# Patient Record
Sex: MALE | Race: WHITE | NOT HISPANIC OR LATINO | Employment: OTHER
[De-identification: names, ages, dates, MRNs, and addresses within clinical notes are randomized per-mention and may not be internally consistent; named-entity substitution may affect disease eponyms.]

---

## 2017-01-17 ENCOUNTER — RECORDS - HEALTHEAST (OUTPATIENT)
Dept: ADMINISTRATIVE | Facility: OTHER | Age: 77
End: 2017-01-17

## 2017-01-17 ENCOUNTER — AMBULATORY - HEALTHEAST (OUTPATIENT)
Dept: CARDIOLOGY | Facility: CLINIC | Age: 77
End: 2017-01-17

## 2017-01-17 DIAGNOSIS — I63.9 STROKE (H): ICD-10-CM

## 2017-01-18 ENCOUNTER — RECORDS - HEALTHEAST (OUTPATIENT)
Dept: LAB | Facility: CLINIC | Age: 77
End: 2017-01-18

## 2017-01-18 LAB
CHOLEST SERPL-MCNC: 133 MG/DL
FASTING STATUS PATIENT QL REPORTED: NORMAL
HDLC SERPL-MCNC: 45 MG/DL
LDLC SERPL CALC-MCNC: 72 MG/DL
PSA SERPL-MCNC: 0.3 NG/ML (ref 0–6.5)
TRIGL SERPL-MCNC: 82 MG/DL

## 2017-01-24 ENCOUNTER — HOSPITAL ENCOUNTER (OUTPATIENT)
Dept: CARDIOLOGY | Facility: HOSPITAL | Age: 77
Discharge: HOME | End: 2017-01-24
Attending: FAMILY MEDICINE

## 2017-01-24 ENCOUNTER — HOSPITAL ENCOUNTER (OUTPATIENT)
Dept: CARDIOLOGY | Facility: HOSPITAL | Age: 77
Discharge: HOME | End: 2017-01-24
Attending: PSYCHIATRY & NEUROLOGY

## 2017-01-24 DIAGNOSIS — I63.9 STROKE (H): ICD-10-CM

## 2017-01-24 LAB
AORTIC ROOT: 3.4 CM
ATRIAL RATE - MUSE: 54 BPM
BSA FOR ECHO PROCEDURE: 2.29 M2
CV ECHO HEIGHT: 73 IN
CV ECHO WEIGHT: 225 LBS
DIASTOLIC BLOOD PRESSURE - MUSE: NORMAL MMHG
DOP CALC LVOT AREA: 3.8 CM2
DOP CALC LVOT DIAMETER: 2.2 CM
DOP CALC LVOT PEAK VEL: 120 CM/S
DOP CALC LVOT STROKE VOLUME: 110.2 CM3
DOP CALCLVOT PEAK VEL VTI: 29 CM
EJECTION FRACTION: 60 % (ref 55–75)
FRACTIONAL SHORTENING: 45.2 % (ref 28–44)
INTERPRETATION ECG - MUSE: NORMAL
INTERVENTRICULAR SEPTUM IN END DIASTOLE: 0.98 CM (ref 0.6–1)
IVS/PW RATIO: 0.9
LA AREA 1: 20.1 CM2
LA AREA 2: 17.4 CM2
LEFT ATRIUM LENGTH: 5.34 CM
LEFT ATRIUM SIZE: 4.1 CM
LEFT ATRIUM TO AORTIC ROOT RATIO: 1.21 NO UNITS
LEFT ATRIUM VOLUME INDEX: 24.3 ML/M2
LEFT ATRIUM VOLUME: 55.7 CM3
LEFT VENTRICLE CARDIAC INDEX: 2.8 L/MIN/M2
LEFT VENTRICLE CARDIAC OUTPUT: 6.4 L/MIN
LEFT VENTRICLE DIASTOLIC VOLUME INDEX: 40.6 CM3/M2 (ref 34–74)
LEFT VENTRICLE DIASTOLIC VOLUME: 93 CM3 (ref 62–150)
LEFT VENTRICLE HEART RATE: 58 BPM
LEFT VENTRICLE MASS INDEX: 91.1 G/M2
LEFT VENTRICLE SYSTOLIC VOLUME INDEX: 16.2 CM3/M2 (ref 11–31)
LEFT VENTRICLE SYSTOLIC VOLUME: 37.1 CM3 (ref 21–61)
LEFT VENTRICULAR INTERNAL DIMENSION IN DIASTOLE: 5.38 CM (ref 4.2–5.8)
LEFT VENTRICULAR INTERNAL DIMENSION IN SYSTOLE: 2.95 CM (ref 2.5–4)
LEFT VENTRICULAR MASS: 208.6 G
LEFT VENTRICULAR OUTFLOW TRACT MEAN GRADIENT: 2 MMHG
LEFT VENTRICULAR OUTFLOW TRACT MEAN VELOCITY: 67.9 CM/S
LEFT VENTRICULAR OUTFLOW TRACT PEAK GRADIENT: 6 MMHG
LEFT VENTRICULAR POSTERIOR WALL IN END DIASTOLE: 1.04 CM (ref 0.6–1)
LV STROKE VOLUME INDEX: 48.1 ML/M2
MITRAL VALVE E/A RATIO: 0.8
MV AVERAGE E/E' RATIO: 10.6 CM/S
MV DECELERATION TIME: 268 MS
MV E'TISSUE VEL-LAT: 7.74 CM/S
MV E'TISSUE VEL-MED: 6.58 CM/S
MV LATERAL E/E' RATIO: 9.8
MV MEDIAL E/E' RATIO: 11.6
MV PEAK A VELOCITY: 98.5 CM/S
MV PEAK E VELOCITY: 76.2 CM/S
NUC REST DIASTOLIC VOLUME INDEX: 3600 LBS
NUC REST SYSTOLIC VOLUME INDEX: 73 IN
P AXIS - MUSE: -7 DEGREES
PR INTERVAL - MUSE: 192 MS
QRS DURATION - MUSE: 170 MS
QT - MUSE: 472 MS
QTC - MUSE: 447 MS
R AXIS - MUSE: 44 DEGREES
RIGHT VENTRICULAR INTERNAL DIMENSION IN DYSTOLE: 2.47 CM
SYSTOLIC BLOOD PRESSURE - MUSE: NORMAL MMHG
T AXIS - MUSE: -4 DEGREES
TRICUSPID REGURGITATION PEAK PRESSURE GRADIENT: 31.6 MMHG
TRICUSPID VALVE PEAK REGURGITANT VELOCITY: 281 CM/S
VENTRICULAR RATE- MUSE: 54 BPM

## 2017-01-24 ASSESSMENT — MIFFLIN-ST. JEOR: SCORE: 1789.47

## 2017-04-20 ENCOUNTER — RECORDS - HEALTHEAST (OUTPATIENT)
Dept: LAB | Facility: CLINIC | Age: 77
End: 2017-04-20

## 2017-04-20 LAB
CHOLEST SERPL-MCNC: 149 MG/DL
FASTING STATUS PATIENT QL REPORTED: NORMAL
HDLC SERPL-MCNC: 43 MG/DL
LDLC SERPL CALC-MCNC: 87 MG/DL
TRIGL SERPL-MCNC: 94 MG/DL

## 2017-07-25 ENCOUNTER — RECORDS - HEALTHEAST (OUTPATIENT)
Dept: LAB | Facility: CLINIC | Age: 77
End: 2017-07-25

## 2017-07-25 LAB
CHOLEST SERPL-MCNC: 121 MG/DL
FASTING STATUS PATIENT QL REPORTED: ABNORMAL
HDLC SERPL-MCNC: 35 MG/DL
LDLC SERPL CALC-MCNC: 61 MG/DL
TRIGL SERPL-MCNC: 125 MG/DL

## 2017-10-27 ENCOUNTER — RECORDS - HEALTHEAST (OUTPATIENT)
Dept: LAB | Facility: CLINIC | Age: 77
End: 2017-10-27

## 2017-10-27 LAB
CHOLEST SERPL-MCNC: 131 MG/DL
FASTING STATUS PATIENT QL REPORTED: NORMAL
HDLC SERPL-MCNC: 44 MG/DL
LDLC SERPL CALC-MCNC: 68 MG/DL
TRIGL SERPL-MCNC: 97 MG/DL

## 2018-01-31 ENCOUNTER — RECORDS - HEALTHEAST (OUTPATIENT)
Dept: LAB | Facility: CLINIC | Age: 78
End: 2018-01-31

## 2018-01-31 LAB
ALBUMIN SERPL-MCNC: 3.7 G/DL (ref 3.5–5)
ALP SERPL-CCNC: 101 U/L (ref 45–120)
ALT SERPL W P-5'-P-CCNC: 17 U/L (ref 0–45)
ANION GAP SERPL CALCULATED.3IONS-SCNC: 12 MMOL/L (ref 5–18)
AST SERPL W P-5'-P-CCNC: 13 U/L (ref 0–40)
BILIRUB SERPL-MCNC: 0.6 MG/DL (ref 0–1)
BUN SERPL-MCNC: 21 MG/DL (ref 8–28)
CALCIUM SERPL-MCNC: 9 MG/DL (ref 8.5–10.5)
CHLORIDE BLD-SCNC: 111 MMOL/L (ref 98–107)
CHOLEST SERPL-MCNC: 135 MG/DL
CO2 SERPL-SCNC: 20 MMOL/L (ref 22–31)
CREAT SERPL-MCNC: 0.99 MG/DL (ref 0.7–1.3)
FASTING STATUS PATIENT QL REPORTED: NORMAL
GFR SERPL CREATININE-BSD FRML MDRD: >60 ML/MIN/1.73M2
GLUCOSE BLD-MCNC: 117 MG/DL (ref 70–125)
HDLC SERPL-MCNC: 47 MG/DL
LDLC SERPL CALC-MCNC: 72 MG/DL
POTASSIUM BLD-SCNC: 4.5 MMOL/L (ref 3.5–5)
PROT SERPL-MCNC: 6.7 G/DL (ref 6–8)
SODIUM SERPL-SCNC: 143 MMOL/L (ref 136–145)
TRIGL SERPL-MCNC: 80 MG/DL

## 2018-08-14 ENCOUNTER — RECORDS - HEALTHEAST (OUTPATIENT)
Dept: LAB | Facility: CLINIC | Age: 78
End: 2018-08-14

## 2018-08-14 LAB
ALBUMIN SERPL-MCNC: 4 G/DL (ref 3.5–5)
ALP SERPL-CCNC: 110 U/L (ref 45–120)
ALT SERPL W P-5'-P-CCNC: 18 U/L (ref 0–45)
ANION GAP SERPL CALCULATED.3IONS-SCNC: 12 MMOL/L (ref 5–18)
AST SERPL W P-5'-P-CCNC: 13 U/L (ref 0–40)
BILIRUB SERPL-MCNC: 0.5 MG/DL (ref 0–1)
BUN SERPL-MCNC: 20 MG/DL (ref 8–28)
CALCIUM SERPL-MCNC: 9.6 MG/DL (ref 8.5–10.5)
CHLORIDE BLD-SCNC: 113 MMOL/L (ref 98–107)
CHOLEST SERPL-MCNC: 134 MG/DL
CO2 SERPL-SCNC: 19 MMOL/L (ref 22–31)
CREAT SERPL-MCNC: 1 MG/DL (ref 0.7–1.3)
FASTING STATUS PATIENT QL REPORTED: YES
GFR SERPL CREATININE-BSD FRML MDRD: >60 ML/MIN/1.73M2
GLUCOSE BLD-MCNC: 123 MG/DL (ref 70–125)
HDLC SERPL-MCNC: 45 MG/DL
LDLC SERPL CALC-MCNC: 72 MG/DL
POTASSIUM BLD-SCNC: 4.8 MMOL/L (ref 3.5–5)
PROT SERPL-MCNC: 6.6 G/DL (ref 6–8)
PSA SERPL-MCNC: 0.3 NG/ML (ref 0–6.5)
SODIUM SERPL-SCNC: 144 MMOL/L (ref 136–145)
TRIGL SERPL-MCNC: 86 MG/DL
TSH SERPL DL<=0.005 MIU/L-ACNC: 3.98 UIU/ML (ref 0.3–5)

## 2018-11-15 ENCOUNTER — RECORDS - HEALTHEAST (OUTPATIENT)
Dept: LAB | Facility: CLINIC | Age: 78
End: 2018-11-15

## 2018-11-15 LAB
ALBUMIN SERPL-MCNC: 3.7 G/DL (ref 3.5–5)
ALP SERPL-CCNC: 146 U/L (ref 45–120)
ALT SERPL W P-5'-P-CCNC: 11 U/L (ref 0–45)
ANION GAP SERPL CALCULATED.3IONS-SCNC: 8 MMOL/L (ref 5–18)
AST SERPL W P-5'-P-CCNC: 12 U/L (ref 0–40)
BILIRUB SERPL-MCNC: 0.4 MG/DL (ref 0–1)
BUN SERPL-MCNC: 27 MG/DL (ref 8–28)
CALCIUM SERPL-MCNC: 9.4 MG/DL (ref 8.5–10.5)
CHLORIDE BLD-SCNC: 107 MMOL/L (ref 98–107)
CO2 SERPL-SCNC: 28 MMOL/L (ref 22–31)
CREAT SERPL-MCNC: 1.07 MG/DL (ref 0.7–1.3)
GFR SERPL CREATININE-BSD FRML MDRD: >60 ML/MIN/1.73M2
GLUCOSE BLD-MCNC: 127 MG/DL (ref 70–125)
POTASSIUM BLD-SCNC: 4.3 MMOL/L (ref 3.5–5)
PROT SERPL-MCNC: 6.6 G/DL (ref 6–8)
SODIUM SERPL-SCNC: 143 MMOL/L (ref 136–145)

## 2019-02-19 ENCOUNTER — RECORDS - HEALTHEAST (OUTPATIENT)
Dept: LAB | Facility: CLINIC | Age: 79
End: 2019-02-19

## 2019-02-19 LAB
ALBUMIN SERPL-MCNC: 3.8 G/DL (ref 3.5–5)
ALP SERPL-CCNC: 115 U/L (ref 45–120)
ALT SERPL W P-5'-P-CCNC: 17 U/L (ref 0–45)
ANION GAP SERPL CALCULATED.3IONS-SCNC: 9 MMOL/L (ref 5–18)
AST SERPL W P-5'-P-CCNC: 14 U/L (ref 0–40)
BILIRUB SERPL-MCNC: 0.4 MG/DL (ref 0–1)
BUN SERPL-MCNC: 23 MG/DL (ref 8–28)
CALCIUM SERPL-MCNC: 9.4 MG/DL (ref 8.5–10.5)
CHLORIDE BLD-SCNC: 109 MMOL/L (ref 98–107)
CHOLEST SERPL-MCNC: 139 MG/DL
CO2 SERPL-SCNC: 23 MMOL/L (ref 22–31)
CREAT SERPL-MCNC: 0.92 MG/DL (ref 0.7–1.3)
FASTING STATUS PATIENT QL REPORTED: NORMAL
GFR SERPL CREATININE-BSD FRML MDRD: >60 ML/MIN/1.73M2
GLUCOSE BLD-MCNC: 109 MG/DL (ref 70–125)
HDLC SERPL-MCNC: 45 MG/DL
LDLC SERPL CALC-MCNC: 75 MG/DL
POTASSIUM BLD-SCNC: 4.8 MMOL/L (ref 3.5–5)
PROT SERPL-MCNC: 6.5 G/DL (ref 6–8)
SODIUM SERPL-SCNC: 141 MMOL/L (ref 136–145)
TRIGL SERPL-MCNC: 95 MG/DL
TSH SERPL DL<=0.005 MIU/L-ACNC: 3.39 UIU/ML (ref 0.3–5)

## 2019-04-15 ENCOUNTER — HOME CARE/HOSPICE - HEALTHEAST (OUTPATIENT)
Dept: HOME HEALTH SERVICES | Facility: HOME HEALTH | Age: 79
End: 2019-04-15

## 2019-04-16 ENCOUNTER — HOME CARE/HOSPICE - HEALTHEAST (OUTPATIENT)
Dept: HOME HEALTH SERVICES | Facility: HOME HEALTH | Age: 79
End: 2019-04-16

## 2019-04-17 ENCOUNTER — HOME CARE/HOSPICE - HEALTHEAST (OUTPATIENT)
Dept: HOME HEALTH SERVICES | Facility: HOME HEALTH | Age: 79
End: 2019-04-17

## 2019-04-18 ENCOUNTER — HOME CARE/HOSPICE - HEALTHEAST (OUTPATIENT)
Dept: HOME HEALTH SERVICES | Facility: HOME HEALTH | Age: 79
End: 2019-04-18

## 2019-04-22 ENCOUNTER — HOME CARE/HOSPICE - HEALTHEAST (OUTPATIENT)
Dept: HOME HEALTH SERVICES | Facility: HOME HEALTH | Age: 79
End: 2019-04-22

## 2019-04-25 ENCOUNTER — HOME CARE/HOSPICE - HEALTHEAST (OUTPATIENT)
Dept: HOME HEALTH SERVICES | Facility: HOME HEALTH | Age: 79
End: 2019-04-25

## 2019-04-29 ENCOUNTER — HOME CARE/HOSPICE - HEALTHEAST (OUTPATIENT)
Dept: HOME HEALTH SERVICES | Facility: HOME HEALTH | Age: 79
End: 2019-04-29

## 2019-04-30 ENCOUNTER — HOME CARE/HOSPICE - HEALTHEAST (OUTPATIENT)
Dept: HOME HEALTH SERVICES | Facility: HOME HEALTH | Age: 79
End: 2019-04-30

## 2019-05-21 ENCOUNTER — RECORDS - HEALTHEAST (OUTPATIENT)
Dept: LAB | Facility: CLINIC | Age: 79
End: 2019-05-21

## 2019-05-21 LAB
ALBUMIN SERPL-MCNC: 4.1 G/DL (ref 3.5–5)
ALP SERPL-CCNC: 120 U/L (ref 45–120)
ALT SERPL W P-5'-P-CCNC: 15 U/L (ref 0–45)
ANION GAP SERPL CALCULATED.3IONS-SCNC: 12 MMOL/L (ref 5–18)
AST SERPL W P-5'-P-CCNC: 15 U/L (ref 0–40)
BILIRUB SERPL-MCNC: 0.7 MG/DL (ref 0–1)
BUN SERPL-MCNC: 24 MG/DL (ref 8–28)
CALCIUM SERPL-MCNC: 9.9 MG/DL (ref 8.5–10.5)
CHLORIDE BLD-SCNC: 107 MMOL/L (ref 98–107)
CO2 SERPL-SCNC: 22 MMOL/L (ref 22–31)
CREAT SERPL-MCNC: 1.04 MG/DL (ref 0.7–1.3)
GFR SERPL CREATININE-BSD FRML MDRD: >60 ML/MIN/1.73M2
GLUCOSE BLD-MCNC: 92 MG/DL (ref 70–125)
POTASSIUM BLD-SCNC: 4.9 MMOL/L (ref 3.5–5)
PROT SERPL-MCNC: 7 G/DL (ref 6–8)
SODIUM SERPL-SCNC: 141 MMOL/L (ref 136–145)

## 2019-07-05 ENCOUNTER — RECORDS - HEALTHEAST (OUTPATIENT)
Dept: LAB | Facility: HOSPITAL | Age: 79
End: 2019-07-05

## 2019-07-05 LAB
TSH SERPL DL<=0.005 MIU/L-ACNC: 3.78 UIU/ML (ref 0.3–5)
VIT B12 SERPL-MCNC: 374 PG/ML (ref 213–816)

## 2019-07-08 ENCOUNTER — AMBULATORY - HEALTHEAST (OUTPATIENT)
Dept: NEUROLOGY | Facility: CLINIC | Age: 79
End: 2019-07-08

## 2019-07-08 DIAGNOSIS — R51.9 HEAD ACHE: ICD-10-CM

## 2019-07-08 DIAGNOSIS — R41.3 MEMORY LOSS: ICD-10-CM

## 2019-07-08 LAB — VIT B1 PYROPHOSHATE BLD-SCNC: 135 NMOL/L (ref 70–180)

## 2019-08-22 ENCOUNTER — RECORDS - HEALTHEAST (OUTPATIENT)
Dept: LAB | Facility: CLINIC | Age: 79
End: 2019-08-22

## 2019-08-22 LAB — PSA SERPL-MCNC: 1.9 NG/ML (ref 0–6.5)

## 2020-02-27 ENCOUNTER — RECORDS - HEALTHEAST (OUTPATIENT)
Dept: LAB | Facility: CLINIC | Age: 80
End: 2020-02-27

## 2020-02-27 LAB
ANION GAP SERPL CALCULATED.3IONS-SCNC: 11 MMOL/L (ref 5–18)
BUN SERPL-MCNC: 26 MG/DL (ref 8–28)
CALCIUM SERPL-MCNC: 9.3 MG/DL (ref 8.5–10.5)
CHLORIDE BLD-SCNC: 107 MMOL/L (ref 98–107)
CHOLEST SERPL-MCNC: 133 MG/DL
CO2 SERPL-SCNC: 24 MMOL/L (ref 22–31)
CREAT SERPL-MCNC: 1.07 MG/DL (ref 0.7–1.3)
FASTING STATUS PATIENT QL REPORTED: NORMAL
GFR SERPL CREATININE-BSD FRML MDRD: >60 ML/MIN/1.73M2
GLUCOSE BLD-MCNC: 108 MG/DL (ref 70–125)
HDLC SERPL-MCNC: 43 MG/DL
LDLC SERPL CALC-MCNC: 68 MG/DL
POTASSIUM BLD-SCNC: 4.3 MMOL/L (ref 3.5–5)
SODIUM SERPL-SCNC: 142 MMOL/L (ref 136–145)
TRIGL SERPL-MCNC: 112 MG/DL

## 2020-06-29 ENCOUNTER — RECORDS - HEALTHEAST (OUTPATIENT)
Dept: LAB | Facility: CLINIC | Age: 80
End: 2020-06-29

## 2020-06-29 LAB
ANION GAP SERPL CALCULATED.3IONS-SCNC: 13 MMOL/L (ref 5–18)
BUN SERPL-MCNC: 27 MG/DL (ref 8–28)
CALCIUM SERPL-MCNC: 9.2 MG/DL (ref 8.5–10.5)
CHLORIDE BLD-SCNC: 110 MMOL/L (ref 98–107)
CO2 SERPL-SCNC: 19 MMOL/L (ref 22–31)
CREAT SERPL-MCNC: 1.05 MG/DL (ref 0.7–1.3)
CREAT UR-MCNC: 92.1 MG/DL
GFR SERPL CREATININE-BSD FRML MDRD: >60 ML/MIN/1.73M2
GLUCOSE BLD-MCNC: 107 MG/DL (ref 70–125)
MICROALBUMIN UR-MCNC: 2.9 MG/DL (ref 0–1.99)
MICROALBUMIN/CREAT UR: 31.5 MG/G
POTASSIUM BLD-SCNC: 4.5 MMOL/L (ref 3.5–5)
SODIUM SERPL-SCNC: 142 MMOL/L (ref 136–145)

## 2020-11-02 ENCOUNTER — RECORDS - HEALTHEAST (OUTPATIENT)
Dept: LAB | Facility: CLINIC | Age: 80
End: 2020-11-02

## 2020-11-02 LAB
ANION GAP SERPL CALCULATED.3IONS-SCNC: 11 MMOL/L (ref 5–18)
BUN SERPL-MCNC: 29 MG/DL (ref 8–28)
CALCIUM SERPL-MCNC: 9.2 MG/DL (ref 8.5–10.5)
CHLORIDE BLD-SCNC: 108 MMOL/L (ref 98–107)
CO2 SERPL-SCNC: 23 MMOL/L (ref 22–31)
CREAT SERPL-MCNC: 1.07 MG/DL (ref 0.7–1.3)
GFR SERPL CREATININE-BSD FRML MDRD: >60 ML/MIN/1.73M2
GLUCOSE BLD-MCNC: 109 MG/DL (ref 70–125)
POTASSIUM BLD-SCNC: 4.3 MMOL/L (ref 3.5–5)
SODIUM SERPL-SCNC: 142 MMOL/L (ref 136–145)

## 2021-03-02 ENCOUNTER — RECORDS - HEALTHEAST (OUTPATIENT)
Dept: LAB | Facility: CLINIC | Age: 81
End: 2021-03-02

## 2021-03-02 LAB
ALBUMIN SERPL-MCNC: 4.2 G/DL (ref 3.5–5)
ANION GAP SERPL CALCULATED.3IONS-SCNC: 11 MMOL/L (ref 5–18)
BUN SERPL-MCNC: 26 MG/DL (ref 8–28)
CALCIUM SERPL-MCNC: 9.1 MG/DL (ref 8.5–10.5)
CHLORIDE BLD-SCNC: 108 MMOL/L (ref 98–107)
CHOLEST SERPL-MCNC: 159 MG/DL
CO2 SERPL-SCNC: 23 MMOL/L (ref 22–31)
CREAT SERPL-MCNC: 1.13 MG/DL (ref 0.7–1.3)
FASTING STATUS PATIENT QL REPORTED: NORMAL
GFR SERPL CREATININE-BSD FRML MDRD: >60 ML/MIN/1.73M2
GLUCOSE BLD-MCNC: 141 MG/DL (ref 70–125)
HDLC SERPL-MCNC: 49 MG/DL
LDLC SERPL CALC-MCNC: 94 MG/DL
PHOSPHATE SERPL-MCNC: 3 MG/DL (ref 2.5–4.5)
POTASSIUM BLD-SCNC: 4.3 MMOL/L (ref 3.5–5)
SODIUM SERPL-SCNC: 142 MMOL/L (ref 136–145)
TRIGL SERPL-MCNC: 82 MG/DL

## 2021-04-08 ENCOUNTER — COMMUNICATION - HEALTHEAST (OUTPATIENT)
Dept: SCHEDULING | Facility: CLINIC | Age: 81
End: 2021-04-08

## 2021-04-14 ENCOUNTER — COMMUNICATION - HEALTHEAST (OUTPATIENT)
Dept: SCHEDULING | Facility: CLINIC | Age: 81
End: 2021-04-14

## 2021-04-14 ENCOUNTER — OFFICE VISIT - HEALTHEAST (OUTPATIENT)
Dept: GERIATRICS | Facility: CLINIC | Age: 81
End: 2021-04-14

## 2021-04-14 DIAGNOSIS — R33.8 ACUTE URINARY RETENTION: ICD-10-CM

## 2021-04-14 DIAGNOSIS — E11.9 DIABETES MELLITUS TYPE 2, NONINSULIN DEPENDENT (H): ICD-10-CM

## 2021-04-14 DIAGNOSIS — E78.5 DYSLIPIDEMIA: ICD-10-CM

## 2021-04-14 DIAGNOSIS — G20.A1 PARKINSON'S DISEASE (H): ICD-10-CM

## 2021-04-14 ASSESSMENT — MIFFLIN-ST. JEOR: SCORE: 1697.39

## 2021-04-16 ENCOUNTER — OFFICE VISIT - HEALTHEAST (OUTPATIENT)
Dept: GERIATRICS | Facility: CLINIC | Age: 81
End: 2021-04-16

## 2021-04-16 DIAGNOSIS — I65.22 STENOSIS OF LEFT INTERNAL CAROTID ARTERY: ICD-10-CM

## 2021-04-16 DIAGNOSIS — E11.9 DIABETES MELLITUS TYPE 2, NONINSULIN DEPENDENT (H): ICD-10-CM

## 2021-04-16 DIAGNOSIS — I16.0 HYPERTENSIVE URGENCY: ICD-10-CM

## 2021-04-16 DIAGNOSIS — G93.41 ACUTE METABOLIC ENCEPHALOPATHY: ICD-10-CM

## 2021-04-16 DIAGNOSIS — E78.5 DYSLIPIDEMIA: ICD-10-CM

## 2021-04-16 DIAGNOSIS — R60.0 EDEMA, LOWER EXTREMITY: ICD-10-CM

## 2021-04-16 DIAGNOSIS — I63.9 CEREBROVASCULAR ACCIDENT (CVA), UNSPECIFIED MECHANISM (H): ICD-10-CM

## 2021-04-16 DIAGNOSIS — I10 ESSENTIAL HYPERTENSION: ICD-10-CM

## 2021-04-16 DIAGNOSIS — G20.A1 PARKINSON'S DISEASE (H): ICD-10-CM

## 2021-04-16 DIAGNOSIS — R33.8 ACUTE URINARY RETENTION: ICD-10-CM

## 2021-04-16 ASSESSMENT — MIFFLIN-ST. JEOR: SCORE: 1701.92

## 2021-04-19 ENCOUNTER — RECORDS - HEALTHEAST (OUTPATIENT)
Dept: LAB | Facility: CLINIC | Age: 81
End: 2021-04-19

## 2021-04-19 ENCOUNTER — OFFICE VISIT - HEALTHEAST (OUTPATIENT)
Dept: GERIATRICS | Facility: CLINIC | Age: 81
End: 2021-04-19

## 2021-04-19 DIAGNOSIS — G20.A1 PARKINSON'S DISEASE (H): ICD-10-CM

## 2021-04-19 DIAGNOSIS — R33.8 ACUTE URINARY RETENTION: ICD-10-CM

## 2021-04-19 DIAGNOSIS — R41.0 CONFUSION: ICD-10-CM

## 2021-04-19 ASSESSMENT — MIFFLIN-ST. JEOR: SCORE: 1703.29

## 2021-04-20 LAB
ALBUMIN SERPL-MCNC: 3.6 G/DL (ref 3.5–5)
ALP SERPL-CCNC: 106 U/L (ref 45–120)
ALT SERPL W P-5'-P-CCNC: 20 U/L (ref 0–45)
ANION GAP SERPL CALCULATED.3IONS-SCNC: 9 MMOL/L (ref 5–18)
AST SERPL W P-5'-P-CCNC: 15 U/L (ref 0–40)
BILIRUB SERPL-MCNC: 0.4 MG/DL (ref 0–1)
BUN SERPL-MCNC: 21 MG/DL (ref 8–28)
CALCIUM SERPL-MCNC: 9.3 MG/DL (ref 8.5–10.5)
CHLORIDE BLD-SCNC: 106 MMOL/L (ref 98–107)
CO2 SERPL-SCNC: 24 MMOL/L (ref 22–31)
CREAT SERPL-MCNC: 1.06 MG/DL (ref 0.7–1.3)
ERYTHROCYTE [DISTWIDTH] IN BLOOD BY AUTOMATED COUNT: 13.7 % (ref 11–14.5)
GFR SERPL CREATININE-BSD FRML MDRD: >60 ML/MIN/1.73M2
GLUCOSE BLD-MCNC: 105 MG/DL (ref 70–125)
HCT VFR BLD AUTO: 41.2 % (ref 40–54)
HGB BLD-MCNC: 13.2 G/DL (ref 14–18)
MCH RBC QN AUTO: 28.6 PG (ref 27–34)
MCHC RBC AUTO-ENTMCNC: 32 G/DL (ref 32–36)
MCV RBC AUTO: 89 FL (ref 80–100)
PLATELET # BLD AUTO: 261 THOU/UL (ref 140–440)
PMV BLD AUTO: 11.5 FL (ref 8.5–12.5)
POTASSIUM BLD-SCNC: 5 MMOL/L (ref 3.5–5)
PROT SERPL-MCNC: 6.9 G/DL (ref 6–8)
RBC # BLD AUTO: 4.61 MILL/UL (ref 4.4–6.2)
SODIUM SERPL-SCNC: 139 MMOL/L (ref 136–145)
WBC: 10.1 THOU/UL (ref 4–11)

## 2021-04-23 ENCOUNTER — AMBULATORY - HEALTHEAST (OUTPATIENT)
Dept: GERIATRICS | Facility: CLINIC | Age: 81
End: 2021-04-23

## 2021-04-30 ENCOUNTER — TRANSFERRED RECORDS (OUTPATIENT)
Dept: HEALTH INFORMATION MANAGEMENT | Facility: CLINIC | Age: 81
End: 2021-04-30

## 2021-05-25 ENCOUNTER — HOSPITAL ENCOUNTER (INPATIENT)
Facility: CLINIC | Age: 81
LOS: 6 days | Discharge: NURSING, REHAB OR LONG-TERM CARE | DRG: 086 | End: 2021-06-01
Attending: EMERGENCY MEDICINE | Admitting: INTERNAL MEDICINE
Payer: COMMERCIAL

## 2021-05-25 ENCOUNTER — HOSPITAL ENCOUNTER (EMERGENCY)
Dept: EMERGENCY MEDICINE | Facility: HOSPITAL | Age: 81
Discharge: ANOTHER HOSPITAL | End: 2021-05-25
Attending: EMERGENCY MEDICINE
Payer: COMMERCIAL
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
## 2021-05-25 DIAGNOSIS — N30.00 ACUTE CYSTITIS WITHOUT HEMATURIA: ICD-10-CM

## 2021-05-25 DIAGNOSIS — S06.320A: ICD-10-CM
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
## 2021-05-25 DIAGNOSIS — S00.83XA TRAUMATIC HEMATOMA OF FOREHEAD, INITIAL ENCOUNTER: ICD-10-CM

## 2021-05-25 DIAGNOSIS — I61.9 INTRAPARENCHYMAL HEMORRHAGE OF BRAIN (H): ICD-10-CM

## 2021-05-25 DIAGNOSIS — N39.0 URINARY TRACT INFECTION WITHOUT HEMATURIA, SITE UNSPECIFIED: ICD-10-CM

## 2021-05-25 DIAGNOSIS — I62.9 INTRACRANIAL HEMORRHAGE (H): ICD-10-CM

## 2021-05-25 DIAGNOSIS — I45.10 RBBB (RIGHT BUNDLE BRANCH BLOCK): ICD-10-CM

## 2021-05-25 DIAGNOSIS — S01.81XA FACIAL LACERATION, INITIAL ENCOUNTER: ICD-10-CM
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
## 2021-05-25 LAB
ALBUMIN SERPL-MCNC: 4.1 G/DL (ref 3.5–5)
ALP SERPL-CCNC: 122 U/L (ref 45–120)
ALT SERPL W P-5'-P-CCNC: <9 U/L (ref 0–45)
ANION GAP SERPL CALCULATED.3IONS-SCNC: 11 MMOL/L (ref 5–18)
AST SERPL W P-5'-P-CCNC: 12 U/L (ref 0–40)
BASOPHILS # BLD AUTO: 0.1 THOU/UL (ref 0–0.2)
BASOPHILS NFR BLD AUTO: 0 % (ref 0–2)
BILIRUB DIRECT SERPL-MCNC: 0.2 MG/DL
BILIRUB SERPL-MCNC: 0.4 MG/DL (ref 0–1)
BUN SERPL-MCNC: 25 MG/DL (ref 8–28)
CALCIUM SERPL-MCNC: 9.3 MG/DL (ref 8.5–10.5)
CHLORIDE BLD-SCNC: 107 MMOL/L (ref 98–107)
CO2 SERPL-SCNC: 22 MMOL/L (ref 22–31)
CREAT SERPL-MCNC: 1.08 MG/DL (ref 0.7–1.3)
EOSINOPHIL # BLD AUTO: 0.2 THOU/UL (ref 0–0.4)
EOSINOPHIL NFR BLD AUTO: 1 % (ref 0–6)
ERYTHROCYTE [DISTWIDTH] IN BLOOD BY AUTOMATED COUNT: 14.5 % (ref 11–14.5)
GFR SERPL CREATININE-BSD FRML MDRD: >60 ML/MIN/1.73M2
GLUCOSE BLD-MCNC: 143 MG/DL (ref 70–125)
HCT VFR BLD AUTO: 40.7 % (ref 40–54)
HGB BLD-MCNC: 13.3 G/DL (ref 14–18)
IMM GRANULOCYTES # BLD: 0.1 THOU/UL
IMM GRANULOCYTES NFR BLD: 1 %
INR PPP: 1.02 (ref 0.9–1.1)
LYMPHOCYTES # BLD AUTO: 1.1 THOU/UL (ref 0.8–4.4)
LYMPHOCYTES NFR BLD AUTO: 9 % (ref 20–40)
MAGNESIUM SERPL-MCNC: 1.9 MG/DL (ref 1.8–2.6)
MCH RBC QN AUTO: 28.7 PG (ref 27–34)
MCHC RBC AUTO-ENTMCNC: 32.7 G/DL (ref 32–36)
MCV RBC AUTO: 88 FL (ref 80–100)
MONOCYTES # BLD AUTO: 0.8 THOU/UL (ref 0–0.9)
MONOCYTES NFR BLD AUTO: 7 % (ref 2–10)
NEUTROPHILS # BLD AUTO: 9.8 THOU/UL (ref 2–7.7)
NEUTROPHILS NFR BLD AUTO: 82 % (ref 50–70)
PLATELET # BLD AUTO: 216 THOU/UL (ref 140–440)
PMV BLD AUTO: 10.7 FL (ref 8.5–12.5)
POTASSIUM BLD-SCNC: 4.4 MMOL/L (ref 3.5–5)
PROT SERPL-MCNC: 7.5 G/DL (ref 6–8)
RBC # BLD AUTO: 4.63 MILL/UL (ref 4.4–6.2)
SARS-COV-2 PCR RESULT-HE - HISTORICAL: NEGATIVE
SODIUM SERPL-SCNC: 140 MMOL/L (ref 136–145)
TSH SERPL DL<=0.005 MIU/L-ACNC: 2.7 UIU/ML (ref 0.3–5)
WBC: 12 THOU/UL (ref 4–11)

## 2021-05-25 PROCEDURE — 96365 THER/PROPH/DIAG IV INF INIT: CPT | Performed by: EMERGENCY MEDICINE

## 2021-05-25 PROCEDURE — 99285 EMERGENCY DEPT VISIT HI MDM: CPT | Performed by: EMERGENCY MEDICINE

## 2021-05-25 PROCEDURE — 99285 EMERGENCY DEPT VISIT HI MDM: CPT | Mod: 25 | Performed by: EMERGENCY MEDICINE

## 2021-05-25 SDOH — HEALTH STABILITY: MENTAL HEALTH: HOW OFTEN DO YOU HAVE A DRINK CONTAINING ALCOHOL?: NEVER

## 2021-05-25 ASSESSMENT — ENCOUNTER SYMPTOMS
FEVER: 0
CONFUSION: 0
NECK STIFFNESS: 0
DIFFICULTY URINATING: 0
DYSURIA: 1
HEADACHES: 1
LIGHT-HEADEDNESS: 0
SHORTNESS OF BREATH: 0
EYE REDNESS: 0
VOMITING: 0
NAUSEA: 0
COLOR CHANGE: 0
NUMBNESS: 1
FREQUENCY: 1
WEAKNESS: 1
ARTHRALGIAS: 0
ABDOMINAL PAIN: 0

## 2021-05-25 ASSESSMENT — MIFFLIN-ST. JEOR
SCORE: 1750.91
SCORE: 1750.91

## 2021-05-26 ENCOUNTER — APPOINTMENT (OUTPATIENT)
Dept: PHYSICAL THERAPY | Facility: CLINIC | Age: 81
DRG: 086 | End: 2021-05-26
Payer: COMMERCIAL

## 2021-05-26 ENCOUNTER — APPOINTMENT (OUTPATIENT)
Dept: CT IMAGING | Facility: CLINIC | Age: 81
DRG: 086 | End: 2021-05-26
Attending: NURSE PRACTITIONER
Payer: COMMERCIAL

## 2021-05-26 ENCOUNTER — APPOINTMENT (OUTPATIENT)
Dept: OCCUPATIONAL THERAPY | Facility: CLINIC | Age: 81
DRG: 086 | End: 2021-05-26
Payer: COMMERCIAL

## 2021-05-26 ENCOUNTER — COMMUNICATION - HEALTHEAST (OUTPATIENT)
Dept: SCHEDULING | Facility: CLINIC | Age: 81
End: 2021-05-26

## 2021-05-26 ENCOUNTER — APPOINTMENT (OUTPATIENT)
Dept: GENERAL RADIOLOGY | Facility: CLINIC | Age: 81
DRG: 086 | End: 2021-05-26
Payer: COMMERCIAL

## 2021-05-26 ENCOUNTER — APPOINTMENT (OUTPATIENT)
Dept: CT IMAGING | Facility: CLINIC | Age: 81
DRG: 086 | End: 2021-05-26
Attending: EMERGENCY MEDICINE
Payer: COMMERCIAL

## 2021-05-26 ENCOUNTER — APPOINTMENT (OUTPATIENT)
Dept: CT IMAGING | Facility: CLINIC | Age: 81
DRG: 086 | End: 2021-05-26
Attending: STUDENT IN AN ORGANIZED HEALTH CARE EDUCATION/TRAINING PROGRAM
Payer: COMMERCIAL
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
## 2021-05-26 PROBLEM — I62.9 INTRACRANIAL HEMORRHAGE (H): Status: ACTIVE | Noted: 2021-05-26

## 2021-05-26 LAB
ALBUMIN UR-MCNC: 30 MG/DL
ANION GAP SERPL CALCULATED.3IONS-SCNC: 7 MMOL/L (ref 3–14)
APPEARANCE UR: CLEAR
APTT PPP: 37 SEC (ref 22–37)
ATRIAL RATE - MUSE: 64 BPM
BASOPHILS # BLD AUTO: 0 10E9/L (ref 0–0.2)
BASOPHILS NFR BLD AUTO: 0.4 %
BILIRUB UR QL STRIP: NEGATIVE
BUN SERPL-MCNC: 22 MG/DL (ref 7–30)
CALCIUM SERPL-MCNC: 9 MG/DL (ref 8.5–10.1)
CHLORIDE SERPL-SCNC: 111 MMOL/L (ref 94–109)
CO2 SERPL-SCNC: 24 MMOL/L (ref 20–32)
COLOR UR AUTO: ABNORMAL
CREAT SERPL-MCNC: 0.91 MG/DL (ref 0.66–1.25)
DIASTOLIC BLOOD PRESSURE - MUSE: NORMAL
DIFFERENTIAL METHOD BLD: ABNORMAL
EOSINOPHIL # BLD AUTO: 0.1 10E9/L (ref 0–0.7)
EOSINOPHIL NFR BLD AUTO: 1.1 %
ERYTHROCYTE [DISTWIDTH] IN BLOOD BY AUTOMATED COUNT: 14.6 % (ref 10–15)
GFR SERPL CREATININE-BSD FRML MDRD: 80 ML/MIN/{1.73_M2}
GLUCOSE BLDC GLUCOMTR-MCNC: 107 MG/DL (ref 70–99)
GLUCOSE BLDC GLUCOMTR-MCNC: 122 MG/DL (ref 70–99)
GLUCOSE BLDC GLUCOMTR-MCNC: 147 MG/DL (ref 70–99)
GLUCOSE BLDC GLUCOMTR-MCNC: 155 MG/DL (ref 70–99)
GLUCOSE BLDC GLUCOMTR-MCNC: 168 MG/DL (ref 70–99)
GLUCOSE SERPL-MCNC: 125 MG/DL (ref 70–99)
GLUCOSE UR STRIP-MCNC: NEGATIVE MG/DL
HCT VFR BLD AUTO: 39.8 % (ref 40–53)
HGB BLD-MCNC: 13 G/DL (ref 13.3–17.7)
HGB UR QL STRIP: NEGATIVE
IMM GRANULOCYTES # BLD: 0 10E9/L (ref 0–0.4)
IMM GRANULOCYTES NFR BLD: 0.4 %
INR PPP: 0.99 (ref 0.86–1.14)
INTERPRETATION ECG - MUSE: NORMAL
INTERPRETATION ECG - MUSE: NORMAL
KETONES UR STRIP-MCNC: NEGATIVE MG/DL
LEUKOCYTE ESTERASE UR QL STRIP: NEGATIVE
LYMPHOCYTES # BLD AUTO: 1.2 10E9/L (ref 0.8–5.3)
LYMPHOCYTES NFR BLD AUTO: 11 %
MAGNESIUM SERPL-MCNC: 2 MG/DL (ref 1.6–2.3)
MCH RBC QN AUTO: 28.6 PG (ref 26.5–33)
MCHC RBC AUTO-ENTMCNC: 32.7 G/DL (ref 31.5–36.5)
MCV RBC AUTO: 88 FL (ref 78–100)
MONOCYTES # BLD AUTO: 0.9 10E9/L (ref 0–1.3)
MONOCYTES NFR BLD AUTO: 8.8 %
NEUTROPHILS # BLD AUTO: 8.4 10E9/L (ref 1.6–8.3)
NEUTROPHILS NFR BLD AUTO: 78.3 %
NITRATE UR QL: NEGATIVE
NRBC # BLD AUTO: 0 10*3/UL
NRBC BLD AUTO-RTO: 0 /100
P AXIS - MUSE: 2 DEGREES
PH UR STRIP: 6 PH (ref 5–7)
PHOSPHATE SERPL-MCNC: 2.9 MG/DL (ref 2.5–4.5)
PLATELET # BLD AUTO: 197 10E9/L (ref 150–450)
POTASSIUM SERPL-SCNC: 3.9 MMOL/L (ref 3.4–5.3)
POTASSIUM SERPL-SCNC: 4.2 MMOL/L (ref 3.4–5.3)
PR INTERVAL - MUSE: 190 MS
QRS DURATION - MUSE: 150 MS
QT - MUSE: 436 MS
QTC - MUSE: 449 MS
R AXIS - MUSE: 64 DEGREES
RBC # BLD AUTO: 4.55 10E12/L (ref 4.4–5.9)
RBC #/AREA URNS AUTO: 2 /HPF (ref 0–2)
SODIUM SERPL-SCNC: 143 MMOL/L (ref 133–144)
SOURCE: ABNORMAL
SP GR UR STRIP: 1.02 (ref 1–1.03)
SQUAMOUS #/AREA URNS AUTO: 0 /HPF (ref 0–1)
SYSTOLIC BLOOD PRESSURE - MUSE: NORMAL
T AXIS - MUSE: -6 DEGREES
TROPONIN I SERPL-MCNC: <0.015 UG/L (ref 0–0.04)
UROBILINOGEN UR STRIP-MCNC: NORMAL MG/DL (ref 0–2)
VENTRICULAR RATE- MUSE: 64 BPM
WBC # BLD AUTO: 10.7 10E9/L (ref 4–11)
WBC #/AREA URNS AUTO: 13 /HPF (ref 0–5)

## 2021-05-26 PROCEDURE — 250N000013 HC RX MED GY IP 250 OP 250 PS 637: Performed by: STUDENT IN AN ORGANIZED HEALTH CARE EDUCATION/TRAINING PROGRAM

## 2021-05-26 PROCEDURE — 250N000013 HC RX MED GY IP 250 OP 250 PS 637: Performed by: NURSE PRACTITIONER

## 2021-05-26 PROCEDURE — 85610 PROTHROMBIN TIME: CPT | Performed by: EMERGENCY MEDICINE

## 2021-05-26 PROCEDURE — 81001 URINALYSIS AUTO W/SCOPE: CPT | Performed by: NURSE PRACTITIONER

## 2021-05-26 PROCEDURE — 83735 ASSAY OF MAGNESIUM: CPT | Performed by: STUDENT IN AN ORGANIZED HEALTH CARE EDUCATION/TRAINING PROGRAM

## 2021-05-26 PROCEDURE — 72125 CT NECK SPINE W/O DYE: CPT

## 2021-05-26 PROCEDURE — 97116 GAIT TRAINING THERAPY: CPT | Mod: GP

## 2021-05-26 PROCEDURE — 97535 SELF CARE MNGMENT TRAINING: CPT | Mod: GO | Performed by: OCCUPATIONAL THERAPIST

## 2021-05-26 PROCEDURE — 999N001017 HC STATISTIC GLUCOSE BY METER IP

## 2021-05-26 PROCEDURE — 999N000127 HC STATISTIC PERIPHERAL IV START W US GUIDANCE

## 2021-05-26 PROCEDURE — 97165 OT EVAL LOW COMPLEX 30 MIN: CPT | Mod: GO | Performed by: OCCUPATIONAL THERAPIST

## 2021-05-26 PROCEDURE — 250N000011 HC RX IP 250 OP 636: Performed by: EMERGENCY MEDICINE

## 2021-05-26 PROCEDURE — 97530 THERAPEUTIC ACTIVITIES: CPT | Mod: GO | Performed by: OCCUPATIONAL THERAPIST

## 2021-05-26 PROCEDURE — 120N000002 HC R&B MED SURG/OB UMMC

## 2021-05-26 PROCEDURE — 71045 X-RAY EXAM CHEST 1 VIEW: CPT

## 2021-05-26 PROCEDURE — 85730 THROMBOPLASTIN TIME PARTIAL: CPT | Performed by: EMERGENCY MEDICINE

## 2021-05-26 PROCEDURE — 87086 URINE CULTURE/COLONY COUNT: CPT | Performed by: SURGERY

## 2021-05-26 PROCEDURE — 70450 CT HEAD/BRAIN W/O DYE: CPT

## 2021-05-26 PROCEDURE — 97161 PT EVAL LOW COMPLEX 20 MIN: CPT | Mod: GP

## 2021-05-26 PROCEDURE — 72125 CT NECK SPINE W/O DYE: CPT | Mod: 26 | Performed by: RADIOLOGY

## 2021-05-26 PROCEDURE — 71045 X-RAY EXAM CHEST 1 VIEW: CPT | Mod: 26 | Performed by: RADIOLOGY

## 2021-05-26 PROCEDURE — 70450 CT HEAD/BRAIN W/O DYE: CPT | Mod: 77

## 2021-05-26 PROCEDURE — 84100 ASSAY OF PHOSPHORUS: CPT | Performed by: STUDENT IN AN ORGANIZED HEALTH CARE EDUCATION/TRAINING PROGRAM

## 2021-05-26 PROCEDURE — 85025 COMPLETE CBC W/AUTO DIFF WBC: CPT | Performed by: EMERGENCY MEDICINE

## 2021-05-26 PROCEDURE — 70450 CT HEAD/BRAIN W/O DYE: CPT | Mod: 26 | Performed by: RADIOLOGY

## 2021-05-26 PROCEDURE — 250N000013 HC RX MED GY IP 250 OP 250 PS 637: Performed by: DIETITIAN, REGISTERED

## 2021-05-26 PROCEDURE — 93005 ELECTROCARDIOGRAM TRACING: CPT

## 2021-05-26 PROCEDURE — 97530 THERAPEUTIC ACTIVITIES: CPT | Mod: GP

## 2021-05-26 PROCEDURE — 250N000013 HC RX MED GY IP 250 OP 250 PS 637: Performed by: SURGERY

## 2021-05-26 PROCEDURE — 80048 BASIC METABOLIC PNL TOTAL CA: CPT | Performed by: EMERGENCY MEDICINE

## 2021-05-26 PROCEDURE — 84132 ASSAY OF SERUM POTASSIUM: CPT | Performed by: STUDENT IN AN ORGANIZED HEALTH CARE EDUCATION/TRAINING PROGRAM

## 2021-05-26 PROCEDURE — 93010 ELECTROCARDIOGRAM REPORT: CPT | Performed by: INTERNAL MEDICINE

## 2021-05-26 PROCEDURE — 99232 SBSQ HOSP IP/OBS MODERATE 35: CPT | Performed by: NURSE PRACTITIONER

## 2021-05-26 PROCEDURE — 36415 COLL VENOUS BLD VENIPUNCTURE: CPT | Performed by: STUDENT IN AN ORGANIZED HEALTH CARE EDUCATION/TRAINING PROGRAM

## 2021-05-26 PROCEDURE — 84484 ASSAY OF TROPONIN QUANT: CPT | Performed by: STUDENT IN AN ORGANIZED HEALTH CARE EDUCATION/TRAINING PROGRAM

## 2021-05-26 RX ORDER — LEVETIRACETAM 500 MG/1
500 TABLET ORAL 2 TIMES DAILY
Status: DISCONTINUED | OUTPATIENT
Start: 2021-05-26 | End: 2021-06-01

## 2021-05-26 RX ORDER — DEXTROSE MONOHYDRATE 25 G/50ML
25-50 INJECTION, SOLUTION INTRAVENOUS
Status: DISCONTINUED | OUTPATIENT
Start: 2021-05-26 | End: 2021-05-26

## 2021-05-26 RX ORDER — CLOPIDOGREL BISULFATE 75 MG/1
75 TABLET ORAL DAILY
Status: ON HOLD | COMMUNITY
End: 2021-05-31

## 2021-05-26 RX ORDER — MULTIVITAMIN,THERAPEUTIC
1 TABLET ORAL EVERY EVENING
Status: DISCONTINUED | OUTPATIENT
Start: 2021-05-26 | End: 2021-06-01 | Stop reason: HOSPADM

## 2021-05-26 RX ORDER — ACETAMINOPHEN 325 MG/1
650 TABLET ORAL EVERY 6 HOURS PRN
Status: DISCONTINUED | OUTPATIENT
Start: 2021-05-26 | End: 2021-06-01 | Stop reason: HOSPADM

## 2021-05-26 RX ORDER — LISINOPRIL 40 MG/1
40 TABLET ORAL DAILY
Status: ON HOLD | COMMUNITY
End: 2021-07-20

## 2021-05-26 RX ORDER — CARBIDOPA AND LEVODOPA 25; 100 MG/1; MG/1
1 TABLET ORAL 4 TIMES DAILY
COMMUNITY

## 2021-05-26 RX ORDER — MULTIVITAMIN,THERAPEUTIC
1 TABLET ORAL EVERY EVENING
Status: ON HOLD | COMMUNITY
End: 2021-07-20

## 2021-05-26 RX ORDER — MAGNESIUM OXIDE 400 MG/1
400 TABLET ORAL 2 TIMES DAILY
Status: DISPENSED | OUTPATIENT
Start: 2021-05-26 | End: 2021-05-28

## 2021-05-26 RX ORDER — AMOXICILLIN 250 MG
1 CAPSULE ORAL
Status: DISCONTINUED | OUTPATIENT
Start: 2021-05-26 | End: 2021-06-01 | Stop reason: HOSPADM

## 2021-05-26 RX ORDER — AMLODIPINE BESYLATE 10 MG/1
10 TABLET ORAL DAILY
Status: ON HOLD | COMMUNITY
End: 2021-07-20

## 2021-05-26 RX ORDER — NICOTINE POLACRILEX 4 MG
15-30 LOZENGE BUCCAL
Status: DISCONTINUED | OUTPATIENT
Start: 2021-05-26 | End: 2021-05-26

## 2021-05-26 RX ORDER — ACETAMINOPHEN 500 MG
1000 TABLET ORAL 2 TIMES DAILY
Status: ON HOLD | COMMUNITY
End: 2021-05-31

## 2021-05-26 RX ORDER — SIMVASTATIN 20 MG
20 TABLET ORAL AT BEDTIME
COMMUNITY
End: 2021-07-20

## 2021-05-26 RX ORDER — LEVETIRACETAM 5 MG/ML
500 INJECTION INTRAVASCULAR EVERY 12 HOURS
Status: DISCONTINUED | OUTPATIENT
Start: 2021-05-26 | End: 2021-05-26

## 2021-05-26 RX ORDER — AMLODIPINE BESYLATE 10 MG/1
10 TABLET ORAL DAILY
Status: DISCONTINUED | OUTPATIENT
Start: 2021-05-26 | End: 2021-06-01 | Stop reason: HOSPADM

## 2021-05-26 RX ORDER — SIMVASTATIN 20 MG
20 TABLET ORAL EVERY EVENING
Status: DISCONTINUED | OUTPATIENT
Start: 2021-05-26 | End: 2021-06-01 | Stop reason: HOSPADM

## 2021-05-26 RX ORDER — LISINOPRIL 40 MG/1
40 TABLET ORAL DAILY
Status: DISCONTINUED | OUTPATIENT
Start: 2021-05-26 | End: 2021-06-01 | Stop reason: HOSPADM

## 2021-05-26 RX ORDER — METOPROLOL SUCCINATE 50 MG/1
50 TABLET, EXTENDED RELEASE ORAL DAILY
COMMUNITY

## 2021-05-26 RX ORDER — POLYETHYLENE GLYCOL 3350 17 G/17G
17 POWDER, FOR SOLUTION ORAL DAILY PRN
Status: DISCONTINUED | OUTPATIENT
Start: 2021-05-26 | End: 2021-06-01 | Stop reason: HOSPADM

## 2021-05-26 RX ORDER — NICOTINE POLACRILEX 4 MG
15-30 LOZENGE BUCCAL
Status: DISCONTINUED | OUTPATIENT
Start: 2021-05-26 | End: 2021-06-01 | Stop reason: HOSPADM

## 2021-05-26 RX ORDER — DEXTROSE MONOHYDRATE 25 G/50ML
25-50 INJECTION, SOLUTION INTRAVENOUS
Status: DISCONTINUED | OUTPATIENT
Start: 2021-05-26 | End: 2021-06-01 | Stop reason: HOSPADM

## 2021-05-26 RX ORDER — CIPROFLOXACIN 500 MG/1
500 TABLET, FILM COATED ORAL EVERY 12 HOURS SCHEDULED
Status: DISCONTINUED | OUTPATIENT
Start: 2021-05-26 | End: 2021-05-26

## 2021-05-26 RX ORDER — METOPROLOL TARTRATE 25 MG/1
25 TABLET, FILM COATED ORAL 2 TIMES DAILY
Status: DISCONTINUED | OUTPATIENT
Start: 2021-05-26 | End: 2021-05-29

## 2021-05-26 RX ORDER — CARBIDOPA AND LEVODOPA 25; 100 MG/1; MG/1
1 TABLET ORAL 4 TIMES DAILY
Status: DISCONTINUED | OUTPATIENT
Start: 2021-05-26 | End: 2021-06-01 | Stop reason: HOSPADM

## 2021-05-26 RX ORDER — SIMVASTATIN 20 MG
20 TABLET ORAL AT BEDTIME
Status: DISCONTINUED | OUTPATIENT
Start: 2021-05-26 | End: 2021-05-26

## 2021-05-26 RX ADMIN — SIMVASTATIN 20 MG: 20 TABLET, FILM COATED ORAL at 20:01

## 2021-05-26 RX ADMIN — LEVETIRACETAM 500 MG: 500 TABLET ORAL at 12:13

## 2021-05-26 RX ADMIN — CIPROFLOXACIN 500 MG: 500 TABLET, FILM COATED ORAL at 08:09

## 2021-05-26 RX ADMIN — THERA TABS 1 TABLET: TAB at 20:01

## 2021-05-26 RX ADMIN — LISINOPRIL 40 MG: 40 TABLET ORAL at 12:13

## 2021-05-26 RX ADMIN — LEVETIRACETAM 500 MG: 5 INJECTION INTRAVENOUS at 01:11

## 2021-05-26 RX ADMIN — LEVETIRACETAM 500 MG: 500 TABLET ORAL at 20:01

## 2021-05-26 RX ADMIN — CARBIDOPA AND LEVODOPA 1 TABLET: 25; 100 TABLET ORAL at 20:01

## 2021-05-26 RX ADMIN — CARBIDOPA AND LEVODOPA 1 TABLET: 25; 100 TABLET ORAL at 12:17

## 2021-05-26 RX ADMIN — CARBIDOPA AND LEVODOPA 1 TABLET: 25; 100 TABLET ORAL at 16:41

## 2021-05-26 RX ADMIN — CARBIDOPA AND LEVODOPA 1 TABLET: 25; 100 TABLET ORAL at 08:09

## 2021-05-26 RX ADMIN — ACETAMINOPHEN 650 MG: 325 TABLET, FILM COATED ORAL at 04:27

## 2021-05-26 RX ADMIN — METOPROLOL TARTRATE 25 MG: 25 TABLET, FILM COATED ORAL at 08:09

## 2021-05-26 RX ADMIN — Medication 400 MG: at 20:01

## 2021-05-26 RX ADMIN — AMLODIPINE BESYLATE 10 MG: 10 TABLET ORAL at 08:08

## 2021-05-26 ASSESSMENT — ACTIVITIES OF DAILY LIVING (ADL)
ADLS_ACUITY_SCORE: 19
NUMBER_OF_TIMES_PATIENT_HAS_FALLEN_WITHIN_LAST_SIX_MONTHS: 4
FALL_HISTORY_WITHIN_LAST_SIX_MONTHS: YES
TOILETING_ISSUES: NO
DRESSING/BATHING_DIFFICULTY: NO
EQUIPMENT_CURRENTLY_USED_AT_HOME: CANE, STRAIGHT;WALKER, STANDARD
DIFFICULTY_EATING/SWALLOWING: NO
DOING_ERRANDS_INDEPENDENTLY_DIFFICULTY: YES
ADLS_ACUITY_SCORE: 19
ADLS_ACUITY_SCORE: 19
CONCENTRATING,_REMEMBERING_OR_MAKING_DECISIONS_DIFFICULTY: NO
WEAR_GLASSES_OR_BLIND: YES
WHICH_OF_THE_ABOVE_FUNCTIONAL_RISKS_HAD_A_RECENT_ONSET_OR_CHANGE?: FALL HISTORY
WALKING_OR_CLIMBING_STAIRS_DIFFICULTY: NO
ADLS_ACUITY_SCORE: 19
DIFFICULTY_COMMUNICATING: NO
HEARING_DIFFICULTY_OR_DEAF: NO
ADLS_ACUITY_SCORE: 15
PATIENT_/_FAMILY_COMMUNICATION_STYLE: SPOKEN LANGUAGE (ENGLISH OR BILINGUAL)

## 2021-05-26 ASSESSMENT — VISUAL ACUITY
OU: BASELINE;GLASSES
OU: BASELINE;GLASSES

## 2021-05-26 ASSESSMENT — MIFFLIN-ST. JEOR: SCORE: 1704

## 2021-05-26 NOTE — H&P
SURGICAL ICU ADMISSION NOTE  05/26/2021      PRIMARY TEAM: Trauma  PRIMARY PHYSICIAN: Dr. Rico  REASON FOR CRITICAL CARE ADMISSION: close neuro monitoring   ADMITTING PHYSICIAN: Dr. Soto  Date of Service (when I saw the patient): 05/26/2021    ASSESSMENT:  Maikol Glaser is a 80 year old male with history of TIA on Plavix who was admitted on 5/25/2021 following GLF w/ head injury found to have 2.9cm parenchymal hemorrhage. Transferred here from [facility]. Repeat head at 6 hours from initial imaging CT stable. CT C spine with chronic T3 superior plate compression fracture, no acute fractures/subluxation.    Injuries:   - 2.9 intraparenchymal hemorrhage L parietal lobe  - forehead lac x2 s/p repair (one w/ skin glue, one w/ nylon sutures x4)     PLAN:    Neurological:  #2.9cm acute parenchymal hemorrhage  #TIA on plavix  #Parkinson's (recent diagnosis)  - PTA sinemet  - Monitor neurological status. Delirium preventions and precautions.   - Q1H neuro checks  - repeat head CT 0130 stable  - Appreciate NSGY recs  - Keppra 500mg BID  - syncope work up  - Pain: PRN Tylenol      Pulmonary:   - Supplemental oxygen to keep saturation above 92 %.  - Incentive spirometer every 15- 30 minutes when awake.  - CXR given fall    Cardiovascular:    #HTN  #HLD  - SBP goal <140  - resume PTA amlodipine, metoprolol, simvastatin  - hold PTA lisinopril  - EKG and troponin for syncope work up    Gastroenterology/Nutrition:  - NPO until repeat head CT/okayed for diet by NSGY    Fluids/Electrolytes:   - electrolyte repletion protocol    Renal:  # UTI  # Chronic urinary retention  - s/p CTX at OSH, will start Cipro  - monitor intake and output.  - straight cath q6h (home regimen)    Endocrine:  #DM2  - ISS  - hold PTA metformin    ID:  #UTI  - cipro 500mg BID (s/p CTX at OSH)    Heme:     - am CBC    Musculoskeletal:  - Physical and occupational therapy consult    Skin:  # Forehead laceration x2 s/p repair  - forehead lac w/ nylon  sutures    General Cares/Prophylaxis:    DVT Prophylaxis: Pneumatic Compression Devices. No ehemoppx due to head bleed.  GI Prophylaxis: Not indicated  Restraints: Restraints for medical healing needed: NO    Lines/ tubes/ drains:  - pIV x1, will have second placed    Disposition:  - Surgical ICU    Patient seen, findings and plan discussed with surgical ICU staff, Dr. Brittany Tan MD  Surgery, PGY2  [id number]    - - - - - - - - - - - - - - - - - - - - - - - - - - - - - - - - - - - - - - - - - - - - - -   HISTORY PRESENTING ILLNESS:  Maikol Glaser is a 80 year old male with hx TIA on plavix, HTN, DM2, HLD and Parkinson's disease (recent dx) who presents as transfer from [facility] with 3 cm left parietal IPH after ground level mechanical fall. Patient reports he tripped in his kitchen and hit his forehead against a wooden garbage can. He denies LOC. Denies presyncopal symptoms, states he lost his balance. Denies any other associated injuries. His wife and son, who live with him, noted forehead hematoma and brought him to the ED for work up. Evaluation at OSH was significant for CT head w/ 3 cm left parietal IPH, and two small forehead lacerations which were repaired. Patient w/ UA w/ +nitrites, 250WBC, neg LE, for which he was given rocephin.    Patient currently denies any pain, including no headache, neck, back, upper or lower extremity, abdominal, or chest pain. Chronic bilateral foot tingling, unchanged, otherwise no numbness or tingling.     Of note, has had history of minor falls that have not resulted in injury in the past. Uses a cane and walker when he is outside, but not at home.     REVIEW OF SYSTEMS:  Per HPI, remainder of 10 point ROS negative    PAST MEDICAL HISTORY:   Past Medical History:   Diagnosis Date     Cerebral infarction (H)      Hyperlipidemia      Hypertension        SURGICAL HISTORY:   No past surgical history on file.    SOCIAL HISTORY:   Social History      Socioeconomic History     Marital status:      Spouse name: Not on file     Number of children: Not on file     Years of education: Not on file     Highest education level: Not on file   Occupational History     Not on file   Social Needs     Financial resource strain: Not on file     Food insecurity     Worry: Not on file     Inability: Not on file     Transportation needs     Medical: Not on file     Non-medical: Not on file   Tobacco Use     Smoking status: Never Smoker     Smokeless tobacco: Never Used   Substance and Sexual Activity     Alcohol use: Not Currently     Frequency: Never     Drug use: Not Currently     Sexual activity: Not on file   Lifestyle     Physical activity     Days per week: Not on file     Minutes per session: Not on file     Stress: Not on file   Relationships     Social connections     Talks on phone: Not on file     Gets together: Not on file     Attends [employer] service: Not on file     Active member of club or organization: Not on file     Attends meetings of clubs or organizations: Not on file     Relationship status: Not on file     Intimate partner violence     Fear of current or ex partner: Not on file     Emotionally abused: Not on file     Physically abused: Not on file     Forced sexual activity: Not on file   Other Topics Concern     Not on file   Social History Narrative     Not on file     FAMILY HISTORY:  Non-contributory    ALLERGIES:   No Known Allergies    MEDICATIONS:  Reviewed  Plavix    PHYSICAL EXAMINATION:  Temp:  [98.4  F (36.9  C)-98.7  F (37.1  C)] 98.4  F (36.9  C)  Pulse:  [67-91] 76  Resp:  [18] 18  BP: (110-170)/(72-89) 128/74  SpO2:  [97 %-99 %] 98 %  General: comfortable appearing in bed, awake, alert, responds to questions appropriately, mildly confused  HEENT: ~1.5cm lac above right eyebrow, ~3cm lac forehead closed w/ sutures, ~0.5cm V shaped lac superior scalp  Neck: no midline C spine tenderness, full ROM   Neuro: alert, oriented to person,  initially states is at [facility] May 2012.   Pulm/Resp: No chest wall tenderness. CTAB. NLB on RA.  CV: RRR  Abdomen: Soft, non-distended, non-tender  : Deferred  Incisions/Skin: see HEENT. No other abrasions or lacerations  MSK/Extremities: 1+ BLE edema. No extremity tenderness    LABS: Reviewed.   Arterial Blood Gases   No lab results found in last 7 days.  Complete Blood Count   Recent Labs   Lab 05/26/21 0049   WBC 10.7   HGB 13.0*        Basic Metabolic Panel  Recent Labs   Lab 05/26/21 0049      POTASSIUM 3.9   CHLORIDE 111*   CO2 24   BUN 22   CR 0.91   *     Liver Function Tests  Recent Labs   Lab 05/26/21 0049   INR 0.99     Pancreatic Enzymes  No lab results found in last 7 days.  Coagulation Profile  Recent Labs   Lab 05/26/21 0049   INR 0.99   PTT 37       IMAGING:  Recent Results (from the past 24 hour(s))   Head CT w/o contrast    Narrative    EXAM: CT HEAD W/O CONTRAST  LOCATION: [facility]  DATE/TIME: 5/26/2021 1:54 AM    INDICATION: Intracranial hemorrhage.  COMPARISON: 05/25/2001.  TECHNIQUE: Routine CT Head without IV contrast. Multiplanar reformats. Dose reduction techniques were used.    FINDINGS:  INTRACRANIAL CONTENTS: Again seen is an acute intraparenchymal hemorrhage centered within the left parietal lobe measuring approximately 2.2 x 2.0 cm in greatest axial dimensions, previously 2.3 x 1.9 cm, grossly stable given differences in imaging   technique. There is mild peripheral surrounding edema. Overall the hematoma appears unchanged. Minimal local mass effect is present, however there is no significant midline shift. No acute large territory infarct. Small focal hypodensities involving the   bilateral thalami may reflect age-indeterminate lacunar type infarctions versus artifact. Mild presumed chronic small vessel ischemic changes. Mild generalized volume loss. No hydrocephalus.     VISUALIZED ORBITS/SINUSES/MASTOIDS: No intraorbital  abnormality. Mucosal thickening primarily involving the ethmoid air cells. No middle ear or mastoid effusion.    BONES/SOFT TISSUES: Right frontal scalp soft tissue swelling/contusion. No acute osseous abnormality.      Impression    IMPRESSION:  1.  Grossly stable intraparenchymal hemorrhage centered within the left parietal lobe with mild surrounding edema.  2.  Small focal hypodensities involving the bilateral thalami may reflect age-indeterminate lacunar type infarctions versus artifact.  3.  Chronic senescent and presumed microvascular ischemic changes, as above.   CT Cervical Spine w/o Contrast    Narrative    EXAM: CT CERVICAL SPINE W/O CONTRAST  LOCATION: [facility]  DATE/TIME: 5/26/2021 1:55 AM    INDICATION: Trauma  COMPARISON: 04/07/2017.  TECHNIQUE: Routine CT Cervical Spine without IV contrast. Multiplanar reformats. Dose reduction techniques were used.    FINDINGS:  VERTEBRA: Stable vertebral body heights. Stable T3 superior endplate compression fracture deformity. There is reversal of the normal cervical lordosis. No acute fracture or acute malalignment. There is osseous fusion of C2 and C3.     CANAL/FORAMINA: Multilevel degenerative changes. At C2-C3 there is moderate to severe bilateral neural foraminal stenosis. At C3-C4 there is severe bilateral neural foraminal stenosis. At C4-C5 there is severe bilateral neural foraminal stenosis. At   C5-C6 there is moderate to severe bilateral neural foraminal stenosis. At C6-C7 there is moderate to severe bilateral neural foraminal stenosis. At C7-T1 there is moderate left and moderate to severe right neural foraminal stenosis. Disc osteophyte   complexes are noted the levels of C5-C6, C6-C7 and C7-T1 which contribute to mild to moderate central spinal canal stenosis.    PARASPINAL: Hypoventilatory changes are noted involving the lungs. Trace paraseptal and centrilobular emphysema is also noted.      Impression    IMPRESSION:  1.  No fracture  or posttraumatic subluxation.  2.  Chronic T3 superior plate compression fracture deformity.  3.  Degenerative changes, as above.

## 2021-05-26 NOTE — ED TRIAGE NOTES
Patient BIBA from [facility]. Patient had a fall earlier in the day. Patient taken in to [facility] and found to have a head bleed in the left parietal lobe and a UTI. Patient reports no loss of consciousness and no pain. He is aaox4

## 2021-05-26 NOTE — CONSULTS
General acute hospital       NEUROSURGERY CONSULTATION NOTE    This consultation was requested by Dr. Johnson from the ED service.      Time Paged/Notified: 11:42PM  Trauma Activation: No  Arrival time in ED: 11:55PM  GCS: E 4 V 5 M 6 = 15        Reason for Consultation: L parietal lobar IPH    HPI:  80-year-old gentleman, on Plavix, with past medical history significant for hypertension, hyperlipidemia, stroke, Parkinson's disease, diabetes presents from outside hospital after a fall and was found to have a left parietal lobe IPH.  The patient reports that on his way to the kitchen he fell and hit his head.  He denies loss of consciousness.  He also denies a prodrome or lightheadedness prior to his fall.  He denies any weakness nausea vomiting.  He endorses some right hand numbness that has been present for about 5 days.      No recent fevers, chills, nausea, vomiting, chest pain, shortness of breath, and denies headaches, weakness, LOC, in extremities, changes in sensation, taste, smell, nor trouble speaking or other neurologic symptoms.    PAST MEDICAL HISTORY:   Past Medical History:   Diagnosis Date     Cerebral infarction (H)      Hyperlipidemia      Hypertension        PAST SURGICAL HISTORY: No past surgical history on file.    FAMILY HISTORY: No family history on file.    SOCIAL HISTORY:   Social History     Tobacco Use     Smoking status: Never Smoker     Smokeless tobacco: Never Used   Substance Use Topics     Alcohol use: Not Currently     Frequency: Never       MEDICATIONS:  No current outpatient medications on file.       Allergies:  No Known Allergies    ROS: 10 point ROS of systems including Constitutional, Eyes, Respiratory, Cardiovascular, Gastroenterology, Genitourinary, Integumentary, Muscularskeletal, Psychiatric were all negative except for pertinent positives noted in my HPI.    Physical exam:   Blood pressure (!) 150/81, pulse 61, temperature 98.4  F (36.9  C),  temperature source Axillary, resp. rate 18, height 1.829 m (6'), weight 95.1 kg (209 lb 10.5 oz), SpO2 98 %.  General: awake and alert  HEENT: right frontal lacerations, s/p repair; no neck pain on palpation, rotation or flexion  PULM: breathing comfortably on room air  NEUROLOGIC:  -- Awake; Alert; oriented x 3  -- Follows commands briskly  -- +repetition, calculation, and naming  -- Speech fluent, spontaneous. No aphasia or dysarthria.  -- no gaze preference. No apparent hemineglect.  Cranial Nerves:  -- PERRL 3-2mm bilat and brisk, extraocular movements intact  -- face symmetrical, tongue midline  -- sensory V1-V3 intact bilaterally  -- palate elevates symmetrically, uvula midline  -- hearing grossly intact bilat  -- Trapezii 5/5 strength bilat symmetric  -- Cerebellar: Finger nose finger without dysmetria    Motor:  Normal bulk / tone; no tremor, rigidity, or bradykinesia.  No muscle wasting or fasciculations  No Pronator Drift     Delt Bi Tri Hand Flexion/  Extension Iliopsoas Quadriceps Hamstrings Tibialis Anterior Gastroc    C5 C6 C7 C8/T1 L2 L3 L4-S1 L4 S1   R 5 5 5 5 5 5 5 5 5   L 5 5 5 5 5 5 5 5 5   Sensory:  intact to LT x 4 extremities       Reflexes: no clonus       Bi BR Ermelinda Pat Bab     C5-6 C6 UMN L2-4 UMN   R 2+ 2+ Norm 2+ Norm   L 2+ 2+ Norm 2+ Norm      Gait: deferred      IMAGING:  CT head 5/25/21:   1.  2.9 cm acute parenchymal hemorrhage in the left parietal lobe with mild surrounding edema.    CT c-spine 5/25/21  IMPRESSION:  1.  No fracture or posttraumatic subluxation.  2.  Chronic T3 superior plate compression fracture deformity.  3.  Degenerative changes, as above.      LABS:   Lab Results   Component Value Date    WBC 10.7 05/26/2021     Lab Results   Component Value Date    RBC 4.55 05/26/2021     Lab Results   Component Value Date    HGB 13.0 05/26/2021     Lab Results   Component Value Date    HCT 39.8 05/26/2021     Lab Results   Component Value Date    MCV 88 05/26/2021     Lab  Results   Component Value Date    MCH 28.6 05/26/2021     Lab Results   Component Value Date    MCHC 32.7 05/26/2021     Lab Results   Component Value Date    RDW 14.6 05/26/2021     Lab Results   Component Value Date     05/26/2021     Last Comprehensive Metabolic Panel:  Sodium   Date Value Ref Range Status   05/26/2021 143 133 - 144 mmol/L Final     Potassium   Date Value Ref Range Status   05/26/2021 4.2 3.4 - 5.3 mmol/L Final     Chloride   Date Value Ref Range Status   05/26/2021 111 (H) 94 - 109 mmol/L Final     Carbon Dioxide   Date Value Ref Range Status   05/26/2021 24 20 - 32 mmol/L Final     Anion Gap   Date Value Ref Range Status   05/26/2021 7 3 - 14 mmol/L Final     Glucose   Date Value Ref Range Status   05/26/2021 125 (H) 70 - 99 mg/dL Final     Urea Nitrogen   Date Value Ref Range Status   05/26/2021 22 7 - 30 mg/dL Final     Creatinine   Date Value Ref Range Status   05/26/2021 0.91 0.66 - 1.25 mg/dL Final     GFR Estimate   Date Value Ref Range Status   05/26/2021 80 >60 mL/min/[1.73_m2] Final     Comment:     Non  GFR Calc  Starting 12/18/2018, serum creatinine based estimated GFR (eGFR) will be   calculated using the Chronic Kidney Disease Epidemiology Collaboration   (CKD-EPI) equation.       Calcium   Date Value Ref Range Status   05/26/2021 9.0 8.5 - 10.1 mg/dL Final     INR   Date Value Ref Range Status   05/26/2021 0.99 0.86 - 1.14 Final      PTT   Date Value Ref Range Status   05/26/2021 37 22 - 37 sec Final            ASSESSMENT:  80-year-old male with left lobar parietal IPH, likely secondary to trauma to his head, is neurologically intact.  However, the location of the hemorrhage could also indicate an underlying mass, particularly considering his right hand sensory change over the last couple of days.      In addition to the assessment of diagnoses detailed above, this 80 year old male patient admitted from the Emergency Department has the following conditions  contributing to the complexity of their medical care:    Type II diabetes,        RECOMMENDATIONS:  No neurosurgical intervention indicated at this time   Repeat head CT in 6 hours (1:30AM) from the time of first acquisition  Hold plavix  Syncope work-up  MRI brain with and without contrast  HOB > 30 degrees  Q1hr neuro exams   Pain control  Keppra 500 BID for seizure ppx  Maintain SBP < 140  Continuous cardiac monitoring while in ICU  Continuous pulse oximetry  Supplemental oxygen PRN  Incentive spirometry Q1H while awake  Regular diet   Glucose < 180  Continue glucose checks  Platelets > 100,000  INR < 1.5  Hemoglobin > 8  DVT: SCDs while in bed    Duran Tijerina MD, PhD  Neurosurgery Resident, PGY-2    The patient was discussed with Dr. Leschke, neurosurgery chief resident, and Dr. Schwartz, neurosurgery staff.      I have reviewed the history above and agree with the resident's assessment and plan.  JOE Schwartz MD

## 2021-05-26 NOTE — PROGRESS NOTES
"   05/26/21 0900   Quick Adds   Type of Visit Initial PT Evaluation   Living Environment   People in home significant other;child(marlo), adult   Current Living Arrangements house   Home Accessibility stairs to enter home;stairs within home   Number of Stairs, Main Entrance other (see comments)  (13)   Stair Railings, Main Entrance railings safe and in good condition   Number of Stairs, Within Home, Primary other (see comments)  (13)   Stair Railings, Within Home, Primary railings safe and in good condition   Transportation Anticipated family or friend will provide   Living Environment Comments Pt reports living in a house with his wife and adult son. The son still works full time, he and his wife are retired. Pt reports stairs to enter and stairs within, however unsure of accuracy of report due to presentation.    Self-Care   Usual Activity Tolerance moderate   Current Activity Tolerance fair   Regular Exercise Yes   Activity/Exercise Type walking   Exercise Amount/Frequency daily   Equipment Currently Used at Home cane, straight;walker, standard   Activity/Exercise/Self-Care Comment Pt is independent at baseline for ADLs/self care with help from his wife if he needs it. Pt reports being fairly active with daily exercise.   Disability/Function   Hearing Difficulty or Deaf no   Wear Glasses or Blind yes   Vision Management glasses   Fall history within last six months yes   Number of times patient has fallen within last six months   (pt reports falling ~5 times in last 6 months)   Change in Functional Status Since Onset of Current Illness/Injury yes   General Information   Onset of Illness/Injury or Date of Surgery 05/25/21   Patient/Family Therapy Goals Statement (PT) To go home.   Pertinent History of Current Problem (include personal factors and/or comorbidities that impact the POC) Per Chart, \"Maikol Glaser is a 80 year old male with hx TIA on plavix, HTN, DM2, HLD and Parkinson's disease (recent dx) who presents " "as transfer from [facility] with 3 cm left parietal IPH after ground level mechanical fall. Patient reports he tripped in his kitchen and hit his forehead against a wooden garbage can. He denies LOC. Denies presyncopal symptoms, states he lost his balance. Denies any other associated injuries. His wife and son, who live with him, noted forehead hematoma and brought him to the ED for work up. Evaluation at OSH was significant for CT head w/ 3 cm left parietal IPH, and two small forehead lacerations which were repaired.\"   Existing Precautions/Restrictions fall   Cognition   Orientation Status (Cognition) oriented x 3;disoriented to;time   Affect/Mental Status (Cognition) WFL   Follows Commands (Cognition) 75-90% accuracy;delayed response/completion;increased processing time needed;verbal cues/prompting required   Pain Assessment   Patient Currently in Pain No   Integumentary/Edema   Integumentary/Edema no deficits were identifed   Posture    Posture Forward head position;Protracted shoulders   Range of Motion (ROM)   ROM Quick Adds ROM WFL   Strength   Strength Comments Pt retains at least 3+/5 LE strength through functional assessment with WB.   Bed Mobility   Comment (Bed Mobility) not assessed   Transfers   Transfer Safety Comments Min A x2   Gait/Stairs (Locomotion)   Comment (Gait/Stairs) CGA x1 w/ FWW, stairs not assessed   Sensory Examination   Sensory Perception other (describe)   Sensory Perception Comments Pt reports BL foot numbness that is not new   Clinical Impression   Criteria for Skilled Therapeutic Intervention yes, treatment indicated   PT Diagnosis (PT) impaired functional mobility   Influenced by the following impairments general deconditioning, LE weakness, posture   Functional limitations due to impairments ambulation, transfers, stair negotiation   Clinical Presentation Stable/Uncomplicated   Clinical Presentation Rationale Clinical judgement and patient presentation.   Clinical Decision Making " (Complexity) low complexity   Therapy Frequency (PT) 5x/week   Predicted Duration of Therapy Intervention (days/wks) 2 weeks   Planned Therapy Interventions (PT) balance training;bed mobility training;gait training;patient/family education;stair training;strengthening;transfer training   Risk & Benefits of therapy have been explained evaluation/treatment results reviewed;care plan/treatment goals reviewed;participants voiced agreement with care plan;participants included;patient   PT Discharge Planning    PT Discharge Recommendation (DC Rec) Transitional Care Facility;Acute Rehab Center-Motivated patient will benefit from intensive, interdisciplinary therapy.  Anticipate will be able to tolerate 3 hours of therapy per day   PT Rationale for DC Rec Pt below baseline with funcitonal mobility. Pt shows strong motivation to work with therapy, however exhibits deficits with mobility that place him at a high fall risk to return to home. H will benefit from continued therapies to address mobility deficits and improve independence with functional mobility.   PT Brief overview of current status  Min A x1 using FWW   Total Evaluation Time   Total Evaluation Time (Minutes) 10

## 2021-05-26 NOTE — PROGRESS NOTES
Arrived from:  4A  Belongings/meds:  Clothes, bag, empty coin purse, glasses, watch, comb, fixodent, shoes, top and bottom dentures, wallet (bedside table per pt request), cookies  2 RN Skin Assessment Completed by:  Yenifer PHOENIX RN and Ilda BARR RN  Non-intact findings documented (yes/no/NA): Yes, Abrasions/bruising to face, hematoma right wrist, exoriation to bilateral groin.

## 2021-05-26 NOTE — PHARMACY-ADMISSION MEDICATION HISTORY
Admission Medication History Completed by Pharmacy    See [facility] Admission Navigator for allergy information, preferred outpatient pharmacy, prior to admission medications and immunization status.     Medication History Sources:     Medication history completed at [facility] on 5/25/21    Changes made to PTA medication list (reason):    Added: All    Deleted: None    Changed: None    Additional Information:    None    Prior to Admission medications    Medication Sig Last Dose Taking? Auth Provider   acetaminophen (TYLENOL) 500 MG tablet Take 1,000 mg by mouth 2 times daily  Yes Unknown, Entered By History   amLODIPine (NORVASC) 10 MG tablet Take 10 mg by mouth daily  Yes Unknown, Entered By History   carbidopa-levodopa (SINEMET)  MG tablet Take 1 tablet by mouth 4 times daily Takes at 0800, 1200, 1600, 2000  Yes Unknown, Entered By History   clopidogrel (PLAVIX) 75 MG tablet Take 75 mg by mouth daily  Yes Unknown, Entered By History   lisinopril (ZESTRIL) 40 MG tablet Take 40 mg by mouth daily  Yes Unknown, Entered By History   metFORMIN (GLUCOPHAGE) 1000 MG tablet Take 1,000 mg by mouth 2 times daily (with meals)  Yes Unknown, Entered By History   metoprolol succinate ER (TOPROL-XL) 50 MG 24 hr tablet Take 50 mg by mouth daily  Yes Unknown, Entered By History   multivitamin, therapeutic (THERA-VIT) TABS tablet Take 1 tablet by mouth every evening  Yes Unknown, Entered By History   simvastatin (ZOCOR) 20 MG tablet Take 20 mg by mouth At Bedtime  Yes Unknown, Entered By History       Date completed: 05/26/21    Medication history completed by: Sourav Rivas [facility]

## 2021-05-26 NOTE — PLAN OF CARE
Admitted/transferred from: ED at 0400 this AM  Reason for admission/transfer: Fell at home, has ICH needing Q1H neuro checks.  2 RN skin assessment: completed by Carlie RN and Wesly RN  Result of skin assessment and interventions/actions: patient has lacerations on forehead, eyebrow, and hairline from fall. Forehead is sutured shut, other lacerations are glued. L wrist abrasion/bruising from fall, bleeding and dressed with gauze and tegaderm. Excoriation in perineal area, provider notified and requested nystatin powder.   Height, weight, drug calc weight: completed  Patient belongings: clothes, wallet, and dentures at bedside.  MDRO education added to care plan: NA    Patient A/O x 4, speech is logical but slow with delayed responses. Patient does not have any deficits, neuro checks stable. PEERLA 3+. Heart rate SR 60s with RBBB, occasional PACs. SBP goal <140 per neurosurgery, scheduled home meds ordered. SBP currently 150s. Patient on room air, NPO sips with meds. Straight cath Q6H, as patient follows this routine at home. Patient complaining of leg cramps, tylenol given with no relief, patient would like to get out of bed and stretch, provider notified and plan to order PT.    Plan: PT orders, MRI, Q1H neuro checks.   ?

## 2021-05-26 NOTE — ED PROVIDER NOTES
ED Provider Note  [facility]      History     Chief Complaint   Patient presents with     Fall     Head Injury     The history is provided by the patient and medical records.     Maikol Glaser is a 80 year old male with a past medical history significant for type 2 diabetes mellitus, Parkinson's disease, hypertension, hyperlipidemia and stroke on Plavix who presents to the ED for valuation of a fall and head injury.  The patient reports that he tripped and fell around 10 AM this morning, hit his head on the garbage can.  He denies any loss of consciousness or lightheadedness.  He has also experienced some frequency and dysuria that started today.  Wife returned home around 12 PM and found dried blood, son later returned (lives with them) and noted a hematoma to the right forehead and thus brought him to the ED.  He reports having mild pain in the right side of his head.  He did also noted to have new right hand weakness and numbness in addition to minor left-handed weakness and numbness which started 2 days ago.  The patient had a head CT which showed a left intraparenchymal hemorrhage with no midline shift.  CTA and MRI was negative 6 weeks ago while getting Parkinson's work-up.  There were no beds at the [facility] ED and therefore he was transferred to the [facility] ED for further evaluation and to discuss the case with neurosurgery.  The patient's UA was suggestive of UTI with positive nitrates, he was given Rocephin for this.  The patient otherwise had a normal kidney function, normal hemoglobin, no glaring electrolyte abnormalities and normal LFTs.  They also repaired his laceration on his forehead with 4 sutures.  In the ED, he denied any other pain or injuries from the fall.  He states that his vision is normal and has no other areas of numbness or weakness.  He denied any vomiting or nausea.    Past Medical History  Past Medical History:   Diagnosis Date     Cerebral infarction (H)       Hyperlipidemia      Hypertension      No past surgical history on file.  No current outpatient medications on file.    No Known Allergies  Family History  No family history on file.  Social History   Social History     Tobacco Use     Smoking status: Never Smoker     Smokeless tobacco: Never Used   Substance Use Topics     Alcohol use: Not Currently     Frequency: Never     Drug use: Not Currently      Past medical history, past surgical history, medications, allergies, family history, and social history were reviewed with the patient. No additional pertinent items.       Review of Systems   Constitutional: Negative for fever.   HENT: Negative for congestion.    Eyes: Negative for redness.   Respiratory: Negative for shortness of breath.    Cardiovascular: Negative for chest pain.   Gastrointestinal: Negative for abdominal pain, nausea and vomiting.   Genitourinary: Positive for dysuria and frequency. Negative for difficulty urinating.   Musculoskeletal: Negative for arthralgias and neck stiffness.   Skin: Negative for color change.   Neurological: Positive for weakness (Focal), numbness (Focal) and headaches. Negative for light-headedness.        Negative for loss of consciousness.   Psychiatric/Behavioral: Negative for confusion.     A complete review of systems was performed with pertinent positives and negatives noted in the HPI, and all other systems negative.    Physical Exam   BP: (!) 170/89  Pulse: 70  Temp: 98.7  F (37.1  C)  Resp: 18  Height: 182.9 cm (6')  Weight: 99.8 kg (220 lb)  SpO2: 99 %  Physical Exam  Vitals signs and nursing note reviewed.   Constitutional:       General: He is not in acute distress.     Appearance: Normal appearance. He is not ill-appearing or toxic-appearing.   HENT:      Head: Normocephalic and atraumatic.      Comments: R forehead laceration s/p suture repair.  Scattered echymoses of forehead     Nose: Nose normal.      Mouth/Throat:      Mouth: Mucous membranes are moist.    Eyes:      Pupils: Pupils are equal, round, and reactive to light.   Neck:      Musculoskeletal: Normal range of motion. No neck rigidity.   Cardiovascular:      Rate and Rhythm: Normal rate.      Pulses: Normal pulses.      Heart sounds: Normal heart sounds.   Pulmonary:      Effort: Pulmonary effort is normal. No respiratory distress.      Breath sounds: Normal breath sounds.   Abdominal:      General: Abdomen is flat. There is no distension.   Musculoskeletal: Normal range of motion.         General: No swelling or deformity.   Skin:     General: Skin is warm.      Capillary Refill: Capillary refill takes less than 2 seconds.   Neurological:      Mental Status: He is alert and oriented to person, place, and time.   Psychiatric:         Mood and Affect: Mood normal.         ED Course       11:35 PM  The patient was seen and examined by Pola Johnson DO in Room ED07.   Procedures        Critical Care Addendum    My initial assessment, based on my review of prehospital provider report, review of nursing observations, focused history, physical exam and discussion with transferring hospital, neurosurgery staff, established that Maikol Glaser has focal neurologic abnormalities, which requires immediate intervention, and therefore he is critically ill.     After the initial assessment, the care team initiated multiple lab tests and consulted with neurosurgery to provide stabilization care. Due to the critical nature of this patient, I reassessed nursing observations, vital signs, physical exam, mental status and neurologic status multiple times prior to his disposition.     Time also spent performing documentation, reviewing test results, discussion with consultants and coordination of care.     Critical care time (excluding teaching time and procedures): 35 minutes.        Results for orders placed or performed during the hospital encounter of 05/25/21   Head CT w/o contrast     Status: None    Narrative    EXAM: CT  HEAD W/O CONTRAST  LOCATION: [facility]  DATE/TIME: 5/26/2021 1:54 AM    INDICATION: Intracranial hemorrhage.  COMPARISON: 05/25/2001.  TECHNIQUE: Routine CT Head without IV contrast. Multiplanar reformats. Dose reduction techniques were used.    FINDINGS:  INTRACRANIAL CONTENTS: Again seen is an acute intraparenchymal hemorrhage centered within the left parietal lobe measuring approximately 2.2 x 2.0 cm in greatest axial dimensions, previously 2.3 x 1.9 cm, grossly stable given differences in imaging   technique. There is mild peripheral surrounding edema. Overall the hematoma appears unchanged. Minimal local mass effect is present, however there is no significant midline shift. No acute large territory infarct. Small focal hypodensities involving the   bilateral thalami may reflect age-indeterminate lacunar type infarctions versus artifact. Mild presumed chronic small vessel ischemic changes. Mild generalized volume loss. No hydrocephalus.     VISUALIZED ORBITS/SINUSES/MASTOIDS: No intraorbital abnormality. Mucosal thickening primarily involving the ethmoid air cells. No middle ear or mastoid effusion.    BONES/SOFT TISSUES: Right frontal scalp soft tissue swelling/contusion. No acute osseous abnormality.      Impression    IMPRESSION:  1.  Grossly stable intraparenchymal hemorrhage centered within the left parietal lobe with mild surrounding edema.  2.  Small focal hypodensities involving the bilateral thalami may reflect age-indeterminate lacunar type infarctions versus artifact.  3.  Chronic senescent and presumed microvascular ischemic changes, as above.   CBC with platelets differential     Status: Abnormal   Result Value Ref Range    WBC 10.7 4.0 - 11.0 10e9/L    RBC Count 4.55 4.4 - 5.9 10e12/L    Hemoglobin 13.0 (L) 13.3 - 17.7 g/dL    Hematocrit 39.8 (L) 40.0 - 53.0 %    MCV 88 78 - 100 fl    MCH 28.6 26.5 - 33.0 pg    MCHC 32.7 31.5 - 36.5 g/dL    RDW 14.6 10.0 - 15.0 %    Platelet Count  197 150 - 450 10e9/L    Diff Method Automated Method     % Neutrophils 78.3 %    % Lymphocytes 11.0 %    % Monocytes 8.8 %    % Eosinophils 1.1 %    % Basophils 0.4 %    % Immature Granulocytes 0.4 %    Nucleated RBCs 0 0 /100    Absolute Neutrophil 8.4 (H) 1.6 - 8.3 10e9/L    Absolute Lymphocytes 1.2 0.8 - 5.3 10e9/L    Absolute Monocytes 0.9 0.0 - 1.3 10e9/L    Absolute Eosinophils 0.1 0.0 - 0.7 10e9/L    Absolute Basophils 0.0 0.0 - 0.2 10e9/L    Abs Immature Granulocytes 0.0 0 - 0.4 10e9/L    Absolute Nucleated RBC 0.0    Basic metabolic panel     Status: Abnormal   Result Value Ref Range    Sodium 143 133 - 144 mmol/L    Potassium 3.9 3.4 - 5.3 mmol/L    Chloride 111 (H) 94 - 109 mmol/L    Carbon Dioxide 24 20 - 32 mmol/L    Anion Gap 7 3 - 14 mmol/L    Glucose 125 (H) 70 - 99 mg/dL    Urea Nitrogen 22 7 - 30 mg/dL    Creatinine 0.91 0.66 - 1.25 mg/dL    GFR Estimate 80 >60 mL/min/[1.73_m2]    GFR Estimate If Black >90 >60 mL/min/[1.73_m2]    Calcium 9.0 8.5 - 10.1 mg/dL   INR     Status: None   Result Value Ref Range    INR 0.99 0.86 - 1.14   Partial thromboplastin time     Status: None   Result Value Ref Range    PTT 37 22 - 37 sec     Medications   levETIRAcetam (KEPPRA) intermittent infusion 500 mg (500 mg Intravenous New Bag 5/26/21 0111)        Assessments & Plan (with Medical Decision Making)   Patient presents for evaluation of left-sided intraparenchymal hemorrhage she suffered after a fall.  Noted to have some right hand weakness, unclear if acute.  Incidentally found to have a UTI at outside hospital.    On arrival, patient is alert and oriented.  Continues to have some mild right hand weakness, but does have decent  strength.  No sensory abnormalities.  Remainder of neurological exam is within normal limits.    Neurosurgery and trauma teams paged immediately on arrival.  Trauma will admit to the neuro ICU.  Neurosurgery recommending repeat head CT at 130, Keppra 500 mg twice daily, every hour  neuro checks, which I have ordered.        I have reviewed the nursing notes. I have reviewed the findings, diagnosis, plan and need for follow up with the patient.    New Prescriptions    No medications on file       Final diagnoses:   Intracranial hemorrhage (H)   Fall, initial encounter   Acute cystitis without hematuria       --  I, Edenilson Freeman, am serving as a trained medical scribe to document services personally performed by Pola Johnson DO, based on the provider's statements to me.     IPola DO, was physically present and have reviewed and verified the accuracy of this note documented by Edenilson Freeman.    Pola Johnson DO  [facility] EMERGENCY DEPARTMENT  5/25/2021     Pola Johnson DO  05/26/21 0244

## 2021-05-26 NOTE — H&P
[facility]    History and Physical note: Trauma Service     Date of Admission:  5/26/2021    Time of Admission/Consult Request (page/call): 11:45p    Time of my evaluation: 12:02a  Consulting services:  Neurosurgery - Non-emergent consult: Called by ED    Assessment & Plan   Trauma mechanism: ground level fall  Time/date of injury: 5- ~ 2230  Known Injuries:  1. 2.9 cm parenchymal hemorrhage in left parietal lobe  2. Forehead laceration x 2 (repaired at OSH)  Other diagnoses:   1. 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 Hypertension  3. Diabetes mellitus, type 2  4. Hyperlipidemia  5. Parkinson's disease (recent dx)    Procedure: forehead laceration x 2 repaired at outside facility (1 with nylon suture, 1 with skin glue)    Plan:  1. Admit to trauma service, ICU status  2. Appreciate neurosurgery recs: Q1h neurochecks, 6 hr interval CT head and CT C-spine, keppra 500 mg BID  3. Send urine culture, PO antibiotics for UTI  4. Local wound cares for forehead lacerations    Code status: FULL.    General Cares:  GI Prophylaxis: n/a  [facility]  Bowel Regimen: prn  Pulmonary toilet: IS    ETOH: This patient was asked if in the last 3-6 months there has been a time when he had  5 or more drinks in a single day/outing. Patient answer to the screening question was in the negative. No intervention needed.  Primary Care Physician   Antione Villeda    Chief Complaint   Ground level mechanical fall    History is obtained from the patient    History of Present Illness   Maikol Glaser is a 80 year old male with hx TIA on plavix, HTN, DM2, HLD and Parkinson's disease (recent dx) who presents as transfer from [facility] with 3 cm left parietal IPH after ground level mechanical fall. Patient reports he tripped in his kitchen and hit his forehead against a wooden garbage can. He denies LOC. Denies presyncopal symptoms, states he lost his balance. Denies any other associated injuries. His  wife and son, who live with him, noted forehead hematoma and brought him to the ED for work up. Evaluation was significant for 3 cm left parietal IPH, and two small forehead lacerations which were repaired. Incidentally, UA concerning for possible UTI for which he was given dose of rocephin.    In the ED, he remains hemodynamically stable and neuro-intact. He notes pain over his forehead. He also endorses right hand tingling which began after the fall, no numbness or weakness. Denies vision or hearing changes, vertigo, dizziness, nausea/vomiting, or any other numbness/tingling. No neck or back pain, chest, abdominal or pelvic pain, or pain in his extremities.      Of note, has had history of minor falls that have not resulted in injury in the past. Uses a cane and walker when he is outside, but not at home.     Past Medical History    I have reviewed this patient's medical history and updated it with pertinent information if needed.   Past Medical History:   Diagnosis Date     Cerebral infarction (H)      Hyperlipidemia      Hypertension      TIA  HTN  HLD  DM2  Parkinson's     Past Surgical History   I have reviewed this patient's surgical history and updated it with pertinent information if needed.  No past surgical history on file.     Prior to Admission Medications     acetaminophen (TYLENOL) 500 MG tablet   Take 1,000 mg by mouth 2 (two) times a day.   amLODIPine (NORVASC) 10 MG tablet   Take 10 mg by mouth daily.   carbidopa-levodopa (SINEMET)  mg per tablet    Indications: Parkinson disease (H) Take 1 tablet by mouth 4 (four) times a day.   clopidogrel (PLAVIX) 75 mg tablet   Take 75 mg by mouth daily.   lisinopril (PRINIVIL,ZESTRIL) 40 MG tablet   Take 40 mg by mouth daily.   metFORMIN (GLUCOPHAGE) 500 MG tablet   Take 1,000 mg by mouth 2 (two) times a day with meals.    metoprolol succinate (TOPROL-XL) 50 MG 24 hr tablet   Take 50 mg by mouth daily.   multivitamin therapeutic (THERAGRAN) tablet   Take  1 tablet by mouth at bedtime.   simvastatin (ZOCOR) 20 MG tablet   Take 20 mg by mouth at bedtime.       Allergies   Penicillins rash (childhood rxn)    Social History   Social History     Socioeconomic History     Marital status:      Spouse name: Not on file     Number of children: Not on file     Years of education: Not on file     Highest education level: Not on file   Occupational History     Not on file   Social Needs     Financial resource strain: Not on file     Food insecurity     Worry: Not on file     Inability: Not on file     Transportation needs     Medical: Not on file     Non-medical: Not on file   Tobacco Use     Smoking status: Never Smoker     Smokeless tobacco: Never Used   Substance and Sexual Activity     Alcohol use: Not Currently     Frequency: Never     Drug use: Not Currently     Sexual activity: Not on file   Lifestyle     Physical activity     Days per week: Not on file     Minutes per session: Not on file     Stress: Not on file   Relationships     Social connections     Talks on phone: Not on file     Gets together: Not on file     Attends [employer] service: Not on file     Active member of club or organization: Not on file     Attends meetings of clubs or organizations: Not on file     Relationship status: Not on file     Intimate partner violence     Fear of current or ex partner: Not on file     Emotionally abused: Not on file     Physically abused: Not on file     Forced sexual activity: Not on file   Other Topics Concern     Not on file   Social History Narrative     Not on file        Denies tobacco, etoh and drug use  Lives with his wife and son    Family History   I have reviewed this patient's family history and updated it with pertinent information if needed.   No family history on file.    Review of Systems   CONSTITUTIONAL: No fever, chills, sweats, fatigue   EYES: no visual blurring, no double vision or visual loss  ENT: no decrease in hearing, no tinnitus, no  vertigo, no hoarseness  RESPIRATORY: no shortness of breath, no cough, no sputum   CARDIOVASCULAR: no palpitations, no chest pain, no exertional chest pain or pressure  GASTROINTESTINAL: no nausea or vomiting, or abd pain  GENITOURINARY: no dysuria, no frequency or hesitancy, no hematuria  MUSCULOSKELETAL: no weakness, no redness, no swelling, no joint pain,   SKIN: no rashes, ecchymoses, abrasions or lacerations  NEUROLOGIC: +tingling in right hand, no numbness of hands, no numbness or tingling  of feet, no syncope, no tremors or weakness  PSYCHIATRIC: no sleep disturbances, no anxiety or depression    Physical Exam   Temp: 98.7  F (37.1  C) Temp src: Oral BP: (!) 170/89 Pulse: 70   Resp: 18 SpO2: 99 % O2 Device: None (Room air)    Vital Signs with Ranges  Temp:  [98.7  F (37.1  C)] 98.7  F (37.1  C)  Pulse:  [70] 70  Resp:  [18] 18  BP: (170)/(89) 170/89  SpO2:  [99 %] 99 % 220 lbs 0 oz    Primary Survey:  Airway: patient talking  Breathing: symmetric respiratory effort bilaterally  Circulation: central pulses present and peripheral pulses present  Disability: Pupils - left 4 mm and brisk, right 4 mm and brisk     [place] Coma Scale - Total 15/15  Eye Response (E): 4  4= spontaneous,  3= to verbal/voice, 2=  to pain, 1= No response   Verbal Response (V):    5= Orientated, converses,  4= Confused, converses, 3= Inappropriate words,  2= Incomprehensible sounds,  1=No response   Motor Response (M): 6   6= Obeys commands, 5= Localizes to pain, 4= Withdrawal to pain, 3=Fexion to pain, 2= Extension to pain, 1= No response    Secondary Survey:  General: alert, oriented to person, place, time  Head: right forehead lacerations (one is 3 cm and one is 1 cm), both s/p repaired, no active bleeding, normocephalic, trachea midline  Eyes: PERRLA, pupils 3 mm, EOMI, corneas and conjunctivae clear  Ears: pearly grey bilateral TMs and non-inflamed external ear canals  Nose: nares patent, no drainage, nasal septum  non-tender  Mouth/Throat: no exudates or erythema, adentulous, no tongue lacerations  Neck:  No cervical collar present. No midline posterior tenderness, full AROM without pain or tenderness   Chest/Pulmonary: normal respiratory rate and rhythm, bilateral clear breath sounds, no chest wall tenderness or deformities  Cardiovascular: normal and regular rate and rhythm  Abdomen: soft, non-tender, no guarding, no rebound tenderness  : pelvis stable to lateral compression, no avilez, no urine assess  Back/Spine: no deformity, no midline tenderness, no sacral tenderness,  no step-offs and no abrasions or contusions  Musculoskel/Extremities: normal extremities, full AROM of major joints without tenderness, edema, erythema, ecchymosis, or abrasions. Peripheral pulses intact.   Hand: no gross deformities of hands or fingers. Full AROM of hand and fingers in flexion and extension.  strength equal and symmetric.   Skin: no rashes, laceration, ecchymosis, skin warm and dry.   Neuro: PERRLA, alert, oriented x 3. CN II-XII grossly intact. No focal deficits. Strength 5/5 x 4 extremities.  Sensation intact.  Psychiatric: affect/mood normal, cooperative, normal judgement/insight and memory intact    # Pain Assessment:  Current Pain Score 5/25/2021   Patient currently in pain? denies   - Maikol is experiencing pain due to head trauma. Pain management was discussed and the plan was created in a collaborative fashion.  Maikol's response to the current recommendations: engaged  compliant  - Please see the plan for pain management as documented above    Data   OSH UA +nitrites, + 250 leuk/uL, 26 WBC, +mucus    Results for orders placed or performed during the hospital encounter of 05/25/21 (from the past 24 hour(s))   CBC with platelets differential   Result Value Ref Range    WBC 10.7 4.0 - 11.0 10e9/L    RBC Count 4.55 4.4 - 5.9 10e12/L    Hemoglobin 13.0 (L) 13.3 - 17.7 g/dL    Hematocrit 39.8 (L) 40.0 - 53.0 %    MCV 88 78 -  100 fl    MCH 28.6 26.5 - 33.0 pg    MCHC 32.7 31.5 - 36.5 g/dL    RDW 14.6 10.0 - 15.0 %    Platelet Count 197 150 - 450 10e9/L    Diff Method Automated Method     % Neutrophils 78.3 %    % Lymphocytes 11.0 %    % Monocytes 8.8 %    % Eosinophils 1.1 %    % Basophils 0.4 %    % Immature Granulocytes 0.4 %    Nucleated RBCs 0 0 /100    Absolute Neutrophil 8.4 (H) 1.6 - 8.3 10e9/L    Absolute Lymphocytes 1.2 0.8 - 5.3 10e9/L    Absolute Monocytes 0.9 0.0 - 1.3 10e9/L    Absolute Eosinophils 0.1 0.0 - 0.7 10e9/L    Absolute Basophils 0.0 0.0 - 0.2 10e9/L    Abs Immature Granulocytes 0.0 0 - 0.4 10e9/L    Absolute Nucleated RBC 0.0    Basic metabolic panel   Result Value Ref Range    Sodium 143 133 - 144 mmol/L    Potassium 3.9 3.4 - 5.3 mmol/L    Chloride 111 (H) 94 - 109 mmol/L    Carbon Dioxide 24 20 - 32 mmol/L    Anion Gap 7 3 - 14 mmol/L    Glucose 125 (H) 70 - 99 mg/dL    Urea Nitrogen 22 7 - 30 mg/dL    Creatinine 0.91 0.66 - 1.25 mg/dL    GFR Estimate 80 >60 mL/min/[1.73_m2]    GFR Estimate If Black >90 >60 mL/min/[1.73_m2]    Calcium 9.0 8.5 - 10.1 mg/dL   INR   Result Value Ref Range    INR 0.99 0.86 - 1.14   Partial thromboplastin time   Result Value Ref Range    PTT 37 22 - 37 sec     Studies:  From outside facility:  CT head (7:47 PM): 2.9 cm acute parenchymal hemorrhage in the left parietal lobe with mild surrounding edema.    CT face: Right frontal scalp swelling. No facial bone fracture.    From this facility:  Head CT w/o contrast    (Results Pending)   CT Cervical Spine w/o Contrast    (Results Pending)         Discussed with staff, Dr. Rico.    Judi Rose MD  [facility]

## 2021-05-26 NOTE — ED NOTES
Quick cath performed with a 14 Fr catheter using aseptic technique. 1000 mL of clear yellow urine drained from bladder. Procedure tolerated well by patient. Catheter removed.

## 2021-05-26 NOTE — PLAN OF CARE
Major Shift Events:  Up in chair most of shift. Head CT completed this morning, see provider notification note. Denies pain. VSS on RA. Straight cath x1 for 550 mL. Transferred to unit 6A this evening, all belongings including glasses and dentures sent with patient. Family updated by this RN after transfer .  Plan: Brain MRI later this evening or tomorrow. Motivated to work with PT/OT and hopes to discharge home soon.   For vital signs and complete assessments, please see documentation flowsheets.

## 2021-05-26 NOTE — ED NOTES
Went in to room to prepare patient for transport up to room. Patient found standing at the side of the bed. He states that he is ready to go home. Patient assisted back to bed.

## 2021-05-26 NOTE — PROGRESS NOTES
05/26/21 1500   Quick Adds   Type of Visit Initial Occupational Therapy Evaluation   Living Environment   People in home child(marlo), adult;significant other   Current Living Arrangements house   Home Accessibility stairs to enter home;stairs within home   Number of Stairs, Main Entrance   (2)   Number of Stairs, Within Home, Primary 10   Stair Railings, Within Home, Primary railing on left side (ascending)   Transportation Anticipated family or friend will provide   Self-Care   Usual Activity Tolerance moderate   Current Activity Tolerance fair   Regular Exercise Yes   Activity/Exercise Type walking   Exercise Amount/Frequency daily   Equipment Currently Used at Home cane, straight;walker, rolling   Disability/Function   Hearing Difficulty or Deaf no   Wear Glasses or Blind yes   Vision Management glasses   Concentrating, Remembering or Making Decisions Difficulty no   Difficulty Communicating no   Difficulty Eating/Swallowing no   Walking or Climbing Stairs Difficulty no   Dressing/Bathing Difficulty no   Toileting issues no   Doing Errands Independently Difficulty (such as shopping) no   Change in Functional Status Since Onset of Current Illness/Injury yes   General Information   Referring Physician Carolyn Mckinney   Patient/Family Therapy Goal Statement (OT) return home   Additional Occupational Profile Info/Pertinent History of Current Problem Maikol Glaser is a 80 year old male with hx TIA on plavix, HTN, DM2, HLD and Parkinson's disease (recent dx) who presents as transfer from [facility] with 3 cm left parietal IPH after ground level mechanical fall. Patient reports he tripped in his kitchen and hit his forehead against a wooden garbage can. He denies LOC. Denies presyncopal symptoms, states he lost his balance. Denies any other associated injuries. His wife and son, who live with him, noted forehead hematoma and brought him to the ED for work up. Evaluation was significant for 3 cm left parietal IPH,  and two small forehead lacerations which were repaired. Incidentally, UA concerning for possible UTI for which he was given dose of rocephin.   Existing Precautions/Restrictions fall   General Observations and Info pt with supportive son at bedside, recent dx of Parkinsons.    Cognitive Status Examination   Orientation Status person;place   Affect/Mental Status (Cognitive) confused   Memory Deficit severe deficit   Cognitive Status Comments further testing required. pt abl eto sequence simple ADL, divided attention limited. cognitive flexibility limited.    Visual Perception   Visual Impairment/Limitations WFL   Sensory   Sensory Quick Adds No deficits were identified   Range of Motion Comprehensive   General Range of Motion no range of motion deficits identified   Strength Comprehensive (MMT)   General Manual Muscle Testing (MMT) Assessment other (see comments)   Comment, General Manual Muscle Testing (MMT) Assessment B UE grossly deconditioned.    Coordination   Coordination Comments mild fine motor deifcits in B hands.    Transfers   Transfers sit-stand transfer;toilet transfer   Sit-Stand Transfer   Sit-Stand [place] (Transfers) contact guard   Toilet Transfer   [place] Level (Toilet Transfer) contact guard   Balance   Balance Assessment standing balance: dynamic   Standing Balance: Dynamic fair balance   Activities of Daily Living   BADL Assessment/Intervention lower body dressing;grooming   Lower Body Dressing Assessment/Training   [place] Level (Lower Body Dressing) minimum assist (75% patient effort)   Grooming Assessment/Training   [place] Level (Grooming) minimum assist (75% patient effort)   Instrumental Activities of Daily Living (IADL)   IADL Comments fmaily assisting wiht all IADL, pt with a recent dx of Parkinsons.    Clinical Impression   Criteria for Skilled Therapeutic Interventions Met (OT) yes   OT Diagnosis decreased ADL I   Assessment of Occupational Performance 5 or more  Performance Deficits   Identified Performance Deficits dressing, bathing, toileting, G/H, home making, leisure.    Planned Therapy Interventions (OT) ADL retraining;IADL retraining;bed mobility training;cognition;strengthening;transfer training;home program guidelines;progressive activity/exercise;risk factor education   Clinical Decision Making Complexity (OT) low complexity   Therapy Frequency (OT) 5x/week   Predicted Duration of Therapy 2 weeks   Risk & Benefits of therapy have been explained evaluation/treatment results reviewed;care plan/treatment goals reviewed;risks/benefits reviewed;current/potential barriers reviewed;participants voiced agreement with care plan;participants included;patient;son   Comment-Clinical Impression Pt presents to OT with cognitive deficits and general deconditoining leading to decreased ADL I.    OT Discharge Planning    OT Discharge Recommendation (DC Rec) Transitional Care Facility   OT Rationale for DC Rec pt below baseline in ADL I/safety.    OT Brief overview of current status  Pt scoring 16 on SLUMS indicating dementia like symptoms. Pt requiring CGA with 2ww for ambulation to BR and setup to min assist for basic G/H.    Total Evaluation Time (Minutes)   Total Evaluation Time (Minutes) 5

## 2021-05-26 NOTE — PROGRESS NOTES
[facility]   Tertiary Survey Progress Note     Date of Service (when I saw the patient): 05/26/2021     Assessment & Plan   Trauma mechanism: ground level fall  Time/date of injury: 5- ~ 2230  Known Injuries:  1. 2.9 cm parenchymal hemorrhage in left parietal lobe  2. Forehead laceration x 2 (repaired at OSH)  Other diagnoses:   1. 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 Hypertension  3. Diabetes mellitus, type 2  4. Hyperlipidemia  5. Parkinson's disease (recent dx)     Procedure: forehead laceration x 2 repaired at outside facility (1 with nylon suture, 1 with skin glue)    Neuro/Pain:  # Fall-on anticoagulation  # Intraparenchymal Hemorrhage  # Hx TIA  # Recent diagnosis of Parkisons disease  Neurosurgery on board, repeat CT head obtained @ 0200am resulted as below:    INTRACRANIAL CONTENTS: Again seen is an acute intraparenchymal hemorrhage centered within the left parietal lobe measuring approximately 2.2 x 2.0 cm in greatest axial dimensions, previously 2.3 x 1.9 cm, grossly stable given differences in imaging   technique. There is mild peripheral surrounding edema. Overall the hematoma appears unchanged. Minimal local mass effect is present, however there is no significant midline shift. No acute large territory infarct. Small focal hypodensities involving the   bilateral thalami may reflect age-indeterminate lacunar type infarctions versus artifact. Mild presumed chronic small vessel ischemic changes. Mild generalized volume loss. No hydrocephalus.   Speech is slow and slurred on exam today, awake, alert up in chair moving all extremities, no reported neuro changes. Son reports he is usually upbeat and not confused.  - Per patient's son Salvador, the patient was complaining of numbness in his right hand 2 days ago(05/24) then fell yesterday. Was recently admitted at [facility] (04/07) received workup up for encephalopathy and treated for a UTI (MRI brain, VEEG).   He was  also seen by Neurology and started on Sinemet for Parkinsons disease, requested outpatient follow up in 6 weeks (appt 06/036 per son).  - Keppra BID  - BP control  - Monitor neurological status hourly  - Hold anticoagulation and PTA Plavix  - Sinemet  - Plan per Neurosurgery for an MRI brain today given deep posterior locating lesion, though frontal lobe head strike to eval for possible mass  - Maintain circadian rhythm.  Lights on during the day.  Off at night, minimize cares at night.  OOB during the day.     ENT  Frequent nosebleeds per son  Pulmonary:    - Pulmonary hygiene. Incentive spirometer while awake     Cardiovascular:    #HTN  # Dyslipidemia  - Resume home betablocker, statin, ACEi  - Hold Plavix  - EKG- RBBB unchanged from 04/2021    GI/Nutrition:    Regular diet    Renal/ Fluids/Electrolytes/  # Hx Chronic  urinary retention straight cath at home  # UTI  UA at OSH with +nitrites, 250 leuk est, WBC 26 started on Cipro  Continue, obtain UC here for specification and guided antibiotic   - electrolyte replacement protocol  In place.     Endocrine:   # Diabetes Mellitus   - PTA medications: Metformin- on hold resume after MRI    Medium correction Sliding scale for glucose management. Goal to keep BG < 180 for optimal wound healing     Infectious disease:   #UTI  UA at OSH with +nitrites, 250 leuk est, WBC 26 started on Cipro  Continue, obtain UC here for specification and guided antibiotic     Hematology:    - Admitted with Hb of 13.0, plt 197, INR 0.99.   - Threshold for transfusion if hgb <7.0 or signs/symptoms of hypoperfusion.       Musculoskeletal:  #Falls  - Physical and occupational therapy consults.  Skin  Right forehead laceration: repaired with nylon sutures, suture removal in 5-7days  Lines/ tubes/ drains:  - PIV  Code status:   General Cares:    PPI/H2 blocker:  n/a   DVT prophylaxis: Mechanical   Bowel Regimen/Date of last stool: PTA, ordered PRN   Pulmonary toilet: cough and deep  breath   ETOH screen completednegative   Lines / drains: PIV  OK to transfer out of ICU if OK with Neurosurgery    Interval History   Was seen up in chair.  Denies headache, shortness of breath, abdominal pain or nausea.    Review Of Systems  Skin: negative  Eyes: negative  Ears/Nose/Throat: negative  Respiratory: No shortness of breath, dyspnea on exertion, cough, or hemoptysis  Cardiovascular: negative  Gastrointestinal: negative  Genitourinary: negative  Musculoskeletal: negative  Neurologic: negative  Psychiatric: negative  Hematologic/Lymphatic/Immunologic: negative  Endocrine: negative     Physical Exam     Padma Coma Scale - Total 15/15  Eye Response (E): 4   4= spontaneous, 3= to verbal/voice, 2= to pain, 1= No response   Verbal Response (V): 5   5= Orientated, converses, 4= Confused, converses, 3= Inappropriate words, 2= Incomprehensible sounds, 1=No response   Motor Response (M): 6   6= Obeys commands, 5= Localizes to pain, 4= Withdrawal to pain, 3=Fexion to pain, 2= Extension to pain, 1= No response     Frailty Questionnaire: To be done for all patients age 60+. To be completed on admission or with the tertiary exam  F (Fatigue): Is the patient easily fatigued? NO = 0  R (Resistance): Is the patient unable to walk one flight of stairs? NO = 0  A (Ambulation): Is the patient unable to walk one block? NO = 0  I  (Illness): Does the patient have more than five illnesses? YES = 1  L (Loss of weight): Has the patient lost more than 5% of weight in the past 6 months. NO = 0  Lost five pounds or more in the last 3 months without trying? AND/OR Unintended weight loss?  Does the patient have difficulty performing housework such as washing windows or scrubbing floors? AND Activity in a typical 24-hour day- No moderate or vigorous activity    Score: 1    Score: 0-2: Ensure appropriate therapies consulted if needed     Physical Exam  Vitals signs and nursing note reviewed.   HENT:      Head: Normocephalic.       Nose: Nose normal.      Mouth/Throat:      Mouth: Mucous membranes are moist.   Eyes:      Pupils: Pupils are equal, round, and reactive to light.   Neck:      Musculoskeletal: Normal range of motion.   Cardiovascular:      Rate and Rhythm: Normal rate.      Heart sounds: No murmur.   Pulmonary:      Effort: No respiratory distress.      Breath sounds: No wheezing.   Abdominal:      General: There is no distension.      Palpations: Abdomen is soft.      Tenderness: There is no abdominal tenderness.   Musculoskeletal: Normal range of motion.   Skin:     General: Skin is warm and dry.      Comments: Right forehead laceration repaired with 5 sutures   Neurological:      Mental Status: He is alert and oriented to person, place, and time.   Psychiatric:         Mood and Affect: Mood normal.         Temp: 98.4  F (36.9  C) Temp src: Axillary BP: (!) 150/81 Pulse: 61   Resp: 18 SpO2: 98 % O2 Device: None (Room air)    Vitals:    05/25/21 2325 05/26/21 0400   Weight: 99.8 kg (220 lb) 95.1 kg (209 lb 10.5 oz)     Vital Signs with Ranges  Temp:  [98.4  F (36.9  C)-98.7  F (37.1  C)] 98.4  F (36.9  C)  Pulse:  [60-91] 61  Resp:  [18] 18  BP: (110-170)/(71-91) 150/81  SpO2:  [94 %-99 %] 98 %  I/O last 3 completed shifts:  In: -   Out: 1000 [Urine:1000]      GIO Badillo CNP  To contact the trauma service use job code pager [phone],   Numeric texts or alpha text through [facility]

## 2021-05-26 NOTE — PROVIDER NOTIFICATION
"Notified NSG and Trauma providers about change in 11:00 neuro exam. Pt was sleeping in chair and woke up confused and disoriented to place, time, and situation. Word-finding difficulties, and wouldn't follow commands (laughed and said no). Trauma NP and NSG resident at bedside to assess. Neuro exam back to baseline within 20 min    Re-directed patient and provided re-orientation. Pt states \"I was confused and in [place] but feel better now\". Head CT ordered and completed.      Pt currently a/o x4 and following commands, logical conversation. Ok for transfer to  per trauma.   "

## 2021-05-26 NOTE — ED NOTES
Bed: ED07  Expected date:   Expected time:   Means of arrival:   Comments:  Maikol Glaser.  Tx from [facility].  Fall, on Plavix.  R hand weakness/paresthesias. Trauma/NSG on arrival    RN report  Had a fall and hit his head, pt is on plavix.  Head CT showed 2.9cm left head bleed.  Pt is alert and orientated, vitals stable.  Patient also getting Rocephin for UTI.  COVID pending.  Will call soon for transport [phone]

## 2021-05-26 NOTE — PROGRESS NOTES
SURGICAL ICU PROGRESS NOTE  05/26/2021    ASSESSMENT:  Maikol Glaser is a 80 year old male with history of TIA on Plavix who was admitted on 5/25/2021 tripping from ground level and head injury without LOC found to have 2.9cm parenchymal hemorrhage in posterior brain w/o midline shift, transferred here from [facility]. Repeat head at 6 hours from initial imaging CT stable. CT C spine with chronic T3 superior plate compression fracture, no acute fractures/subluxation. Etiology of fall is likely mechanical.     Overnight GERRI or neuro deficits.     Today's Plan:  -Stop cipro d/t low suspicion for UTI. Pt likely has chronic bacturia 2/2 home straight cath regimen  -Transfer to floor    PLAN:    Neurological:  #2.9cm acute parenchymal hemorrhage  #TIA on plavix  #Parkinson's (recent diagnosis)  - PTA sinemet  - Monitor neurological status.   - Delirium preventions and precautions.   - Repeat 6h head CT stable  - Keppra 500mg BID  - Pain: PRN Tylenol  - MRI for tumor w/u      Pulmonary:   - Supplemental oxygen to keep saturation above 92 %.  - Incentive spirometer every 15- 30 minutes when awake.  - CXR with no acute abnormalities    Cardiovascular:    #HTN  #HLD  #RBBB  #70% stenosis of L ICA 4/7/21  - SBP goal <140  - resume PTA amlodipine, metoprolol, simvastatin, lisinopril   - hold clopidogrel due to bleed  - EKG with RBBB without evidence of MI  - Troponin nl  - CT w/o evidence of L ICA embolism as source of fall    Gastroenterology/Nutrition:  - Full diet   - miralax and senna prn    Fluids/Electrolytes:   - electrolyte repletion protocol    Renal:  # Chronic urinary retention  - Straight cath q6h (home regimen)    Endocrine:  #DM2  - ISS  - hold PTA metformin    ID:  # Bacturia, likely chronic   - Stop cipro. Bacturia, WBCs in urine, and LE are likely d/t chronic colonization 2/2 frequent home straight cath. Pt reports no bladder irritation.  -Nystatin powder for under pannus    Heme:     - am  CBC    Musculoskeletal:  - PT/OT    Skin:  # Forehead laceration x2 s/p repair  - one forehead lac w/ nylon sutures  - one forehead lac w/ dermabond     General Cares/Prophylaxis:    DVT Prophylaxis: Pneumatic Compression Devices. No chemoppx due to head bleed.  GI Prophylaxis: Not indicated    Lines/ tubes/ drains:  - pIV x2    Disposition:  - Floor    Patient seen, findings and plan discussed with surgical ICU staff, Dr. Rico.    Ahsan Boggs, MS4    Kevin Pederson MD     - - - - - - - - - - - - - - - - - - - - - - - - - - - - - - - - - - - - - - - - - - - - - -   SUBJECTIVE:  Pt has no complaints. Neurologic status has not changed overnight.     REVIEW OF SYSTEMS:  Per HPI.    PHYSICAL EXAMINATION:  Temp:  [97.6  F (36.4  C)-98.7  F (37.1  C)] 97.6  F (36.4  C)  Pulse:  [60-91] 65  Resp:  [17-18] 17  BP: (110-170)/(71-91) 143/79  SpO2:  [94 %-99 %] 98 %  General: comfortable appearing in bed, awake, alert, responds to questions appropriately, mildly confused  HEENT: ~1.5cm lac above right eyebrow, ~3cm lac forehead closed w/ sutures, ~0.5cm V shaped lac superior scalp  Neck: normal ROM   Neuro: alert, oriented to person and place, states year is 1998. PERRL, EOM intact, finger-nose-finger and heel-shin test appropriate bilaterally, +3 strength with hand  and foot extension.  Pulm/Resp: CTAB. NLB on RA.  CV: RRR  Abdomen: Soft, non-distended, non-tender  Incisions/Skin: see HEENT. No other abrasions or lacerations  MSK/Extremities: 1+ BLE edema.    LABS: Reviewed.   Arterial Blood Gases   No lab results found in last 7 days.  Complete Blood Count   Recent Labs   Lab 05/26/21  0049   WBC 10.7   HGB 13.0*        Basic Metabolic Panel  Recent Labs   Lab 05/26/21  0522 05/26/21  0049   NA  --  143   POTASSIUM 4.2 3.9   CHLORIDE  --  111*   CO2  --  24   BUN  --  22   CR  --  0.91   GLC  --  125*     Liver Function Tests  Recent Labs   Lab 05/26/21  0049   INR 0.99     Pancreatic Enzymes  No lab  results found in last 7 days.  Coagulation Profile  Recent Labs   Lab 05/26/21  0049   INR 0.99   PTT 37     IMAGING:  Recent Results (from the past 24 hour(s))   Head CT w/o contrast    Narrative    EXAM: CT HEAD W/O CONTRAST  LOCATION: [facility]  DATE/TIME: 5/26/2021 1:54 AM    INDICATION: Intracranial hemorrhage.  COMPARISON: 05/25/2001.  TECHNIQUE: Routine CT Head without IV contrast. Multiplanar reformats. Dose reduction techniques were used.    FINDINGS:  INTRACRANIAL CONTENTS: Again seen is an acute intraparenchymal hemorrhage centered within the left parietal lobe measuring approximately 2.2 x 2.0 cm in greatest axial dimensions, previously 2.3 x 1.9 cm, grossly stable given differences in imaging   technique. There is mild peripheral surrounding edema. Overall the hematoma appears unchanged. Minimal local mass effect is present, however there is no significant midline shift. No acute large territory infarct. Small focal hypodensities involving the   bilateral thalami may reflect age-indeterminate lacunar type infarctions versus artifact. Mild presumed chronic small vessel ischemic changes. Mild generalized volume loss. No hydrocephalus.     VISUALIZED ORBITS/SINUSES/MASTOIDS: No intraorbital abnormality. Mucosal thickening primarily involving the ethmoid air cells. No middle ear or mastoid effusion.    BONES/SOFT TISSUES: Right frontal scalp soft tissue swelling/contusion. No acute osseous abnormality.      Impression    IMPRESSION:  1.  Grossly stable intraparenchymal hemorrhage centered within the left parietal lobe with mild surrounding edema.  2.  Small focal hypodensities involving the bilateral thalami may reflect age-indeterminate lacunar type infarctions versus artifact.  3.  Chronic senescent and presumed microvascular ischemic changes, as above.   CT Cervical Spine w/o Contrast    Narrative    EXAM: CT CERVICAL SPINE W/O CONTRAST  LOCATION: [facility]  DATE/TIME:  5/26/2021 1:55 AM    INDICATION: Trauma  COMPARISON: 04/07/2017.  TECHNIQUE: Routine CT Cervical Spine without IV contrast. Multiplanar reformats. Dose reduction techniques were used.    FINDINGS:  VERTEBRA: Stable vertebral body heights. Stable T3 superior endplate compression fracture deformity. There is reversal of the normal cervical lordosis. No acute fracture or acute malalignment. There is osseous fusion of C2 and C3.     CANAL/FORAMINA: Multilevel degenerative changes. At C2-C3 there is moderate to severe bilateral neural foraminal stenosis. At C3-C4 there is severe bilateral neural foraminal stenosis. At C4-C5 there is severe bilateral neural foraminal stenosis. At   C5-C6 there is moderate to severe bilateral neural foraminal stenosis. At C6-C7 there is moderate to severe bilateral neural foraminal stenosis. At C7-T1 there is moderate left and moderate to severe right neural foraminal stenosis. Disc osteophyte   complexes are noted the levels of C5-C6, C6-C7 and C7-T1 which contribute to mild to moderate central spinal canal stenosis.    PARASPINAL: Hypoventilatory changes are noted involving the lungs. Trace paraseptal and centrilobular emphysema is also noted.      Impression    IMPRESSION:  1.  No fracture or posttraumatic subluxation.  2.  Chronic T3 superior plate compression fracture deformity.  3.  Degenerative changes, as above.   XR Chest Port 1 View    Narrative    Exam:  Chest X-ray 5/26/2021 6:00 AM    History: admitted w/ head injury s/p fall    Comparison: None    Findings: Frontal view of the chest. Trachea is midline. The  cardiomediastinal silhouette is within normal limits. Pulmonary  vasculature is distinct. No pleural effusion or pneumothorax. No focal  pulmonary opacities. No acute bony abnormalities. The upper abdomen is  unremarkable.      Impression    Impression:   No acute cardiopulmonary abnormalities.    I have personally reviewed the examination and initial interpretation  and  I agree with the findings.    STEFANIE FELTON MD

## 2021-05-27 ENCOUNTER — APPOINTMENT (OUTPATIENT)
Dept: MRI IMAGING | Facility: CLINIC | Age: 81
DRG: 086 | End: 2021-05-27
Attending: STUDENT IN AN ORGANIZED HEALTH CARE EDUCATION/TRAINING PROGRAM
Payer: COMMERCIAL

## 2021-05-27 ENCOUNTER — APPOINTMENT (OUTPATIENT)
Dept: PHYSICAL THERAPY | Facility: CLINIC | Age: 81
DRG: 086 | End: 2021-05-27
Payer: COMMERCIAL

## 2021-05-27 ENCOUNTER — APPOINTMENT (OUTPATIENT)
Dept: OCCUPATIONAL THERAPY | Facility: CLINIC | Age: 81
DRG: 086 | End: 2021-05-27
Payer: COMMERCIAL

## 2021-05-27 LAB
BACTERIA SPEC CULT: NO GROWTH
GLUCOSE BLDC GLUCOMTR-MCNC: 117 MG/DL (ref 70–99)
GLUCOSE BLDC GLUCOMTR-MCNC: 127 MG/DL (ref 70–99)
GLUCOSE BLDC GLUCOMTR-MCNC: 165 MG/DL (ref 70–99)
GLUCOSE BLDC GLUCOMTR-MCNC: 167 MG/DL (ref 70–99)
GLUCOSE BLDC GLUCOMTR-MCNC: 191 MG/DL (ref 70–99)
PHOSPHATE SERPL-MCNC: 2.9 MG/DL (ref 2.5–4.5)
POTASSIUM SERPL-SCNC: 3.8 MMOL/L (ref 3.4–5.3)
RADIOLOGIST FLAGS: NORMAL
SPECIMEN SOURCE: NORMAL

## 2021-05-27 PROCEDURE — 84100 ASSAY OF PHOSPHORUS: CPT | Performed by: SURGERY

## 2021-05-27 PROCEDURE — 97535 SELF CARE MNGMENT TRAINING: CPT | Mod: GO

## 2021-05-27 PROCEDURE — 84132 ASSAY OF SERUM POTASSIUM: CPT | Performed by: SURGERY

## 2021-05-27 PROCEDURE — 250N000013 HC RX MED GY IP 250 OP 250 PS 637: Performed by: NURSE PRACTITIONER

## 2021-05-27 PROCEDURE — 36415 COLL VENOUS BLD VENIPUNCTURE: CPT | Performed by: SURGERY

## 2021-05-27 PROCEDURE — 250N000013 HC RX MED GY IP 250 OP 250 PS 637: Performed by: SURGERY

## 2021-05-27 PROCEDURE — 999N001017 HC STATISTIC GLUCOSE BY METER IP

## 2021-05-27 PROCEDURE — 97530 THERAPEUTIC ACTIVITIES: CPT | Mod: GP

## 2021-05-27 PROCEDURE — 120N000002 HC R&B MED SURG/OB UMMC

## 2021-05-27 PROCEDURE — 70553 MRI BRAIN STEM W/O & W/DYE: CPT

## 2021-05-27 PROCEDURE — 250N000013 HC RX MED GY IP 250 OP 250 PS 637: Performed by: STUDENT IN AN ORGANIZED HEALTH CARE EDUCATION/TRAINING PROGRAM

## 2021-05-27 PROCEDURE — 70553 MRI BRAIN STEM W/O & W/DYE: CPT | Mod: 26 | Performed by: STUDENT IN AN ORGANIZED HEALTH CARE EDUCATION/TRAINING PROGRAM

## 2021-05-27 PROCEDURE — 97750 PHYSICAL PERFORMANCE TEST: CPT | Mod: GP

## 2021-05-27 PROCEDURE — 250N000013 HC RX MED GY IP 250 OP 250 PS 637: Performed by: DIETITIAN, REGISTERED

## 2021-05-27 PROCEDURE — 255N000002 HC RX 255 OP 636: Performed by: SURGERY

## 2021-05-27 PROCEDURE — A9585 GADOBUTROL INJECTION: HCPCS | Performed by: SURGERY

## 2021-05-27 PROCEDURE — 250N000013 HC RX MED GY IP 250 OP 250 PS 637: Performed by: PHYSICIAN ASSISTANT

## 2021-05-27 RX ORDER — GADOBUTROL 604.72 MG/ML
10 INJECTION INTRAVENOUS ONCE
Status: COMPLETED | OUTPATIENT
Start: 2021-05-27 | End: 2021-05-27

## 2021-05-27 RX ORDER — NYSTATIN 100000 U/G
CREAM TOPICAL 2 TIMES DAILY
Status: DISCONTINUED | OUTPATIENT
Start: 2021-05-27 | End: 2021-06-01 | Stop reason: HOSPADM

## 2021-05-27 RX ADMIN — LEVETIRACETAM 500 MG: 500 TABLET ORAL at 07:56

## 2021-05-27 RX ADMIN — THERA TABS 1 TABLET: TAB at 20:30

## 2021-05-27 RX ADMIN — CARBIDOPA AND LEVODOPA 1 TABLET: 25; 100 TABLET ORAL at 20:30

## 2021-05-27 RX ADMIN — LISINOPRIL 40 MG: 40 TABLET ORAL at 07:57

## 2021-05-27 RX ADMIN — AMLODIPINE BESYLATE 10 MG: 10 TABLET ORAL at 07:56

## 2021-05-27 RX ADMIN — METOPROLOL TARTRATE 25 MG: 25 TABLET, FILM COATED ORAL at 07:56

## 2021-05-27 RX ADMIN — SIMVASTATIN 20 MG: 20 TABLET, FILM COATED ORAL at 20:30

## 2021-05-27 RX ADMIN — CARBIDOPA AND LEVODOPA 1 TABLET: 25; 100 TABLET ORAL at 12:15

## 2021-05-27 RX ADMIN — LEVETIRACETAM 500 MG: 500 TABLET ORAL at 20:30

## 2021-05-27 RX ADMIN — METOPROLOL TARTRATE 25 MG: 25 TABLET, FILM COATED ORAL at 20:30

## 2021-05-27 RX ADMIN — NYSTATIN: 100000 CREAM TOPICAL at 12:14

## 2021-05-27 RX ADMIN — CARBIDOPA AND LEVODOPA 1 TABLET: 25; 100 TABLET ORAL at 16:00

## 2021-05-27 RX ADMIN — CARBIDOPA AND LEVODOPA 1 TABLET: 25; 100 TABLET ORAL at 07:56

## 2021-05-27 RX ADMIN — Medication 400 MG: at 07:57

## 2021-05-27 RX ADMIN — GADOBUTROL 10 ML: 604.72 INJECTION INTRAVENOUS at 19:41

## 2021-05-27 ASSESSMENT — ACTIVITIES OF DAILY LIVING (ADL)
ADLS_ACUITY_SCORE: 19
ADLS_ACUITY_SCORE: 21
ADLS_ACUITY_SCORE: 20
ADLS_ACUITY_SCORE: 21
ADLS_ACUITY_SCORE: 19
ADLS_ACUITY_SCORE: 20

## 2021-05-27 NOTE — PROGRESS NOTES
Husain Balance Scale (BBS) Cutoff Scores: A score of < 45/56 indicates an increased risk for falls.   Patient Score: 20/56    The BBS is a measure of static and dynamic standing balance that has been validated in community dwelling elderly individuals and individuals who have Parkinson's Disease, MS, and those who are s/p CVA and TBI. The test is administered without an assistive device. Scores from the Husain are used to determine the probability of falling based on the patient's previous history of falls and their test performance.     Minimal Detectable Change = 6.5   & Minimal Detectable Change (Parkinson's Disease) = 5 according to Kranthi & Cruzey 2008    Assessment (rationale for performing, application to patient s function & care plan): Fall risk assessment, discharge planning. High risk for falls  Minutes billed as physical performance test: 15

## 2021-05-27 NOTE — PROGRESS NOTES
[facility]  Neurosurgery Progress Note    Subjective:  No acute events; doing well, wishes to go home    Objective:   Temp:  [97.5  F (36.4  C)-97.8  F (36.6  C)] 97.5  F (36.4  C)  Pulse:  [47-68] 68  Resp:  [16-18] 16  BP: (118-154)/(64-84) 118/68  SpO2:  [97 %-100 %] 100 %  I/O last 3 completed shifts:  In: 720 [P.O.:720]  Out: 1750 [Urine:1750]    Gen: Appears comfortable, NAD  Wound: clean, dry, intact  Neurologic:  - Alert & Oriented to person, place, time, and situation  - Follows commands briskly  - Speech fluent, spontaneous. No aphasia or dysarthria.  - No gaze preference. No apparent hemineglect.  - PERRL, EOMI  - Strong eye closure, jaw clench, and cheek puff  - Face symmetric with sensation intact to light touch  - Palate elevates symmetrically, uvula midline, tongue protrudes midline  - Trapezii and sternocleidomastoid muscles 5/5 bilaterally  - No pronator drift     Del Tr Bi WE WF Gr   R 5 5 5 5 5 5   L 5 5 5 5 5 5    HF KE KF DF PF EHL   R 5 5 5 5 5 5   L 5 5 5 5 5 5     Reflexes 2+ throughout    Sensation intact and symmetric to light touch throughout    LABS  Recent Labs   Lab Test 05/26/21  0049   WBC 10.7   HGB 13.0*   MCV 88          Recent Labs   Lab Test 05/27/21  0645 05/26/21  0522 05/26/21  0049   NA  --   --  143   POTASSIUM 3.8 4.2 3.9   CHLORIDE  --   --  111*   CO2  --   --  24   BUN  --   --  22   CR  --   --  0.91   ANIONGAP  --   --  7   DORA  --   --  9.0   GLC  --   --  125*       IMAGING  Head CT 5/27/21      Assessment:  Maikol Glaser is a 80 year old male found to have a left parietal lobar hemorrhage after a fall; however, DDx includes neoplasm; he has been clinically stable.    Plan:  - Serial neuro exams  - MRI brain, with and without contrast  - Pain control  - HOB > 30 degrees  - Regular diet  - Bowel regimen  - PRN antiemetics  - IVF until taking adequate PO  - PT/OT  - SCDs for DVT proph    Duran Tijerina MD, PhD  Neurosurgery  Resident PGY-2    Please contact neurosurgery resident on call with questions.    Dial * * [phone], enter [phone] when prompted.     I have reviewed the history above and agree with the resident's assessment and plan.  JOE Schwartz MD

## 2021-05-27 NOTE — PROGRESS NOTES
[facility]  Neurosurgery Progress Note    Subjective:  No acute events    Objective:   Temp:  [97.4  F (36.3  C)-98  F (36.7  C)] 97.5  F (36.4  C)  Pulse:  [54-70] 67  Resp:  [12-16] 14  BP: (120-145)/(70-86) 145/77  SpO2:  [98 %-99 %] 98 %  I/O last 3 completed shifts:  In: 180 [P.O.:180]  Out: 1125 [Urine:1125]    Gen: Appears comfortable, NAD  Neurologic:  - Alert & Oriented to person, place, time, and situation  - Follows commands briskly  - Speech fluent, spontaneous. No aphasia or dysarthria.  - No gaze preference. No apparent hemineglect.  - PERRL, EOMI  - Strong eye closure, jaw clench, and cheek puff  - Face symmetric with sensation intact to light touch  - Palate elevates symmetrically, uvula midline, tongue protrudes midline  - Trapezii and sternocleidomastoid muscles 5/5 bilaterally  - No pronator drift     Del Tr Bi WE WF Gr   R 5 5 5 5 5 5   L 5 5 5 5 5 5    HF KE KF DF PF EHL   R 5 5 5 5 5 5   L 5 5 5 5 5 5     Reflexes 2+ throughout    Sensation intact and symmetric to light touch throughout    LABS  Recent Labs   Lab Test 05/26/21  0049   WBC 10.7   HGB 13.0*   MCV 88          Recent Labs   Lab Test 05/27/21  0645 05/26/21  0522 05/26/21  0049   NA  --   --  143   POTASSIUM 3.8 4.2 3.9   CHLORIDE  --   --  111*   CO2  --   --  24   BUN  --   --  22   CR  --   --  0.91   ANIONGAP  --   --  7   DORA  --   --  9.0   GLC  --   --  125*       IMAGING  MRI brain 5/27/21  Impression:     1. Left parietal lobe intraparenchymal hemorrhage and surrounding vasogenic edema without definite evidence of associated tumor. However, there are multiple small enhancing vessels inside and along the margins of the hemorrhage which raises the suspicion for  arteriovenous malformation. There is some restricted diffusion along the medial margin of the hemorrhage, could be secondary to blood products or mild perihemorrhage infarct.  2. Serpiginous tubular enhancement in a cortical or  meningeal fashion involving left frontoparietal gyri with potential connection to a superficial venous structures. This appearance also raises suspicio for arteriovenous malformation and likely drainage to adjacent meningeal venous circulation. However, metastatic disease cannot entirely be excluded given the presence of T2 FLAIR hyperintensity of the cortex.  3. Recommend further evaluation with vessel imaging (either dynamic CT angiography or catheter angiography) to rule out AVM and CT of the chest, abdomen, and pelvis to rule out potential metastatic disease.         Assessment:  Maikol Glaser is a 80 year old male found to have a left parietal lobar hemorrhage after a fall; however, DDx includes neoplasm, vascular malformation; he has been clinically stable.    Plan:  - Serial neuro exams  - CTA today (MRI concerning for vascular malformation)  -- possible cerebral angiogram, pending CTA results  - Pain control  - HOB > 30 degrees  - Regular diet  - Bowel regimen  - PRN antiemetics  - PT/OT  - SCDs for DVT proph    Duran Tijerina MD, PhD  Neurosurgery Resident PGY-2    Please contact neurosurgery resident on call with questions.    Dial * * [phone], enter [phone] when prompted.       I have reviewed the history above and agree with the resident's assessment and plan.  JOE Schwartz MD

## 2021-05-27 NOTE — PLAN OF CARE
Status: Admitted from outside hospital after a fall and was found to have a left parietal lobe IPH  Vitals: VSS on RA except bradycardia (50-60bpm) and HTN (no parameters at this time, on list). CCM.   Neuros: Alert and oriented x4. All extremities 5/5 with generalized weakness  IV: PIV x2 Sl'd.  Labs/Electrolytes: Mg replacement ordered. K and Phos recheck in am.  Resp/trach: WNL on RA  Diet: Moderate consistent CHO diet.  Bowel status: No BM this shift, LBM 5/25. BS+  : Retention, self caths at home. Orders to straight cath Q6H, last cathed around 0300 for 700mL clear yellow output   Skin: Abrasions/bruising to face, hematoma right wrist, exoriation to bilateral groin  Pain: Denied.  Activity: Up with assist of 1, GB. Bed/chair alarm at all times.   Plan: Continue to monitor and follow POC. MRI today. Continue to monitor and follow POC.

## 2021-05-27 NOTE — PLAN OF CARE
Status: 4A transfer this shift, admitted from outside hospital after a fall and was found to have a left parietal lobe IPH  Vitals: VSS on RA except bradycardia. CCM.   Neuros: Alert and oriented x4. All extremities 5/5.   IV: PIV x2 Sl'd.  Labs/Electrolytes: Mg replacement ordered. K and Phos recheck in am.  Resp/trach: Wnl on RA  Diet: Moderate consistent CHO diet.  Bowel status: Bs+x4, last BM yesterday.  : Retention, self caths at home. Orders to straight cath Q6H, last cathed at 2056.  Skin: See previous note.  Pain: Denied.  Activity: Up with assist of 1, GB. Bed/chair alarm at all times. Sat in chair this evening.  Social: Wife updated per pt request.  Plan: Continue to monitor and follow POC. MRI tomorrow, MD aware. Metoprolol held this evening for HR 52, Dr. Colorado notified. Bradycardic down to 43, Dr. Colorado notified. Continue to monitor.

## 2021-05-27 NOTE — PROGRESS NOTES
[facility]  Trauma Service Progress Note    Date of Service: 05/27/2021    Trauma Mechanism: GLF  Date of Injury: 5/25/21  Known Injuries:  1. 2.9 parenchymal hemorrhage in left parietal lobe  2. Forehead laceration x2     Procedures:  1. Forehead lac x2 repaired at OSH: 1 sutured, 1 glued      Assessment & Plan   Neuro/Pain/Psych:  # Fall, on anticoagulation  # Intraparenchymal Hemorrhage  # Hx of TIA  # Parkinson's disease, recent diagnosis   - continue PTA: Sinemet,   - Neurosurgery following: MRI ordered given deep posterior locating lesion, though frontal lobe head strike to eval for possible mass, Keppra BID, Hold Plavix,     # Acute on chronic pain   - Prn: Tylenol    Pulmonary:  - Incentive spirometer while awake     Cardiovascular:    # Hypertension   # Dyslipidemia   - Monitor hemodynamic status.   - continue PTA: amlodipine, lisinopril, metoprolol, simvastatin,     GI/Nutrition:    - Regular diet    Renal/ Fluids/Electrolytes/:  # Hx chronic urinary retention straight cath at home  - electrolyte replacement protocol in place.     Endocrine:  # Diabetes Mellitus   - PTA medications: Metformin (on hold, will resume after MRI)  - Medium correction sliding scale for glucose management. Goal to keep BG < 180 for optimal wound healing     Infectious disease:   # UTI  - UA at OSH with +nitrites, 250 leuk est, WBC 26 started on Cipro  Continue, obtain UC here for specification and guided antibiotic    Hematology:    - Hgb 13.0. Monitor and trend.   - Threshold for transfusion if hgb <7.0 or signs/symptoms of hypoperfusion.       # Coagulopathy  - Holding Plavix because of IPH     Musculoskeletal:  # Weakness and deconditioning of chronic illness   - Physical and occupational therapy consults.    Skin:  - dilgent cares to prevent skin breakdown and wound formation.      Lines/ tubes/ drains:  - PIV     General Cares:    PPI/H2 blocker:  NA   DVT prophylaxis:  PCD   Bowel Regimen/Date of last stool: in place   Pulmonary toilet: cough, deep breath     Code status:  Full     Discharge goals:     Adequate pain management: yes    VSS x24 hours: yes    Hemoglobin stable x 48 hours: yes    Ambulating safely and/or therapy evals complete: yes    Drains/lines removed or plan in place to manage: yes    Teaching done:     Other:  Expected D/C date: MRI, and TCU pending     RADHA Yu  To contact the trauma service use job code pager [phone],   Numeric texts or alpha [facility]     Interval History   ROS x 8 negative with exception of those things listed in interval hx    Physical Exam   Temp: 97.5  F (36.4  C) Temp src: Oral BP: 118/68 Pulse: 68   Resp: 16 SpO2: 100 % O2 Device: None (Room air)    Vitals:    05/25/21 2325 05/26/21 0400   Weight: 99.8 kg (220 lb) 95.1 kg (209 lb 10.5 oz)     Vital Signs with Ranges  Temp:  [97.5  F (36.4  C)-97.8  F (36.6  C)] 97.5  F (36.4  C)  Pulse:  [47-68] 68  Resp:  [16-18] 16  BP: (118-154)/(64-84) 118/68  SpO2:  [97 %-100 %] 100 %  I/O last 3 completed shifts:  In: 720 [P.O.:720]  Out: 1750 [Urine:1750]    [place] Coma Scale - Total 15/15  Constitutional: Awake, alert, cooperative, no apparent distress.  Eyes: Periorbital ecchymosis on right, PERRL, EOMI, sclera clear, conjunctiva normal.  HENT: Normocephalic, right forehead laceration repaired with 5 sutures    Respiratory: No increased work of breathing, good air exchange, clear to auscultation bilaterally,  Cardiovascular:  regular rate and rhythm,  GI: Abdomen soft, non-distended, non-tender, no guarding  Skin:  Warm & dry .  Musculoskeletal: There is no redness, warmth, or swelling of the joints.  Pedal pulse palpated.  Neurologic: Awake, alert, oriented. Cranial nerves II-XII are grossly intact.  Strength and sensory is intact. No focal deficits.  Neuropsychiatric: Calm, normal eye contact, alert, affect appropriate to situation, oriented, thought process normal.

## 2021-05-28 ENCOUNTER — APPOINTMENT (OUTPATIENT)
Dept: CT IMAGING | Facility: CLINIC | Age: 81
DRG: 086 | End: 2021-05-28
Attending: STUDENT IN AN ORGANIZED HEALTH CARE EDUCATION/TRAINING PROGRAM
Payer: COMMERCIAL

## 2021-05-28 ENCOUNTER — APPOINTMENT (OUTPATIENT)
Dept: OCCUPATIONAL THERAPY | Facility: CLINIC | Age: 81
DRG: 086 | End: 2021-05-28
Payer: COMMERCIAL

## 2021-05-28 LAB
BACTERIA SPEC CULT: ABNORMAL
GLUCOSE BLDC GLUCOMTR-MCNC: 154 MG/DL (ref 70–99)
GLUCOSE BLDC GLUCOMTR-MCNC: 154 MG/DL (ref 70–99)
GLUCOSE BLDC GLUCOMTR-MCNC: 174 MG/DL (ref 70–99)
GLUCOSE BLDC GLUCOMTR-MCNC: 202 MG/DL (ref 70–99)

## 2021-05-28 PROCEDURE — 70498 CT ANGIOGRAPHY NECK: CPT | Mod: 26 | Performed by: STUDENT IN AN ORGANIZED HEALTH CARE EDUCATION/TRAINING PROGRAM

## 2021-05-28 PROCEDURE — 99232 SBSQ HOSP IP/OBS MODERATE 35: CPT | Performed by: PHYSICIAN ASSISTANT

## 2021-05-28 PROCEDURE — 120N000002 HC R&B MED SURG/OB UMMC

## 2021-05-28 PROCEDURE — 250N000013 HC RX MED GY IP 250 OP 250 PS 637: Performed by: NURSE PRACTITIONER

## 2021-05-28 PROCEDURE — 250N000013 HC RX MED GY IP 250 OP 250 PS 637: Performed by: DIETITIAN, REGISTERED

## 2021-05-28 PROCEDURE — 250N000011 HC RX IP 250 OP 636: Performed by: SURGERY

## 2021-05-28 PROCEDURE — 97110 THERAPEUTIC EXERCISES: CPT | Mod: GO

## 2021-05-28 PROCEDURE — 999N001017 HC STATISTIC GLUCOSE BY METER IP

## 2021-05-28 PROCEDURE — 70496 CT ANGIOGRAPHY HEAD: CPT

## 2021-05-28 PROCEDURE — 250N000013 HC RX MED GY IP 250 OP 250 PS 637: Performed by: STUDENT IN AN ORGANIZED HEALTH CARE EDUCATION/TRAINING PROGRAM

## 2021-05-28 PROCEDURE — 70496 CT ANGIOGRAPHY HEAD: CPT | Mod: 26 | Performed by: STUDENT IN AN ORGANIZED HEALTH CARE EDUCATION/TRAINING PROGRAM

## 2021-05-28 PROCEDURE — 97535 SELF CARE MNGMENT TRAINING: CPT | Mod: GO

## 2021-05-28 PROCEDURE — 97530 THERAPEUTIC ACTIVITIES: CPT | Mod: GO

## 2021-05-28 PROCEDURE — 250N000013 HC RX MED GY IP 250 OP 250 PS 637: Performed by: SURGERY

## 2021-05-28 RX ORDER — IOPAMIDOL 755 MG/ML
75 INJECTION, SOLUTION INTRAVASCULAR ONCE
Status: COMPLETED | OUTPATIENT
Start: 2021-05-28 | End: 2021-05-28

## 2021-05-28 RX ADMIN — SIMVASTATIN 20 MG: 20 TABLET, FILM COATED ORAL at 20:41

## 2021-05-28 RX ADMIN — CARBIDOPA AND LEVODOPA 1 TABLET: 25; 100 TABLET ORAL at 11:55

## 2021-05-28 RX ADMIN — METOPROLOL TARTRATE 25 MG: 25 TABLET, FILM COATED ORAL at 07:41

## 2021-05-28 RX ADMIN — CARBIDOPA AND LEVODOPA 1 TABLET: 25; 100 TABLET ORAL at 07:42

## 2021-05-28 RX ADMIN — Medication 400 MG: at 07:41

## 2021-05-28 RX ADMIN — THERA TABS 1 TABLET: TAB at 20:41

## 2021-05-28 RX ADMIN — LISINOPRIL 40 MG: 40 TABLET ORAL at 07:41

## 2021-05-28 RX ADMIN — AMLODIPINE BESYLATE 10 MG: 10 TABLET ORAL at 07:41

## 2021-05-28 RX ADMIN — LEVETIRACETAM 500 MG: 500 TABLET ORAL at 20:41

## 2021-05-28 RX ADMIN — CARBIDOPA AND LEVODOPA 1 TABLET: 25; 100 TABLET ORAL at 15:48

## 2021-05-28 RX ADMIN — IOPAMIDOL 75 ML: 755 INJECTION, SOLUTION INTRAVENOUS at 07:10

## 2021-05-28 RX ADMIN — CARBIDOPA AND LEVODOPA 1 TABLET: 25; 100 TABLET ORAL at 20:41

## 2021-05-28 RX ADMIN — LEVETIRACETAM 500 MG: 500 TABLET ORAL at 07:41

## 2021-05-28 RX ADMIN — NYSTATIN: 100000 CREAM TOPICAL at 07:43

## 2021-05-28 ASSESSMENT — ACTIVITIES OF DAILY LIVING (ADL)
ADLS_ACUITY_SCORE: 19
ADLS_ACUITY_SCORE: 20
ADLS_ACUITY_SCORE: 19
ADLS_ACUITY_SCORE: 20

## 2021-05-28 NOTE — PLAN OF CARE
Status: Pt admitted from outside hospital after a fall and was found to have a left parietal lobe IPH.  Vitals: VSS on RA. CCM in place w/ bradycardia.  Neuros: A&Ox4. Denies N/T. 5/5 all extremities.  IV: PIV x 2 SL.  Labs/Electrolytes: Mag replaced this shift.  Resp/trach: Stable on RA. LS clear all fields.  Diet: Kept NPO since this morning for possible procedure tonight. Now Mod CHO diet. Good intake.  Bowel status: BS active all quads. No BM this shift.  : Self cath at baseline q 6 hours. Good output.  Skin: Bruising throughout but especially R side face, RUE. RUE wrist hematoma w/ dressing in place.  Pain: Denies.  Activity: Up w/ 1 and GB.  Social: Son, Salvador currently visiting. Involved and supportive with cares.  Plan: PT and OT recommending ARU vs TCU. Continue to monitor and follow POC.  Updates this shift: No updates this shift.

## 2021-05-28 NOTE — PROGRESS NOTES
Neurosurgery    Discussed with neuro IR who recommended follow up as outpatient.   Repeat CTH in 2 weeks with neurosurgery  Repeat Mri with and without in 3 months with neurosurgery    Neurosurgery will follow peripherally    Please contact neurosurgery resident on call with questions.    Dial * * [phone], enter [phone] when prompted.     Akila Gibson MD  Neurosurgery PGY3

## 2021-05-28 NOTE — PROGRESS NOTES
[facility]  Trauma Service Progress Note    Date of Service: 05/28/2021    Trauma Mechanism: GLF  Date of Injury: 5/25/21  Known Injuries:  1. 2.9 parenchymal hemorrhage in left parietal lobe  2. Forehead laceration x2      Procedures:  1. Forehead lac x2 repaired at OSH: 1 sutured, 1 glued      Assessment & Plan   Neuro/Pain/Psych:  # Fall, on anticoagulation  # Intraparenchymal Hemorrhage  # Hx of TIA  # Parkinson's disease, recent diagnosis   - continue PTA: Sinemet,   - Neurosurgery following: Keppra BID, Hold Plavix, Planned angio today after results from MRI and CTA.   - Brain MRI:  1. Left parietal lobe intraparenchymal hemorrhage and surrounding vasogenic edema without definite evidence of associated tumor.  However, there are multiple small enhancing vessels inside and along the margins of the hemorrhage which raises the suspicion for arteriovenous malformation. There is some restricted diffusion along the medial margin of the hemorrhage, could be secondary to blood products or mild perihemorrhage infarct.  2. Serpiginous tubular enhancement in a cortical or meningeal fashion involving left frontoparietal gyri with potential connection to a superficial venous structures. This appearance also raises suspicion for arteriovenous malformation and likely drainage to adjacent meningeal venous circulation. However, metastatic disease cannot entirely be excluded given the presence of T2 FLAIR hyperintensity of the cortex.  - follow-up CTA performed this morning      # Acute on chronic pain   - Prn: Tylenol    Pulmonary:  - no acute issues     Cardiovascular:    # Hypertension   # Dyslipidemia   - Monitor hemodynamic status.   - continue PTA: amlodipine, lisinopril, metoprolol, simvastatin,     GI/Nutrition:    - npo for procedure     Renal/ Fluids/Electrolytes:  # Hx chronic urinary retention straight cath at home  - electrolyte replacement protocol in place.      Endocrine:  # Diabetes Mellitus   - PTA medications: Metformin (on hold, will resume after MRI)  - Medium correction sliding scale for glucose management. Goal to keep BG < 180 for optimal wound healing    Infectious disease:   # UTI  - UA at OSH with +nitrites, 250 leuk est, WBC 26 started on Cipro  Continue, obtain UC here for specification and guided antibiotic     Hematology:    - Hgb stable  - Threshold for transfusion if hgb <7.0 or signs/symptoms of hypoperfusion.        # Coagulopathy  - Holding Plavix because of IPH      Musculoskeletal:  # Weakness and deconditioning of chronic illness   - Physical and occupational therapy consults.    Skin:  - dilgent cares to prevent skin breakdown and wound formation.      Lines/ tubes/ drains:  - PIV     General Cares:       PPI/H2 blocker:  NA                DVT prophylaxis: PCD                Bowel Regimen/Date of last stool: in place                Pulmonary toilet: cough, deep breath     Code status:  Full     Discharge goals:     Adequate pain management: yes    VSS x24 hours: yes    Hemoglobin stable x 48 hours: yes    Ambulating safely and/or therapy evals complete: yes    Drains/lines removed or plan in place to manage: yes    Teaching done:     Other:  Expected D/C date: TBD, Neurosurgery procedure today    RADHA Yu  To contact the trauma service use job code pager [phone],   Numeric texts or alpha text through [facility]     Interval History   ROS x 8 negative with exception of those things listed in interval hx    Physical Exam   Temp: 97.7  F (36.5  C) Temp src: Oral BP: (!) 145/77 Pulse: 67   Resp: 14 SpO2: 98 % O2 Device: None (Room air)    Vitals:    05/25/21 2325 05/26/21 0400   Weight: 99.8 kg (220 lb) 95.1 kg (209 lb 10.5 oz)     Vital Signs with Ranges  Temp:  [97.4  F (36.3  C)-98  F (36.7  C)] 97.7  F (36.5  C)  Pulse:  [54-70] 67  Resp:  [12-16] 14  BP: (120-145)/(70-86) 145/77  SpO2:  [98 %-99 %] 98 %  I/O last 3 completed shifts:  In:  180 [P.O.:180]  Out: 1125 [Urine:1125]    [place] Coma Scale - Total 15/15    Constitutional: Awake, alert, cooperative, no apparent distress. Sitting in chair  Eyes: Periorbital ecchymosis on right improved, PERRL, EOMI, sclera clear, conjunctiva normal.  HENT: Normocephalic, right forehead laceration repaired with 5 sutures    Respiratory: No increased work of breathing, good air exchange, clear to auscultation bilaterally,  Cardiovascular:  regular rate and rhythm,  GI: Abdomen soft, non-distended, non-tender, no guarding  Skin:  Warm & dry .  Musculoskeletal: There is no redness, warmth, or swelling of the joints.    Neurologic: Awake, alert, oriented. Cranial nerves II-XII are grossly intact.  Strength and sensory is intact. No focal deficits.  Neuropsychiatric: Calm, normal eye contact, alert, affect appropriate to situation, oriented, thought process normal.

## 2021-05-28 NOTE — PROVIDER NOTIFICATION
At 0000 assessment, patient was disoriented to place, situation, and time. Trauma was notified of changed in status. Trauma wanted to monitor and see how 0400 assessment looks.

## 2021-05-28 NOTE — PROGRESS NOTES
"Pt stood up w/ a small episode of confusion stating \"he was hallucinating\". This RN came to assess patient. Neuros unchanged from baseline. A&Ox4. Denies N/T. 5/5 all extremities. Able to follow commands. Denies seeing or hearing anything abnormal. No new vision changes. Trauma MD, Tonya, paged and notified. There are no further concerns at this time. Will continue to monitor and follow POC.  "

## 2021-05-28 NOTE — PLAN OF CARE
Status: Pt admitted from outside hospital after a fall and was found to have a left parietal lobe IPH.  Vitals: VSS on RA. CCM in place w/ bradycardia.  Neuros: A&Ox4. Denies N/T. 5/5 all extremities.  IV: PIV x 2 SL.  Labs/Electrolytes: Mag replaced this shift.  Resp/trach: Stable on RA. LS clear all fields.  Diet: Mod CHO diet. Good intake.  Bowel status: BS active all quads. No BM this shift.  : Self cath at baseline q 6 hours. Good output.  Skin: Bruising throughout but especially R side face, RUE. RUE wrist hematoma w/ dressing in place.  Pain: Denies.  Activity: Up w/ 1 and GB.  Social: Son, Salvador came to visit earlier this shift. Involved and supportive with cares.  Plan: PT and OT recommending ARU vs TCU. Continue to monitor and follow POC.  Updates this shift: MRI completed tonight.

## 2021-05-28 NOTE — PLAN OF CARE
Status: Pt admitted form OSH after a fall and was found to have L parietal lobe IPH  Vitals: VSS on RA  Neuros: A&O x4, strengths 5/5 w/ generalized weakness, denies N/T  IV: PIV is SL  Labs/Electrolytes: WDL  Resp/trach: Lung sounds are clear  Diet: Reg, tolerating  Bowel status: Bowel sounds are active, x1 small loose BM overnight  : Retaining, straight cath'd for 600 mL around 0200  Skin: Bruising/scabbing throughout, x2 head lacerations closed w/ sutures  Pain: Denied  Activity: Assist x1 w/ GB  Social: No phone calls for the patient overnight  Plan: Will continue to monitor and follow POC  Updates this shift: For 0000 assessment, the patient was disoriented to place, situation, and time. MD was paged and plan was to see how patient was at 0400 assessment, at which time pt returned to previous level of orientation. Other neuros remained the same throughout the night

## 2021-05-29 ENCOUNTER — APPOINTMENT (OUTPATIENT)
Dept: PHYSICAL THERAPY | Facility: CLINIC | Age: 81
DRG: 086 | End: 2021-05-29
Payer: COMMERCIAL

## 2021-05-29 LAB
GLUCOSE BLDC GLUCOMTR-MCNC: 130 MG/DL (ref 70–99)
GLUCOSE BLDC GLUCOMTR-MCNC: 133 MG/DL (ref 70–99)
GLUCOSE BLDC GLUCOMTR-MCNC: 134 MG/DL (ref 70–99)
GLUCOSE BLDC GLUCOMTR-MCNC: 140 MG/DL (ref 70–99)

## 2021-05-29 PROCEDURE — 250N000013 HC RX MED GY IP 250 OP 250 PS 637: Performed by: STUDENT IN AN ORGANIZED HEALTH CARE EDUCATION/TRAINING PROGRAM

## 2021-05-29 PROCEDURE — 99232 SBSQ HOSP IP/OBS MODERATE 35: CPT | Performed by: PHYSICIAN ASSISTANT

## 2021-05-29 PROCEDURE — 97116 GAIT TRAINING THERAPY: CPT | Mod: GP

## 2021-05-29 PROCEDURE — 250N000013 HC RX MED GY IP 250 OP 250 PS 637: Performed by: NURSE PRACTITIONER

## 2021-05-29 PROCEDURE — 97530 THERAPEUTIC ACTIVITIES: CPT | Mod: GP

## 2021-05-29 PROCEDURE — 250N000013 HC RX MED GY IP 250 OP 250 PS 637: Performed by: DIETITIAN, REGISTERED

## 2021-05-29 PROCEDURE — 250N000013 HC RX MED GY IP 250 OP 250 PS 637: Performed by: PHYSICIAN ASSISTANT

## 2021-05-29 PROCEDURE — 120N000002 HC R&B MED SURG/OB UMMC

## 2021-05-29 PROCEDURE — 999N001017 HC STATISTIC GLUCOSE BY METER IP

## 2021-05-29 RX ADMIN — THERA TABS 1 TABLET: TAB at 20:48

## 2021-05-29 RX ADMIN — CARBIDOPA AND LEVODOPA 1 TABLET: 25; 100 TABLET ORAL at 20:48

## 2021-05-29 RX ADMIN — METFORMIN HYDROCHLORIDE 1000 MG: 500 TABLET ORAL at 17:37

## 2021-05-29 RX ADMIN — METOPROLOL TARTRATE 25 MG: 25 TABLET, FILM COATED ORAL at 01:02

## 2021-05-29 RX ADMIN — CARBIDOPA AND LEVODOPA 1 TABLET: 25; 100 TABLET ORAL at 12:01

## 2021-05-29 RX ADMIN — AMLODIPINE BESYLATE 10 MG: 10 TABLET ORAL at 08:43

## 2021-05-29 RX ADMIN — METOPROLOL TARTRATE 25 MG: 25 TABLET, FILM COATED ORAL at 08:43

## 2021-05-29 RX ADMIN — CARBIDOPA AND LEVODOPA 1 TABLET: 25; 100 TABLET ORAL at 08:43

## 2021-05-29 RX ADMIN — Medication 12.5 MG: at 20:48

## 2021-05-29 RX ADMIN — METFORMIN HYDROCHLORIDE 1000 MG: 500 TABLET ORAL at 08:43

## 2021-05-29 RX ADMIN — LEVETIRACETAM 500 MG: 500 TABLET ORAL at 20:48

## 2021-05-29 RX ADMIN — LEVETIRACETAM 500 MG: 500 TABLET ORAL at 08:43

## 2021-05-29 RX ADMIN — NYSTATIN: 100000 CREAM TOPICAL at 08:43

## 2021-05-29 RX ADMIN — SIMVASTATIN 20 MG: 20 TABLET, FILM COATED ORAL at 20:48

## 2021-05-29 RX ADMIN — LISINOPRIL 40 MG: 40 TABLET ORAL at 08:43

## 2021-05-29 RX ADMIN — CARBIDOPA AND LEVODOPA 1 TABLET: 25; 100 TABLET ORAL at 16:53

## 2021-05-29 RX ADMIN — NYSTATIN: 100000 CREAM TOPICAL at 20:49

## 2021-05-29 ASSESSMENT — ACTIVITIES OF DAILY LIVING (ADL)
ADLS_ACUITY_SCORE: 19
ADLS_ACUITY_SCORE: 19
DEPENDENT_IADLS:: INDEPENDENT;TRANSPORTATION
ADLS_ACUITY_SCORE: 19

## 2021-05-29 ASSESSMENT — VISUAL ACUITY
OU: NORMAL ACUITY
OU: NORMAL ACUITY;GLASSES;BASELINE
OU: NORMAL ACUITY;BASELINE;GLASSES

## 2021-05-29 NOTE — CONSULTS
Care Management Initial Consult    General Information  Assessment completed with: Patient,    Type of CM/SW Visit: Initial Assessment    Primary Care Provider verified and updated as needed: Yes   Readmission within the last 30 days: no previous admission in last 30 days   Reason for Consult: discharge planning  Advance Care Planning:         Communication Assessment  Patient's communication style: spoken language (English or Bilingual)    Hearing Difficulty or Deaf: no   Wear Glasses or Blind: yes    Cognitive  Cognitive/Neuro/Behavioral: WDL  Level of  Consciousness: alert  Arousal Level: opens eyes spontaneously  Orientation: oriented x 4  Mood/Behavior: calm, cooperative  Best Language: 0 - No aphasia  Speech: garbled, hesitant, hoarse    Living Environment:   People in home: spouse Nichelle  Current living Arrangements: house      Able to return to prior arrangements: yes    Family/Social Support:  Care provided by: self, spouse/significant other  Provides care for: no one  Marital Status:   Support: Wife Nikki, Adult Son Salvador      Description of Support System: Supportive    Support Assessment: Adequate family and caregiver support    Current Resources:   Patient receiving home care services: No  Community Resources: None  Equipment currently used at home: crutches, walker, rolling, cane, straight  Supplies currently used at home: None    Employment/Financial:  Employment Status: retired  Financial Concerns: No concerns identified     Lifestyle & Psychosocial Needs:  Socioeconomic History     Marital status:      Spouse name: Not on file     Number of children: Not on file     Years of education: Not on file     Highest education level: Not on file     Tobacco Use     Smoking status: Never Smoker     Smokeless tobacco: Never Used   Substance and Sexual Activity     Alcohol use: Not Currently     Frequency: Never     Drug use: Not Currently     Functional Status:  Prior to admission patient needed  assistance:   Dependent ADLs: Independent  Dependent IADLs: Independent, Transportation  Assesssment of Functional Status: Needs placement in a SNF/TCF for rehabilitation    Mental Health Status:  Mental Health Status: No Current Concerns       Chemical Dependency Status:  Chemical Dependency Status: No Current Concerns          Values/Beliefs:  Spiritual, Cultural Beliefs, Sabianist Practices, Values that affect care: did not discuss today    Additional Information:  Patient is a 80-year-old gentleman, on Plavix, with past medical history significant for hype II diabetes, hypertension, hyperlipidemia, stroke, and Parkinson's disease. He presented from outside hospital after a fall and was found to have a left parietal lobe IPH.    SW completed this assessment via phone with patient. He was hard to understand due to soft/hoarse voice and was slow to respond, likely due to hearing loss. He is from home with his wife who provides him support. He also has support from his son Salvador who lives with them in the basement. Patient has a walker, cane, and crutches at home. He was independent except for transportation which his wife provided before being admitted to the hospital. Patient has no mental health or chemical health concerns. He is agreeable to TCU referrals and would like referrals sent to [facility], [facility], and [facility].     STEPHEN Chapa, SINCERE  Weekend  on 5/29  [facility]  Pager: [phone]    For weekend social work needs, contact information below and available on [facility]:  4A, 4C, 4E, 5A, 5B  pager [phone]  6A, 6B, 6C, 6D  pager [phone]  7A, 7B, 7C, 7D, 5C  pager [phone]    For weekend RN care coordinator needs (home discharge with needs including home care, assisted living facility returns, durable medical equip, IV antibiotics) - page [phone].

## 2021-05-29 NOTE — PROGRESS NOTES
Vitals: VSS. MD aware that pt becomes elba at night  Neuros: Intact except generalized weakness  IV: x2 SLd  Labs/Electrolytes: NA - Blood Sugars WNL  Resp/trach: WNL  Diet: Regular - Good po. Needs help ordering/setup  Bowel status: BM 5/29  : Straight Caths at baseline. Cathed at 1400 for 400ml   Skin: Abrasion/bruising to right forehead. Bruising T/O arms legs  Pain: Denies  Activity: Assist of 1, GB and walker. Walked halls and sat in recliner most of day  Social: Son Salvador at bedside this am  Plan: TCU placement

## 2021-05-29 NOTE — PROGRESS NOTES
[facility]  Trauma Service Progress Note    Date of Service: 05/29/2021    Trauma Mechanism: GLF  Date of Injury: 5/25/21  Known Injuries:  1. 2.9 parenchymal hemorrhage in left parietal lobe  2. Forehead laceration x2      Procedures:  1. Forehead lac x2 repaired at OSH: 1 sutured, 1 glued     Assessment & Plan              Neuro/Pain/Psych:  # Fall, on anticoagulation  # Intraparenchymal Hemorrhage  # Hx of TIA  # Parkinson's disease, recent diagnosis   - continue PTA: Sinemet,   - Neurosurgery following: Keppra BID, Hold Plavix,   - Per discussion with Neuro/stroke, patient ok to transition to ASA in 1 week and not restarting Plavix.   - Neurosurgery had planned angio on 5/28 after results from MRI and CTA. Notified late afternoon that this procedure was not going to occur and patient was free to discharge and will follow-up as OP.    - Follow-up repeat CTH in 2 weeks with neurosurgery, Repeat MRI with and without in 3 months with neurosurgery    # Acute on chronic pain   - Prn: Tylenol     Pulmonary:  - no acute issues      Cardiovascular:    # Hypertension   # Dyslipidemia   - Monitor hemodynamic status.   - continue PTA: amlodipine, lisinopril, metoprolol, simvastatin,      GI/Nutrition:    - Regular diet     Renal/ Fluids/Electrolytes:  # Hx chronic urinary retention straight cath at home  - electrolyte replacement protocol in place.      Endocrine:  # Diabetes Mellitus   - PTA medications: Metformin, restarted today since no more imaging scheduled   - Medium correction sliding scale for glucose management. Goal to keep BG < 180 for optimal wound healing    Infectious disease:   # UTI  - UA at OSH with +nitrites, 250 leuk est, WBC 26 started on Cipro  - UC: No growth      Hematology:    - Hgb stable  - Threshold for transfusion if hgb <7.0 or signs/symptoms of hypoperfusion.        # Coagulopathy  - Holding Plavix because of IPH  - Plan to stop Plavix and  transition to ASA 1 week after fall, June 1     Musculoskeletal:  # Weakness and deconditioning of chronic illness   - Physical and occupational therapy consults.     Skin:  # Forehead lac x2 repaired at OSH: 1 sutured, 1 glued.   - Sutures to be removed after 5 days (5/30)  - dilgent cares to prevent skin breakdown and wound formation.      Lines/ tubes/ drains:  - PIV     General Cares:                 PPI/H2 blocker:  NA                DVT prophylaxis: PCD                Bowel Regimen/Date of last stool: in place                Pulmonary toilet: cough, deep breath      Code status:  Full               Discharge goals:     Adequate pain management: yes    VSS x24 hours: yes    Hemoglobin stable x 48 hours: yes    Ambulating safely and/or therapy evals complete: yes    Drains/lines removed or plan in place to manage: yes    Teaching done: Discussed plan with pt and pt's son at bedside today    Other:  Expected D/C date: TCU pending     RADHA Yu  To contact the trauma service use job code pager [phone],   Numeric texts or alpha text through [facility]     Interval History    ROS x 8 negative with exception of those things listed in interval hx    Physical Exam   Temp: 97.5  F (36.4  C) Temp src: Axillary BP: 116/59 Pulse: 55   Resp: 16 SpO2: 99 % O2 Device: None (Room air)    Vitals:    05/25/21 2325 05/26/21 0400   Weight: 99.8 kg (220 lb) 95.1 kg (209 lb 10.5 oz)     Vital Signs with Ranges  Temp:  [95.3  F (35.2  C)-98.9  F (37.2  C)] 97.5  F (36.4  C)  Pulse:  [52-67] 55  Resp:  [14-16] 16  BP: ()/(54-77) 116/59  SpO2:  [96 %-99 %] 99 %  I/O last 3 completed shifts:  In: -   Out: 1150 [Urine:1150]    [place] Coma Scale - Total 15/15    Constitutional: Awake, alert, cooperative, no apparent distress. Sitting in chair   Eyes: Periorbital ecchymosis on right improved, PERRL, EOMI, sclera clear, conjunctiva normal.  HENT: Normocephalic, right forehead laceration repaired with 5 sutures    Respiratory: No  increased work of breathing, good air exchange, clear to auscultation bilaterally,  Cardiovascular:  regular rate and rhythm,  GI: Abdomen soft, non-distended, non-tender, no guarding  Skin:  Warm & dry .  Musculoskeletal: There is no redness, warmth, or swelling of the joints.    Neurologic: Awake, alert, oriented. Cranial nerves II-XII are grossly intact.  Strength and sensory is intact. No focal deficits.  Neuropsychiatric: Calm, normal eye contact, alert, affect appropriate to situation, oriented, thought process normal.

## 2021-05-29 NOTE — PLAN OF CARE
Status: Pt admitted form OSH after a fall and was found to have L parietal lobe IPH  Vitals: Pt was bradycardic throughout the night, pt also had a lower temp that improved, vitals otherwise stable on RA  Neuros: A&O x4, strengths 5/5 w/ generalized weakness, denies N/T  IV: PIV is SL  Labs/Electrolytes: WDL  Resp/trach: Lung sounds are clear  Diet: Reg, tolerating  Bowel status: Bowel sounds are active, x1 loose BM overnight  : Retaining, straight cath'd for 350 mL at 2300, scanned at 0630 for 38 mL  Skin: Bruising/scabbing throughout, x2 head lacerations closed w/ sutures  Pain: Denied  Activity: Assist x1 w/ GB  Social: No phone calls for the patient overnight  Plan: Will continue to monitor and follow POC  Updates this shift: Pt rested fairly well throughout the night

## 2021-05-30 VITALS — WEIGHT: 225 LBS | HEIGHT: 73 IN | BODY MASS INDEX: 29.82 KG/M2

## 2021-05-30 LAB
AEROBIC BLOOD CULTURE BOTTLE: NO GROWTH
AEROBIC BLOOD CULTURE BOTTLE: NO GROWTH
ANAEROBIC BLOOD CULTURE BOTTLE: NO GROWTH
ANAEROBIC BLOOD CULTURE BOTTLE: NO GROWTH
GLUCOSE BLDC GLUCOMTR-MCNC: 114 MG/DL (ref 70–99)
GLUCOSE BLDC GLUCOMTR-MCNC: 129 MG/DL (ref 70–99)
GLUCOSE BLDC GLUCOMTR-MCNC: 171 MG/DL (ref 70–99)
GLUCOSE BLDC GLUCOMTR-MCNC: 183 MG/DL (ref 70–99)
GLUCOSE BLDC GLUCOMTR-MCNC: 207 MG/DL (ref 70–99)

## 2021-05-30 PROCEDURE — 250N000013 HC RX MED GY IP 250 OP 250 PS 637: Performed by: STUDENT IN AN ORGANIZED HEALTH CARE EDUCATION/TRAINING PROGRAM

## 2021-05-30 PROCEDURE — 99232 SBSQ HOSP IP/OBS MODERATE 35: CPT | Performed by: NURSE PRACTITIONER

## 2021-05-30 PROCEDURE — 250N000013 HC RX MED GY IP 250 OP 250 PS 637: Performed by: NURSE PRACTITIONER

## 2021-05-30 PROCEDURE — 120N000002 HC R&B MED SURG/OB UMMC

## 2021-05-30 PROCEDURE — 250N000013 HC RX MED GY IP 250 OP 250 PS 637: Performed by: DIETITIAN, REGISTERED

## 2021-05-30 PROCEDURE — 250N000013 HC RX MED GY IP 250 OP 250 PS 637: Performed by: PHYSICIAN ASSISTANT

## 2021-05-30 PROCEDURE — 999N001017 HC STATISTIC GLUCOSE BY METER IP

## 2021-05-30 RX ADMIN — CARBIDOPA AND LEVODOPA 1 TABLET: 25; 100 TABLET ORAL at 12:07

## 2021-05-30 RX ADMIN — LEVETIRACETAM 500 MG: 500 TABLET ORAL at 07:51

## 2021-05-30 RX ADMIN — NYSTATIN: 100000 CREAM TOPICAL at 08:36

## 2021-05-30 RX ADMIN — AMLODIPINE BESYLATE 10 MG: 10 TABLET ORAL at 07:51

## 2021-05-30 RX ADMIN — CARBIDOPA AND LEVODOPA 1 TABLET: 25; 100 TABLET ORAL at 17:02

## 2021-05-30 RX ADMIN — LEVETIRACETAM 500 MG: 500 TABLET ORAL at 20:11

## 2021-05-30 RX ADMIN — METFORMIN HYDROCHLORIDE 1000 MG: 500 TABLET ORAL at 17:02

## 2021-05-30 RX ADMIN — ACETAMINOPHEN 650 MG: 325 TABLET, FILM COATED ORAL at 07:51

## 2021-05-30 RX ADMIN — NYSTATIN: 100000 CREAM TOPICAL at 20:11

## 2021-05-30 RX ADMIN — CARBIDOPA AND LEVODOPA 1 TABLET: 25; 100 TABLET ORAL at 20:11

## 2021-05-30 RX ADMIN — Medication 12.5 MG: at 07:50

## 2021-05-30 RX ADMIN — SIMVASTATIN 20 MG: 20 TABLET, FILM COATED ORAL at 20:11

## 2021-05-30 RX ADMIN — METFORMIN HYDROCHLORIDE 1000 MG: 500 TABLET ORAL at 07:50

## 2021-05-30 RX ADMIN — Medication 12.5 MG: at 20:11

## 2021-05-30 RX ADMIN — LISINOPRIL 40 MG: 40 TABLET ORAL at 07:51

## 2021-05-30 RX ADMIN — THERA TABS 1 TABLET: TAB at 20:11

## 2021-05-30 RX ADMIN — CARBIDOPA AND LEVODOPA 1 TABLET: 25; 100 TABLET ORAL at 07:50

## 2021-05-30 ASSESSMENT — ACTIVITIES OF DAILY LIVING (ADL)
ADLS_ACUITY_SCORE: 19

## 2021-05-30 ASSESSMENT — VISUAL ACUITY
OU: NORMAL ACUITY
OU: NORMAL ACUITY

## 2021-05-30 NOTE — PROGRESS NOTES
[facility]  Trauma Service Progress Note    Date of Service (when I saw the patient): 05/30/2021     Assessment & Plan   Trauma Mechanism: GLF  Date of Injury: 5/25/21  Known Injuries:  1. 2.9 parenchymal hemorrhage in left parietal lobe  2. Forehead laceration x2      Procedures:  1. Forehead lac x2 repaired at OSH: 1 sutured, 1 glued                Neuro/Pain/Psych:  # Fall, on anticoagulation  # Intraparenchymal Hemorrhage  # Hx of TIA  # Parkinson's disease, recent diagnosis   - continue PTA: Sinemet,   - Neurosurgery following: Keppra BID, Hold Plavix, concern for vascular malformation on MRI  - Per discussion with Neuro/stroke, patient ok to transition to ASA in 1 week and not restarting Plavix  - Neurosurgery had planned angio on 5/28 after results from MRI and CTA. Notified late afternoon that this procedure was not going to occur and patient was free to discharge and will follow-up as OP.   - Follow-up repeat CTH in 2 weeks with neurosurgery, and NeuroIR with a Repeat MRI with and without in 3 months with neurosurgery     # Acute on chronic pain   - Prn: Tylenol     Pulmonary:  - no acute issues      Cardiovascular:    # Hypertension   # Dyslipidemia   - Monitor hemodynamic status.   - continue PTA: amlodipine, lisinopril, metoprolol, simvastatin,      GI/Nutrition:    - Regular diet     Renal/ Fluids/Electrolytes:  # Hx chronic urinary retention straight cath at home, Neurogenic bladder  - Straight cath per protocol  - electrolyte replacement protocol in place.      Endocrine:  # Diabetes Mellitus   - PTA medications: Metformin, restarted today since no more imaging scheduled   - Medium correction sliding scale for glucose management. Goal to keep BG < 180 for optimal wound healing     Infectious disease:   # UTI  - UA at OSH with +nitrites, 250 leuk est, WBC 26 started on Cipro  - UC: No growth, stopped     Hematology:    - Hgb stable  - Threshold for  transfusion if hgb <7.0 or signs/symptoms of hypoperfusion.        # Coagulopathy  - Plan to stop Plavix and transition to ASA 1 week after fall, June 1     Musculoskeletal:  # Weakness and deconditioning of chronic illness   - Physical and occupational therapy consults.     Skin:  # Forehead lac x2 repaired at OSH: 1 sutured, 1 glued.   - Sutures to be removed after 5 days (5/30)  - dilgent cares to prevent skin breakdown and wound formation.       Lines/ tubes/ drains:  - PIV      General Cares:                 PPI/H2 blocker:  NA                DVT prophylaxis: PCD                Bowel Regimen/Date of last stool: in place                Pulmonary toilet: cough, deep breath      Code status:  Full               Discharge goals:     Adequate pain management: yes    VSS x24 hours: yes    Hemoglobin stable x 48 hours: yes    Ambulating safely and/or therapy evals complete: yes    Drains/lines removed or plan in place to manage: yes    Teaching done: Discussed plan with pt and pt's son at bedside today    Other:  Expected D/C date: TCU pending, Social work following on referrals    Interval History   Complains of intermittent leg cramps. NO other acute issues  ROS x 8 negative with exception of those things listed in interval hx    Physical Exam   Temp: 95.8  F (35.4  C) Temp src: Axillary BP: 124/64 Pulse: 57   Resp: 16 SpO2: 98 % O2 Device: None (Room air)    Vitals:    05/25/21 2325 05/26/21 0400   Weight: 99.8 kg (220 lb) 95.1 kg (209 lb 10.5 oz)     Vital Signs with Ranges  Temp:  [95.5  F (35.3  C)-97.5  F (36.4  C)] 95.8  F (35.4  C)  Pulse:  [50-66] 57  Resp:  [16] 16  BP: (108-144)/(53-78) 124/64  SpO2:  [96 %-100 %] 98 %  I/O last 3 completed shifts:  In: 1020 [P.O.:1020]  Out: 1200 [Urine:1200]    Padma Coma Scale - Total 15/15  Constitutional: Awake, alert, cooperative, no apparent distress. Sitting in chair   Eyes: Periorbital ecchymosis on right improved, PERRL, EOMI, sclera clear, conjunctiva  normal.  HENT: Normocephalic, right forehead laceration repaired with 5 sutures    Respiratory: No increased work of breathing, good air exchange, clear to auscultation bilaterally,  Cardiovascular:  regular rate and rhythm,  GI: Abdomen soft, non-distended, non-tender, no guarding  Skin:  Warm & dry .  Musculoskeletal: There is no redness, warmth, or swelling of the joints.    Neurologic: Awake, alert, oriented. Cranial nerves II-XII are grossly intact.  Strength and sensory is intact. No focal deficits.  Neuropsychiatric: Calm, normal eye contact, alert, affect appropriate to situation, oriented, thought process normal.    GIO Badillo CNP  To contact the trauma service use job code pager [phone],   Numeric texts or alpha text through [facility]

## 2021-05-30 NOTE — PLAN OF CARE
Status: Pt admitted form OSH after a fall and was found to have L parietal lobe IPH  Vitals: VSS this shift   Neuros: A&O x4, strengths 5/5 w/ generalized weakness, denies N/T  IV: PIV is SL  Labs/Electrolytes: WDL  Resp/trach: Lung sounds are clear  Diet: Reg, tolerating  Bowel status: Bowel sounds are active, No BM this shift   : Straight cath'd for 300mL at 2200,   Skin: Bruising/scabbing throughout, x2 head lacerations closed w/ sutures  Pain: Denied  Activity: Assist x1 w/ GB  Social: Updated son   Plan: Plan for TCU. SW following. Will continue to monitor and follow POC

## 2021-05-30 NOTE — PROGRESS NOTES
Vitals: VSS  Neuros: Intact except generalized weakness  IV: x2 SLd  Resp/trach: WNL  Diet: Regular - Good po. Needs help ordering/setup  Bowel status: BM 5/29  : Straight Caths at baseline. Cathed at 1400 for 200ml - encouraged more po  Skin: Abrasion/bruising to right forehead. Bruising T/O arms legs  Pain: Denies  Activity: Assist of 1, GB and walker. Walked halls x 2 and sat in recliner most of day  Plan: TCU placement

## 2021-05-30 NOTE — PLAN OF CARE
Status: Pt admitted form OSH after a fall and was found to have L parietal lobe IPH  Vitals: /65 (BP Location: Right arm)   Pulse 58   Temp 96.2  F (35.7  C)   Resp 16   Ht 1.829 m (6')   Wt 95.1 kg (209 lb 10.5 oz)   SpO2 98%   BMI 28.43 kg/m  RA.  Neuros: A&O x4, strengths 5/5 w/ generalized weakness, denies N/T  IV: PIV is SLx2  Resp/trach: Lung sounds are clear  Diet: Reg, tolerating  Bowel status: Bowel sounds are active, No BM this shift   : Straight cath'd for 500 ml at 0430   Skin: Bruising/scabbing throughout, x2 head lacerations closed w/ sutures  Pain: Denied  Activity: Assist x1 w/ GB,Up in chair this am.  Plan: blood glucose 114, Plan for TCU soon, Will continue to monitor and follow POC

## 2021-05-31 LAB
GLUCOSE BLDC GLUCOMTR-MCNC: 112 MG/DL (ref 70–99)
GLUCOSE BLDC GLUCOMTR-MCNC: 113 MG/DL (ref 70–99)
GLUCOSE BLDC GLUCOMTR-MCNC: 131 MG/DL (ref 70–99)
GLUCOSE BLDC GLUCOMTR-MCNC: 146 MG/DL (ref 70–99)
GLUCOSE BLDC GLUCOMTR-MCNC: 170 MG/DL (ref 70–99)
MAGNESIUM SERPL-MCNC: 1.9 MG/DL (ref 1.6–2.3)
PHOSPHATE SERPL-MCNC: 2.7 MG/DL (ref 2.5–4.5)
POTASSIUM SERPL-SCNC: 4 MMOL/L (ref 3.4–5.3)

## 2021-05-31 PROCEDURE — 250N000013 HC RX MED GY IP 250 OP 250 PS 637: Performed by: STUDENT IN AN ORGANIZED HEALTH CARE EDUCATION/TRAINING PROGRAM

## 2021-05-31 PROCEDURE — 250N000013 HC RX MED GY IP 250 OP 250 PS 637: Performed by: NURSE PRACTITIONER

## 2021-05-31 PROCEDURE — 999N001017 HC STATISTIC GLUCOSE BY METER IP

## 2021-05-31 PROCEDURE — 120N000002 HC R&B MED SURG/OB UMMC

## 2021-05-31 PROCEDURE — 84132 ASSAY OF SERUM POTASSIUM: CPT | Performed by: SURGERY

## 2021-05-31 PROCEDURE — 250N000013 HC RX MED GY IP 250 OP 250 PS 637: Performed by: DIETITIAN, REGISTERED

## 2021-05-31 PROCEDURE — 250N000013 HC RX MED GY IP 250 OP 250 PS 637: Performed by: PHYSICIAN ASSISTANT

## 2021-05-31 PROCEDURE — 99232 SBSQ HOSP IP/OBS MODERATE 35: CPT | Performed by: NURSE PRACTITIONER

## 2021-05-31 PROCEDURE — 84100 ASSAY OF PHOSPHORUS: CPT | Performed by: SURGERY

## 2021-05-31 PROCEDURE — 83735 ASSAY OF MAGNESIUM: CPT | Performed by: SURGERY

## 2021-05-31 PROCEDURE — 36415 COLL VENOUS BLD VENIPUNCTURE: CPT | Performed by: SURGERY

## 2021-05-31 RX ORDER — LEVETIRACETAM 500 MG/1
500 TABLET ORAL 2 TIMES DAILY
Status: ON HOLD | DISCHARGE
Start: 2021-05-26 | End: 2021-07-20

## 2021-05-31 RX ORDER — ASPIRIN 325 MG
325 TABLET ORAL DAILY
Status: ON HOLD | DISCHARGE
Start: 2021-06-01 | End: 2021-07-20

## 2021-05-31 RX ORDER — NYSTATIN 100000 U/G
CREAM TOPICAL 2 TIMES DAILY
Status: ON HOLD | DISCHARGE
Start: 2021-05-31 | End: 2021-07-20

## 2021-05-31 RX ORDER — ASPIRIN 325 MG
325 TABLET ORAL DAILY
Status: DISCONTINUED | OUTPATIENT
Start: 2021-06-01 | End: 2021-06-01 | Stop reason: HOSPADM

## 2021-05-31 RX ORDER — POLYETHYLENE GLYCOL 3350 17 G/17G
17 POWDER, FOR SOLUTION ORAL DAILY
Qty: 510 G | Status: ON HOLD | DISCHARGE
Start: 2021-05-31 | End: 2021-07-20

## 2021-05-31 RX ADMIN — LEVETIRACETAM 500 MG: 500 TABLET ORAL at 07:42

## 2021-05-31 RX ADMIN — Medication 12.5 MG: at 20:03

## 2021-05-31 RX ADMIN — CARBIDOPA AND LEVODOPA 1 TABLET: 25; 100 TABLET ORAL at 07:42

## 2021-05-31 RX ADMIN — CARBIDOPA AND LEVODOPA 1 TABLET: 25; 100 TABLET ORAL at 20:05

## 2021-05-31 RX ADMIN — METFORMIN HYDROCHLORIDE 1000 MG: 500 TABLET ORAL at 18:06

## 2021-05-31 RX ADMIN — THERA TABS 1 TABLET: TAB at 20:05

## 2021-05-31 RX ADMIN — NYSTATIN: 100000 CREAM TOPICAL at 20:05

## 2021-05-31 RX ADMIN — AMLODIPINE BESYLATE 10 MG: 10 TABLET ORAL at 07:42

## 2021-05-31 RX ADMIN — LISINOPRIL 40 MG: 40 TABLET ORAL at 07:42

## 2021-05-31 RX ADMIN — SIMVASTATIN 20 MG: 20 TABLET, FILM COATED ORAL at 20:03

## 2021-05-31 RX ADMIN — LEVETIRACETAM 500 MG: 500 TABLET ORAL at 20:03

## 2021-05-31 RX ADMIN — CARBIDOPA AND LEVODOPA 1 TABLET: 25; 100 TABLET ORAL at 16:03

## 2021-05-31 RX ADMIN — NYSTATIN: 100000 CREAM TOPICAL at 07:45

## 2021-05-31 RX ADMIN — CARBIDOPA AND LEVODOPA 1 TABLET: 25; 100 TABLET ORAL at 11:55

## 2021-05-31 RX ADMIN — METFORMIN HYDROCHLORIDE 1000 MG: 500 TABLET ORAL at 07:42

## 2021-05-31 ASSESSMENT — ACTIVITIES OF DAILY LIVING (ADL)
ADLS_ACUITY_SCORE: 19
ADLS_ACUITY_SCORE: 19
ADLS_ACUITY_SCORE: 20
ADLS_ACUITY_SCORE: 19

## 2021-05-31 ASSESSMENT — VISUAL ACUITY: OU: NORMAL ACUITY

## 2021-05-31 NOTE — PROGRESS NOTES
[facility]  Trauma Service Progress Note    Date of Service (when I saw the patient): 05/31/2021     Assessment & Plan   Trauma Mechanism: GLF  Date of Injury: 5/25/21  Known Injuries:  1. 2.9 parenchymal hemorrhage in left parietal lobe  2. Forehead laceration x2      Procedures:  1. Forehead lac x2 repaired at OSH: 1 sutured, 1 glued                Neuro/Pain/Psych:  # Fall, on anticoagulation  # Intraparenchymal Hemorrhage  # Hx of TIA  # Parkinson's disease, recent diagnosis   - continue PTA: Sinemet,   - Neurosurgery following: Keppra BID, Hold Plavix, concern for vascular malformation on MRI  - Per discussion with Neuro/stroke, patient ok to transition to ASA in 1 week and not restarting Plavix  - Neurosurgery had planned angio on 5/28 after results from MRI and CTA. Notified late afternoon that this procedure was not going to occur and patient was free to discharge and will follow-up as OP.   - Follow-up repeat CTH in 2 weeks with neurosurgery, and NeuroIR with a Repeat MRI with and without in 3 months with neurosurgery     # Acute on chronic pain   - Prn: Tylenol     Pulmonary:  - no acute issues      Cardiovascular:    # Hypertension   # Dyslipidemia   - Monitor hemodynamic status.   - continue PTA: amlodipine, lisinopril, metoprolol, simvastatin,      GI/Nutrition:    - Regular diet     Renal/ Fluids/Electrolytes:  # Hx chronic urinary retention straight cath at home, Neurogenic bladder  - Straight cath per protocol  - electrolyte replacement protocol in place.      Endocrine:  # Diabetes Mellitus   - PTA medications: Metformin,resumed  - Medium correction sliding scale for glucose management. Goal to keep BG < 180 for optimal wound healing     Infectious disease:   - UA at OSH with +nitrites, 250 leuk est, WBC 26 started on Cipro  - UC: No growth, stopped     Hematology:    - Hgb stable  - Threshold for transfusion if hgb <7.0 or signs/symptoms of  hypoperfusion.        # Coagulopathy  - Plan to stop Plavix and transition to ASA 1 week after fall, June 1     Musculoskeletal:  # Weakness and deconditioning of chronic illness   - Physical and occupational therapy consults.     Skin:  # Forehead lac x2 repaired at OSH: 1 sutured, 1 glued.   Will remove sutures today  - dilgent cares to prevent skin breakdown and wound formation.       Lines/ tubes/ drains:  - PIV      General Cares:                 PPI/H2 blocker:  NA                DVT prophylaxis: PCD                Bowel Regimen/Date of last stool: 05/30, ordered                Pulmonary toilet: cough, deep breath      Code status:  Full               Discharge goals:     Adequate pain management: yes    VSS x24 hours: yes    Hemoglobin stable x 48 hours: yes    Ambulating safely and/or therapy evals complete: yes    Drains/lines removed or plan in place to manage: yes    Teaching done: Discussed plan with pt and pt's son at bedside today    Other:  Expected D/C date: TCU pending, Social work following on referrals    Interval History   Continues to complain of leg cramps worse in the am, states he is bored asking when he can leave. Denies pain, shortness of breath.  ROS x 8 negative with exception of those things listed in interval hx    Physical Exam   Temp: 97  F (36.1  C) Temp src: Oral BP: 132/55 Pulse: 55   Resp: 16 SpO2: 98 % O2 Device: None (Room air)    Vitals:    05/25/21 2325 05/26/21 0400   Weight: 99.8 kg (220 lb) 95.1 kg (209 lb 10.5 oz)     Vital Signs with Ranges  Temp:  [95  F (35  C)-97  F (36.1  C)] 97  F (36.1  C)  Pulse:  [54-60] 55  Resp:  [14-16] 16  BP: (101-153)/(49-66) 132/55  SpO2:  [98 %-99 %] 98 %  I/O last 3 completed shifts:  In: 900 [P.O.:900]  Out: 1300 [Urine:1300]    Padam Coma Scale - Total 15/15  Constitutional: Awake, alert, cooperative, no apparent distress. Sitting in chair   Eyes: Periorbital ecchymosis on right improved  HENT: Normocephalic, right forehead laceration  well approximated, no drainage  Respiratory: No increased work of breathing, good air exchange, clear to auscultation bilaterally,  Cardiovascular:  regular rate and rhythm,  GI: Abdomen soft, non-distended, non-tender, no guarding  Skin:  Warm & dry .  Musculoskeletal: There is no redness, warmth, or swelling of the joints.    Neurologic: Awake, alert, oriented. Strength and sensory is intact. No focal deficits.  Neuropsychiatric: Calm, normal eye contact, alert    GIO Badillo CNP  To contact the trauma service use job code pager [phone],   Numeric texts or alpha text through [facility]

## 2021-05-31 NOTE — PROGRESS NOTES
Care Management Follow Up    Length of Stay (days): 5    Expected Discharge Date: 06/01/21     Concerns to be Addressed:     TCU placement  Patient plan of care discussed at interdisciplinary rounds: No due to weekend staffing    Anticipated Discharge Disposition:  TCU      Patient/family educated on Medicare website which has current facility and service quality ratings:  yes  Education Provided on the Discharge Plan:  yes  Patient/Family in Agreement with the Plan:  yes    Additional Information:  Weekend SW following up on referrals to TCU. Per chart review, TCU referrals have been sent to the following facilities:    - [facility]-[phone], left  for admissions  - [facility]- [phone], spoke with Britt in admissions. She states she did not receive referral. SW resent via [facility].   - [facility]- [phone]-no answer and no voicemail availble           Ariane Brice, [facility]  Weekend Adult Acute Care     For weekend social work needs, contact information below and available on [facility]:  4A, 4C, 4E, 5A, 5B                  pager [phone]  6A, 6B, 6C                               pager [phone]  7A, 7B, 7C, 7D, 5C                 pager [phone]  ED/OBS                                  pager [phone]     For weekend RN care coordinator needs (home discharge with needs including home care, rides home, assisted living facility returns, durable medical equip, IV antibiotics) - page [phone].     For hours 1600 - midnight Pager: [phone]

## 2021-05-31 NOTE — PLAN OF CARE
Pt here with SDH 2/2 fall @ home, vss,neuros include: a/ox4, GW throughout. PIV SL, regular diet with good PO intake, DTV but has been straight cath'd last @ 0500 for 700ml. Pt up SBA w/GB and cane, denies pain, cranial laceration OFELIA and WNL. Continue to monitor per orders.

## 2021-05-31 NOTE — PLAN OF CARE
Care from 1100 - 1530  Status:  Pt admitted s/p fall w/IPH and forehead lacerations on 5/25  Vitals: VSS on RA  Neuros: AO4, strength 5/5 throughout with generalized weakness  IV: PIV SL  Diet: Regular diet, fair intake  Bowel status: No BM this shift  : Straight cathed this AM at 0500, bladder scanned at 1400 for <300 ccs  Skin: Bruising throughout   Pain: Denies  Activity: Up with assist of 1, GB and walker  Social: Son at bedside this AM, supportive  Plan: Awaiting TCU placement. Continue to monitor and follow POC

## 2021-05-31 NOTE — PLAN OF CARE
Status: Admitted s/p fall w/IPH and forehead lacerations on 5/25  Vitals: BP (!) 153/66 (BP Location: Right arm)   Pulse 54   Temp 96.3  F (35.7  C) (Oral)   Resp 16   Ht 1.829 m (6')   Wt 95.1 kg (209 lb 10.5 oz)   SpO2 98%   BMI 28.43 kg/m   RA.  Neuros: A/O x4 - generalized weakness  IV: PIV SL  Resp/trach: RA O2 sats stable, Lungs clear.  Diet: Reg diet  Bowel status: BS+ BM 5/30  : Straight cath at baseline. Cathed for 700 at 0500.  Skin: Bruising on face (R), R arm  Pain: Denies pain.  Activity: Up w/1 GB/Walker,up in chairthis morning.  Plan: TCU on Tuesday ,SW working on it.  Updates this shift: Resting comfortably, no changes.

## 2021-05-31 NOTE — PLAN OF CARE
/64 (BP Location: Left arm)   Pulse 66   Temp 95.9  F (35.5  C) (Oral)   Resp 16   Ht 1.829 m (6')   Wt 95.1 kg (209 lb 10.5 oz)   SpO2 98%   BMI 28.43 kg/m    [id number]  Status: VSS on room air  Pain/Nausea: denies both  Mobility: SBA with walker  Diet: Regular - fair appetite, , 146  Labs: Reviewed  Lines: PIV SL  Drains: X  Skin/Incisions: No new deficits noted. Nystatin cream applied per orders. Rash mainly pink, more red on right side toward hip. Bruising to face/arm, head laceration  Neuro: increased confusion with 1st neuro check, c/o numbness to right pointer and middle fingers , R hand  4/5 - team aware, resolved as shift progressed - now A&Ox4, denies N/T, both hands 5/5 strength   Respiratory: WDL on room air  Cardiac: WDL - bradycardic  GI: BS+  : Straight cathed for 850mL at 2200, urine clear/seniat/no odor  Plan: Continue POC, discharge to TCU pending bed availability

## 2021-05-31 NOTE — PLAN OF CARE
Status: Admitted s/p fall w/IPH and forehead lacerations on 5/25  Vitals: VSS, RA  Neuros: A/O x4 - generalized weakness  IV: PIV SL  Labs/Electrolytes: WNL  Resp/trach: RA O2 sats stable, LS clr  Diet: Reg diet  Bowel status: BS+ BM 5/29  : Straight cath at baseline. Cathed for 400 mL @ 2200  Skin: Bruising on face (R), R arm  Pain: Denies  Activity: Up w/1 GB/Walker  Plan: TCU on Tuesday  Updates this shift: Resting comfortably, no changes.

## 2021-06-01 ENCOUNTER — APPOINTMENT (OUTPATIENT)
Dept: PHYSICAL THERAPY | Facility: CLINIC | Age: 81
DRG: 086 | End: 2021-06-01
Payer: COMMERCIAL

## 2021-06-01 VITALS
BODY MASS INDEX: 28.4 KG/M2 | HEIGHT: 72 IN | HEART RATE: 63 BPM | WEIGHT: 209.66 LBS | DIASTOLIC BLOOD PRESSURE: 68 MMHG | OXYGEN SATURATION: 99 % | TEMPERATURE: 95.3 F | RESPIRATION RATE: 16 BRPM | SYSTOLIC BLOOD PRESSURE: 136 MMHG

## 2021-06-01 LAB
GLUCOSE BLDC GLUCOMTR-MCNC: 103 MG/DL (ref 70–99)
GLUCOSE BLDC GLUCOMTR-MCNC: 127 MG/DL (ref 70–99)
MAGNESIUM SERPL-MCNC: 1.9 MG/DL (ref 1.6–2.3)
PHOSPHATE SERPL-MCNC: 2.7 MG/DL (ref 2.5–4.5)
POTASSIUM SERPL-SCNC: 4.4 MMOL/L (ref 3.4–5.3)

## 2021-06-01 PROCEDURE — 250N000013 HC RX MED GY IP 250 OP 250 PS 637: Performed by: STUDENT IN AN ORGANIZED HEALTH CARE EDUCATION/TRAINING PROGRAM

## 2021-06-01 PROCEDURE — 97116 GAIT TRAINING THERAPY: CPT | Mod: GP

## 2021-06-01 PROCEDURE — 97530 THERAPEUTIC ACTIVITIES: CPT | Mod: GP

## 2021-06-01 PROCEDURE — 250N000013 HC RX MED GY IP 250 OP 250 PS 637: Performed by: DIETITIAN, REGISTERED

## 2021-06-01 PROCEDURE — 84132 ASSAY OF SERUM POTASSIUM: CPT | Performed by: SURGERY

## 2021-06-01 PROCEDURE — 250N000013 HC RX MED GY IP 250 OP 250 PS 637: Performed by: NURSE PRACTITIONER

## 2021-06-01 PROCEDURE — 999N001017 HC STATISTIC GLUCOSE BY METER IP

## 2021-06-01 PROCEDURE — 84100 ASSAY OF PHOSPHORUS: CPT | Performed by: SURGERY

## 2021-06-01 PROCEDURE — 250N000013 HC RX MED GY IP 250 OP 250 PS 637: Performed by: PHYSICIAN ASSISTANT

## 2021-06-01 PROCEDURE — 36415 COLL VENOUS BLD VENIPUNCTURE: CPT | Performed by: SURGERY

## 2021-06-01 PROCEDURE — 99239 HOSP IP/OBS DSCHRG MGMT >30: CPT | Performed by: NURSE PRACTITIONER

## 2021-06-01 PROCEDURE — 83735 ASSAY OF MAGNESIUM: CPT | Performed by: SURGERY

## 2021-06-01 RX ORDER — LEVETIRACETAM 500 MG/1
500 TABLET ORAL 2 TIMES DAILY
Status: DISCONTINUED | OUTPATIENT
Start: 2021-06-01 | End: 2021-06-01 | Stop reason: HOSPADM

## 2021-06-01 RX ORDER — ACETAMINOPHEN 325 MG/1
650 TABLET ORAL EVERY 6 HOURS PRN
Status: ON HOLD | DISCHARGE
Start: 2021-06-01 | End: 2021-08-11

## 2021-06-01 RX ADMIN — Medication 12.5 MG: at 08:18

## 2021-06-01 RX ADMIN — CARBIDOPA AND LEVODOPA 1 TABLET: 25; 100 TABLET ORAL at 15:06

## 2021-06-01 RX ADMIN — CARBIDOPA AND LEVODOPA 1 TABLET: 25; 100 TABLET ORAL at 08:18

## 2021-06-01 RX ADMIN — ACETAMINOPHEN 650 MG: 325 TABLET, FILM COATED ORAL at 08:19

## 2021-06-01 RX ADMIN — CARBIDOPA AND LEVODOPA 1 TABLET: 25; 100 TABLET ORAL at 12:20

## 2021-06-01 RX ADMIN — NYSTATIN: 100000 CREAM TOPICAL at 12:20

## 2021-06-01 RX ADMIN — ASPIRIN 325 MG ORAL TABLET 325 MG: 325 PILL ORAL at 08:18

## 2021-06-01 RX ADMIN — LEVETIRACETAM 500 MG: 500 TABLET ORAL at 08:19

## 2021-06-01 RX ADMIN — AMLODIPINE BESYLATE 10 MG: 10 TABLET ORAL at 08:19

## 2021-06-01 RX ADMIN — LISINOPRIL 40 MG: 40 TABLET ORAL at 08:18

## 2021-06-01 RX ADMIN — METFORMIN HYDROCHLORIDE 1000 MG: 500 TABLET ORAL at 08:18

## 2021-06-01 ASSESSMENT — ACTIVITIES OF DAILY LIVING (ADL)
ADLS_ACUITY_SCORE: 19

## 2021-06-01 ASSESSMENT — VISUAL ACUITY: OU: NORMAL ACUITY

## 2021-06-01 NOTE — PROGRESS NOTES
[facility]  Trauma Service Progress Note    Date of Service (when I saw the patient): 06/01/2021     Assessment & Plan   Trauma Mechanism: GLF  Date of Injury: 5/25/21  Known Injuries:  1. 2.9cm intraparenchymal hemorrhage in left parietal lobe  2. Forehead laceration x2      Procedures:  1. Forehead lac x2 repaired at OSH: 1 sutured, 1 glued                Neuro/Pain/Psych:  # Fall, on anticoagulation  # Intraparenchymal Hemorrhage  # Hx of TIA  # Parkinson's disease, recent diagnosis   Reported right hand numbness prior to admission, occasional gets confused, redirectable with re-orientation  - continue PTA: Sinemet,   - Neurosurgery following: Keppra BID, Hold Plavix, concern for vascular malformation on MRI  - Per discussion with Neuro/stroke, recommend not resuming Plavix and start Aspirin June 1st, 7 days post injury-ordered  - Neurosurgery had planned angio on 5/28 after results from MRI and CTA. No longer required prior to discontinue.  - Follow-up with a repeat CTH in 2 weeks with neurosurgery, and NeuroIR with a Repeat MRI with and without in 3 months with neurosurgery     # Acute on chronic pain   - Prn: Tylenol  - Warm pack, stretching, encourage ambulation with assistance to help with am leg cramps     Pulmonary:  - no acute issues      Cardiovascular:    # Hypertension   # Dyslipidemia   - Monitor hemodynamic status.   - continue PTA: amlodipine, lisinopril, metoprolol, simvastatin,      GI/Nutrition:    - Regular diet     Renal/ Fluids/Electrolytes:  # Hx chronic urinary retention straight cath at home, Neurogenic bladder  - Straight cath per protocol  - electrolyte replacement protocol in place.      Endocrine:  # Diabetes Mellitus   - PTA medications: Metformin,resumed  - Medium correction sliding scale for glucose management. Goal to keep BG < 180 for optimal wound healing     Infectious disease:   - UA at OSH with +nitrites, 250 leuk est, WBC 26  started on Cipro  - UC: No growth, stopped     Hematology:    - Hgb stable  - Threshold for transfusion if hgb <7.0 or signs/symptoms of hypoperfusion.        # Coagulopathy  - Plan to stop Plavix and transition to ASA 1 week after fall, June 1     Musculoskeletal:  # Weakness and deconditioning of chronic illness   - Physical and occupational therapy consults.     Skin:  # Forehead lac x2 repaired at OSH: 1 sutured, 1 glued.   - dilgent cares to prevent skin breakdown and wound formation.       Lines/ tubes/ drains:  - PIV      General Cares:                 PPI/H2 blocker:  NA                DVT prophylaxis: PCD                Bowel Regimen/Date of last stool: 05/30, ordered                Pulmonary toilet: cough, deep breath      Code status:  Full               Discharge goals:     Adequate pain management: yes    VSS x24 hours: yes    Hemoglobin stable x 48 hours: yes    Ambulating safely and/or therapy evals complete: yes    Drains/lines removed or plan in place to manage: yes    Teaching done: Discussed plan with pt and pt's son at bedside today    Other:  Expected D/C date: TCU pending, Social work following on referrals    Interval History   Leg cramps are most bothersome in the am, improves with stretches and hydration.  ROS x 8 negative with exception of those things listed in interval hx    Physical Exam   Temp: 95.6  F (35.3  C) Temp src: Oral BP: 135/74 Pulse: 71   Resp: 16 SpO2: 100 % O2 Device: None (Room air)    Vitals:    05/25/21 2325 05/26/21 0400   Weight: 99.8 kg (220 lb) 95.1 kg (209 lb 10.5 oz)     Vital Signs with Ranges  Temp:  [95.6  F (35.3  C)-96.9  F (36.1  C)] 95.6  F (35.3  C)  Pulse:  [53-71] 71  Resp:  [16] 16  BP: (119-135)/(59-74) 135/74  SpO2:  [97 %-100 %] 100 %  I/O last 3 completed shifts:  In: 450 [P.O.:450]  Out: 1425 [Urine:1425]    Padma Coma Scale - Total 15/15, gets really confused at times,but comes around once re-oriented  Constitutional: Awake, alert, cooperative,  slow speech  Eyes: Periorbital ecchymosis on right improved  HENT: Normocephalic, right forehead laceration well approximated, no drainage  Respiratory: No increased work of breathing, good air exchange, clear to auscultation bilaterally,  Cardiovascular:  regular rate and rhythm,  GI: Abdomen soft, non-distended, non-tender, no guarding  Skin:  Warm & dry .  Musculoskeletal: There is no redness, warmth, or swelling of the joints.    Neurologic: Awake, alert, oriented. Strength and sensory is intact. No focal deficits.  Neuropsychiatric: Calm, normal eye contact, alert    GIO Badillo CNP  To contact the trauma service use job code pager [phone],   Numeric texts or alpha text through [facility]

## 2021-06-01 NOTE — PLAN OF CARE
Pt admitted after a fall w/ IPH and forehead lacerations on 5/25. VSS on RA. Neuros unchanged - AO4, strength 5/5 with generalized weakness. Pain well controlled with PRN Tylenol for leg cramps. Up with assist of 1 and a walker. Discharge instructions explained to pt with verbalization of understanding. Last BM 5/30. Straight cathed at 1500 prior to leaving. Pt left at 1600 with all belongings in hand to [facility], report given to Santana.

## 2021-06-01 NOTE — DISCHARGE SUMMARY
[facility]    Discharge Summary  Trauma Surgery Service    Date of Admission:  5/25/2021  Date of Discharge:  6/1/2021  Attending Physician: Dr. Monster Ramos  Discharging Provider: Horace Pedersen CNP  Date of Service (when I saw the patient): 06/01/21    Primary Provider: Antione Villeda  Primary Care clinic: [place] [id number]  Phone: [phone]  Fax number: [phone]     Discharge Diagnoses   Left parietal lobe intraparenchymal hemorrhage     Hospital Course    Maikol Glaser is a 80 year old male with history of TIA on Plavix, Parkinsons disease, Gait instability and fall who was admitted on 5/25/2021 following a ground level fall with signs of head trauma found to have a 2.9cm parenchymal hemorrhage on CT. The patient was transferred here for Neurosurgery management of his injuries. The systematic review did not find any other injuries.    #Intraparenchymal Hemorrhage  Maikol Glaser was evaluated by Neurosurgery and was managed non-operatively. He had an MRI of the head and CTA of his head and neck given the location of the hemorrhage as resulted below:  Impression:    Brain MRI 05/27/2021  1. Left parietal lobe intraparenchymal hemorrhage and surrounding  vasogenic edema without definite evidence of associated tumor.  However, there are multiple small enhancing vessels inside and along  the margins of the hemorrhage which raises the suspicion for  arteriovenous malformation. There is some restricted diffusion along  the medial margin of the hemorrhage, could be secondary to blood  products or mild perihemorrhage infarct.  2. Serpiginous tubular enhancement in a cortical or meningeal fashion  involving left frontoparietal gyri with potential connection to a  superficial venous structures. This appearance also raises suspicion  for arteriovenous malformation and likely drainage to adjacent  meningeal venous circulation. However,  metastatic disease cannot  entirely be excluded given the presence of T2 FLAIR hyperintensity of  the cortex.  3. Recommend further evaluation with vessel imaging (either dynamic CT  angiography or catheter angiography) to rule out AVM and CT of the  chest, abdomen, and pelvis to rule out potential metastatic disease.    Neuro Interventional Radiology recommend initiating Aspirin 325mg daily 7 days post injury which was started prior to discharge.  He was monitored with serial neurological examinations which remained stable.  Of note he reported occasional right hand numbness prior to admission. During his stay he was noted to have intermittent episodes of severe confusion, improved with time and re-orientation. No focal deficits were associated with this. Neurosurgery recommends Follow-up with a repeat CTH in 2 weeks with Neurosurgery, and NeuroIR with a Repeat MRI with and without in 3 months with Neurosurgery.  He was evaluated by physical and occupational therapies during his hospitalization.  Patient underwent Traumatic Brain Injury evaluation and screening by therapies. He has been provided with information for TBI follow-up. Please see summary of most current therapy notes below.    # Hx Hypertension   # Dyslipidemia   - Continue PTA: Amlodipine, Lisinopril, Metoprolol, Simvastatin,      # Hx chronic urinary retention straight caths at home,   # Hx Neurogenic bladder  - Continued Straight cath per protocol  - UA/UC sent without growth, or overt concern for UTI     # Diabetes Mellitus   - PTA medications: Metformin continue without changes  Therapy Recommendations:   Current status of physical therapies on discharge:   TCU   Current status of occupational therapies on discharge:  TCU    Code Status   Full Code    SUBJECTIVE: Prior to discharge Mr. Glaser reported leg cramps as most bothersome. He denies headache, shortness of breath or abdominal pain.    Physical Exam   Temp: 95.3  F (35.2  C) Temp src: Oral BP:  137/64 Pulse: 63   Resp: 16 SpO2: 100 % O2 Device: None (Room air)    Vitals:    05/25/21 2325 05/26/21 0400   Weight: 99.8 kg (220 lb) 95.1 kg (209 lb 10.5 oz)     Vital Signs with Ranges  Temp:  [95.3  F (35.2  C)-96.9  F (36.1  C)] 95.3  F (35.2  C)  Pulse:  [53-71] 63  Resp:  [16] 16  BP: (119-137)/(59-74) 137/64  SpO2:  [97 %-100 %] 100 %  I/O last 3 completed shifts:  In: 450 [P.O.:450]  Out: 1425 [Urine:1425]    Constitutional: Awake, alert, cooperative, slow speech  Eyes: Periorbital ecchymosis on right improved  HENT: Normocephalic, right forehead laceration well approximated, no drainage  Respiratory: No increased work of breathing, good air exchange, clear to auscultation bilaterally,  Cardiovascular:  regular rate and rhythm,  GI: Abdomen soft, non-distended, non-tender, no guarding  Skin:  Warm & dry .  Musculoskeletal: There is no redness, warmth, or swelling of the joints.    Neurologic: Awake, alert, oriented. Strength and sensory is intact. No focal deficits.  Neuropsychiatric: Calm, normal eye contact, alert    Discharge Disposition   Discharged to rehabilitation facility  Condition at discharge: Stable  Discharge VS: Blood pressure 135/74, pulse 71, temperature 95.6  F (35.3  C), temperature source Oral, resp. rate 16, height 1.829 m (6'), weight 95.1 kg (209 lb 10.5 oz), SpO2 100 %.    Consultations This Hospital Stay   PHYSICAL THERAPY ADULT IP CONSULT  OCCUPATIONAL THERAPY ADULT IP CONSULT  VASCULAR ACCESS CARE ADULT IP CONSULT  NEUROLOGY STROKE ADULT IP CONSULT  SOCIAL WORK IP CONSULT  PHYSICAL THERAPY ADULT IP CONSULT  OCCUPATIONAL THERAPY ADULT IP CONSULT    Discharge Orders      CT Head w/o contrast*     MRI Brain w & w/o contrast     Follow Up ([facility])    Follow up neurosurgery at 2 weeks with repeat CTH, and 3 months with MRI     General info for SNF    Length of Stay Estimate: Short Term Care: Estimated # of Days <30  Condition at Discharge: Stable  Level of care:skilled    Rehabilitation Potential: Good  Admission H&P remains valid and up-to-date: Yes  Recent Chemotherapy: N/A  Use Nursing Home Standing Orders: Yes     Mantoux instructions    Give two-step Mantoux (PPD) Per Facility Policy Yes     Reason for your hospital stay    Fall  2.3x1.9cm parenchymal hemorrhage in left parietal lobe     Glucose monitor nursing POCT    Before meals and at bedtime     Hunter catheter    Patient requires straight cath about every 8 hours, chronic home regimen.   Indication: Neurogenic bladder.     Activity - Up with nursing assistance     Additional Discharge Instructions    Requires straight cath every 8 hours and PRN     Full Code     Physical Therapy Adult Consult    Evaluate and treat as clinically indicated.    Reason:  Fall, intraparenchymal hemorrhage, gait instability     Occupational Therapy Adult Consult    Evaluate and treat as clinically indicated.    Reason:  Falls, gait instability     Fall precautions     Advance Diet as Tolerated    Follow this diet upon discharge: Regular     Discharge Medications   Current Discharge Medication List      START taking these medications    Details   aspirin (ASA) 325 MG tablet Take 1 tablet (325 mg) by mouth daily  Qty:      Associated Diagnoses: Intracranial hemorrhage (H)      levETIRAcetam (KEPPRA) 500 MG tablet Take 1 tablet (500 mg) by mouth 2 times daily for 7 days    Associated Diagnoses: Intraparenchymal hematoma of left side of brain due to trauma, without loss of consciousness, initial encounter (H)      nystatin (MYCOSTATIN) 853916 UNIT/GM external cream Apply topically 2 times daily  Qty:      Associated Diagnoses: Intraparenchymal hematoma of left side of brain due to trauma, without loss of consciousness, initial encounter (H)      polyethylene glycol (MIRALAX) 17 GM/Dose powder Take 17 g by mouth daily  Qty: 510 g    Associated Diagnoses: Intraparenchymal hematoma of left side of brain due to trauma, without loss of consciousness,  initial encounter (H)         CONTINUE these medications which have CHANGED    Details   acetaminophen (TYLENOL) 325 MG tablet Take 2 tablets (650 mg) by mouth every 6 hours as needed for mild pain or fever  Qty:      Associated Diagnoses: Intracranial hemorrhage (H)         CONTINUE these medications which have NOT CHANGED    Details   amLODIPine (NORVASC) 10 MG tablet Take 10 mg by mouth daily      carbidopa-levodopa (SINEMET)  MG tablet Take 1 tablet by mouth 4 times daily Takes at 0800, 1200, 1600, 2000      lisinopril (ZESTRIL) 40 MG tablet Take 40 mg by mouth daily      metFORMIN (GLUCOPHAGE) 1000 MG tablet Take 1,000 mg by mouth 2 times daily (with meals)      metoprolol succinate ER (TOPROL-XL) 50 MG 24 hr tablet Take 50 mg by mouth daily      multivitamin, therapeutic (THERA-VIT) TABS tablet Take 1 tablet by mouth every evening      simvastatin (ZOCOR) 20 MG tablet Take 20 mg by mouth At Bedtime         STOP taking these medications       clopidogrel (PLAVIX) 75 MG tablet Comments:   Reason for Stopping:             Allergies   No Known Allergies  Data   Most Recent 3 CBC's:  Recent Labs   Lab Test 05/26/21 0049   WBC 10.7   HGB 13.0*   MCV 88         Most Recent 3 BMP's:  Recent Labs   Lab Test 06/01/21  0655 05/31/21  0953 05/27/21  0645 05/26/21 0049 05/26/21 0049   NA  --   --   --   --  143   POTASSIUM 4.4 4.0 3.8   < > 3.9   CHLORIDE  --   --   --   --  111*   CO2  --   --   --   --  24   BUN  --   --   --   --  22   CR  --   --   --   --  0.91   ANIONGAP  --   --   --   --  7   DORA  --   --   --   --  9.0   GLC  --   --   --   --  125*    < > = values in this interval not displayed.     Most Recent 2 LFT's:No lab results found.  Most Recent INR's and Anticoagulation Dosing History:  Anticoagulation Dose History     Recent Dosing and Labs Latest Ref Rng & Units 5/26/2021    INR 0.86 - 1.14 0.99        Most Recent 3 Troponin's:  Recent Labs   Lab Test 05/26/21 0522   TROPI <0.015      Most Recent 6 Bacteria Isolates From Any Culture (See EPIC Reports for Culture Details):  Recent Labs   Lab Test 05/26/21  1145   CULT No growth     Most Recent TSH, T4 and A1c Labs:No lab results found.  Results for orders placed or performed during the hospital encounter of 05/25/21   Head CT w/o contrast    Narrative    EXAM: CT HEAD W/O CONTRAST  LOCATION: [facility]  DATE/TIME: 5/26/2021 1:54 AM    INDICATION: Intracranial hemorrhage.  COMPARISON: 05/25/2001.  TECHNIQUE: Routine CT Head without IV contrast. Multiplanar reformats. Dose reduction techniques were used.    FINDINGS:  INTRACRANIAL CONTENTS: Again seen is an acute intraparenchymal hemorrhage centered within the left parietal lobe measuring approximately 2.2 x 2.0 cm in greatest axial dimensions, previously 2.3 x 1.9 cm, grossly stable given differences in imaging   technique. There is mild peripheral surrounding edema. Overall the hematoma appears unchanged. Minimal local mass effect is present, however there is no significant midline shift. No acute large territory infarct. Small focal hypodensities involving the   bilateral thalami may reflect age-indeterminate lacunar type infarctions versus artifact. Mild presumed chronic small vessel ischemic changes. Mild generalized volume loss. No hydrocephalus.     VISUALIZED ORBITS/SINUSES/MASTOIDS: No intraorbital abnormality. Mucosal thickening primarily involving the ethmoid air cells. No middle ear or mastoid effusion.    BONES/SOFT TISSUES: Right frontal scalp soft tissue swelling/contusion. No acute osseous abnormality.      Impression    IMPRESSION:  1.  Grossly stable intraparenchymal hemorrhage centered within the left parietal lobe with mild surrounding edema.  2.  Small focal hypodensities involving the bilateral thalami may reflect age-indeterminate lacunar type infarctions versus artifact.  3.  Chronic senescent and presumed microvascular ischemic changes, as above.   CT  Cervical Spine w/o Contrast    Narrative    EXAM: CT CERVICAL SPINE W/O CONTRAST  LOCATION: [facility]  DATE/TIME: 5/26/2021 1:55 AM    INDICATION: Trauma  COMPARISON: 04/07/2017.  TECHNIQUE: Routine CT Cervical Spine without IV contrast. Multiplanar reformats. Dose reduction techniques were used.    FINDINGS:  VERTEBRA: Stable vertebral body heights. Stable T3 superior endplate compression fracture deformity. There is reversal of the normal cervical lordosis. No acute fracture or acute malalignment. There is osseous fusion of C2 and C3.     CANAL/FORAMINA: Multilevel degenerative changes. At C2-C3 there is moderate to severe bilateral neural foraminal stenosis. At C3-C4 there is severe bilateral neural foraminal stenosis. At C4-C5 there is severe bilateral neural foraminal stenosis. At   C5-C6 there is moderate to severe bilateral neural foraminal stenosis. At C6-C7 there is moderate to severe bilateral neural foraminal stenosis. At C7-T1 there is moderate left and moderate to severe right neural foraminal stenosis. Disc osteophyte   complexes are noted the levels of C5-C6, C6-C7 and C7-T1 which contribute to mild to moderate central spinal canal stenosis.    PARASPINAL: Hypoventilatory changes are noted involving the lungs. Trace paraseptal and centrilobular emphysema is also noted.      Impression    IMPRESSION:  1.  No fracture or posttraumatic subluxation.  2.  Chronic T3 superior plate compression fracture deformity.  3.  Degenerative changes, as above.   XR Chest Port 1 View    Narrative    Exam:  Chest X-ray 5/26/2021 6:00 AM    History: admitted w/ head injury s/p fall    Comparison: None    Findings: Frontal view of the chest. Trachea is midline. The  cardiomediastinal silhouette is within normal limits. Pulmonary  vasculature is distinct. No pleural effusion or pneumothorax. No focal  pulmonary opacities. No acute bony abnormalities. The upper abdomen is  unremarkable.      Impression     Impression:   No acute cardiopulmonary abnormalities.    I have personally reviewed the examination and initial interpretation  and I agree with the findings.    STEFANIE FELTON MD   MR Brain w/o & w Contrast     Value    Radiologist flags As above    Narrative     MR BRAIN W/O & W CONTRAST 5/27/2021 7:40 PM    Provided History: Assess for mass.    Comparison: CT 5/26/2021.    Technique: Multiplanar T1-weighted, axial FLAIR, and susceptibility  images were obtained without intravenous contrast. Following  intravenous gadolinium-based contrast administration, axial  T2-weighted, diffusion, and T1-weighted images (in multiple planes)  were obtained.    Contrast: 10 mL Gadavist    Findings: Focal intraparenchymal hemorrhage within the left parietal  lobe with surrounding vasogenic edema. There is restricted diffusion  seen along the medial margin of the hemorrhage. There is no associated  large feeding artery or draining vein, however there are multiple  small surrounding vessels are present associated with the hemorrhage.  In addition, there is serpiginous/tubular-type meningeal/cortical  based enhancement of left middle frontal gyrus with FLAIR abnormality  and questionable connection with superficial venous structures (series  100, image 137-139) and eventually leading into the dural sinuses.  Additional focus cortical based enhancement in a left parietal lobe  gyrus (series 100, image 133). There is no midline shift. No  hydrocephalus. Multifocal FLAIR hyperintense signal within the  subcortical and periventricular white matter is compatible with  chronic small vessel ischemic disease. The basal cisterns are patent.  No extra-axial fluid collection.    No abnormality of the skull marrow signal. The visualized portions of  paranasal sinuses, and mastoid air cells are relatively clear. The  orbits are grossly unremarkable.      Impression    Impression:     1. Left parietal lobe intraparenchymal hemorrhage and  surrounding  vasogenic edema without definite evidence of associated tumor.  However, there are multiple small enhancing vessels inside and along  the margins of the hemorrhage which raises the suspicion for  arteriovenous malformation. There is some restricted diffusion along  the medial margin of the hemorrhage, could be secondary to blood  products or mild perihemorrhage infarct.  2. Serpiginous tubular enhancement in a cortical or meningeal fashion  involving left frontoparietal gyri with potential connection to a  superficial venous structures. This appearance also raises suspicion  for arteriovenous malformation and likely drainage to adjacent  meningeal venous circulation. However, metastatic disease cannot  entirely be excluded given the presence of T2 FLAIR hyperintensity of  the cortex.  3. Recommend further evaluation with vessel imaging (either dynamic CT  angiography or catheter angiography) to rule out AVM and CT of the  chest, abdomen, and pelvis to rule out potential metastatic disease.    [Consider Follow Up: As above]    This report will be copied to the [facility] to ensure a  provider acknowledges the finding.     I have personally reviewed the examination and initial interpretation  and I agree with the findings.    KAMILAH STUBBS MD   CT Head w/o Contrast    Narrative    CT HEAD W/O CONTRAST 5/26/2021 11:24 AM    History: Trauma - Head Injury; change in neuro status   ICD-10:    Comparison: Head CT 5/26/2021 1:54 AM    Technique: Using multidetector thin collimation helical acquisition  technique, axial, coronal and sagittal CT images from the skull base  to the vertex were obtained without intravenous contrast.    Findings:   Unchanged left parietal intraparenchymal hemorrhage with mild  surrounding vasogenic edema since the CT earlier today. No new  hemorrhage. Mild sulcal effacement, however no associated midline  shift or herniation.    Mild leukoaraiosis, unchanged. No acute  loss of gray-white matter  differentiation. The ventricles are proportionate to the cerebral  sulci.    The bony calvaria and the bones of the skull base appear normal. The  visualized portions of the paranasal sinuses and mastoid air cells are  clear. The orbital contents are grossly normal.      Impression    Impression: Unchanged left parietal intraparenchymal hemorrhage with  mild vasogenic edema.         I have personally reviewed the examination and initial interpretation  and I agree with the findings.    DANA DRISCOLL MD   CTA Head Neck with Contrast    Narrative    CTA  HEAD NECK WITH CONTRAST 5/28/2021 7:26 AM    Head CT without contrast  CT angiogram of the neck   CT angiogram of the base of the brain with contrast  Reconstruction by the Radiologist on the 3D workstation    History:  Comparison:  MRI 5/27/2021.    Technique:    HEAD and NECK CTA: During rapid bolus intravenous injection of  nonionic contrast material, axial images were obtained using thin  collimation multidetector helical technique from the top of the skull  through the aortic arch. This CT angiogram data was reconstructed at  thin intervals with mild overlap. Images were sent to the [employer]  workstation, and 3D reconstructions were obtained. The axial source  images, multiplanar reformations, 3D reconstructions in both maximum  intensity projection display and volume rendered models were reviewed,  with reconstructions performed by the technologist and the  radiologist. Residual nonenhanced CT images were performed    Contrast: iopamidol (ISOVUE-370) solution 75 mL    Findings:  Correlated with prior MRI, redemonstration of intraparenchymal  hemorrhage in the left parietal lobe involving the superior and  inferior parietal lobules. Using dual enhanced which she'll  noncontrast images, there are seen arterial enhancing tangled vessels  crossing through the hemorrhage. Although no definite nidus identified  within the limits of the  exam, this raises the possibility of  arteriovenous malformation. Correlating with the previously described  cortical enhancing regions in the left middle frontal and parietal  gyri, there are faintly enhancing serpiginous areas, not definitive  but most likely representing meningeal collaterals.    No other intracranial hemorrhage or vascular malformation elsewhere in  the brain.    No aneurysm or stenosis in the major intracranial vasculature.    Regarding the neck, tortuous aortic aorta and tortuous brachiocephalic  trunk is noted likely from underlying chronic hypertension. Origin of  the brachiocephalic trunk, left common carotid artery and left  subclavian artery are patent. Calcified plaques in the right carotid  bifurcation without significant stenosis. On the other hand, there is  mostly noncalcified fatty plaque in the left carotid bifurcation  causing at least 67% stenosis.    Regarding the vertebral arteries, there is mild on the right and mild  to moderate on the left stenosis in the origin of the vertebral  arteries.    No dissection.    Partially visualized coronary artery calcifications.    No mediastinal or cervical mass. Question 1 cm hypodense nodule in the  left thyroid gland. No cervical mass. Normal lung apices. No lytic  bone lesion. Advanced degenerative spondylosis in the lower cervical  and thoracic spine.         Impression    Impression:  1. Stable intra-axial hemorrhage involving the left superior and  inferior parietal lobes. Tangled arterial enhancing crossing vessels  within the hemorrhage without obvious nidus, however raising  possibility of a subtle underlying arteriovenous malformation.  Previously described enhancing cortical lesions in the MRI within the  left frontal parietal gyri are better seen on MRI but these enhancing  lesions are demonstrating faint arterial enhancement with serpiginous  extensions, raising possibility of meningeal collateralization.  2. At least 67%  stenosis in the left internal carotid artery origin at  the bifurcation secondary to mostly noncalcified fatty nonulcerated  plaque  3. Mild left and mild-to-moderate right stenosis in the vertebral  artery origins.  4. Tortuous aortic arch and brachial cephalic trunk.  5. No cervical or mediastinal mass. Coronary artery calcifications.  Advanced cervical and thoracic spondylosis.    KAMILAH STUBBS MD       Time Spent on this Encounter   I, GIO Badillo CNP, personally saw the patient today and spent greater than 30 minutes discharging this patient.    We appreciate the opportunity to care for your patient while in the hospital.  Should you have any questions about their injuries or this discharge summary our contact information is below.    Trauma Services  [facility]   Department of Critical Care and Acute Care Surgery  [address] [address]  Office: [phone]

## 2021-06-01 NOTE — PROGRESS NOTES
Care Management Discharge Note    Discharge Date: 06/01/21       Discharge Disposition:  [facility] ([phone])    Discharge Services:  Short term TCU placement    Discharge DME:  Not applicable  Discharge Transportation: [facility] (Johanna [phone]) to provide private pay w/c transport at 3:30pm    Private pay costs discussed:  Pt has been informed that the cost of transport from [facility] to [facility] is private pay and pt has agreed to accept this charge.    PAS Confirmation Code:  [id number]  Patient/family educated on Medicare website which has current facility and service quality ratings:  Yes  Education Provided on the Discharge Plan:  Yes  Persons Notified of Discharge Plans: Pt, pt's wife, 6A nursing and Horace Pedersen NP Trauma  Patient/Family in Agreement with the Plan:  Pt voices understanding of the discharge plan and agreement with the discharge plan.    Handoff Referral Completed: Yes    Additional Information:  - Horace Pedersen NP confirms readiness for discharge  - Admissions (Rainy) at [facility] confirms acceptance for admit  - SW faxed pt's discharge orders and summary to [facility].    ZARI Sanchez  Social Work, 6A  Phone:  [phone]  Pager:  [phone]  6/1/2021            YEFRI Thompson

## 2021-06-01 NOTE — PLAN OF CARE
VSS except bradycardic.  Denied pain.  A&O x 4.  Neuros intact except generalized weakness.  L PIV  SL.  On regular diet; tray setup.  Orders to straight cath q6h; last completed at 0430 for 575 ml.  Last documented BM on 5/30.  Up with 1, GB, walker; ambulated halls x 1 overnight.  Pt has shuffled gait.  R forehead lac intact with sutures, R eyebrow lac intact with glue; no drainage, OFELIA to both.  Bruising to R periorbital and extremities.  Groin folds pink/red. PT and OT following; TCU at discharge.  Continue with POC.

## 2021-06-01 NOTE — PLAN OF CARE
Occupational Therapy Discharge Summary    Reason for therapy discharge:    Discharged to transitional care facility.    Progress towards therapy goal(s). See goals on Care Plan in [facility] electronic health record for goal details.  Goals partially met.  Barriers to achieving goals:   discharge from facility.    Therapy recommendation(s):    Continued therapy is recommended.  Rationale/Recommendations:  to increase activity tolerance and independence with ADL completion.

## 2021-06-02 ENCOUNTER — OFFICE VISIT - HEALTHEAST (OUTPATIENT)
Dept: GERIATRICS | Facility: CLINIC | Age: 81
End: 2021-06-02

## 2021-06-02 ENCOUNTER — COMMUNICATION - HEALTHEAST (OUTPATIENT)
Dept: NEUROSURGERY | Facility: CLINIC | Age: 81
End: 2021-06-02

## 2021-06-02 ENCOUNTER — AMBULATORY - HEALTHEAST (OUTPATIENT)
Dept: NEUROSURGERY | Facility: CLINIC | Age: 81
End: 2021-06-02

## 2021-06-02 ENCOUNTER — TELEPHONE (OUTPATIENT)
Dept: NEUROSURGERY | Facility: CLINIC | Age: 81
End: 2021-06-02

## 2021-06-02 ENCOUNTER — RECORDS - HEALTHEAST (OUTPATIENT)
Dept: ADMINISTRATIVE | Facility: CLINIC | Age: 81
End: 2021-06-02

## 2021-06-02 DIAGNOSIS — S06.5XAA SDH (SUBDURAL HEMATOMA) (H): ICD-10-CM

## 2021-06-02 DIAGNOSIS — I63.9 CEREBROVASCULAR ACCIDENT (CVA), UNSPECIFIED MECHANISM (H): ICD-10-CM

## 2021-06-02 DIAGNOSIS — G20.A1 PARKINSON'S DISEASE (H): ICD-10-CM

## 2021-06-02 DIAGNOSIS — R60.0 EDEMA, LOWER EXTREMITY: ICD-10-CM

## 2021-06-02 DIAGNOSIS — E11.9 DIABETES MELLITUS TYPE 2, NONINSULIN DEPENDENT (H): ICD-10-CM

## 2021-06-02 DIAGNOSIS — R51.9 RIGHT SIDED TEMPORAL HEADACHE: ICD-10-CM

## 2021-06-02 ASSESSMENT — MIFFLIN-ST. JEOR: SCORE: 1700.11

## 2021-06-03 ENCOUNTER — OFFICE VISIT (OUTPATIENT)
Dept: NEUROLOGY | Facility: CLINIC | Age: 81
End: 2021-06-03
Payer: COMMERCIAL

## 2021-06-03 ENCOUNTER — MEDICAL CORRESPONDENCE (OUTPATIENT)
Dept: HEALTH INFORMATION MANAGEMENT | Facility: CLINIC | Age: 81
End: 2021-06-03

## 2021-06-03 VITALS
HEART RATE: 71 BPM | HEIGHT: 72 IN | DIASTOLIC BLOOD PRESSURE: 65 MMHG | BODY MASS INDEX: 28.31 KG/M2 | SYSTOLIC BLOOD PRESSURE: 131 MMHG | WEIGHT: 209 LBS

## 2021-06-03 DIAGNOSIS — G20.A1 PARKINSON DISEASE (H): ICD-10-CM

## 2021-06-03 PROCEDURE — 99204 OFFICE O/P NEW MOD 45 MIN: CPT | Performed by: PSYCHIATRY & NEUROLOGY

## 2021-06-03 ASSESSMENT — MIFFLIN-ST. JEOR: SCORE: 1701.02

## 2021-06-03 NOTE — LETTER
6/3/2021         RE: Maikol Glaser  [address] [address]        Dear Colleague,    Thank you for referring your patient, Maikol Glaser, to the [facility] [place]. Please see a copy of my visit note below.    [facility]  [place]    Maikol Glaser MRN# [id number]   Age: 80 year old YOB: 1940               Assessment and Plan:   Assessment:   Parkinson disease  Recent fall with intracerebral hemorrhage    Clinically he is doing fairly well.  I expect he will continue to improve in terms of mobility.  He needs physical therapy for gait and for range of motion exercises with his Parkinson's.        Plan:     We will increase the carbidopa levodopa now to 2 tablets 4 times daily.  I did write on the referral form that he should get physical therapy daily.  He should follow-up with us in 3 or 4 months.             Chief Complaint/HPI:     Maikol is a 80-year-old man seen for a neurologic visit here in our [place] clinic today.  He is a new patient to me, he was seen by a couple of my partners during a hospitalization at [facility] in April of this year.  He was diagnosed with Parkinson's and started on carbidopa levodopa.  More recently he fell on May 26 and had CT scan showing left parietal hemorrhage, approximately 2 x 2 cm.  He was hospitalized to the [facility] and discharged on June 1, stable.  He went to transitional care with [facility].  He is still having some mobility difficulty, but is able to walk with minimal assistance now.            Past Medical History:    has a past medical history of Cerebral infarction (H), Hyperlipidemia, and Hypertension.          Past Surgical History:    has no past surgical history on file.          Social History:     Social History     Tobacco Use     Smoking status: Never Smoker     Smokeless tobacco: Never Used   Substance Use Topics     Alcohol use: Not Currently      Frequency: Never             Family History:   No family history on file.             Allergies:     Allergies   Allergen Reactions     Penicillins Rash     Childhood rxn.                 Medications:     Current Outpatient Medications:      acetaminophen (TYLENOL) 325 MG tablet, Take 2 tablets (650 mg) by mouth every 6 hours as needed for mild pain or fever, Disp:  , Rfl:      amLODIPine (NORVASC) 10 MG tablet, Take 10 mg by mouth daily, Disp: , Rfl:      aspirin (ASA) 325 MG tablet, Take 1 tablet (325 mg) by mouth daily, Disp:  , Rfl:      carbidopa-levodopa (SINEMET)  MG tablet, Take 1 tablet by mouth 4 times daily Takes at 0800, 1200, 1600, 2000, Disp: , Rfl:      lisinopril (ZESTRIL) 40 MG tablet, Take 40 mg by mouth daily, Disp: , Rfl:      metFORMIN (GLUCOPHAGE) 1000 MG tablet, Take 1,000 mg by mouth 2 times daily (with meals), Disp: , Rfl:      metoprolol succinate ER (TOPROL-XL) 50 MG 24 hr tablet, Take 50 mg by mouth daily, Disp: , Rfl:      multivitamin, therapeutic (THERA-VIT) TABS tablet, Take 1 tablet by mouth every evening, Disp: , Rfl:      nystatin (MYCOSTATIN) 163970 UNIT/GM external cream, Apply topically 2 times daily, Disp:  , Rfl:      polyethylene glycol (MIRALAX) 17 GM/Dose powder, Take 17 g by mouth daily, Disp: 510 g, Rfl:      simvastatin (ZOCOR) 20 MG tablet, Take 20 mg by mouth At Bedtime, Disp: , Rfl:      levETIRAcetam (KEPPRA) 500 MG tablet, Take 1 tablet (500 mg) by mouth 2 times daily for 7 days, Disp: , Rfl:               Physical Exam:     /65 (BP Location: Right arm, Patient Position: Sitting)   Pulse 71   Ht 1.829 m (6')   Wt 94.8 kg (209 lb)   BMI 28.35 kg/m     Awake, alert, no aphasia, no dysarthria  Oriented x3, attention is fine  Cranial nerves II - XII tested and intact, no nystagmus  There is no focal or generalized weakness in the extremities  Rapid alternating movements show diminished amplitudes on both sides  Tone is moderately increased on both sides  with a little bit of cogwheeling  There is some resting tremor at times during today's visit as well.  Sensation is normal  Initially, it is hard for him to get up from the chair but then he is able to do it several times for me.      Daniel Quick MD            Again, thank you for allowing me to participate in the care of your patient.        Sincerely,        Daniel Quick MD

## 2021-06-03 NOTE — NURSING NOTE
Chief Complaint   Patient presents with     Hospital F/U     Admitted 05/25/2021-06/01/2021     Simba Alcaraz MA on 6/3/2021 at 9:29 AM

## 2021-06-05 VITALS
RESPIRATION RATE: 16 BRPM | WEIGHT: 209.2 LBS | HEIGHT: 72 IN | SYSTOLIC BLOOD PRESSURE: 136 MMHG | BODY MASS INDEX: 28.33 KG/M2 | DIASTOLIC BLOOD PRESSURE: 72 MMHG | HEART RATE: 65 BPM | TEMPERATURE: 97.7 F | OXYGEN SATURATION: 99 %

## 2021-06-05 VITALS
HEART RATE: 55 BPM | DIASTOLIC BLOOD PRESSURE: 73 MMHG | TEMPERATURE: 98.1 F | BODY MASS INDEX: 28.2 KG/M2 | WEIGHT: 208.2 LBS | HEIGHT: 72 IN | RESPIRATION RATE: 16 BRPM | OXYGEN SATURATION: 98 % | SYSTOLIC BLOOD PRESSURE: 131 MMHG

## 2021-06-05 VITALS
WEIGHT: 209.5 LBS | SYSTOLIC BLOOD PRESSURE: 115 MMHG | HEART RATE: 67 BPM | HEIGHT: 72 IN | OXYGEN SATURATION: 96 % | DIASTOLIC BLOOD PRESSURE: 63 MMHG | TEMPERATURE: 97.7 F | BODY MASS INDEX: 28.38 KG/M2 | RESPIRATION RATE: 18 BRPM

## 2021-06-07 ENCOUNTER — OFFICE VISIT - HEALTHEAST (OUTPATIENT)
Dept: GERIATRICS | Facility: CLINIC | Age: 81
End: 2021-06-07

## 2021-06-07 ENCOUNTER — TELEPHONE (OUTPATIENT)
Dept: NEUROSURGERY | Facility: CLINIC | Age: 81
End: 2021-06-07

## 2021-06-07 DIAGNOSIS — G20.A1 PARKINSON'S DISEASE (H): ICD-10-CM

## 2021-06-07 DIAGNOSIS — R60.0 EDEMA, LOWER EXTREMITY: ICD-10-CM

## 2021-06-07 DIAGNOSIS — R41.0 CONFUSION: ICD-10-CM

## 2021-06-07 DIAGNOSIS — E11.9 DIABETES MELLITUS TYPE 2, NONINSULIN DEPENDENT (H): ICD-10-CM

## 2021-06-07 ASSESSMENT — MIFFLIN-ST. JEOR: SCORE: 1707.37

## 2021-06-08 ENCOUNTER — RECORDS - HEALTHEAST (OUTPATIENT)
Dept: LAB | Facility: CLINIC | Age: 81
End: 2021-06-08

## 2021-06-08 LAB
ALBUMIN UR-MCNC: NEGATIVE G/DL
APPEARANCE UR: CLEAR
BACTERIA #/AREA URNS HPF: ABNORMAL /[HPF]
BILIRUB UR QL STRIP: NEGATIVE
COLOR UR AUTO: ABNORMAL
GLUCOSE UR STRIP-MCNC: NEGATIVE MG/DL
HGB UR QL STRIP: ABNORMAL
HYALINE CASTS: 3 LPF
KETONES UR STRIP-MCNC: NEGATIVE MG/DL
LEUKOCYTE ESTERASE UR QL STRIP: ABNORMAL
MUCOUS THREADS #/AREA URNS LPF: PRESENT LPF
NITRATE UR QL: NEGATIVE
PH UR STRIP: 5.5 [PH] (ref 5–8)
RBC URINE: 7 HPF
SP GR UR STRIP: 1.02 (ref 1–1.03)
SQUAMOUS EPITHELIAL: 0 /HPF
UROBILINOGEN UR STRIP-ACNC: ABNORMAL
WBC URINE: 29 HPF

## 2021-06-09 ENCOUNTER — RECORDS - HEALTHEAST (OUTPATIENT)
Dept: LAB | Facility: CLINIC | Age: 81
End: 2021-06-09

## 2021-06-09 ENCOUNTER — OFFICE VISIT - HEALTHEAST (OUTPATIENT)
Dept: GERIATRICS | Facility: CLINIC | Age: 81
End: 2021-06-09

## 2021-06-09 DIAGNOSIS — S01.01XD LACERATION OF SCALP WITHOUT FOREIGN BODY, SUBSEQUENT ENCOUNTER: ICD-10-CM

## 2021-06-09 DIAGNOSIS — G20.A1 PARKINSON'S DISEASE (H): ICD-10-CM

## 2021-06-09 ASSESSMENT — MIFFLIN-ST. JEOR: SCORE: 1707.37

## 2021-06-10 ENCOUNTER — RECORDS - HEALTHEAST (OUTPATIENT)
Dept: LAB | Facility: CLINIC | Age: 81
End: 2021-06-10

## 2021-06-10 LAB
ALBUMIN UR-MCNC: NEGATIVE G/DL
APPEARANCE UR: CLEAR
BILIRUB UR QL STRIP: NEGATIVE
COLOR UR AUTO: YELLOW
ERYTHROCYTE [DISTWIDTH] IN BLOOD BY AUTOMATED COUNT: 14.2 % (ref 11–14.5)
GLUCOSE UR STRIP-MCNC: NEGATIVE MG/DL
HCT VFR BLD AUTO: 39.1 % (ref 40–54)
HGB BLD-MCNC: 12.5 G/DL (ref 14–18)
HGB UR QL STRIP: NEGATIVE
KETONES UR STRIP-MCNC: NORMAL MG/DL
LEUKOCYTE ESTERASE UR QL STRIP: NEGATIVE
MCH RBC QN AUTO: 28.3 PG (ref 27–34)
MCHC RBC AUTO-ENTMCNC: 32 G/DL (ref 32–36)
MCV RBC AUTO: 89 FL (ref 80–100)
NITRATE UR QL: NEGATIVE
PH UR STRIP: 5 [PH] (ref 5–8)
PLATELET # BLD AUTO: 214 THOU/UL (ref 140–440)
PMV BLD AUTO: 11.7 FL (ref 8.5–12.5)
RBC # BLD AUTO: 4.41 MILL/UL (ref 4.4–6.2)
SP GR UR STRIP: 1.02 (ref 1–1.03)
UROBILINOGEN UR STRIP-ACNC: NORMAL
WBC: 9.9 THOU/UL (ref 4–11)

## 2021-06-11 LAB — BACTERIA SPEC CULT: ABNORMAL

## 2021-06-12 LAB — BACTERIA SPEC CULT: ABNORMAL

## 2021-06-13 PROBLEM — G20.A1 PARKINSON DISEASE (H): Status: ACTIVE | Noted: 2021-06-13

## 2021-06-13 NOTE — PROGRESS NOTES
[facility]  [place]    Maikol Glaser MRN# [id number]   Age: 80 year old YOB: 1940               Assessment and Plan:   Assessment:   Parkinson disease  Recent fall with intracerebral hemorrhage    Clinically he is doing fairly well.  I expect he will continue to improve in terms of mobility.  He needs physical therapy for gait and for range of motion exercises with his Parkinson's.        Plan:     We will increase the carbidopa levodopa now to 2 tablets 4 times daily.  I did write on the referral form that he should get physical therapy daily.  He should follow-up with us in 3 or 4 months.             Chief Complaint/HPI:     Maikol is a 80-year-old man seen for a neurologic visit here in our [place] clinic today.  He is a new patient to me, he was seen by a couple of my partners during a hospitalization at [facility] in April of this year.  He was diagnosed with Parkinson's and started on carbidopa levodopa.  More recently he fell on May 26 and had CT scan showing left parietal hemorrhage, approximately 2 x 2 cm.  He was hospitalized to the [facility] and discharged on June 1, stable.  He went to transitional care with [facility].  He is still having some mobility difficulty, but is able to walk with minimal assistance now.            Past Medical History:    has a past medical history of Cerebral infarction (H), Hyperlipidemia, and Hypertension.          Past Surgical History:    has no past surgical history on file.          Social History:     Social History     Tobacco Use     Smoking status: Never Smoker     Smokeless tobacco: Never Used   Substance Use Topics     Alcohol use: Not Currently     Frequency: Never             Family History:   No family history on file.             Allergies:     Allergies   Allergen Reactions     Penicillins Rash     Childhood rxn.                 Medications:     Current Outpatient Medications:      acetaminophen  (TYLENOL) 325 MG tablet, Take 2 tablets (650 mg) by mouth every 6 hours as needed for mild pain or fever, Disp:  , Rfl:      amLODIPine (NORVASC) 10 MG tablet, Take 10 mg by mouth daily, Disp: , Rfl:      aspirin (ASA) 325 MG tablet, Take 1 tablet (325 mg) by mouth daily, Disp:  , Rfl:      carbidopa-levodopa (SINEMET)  MG tablet, Take 1 tablet by mouth 4 times daily Takes at 0800, 1200, 1600, 2000, Disp: , Rfl:      lisinopril (ZESTRIL) 40 MG tablet, Take 40 mg by mouth daily, Disp: , Rfl:      metFORMIN (GLUCOPHAGE) 1000 MG tablet, Take 1,000 mg by mouth 2 times daily (with meals), Disp: , Rfl:      metoprolol succinate ER (TOPROL-XL) 50 MG 24 hr tablet, Take 50 mg by mouth daily, Disp: , Rfl:      multivitamin, therapeutic (THERA-VIT) TABS tablet, Take 1 tablet by mouth every evening, Disp: , Rfl:      nystatin (MYCOSTATIN) 097491 UNIT/GM external cream, Apply topically 2 times daily, Disp:  , Rfl:      polyethylene glycol (MIRALAX) 17 GM/Dose powder, Take 17 g by mouth daily, Disp: 510 g, Rfl:      simvastatin (ZOCOR) 20 MG tablet, Take 20 mg by mouth At Bedtime, Disp: , Rfl:      levETIRAcetam (KEPPRA) 500 MG tablet, Take 1 tablet (500 mg) by mouth 2 times daily for 7 days, Disp: , Rfl:               Physical Exam:     /65 (BP Location: Right arm, Patient Position: Sitting)   Pulse 71   Ht 1.829 m (6')   Wt 94.8 kg (209 lb)   BMI 28.35 kg/m     Awake, alert, no aphasia, no dysarthria  Oriented x3, attention is fine  Cranial nerves II - XII tested and intact, no nystagmus  There is no focal or generalized weakness in the extremities  Rapid alternating movements show diminished amplitudes on both sides  Tone is moderately increased on both sides with a little bit of cogwheeling  There is some resting tremor at times during today's visit as well.  Sensation is normal  Initially, it is hard for him to get up from the chair but then he is able to do it several times for me.      Daniel Quick,  MD

## 2021-06-16 ENCOUNTER — RECORDS - HEALTHEAST (OUTPATIENT)
Dept: ADMINISTRATIVE | Facility: OTHER | Age: 81
End: 2021-06-16

## 2021-06-16 ENCOUNTER — RECORDS - HEALTHEAST (OUTPATIENT)
Dept: RADIOLOGY | Facility: CLINIC | Age: 81
End: 2021-06-16

## 2021-06-16 ENCOUNTER — OFFICE VISIT - HEALTHEAST (OUTPATIENT)
Dept: GERIATRICS | Facility: CLINIC | Age: 81
End: 2021-06-16

## 2021-06-16 ENCOUNTER — OFFICE VISIT - HEALTHEAST (OUTPATIENT)
Dept: NEUROSURGERY | Facility: CLINIC | Age: 81
End: 2021-06-16

## 2021-06-16 ENCOUNTER — HOSPITAL ENCOUNTER (OUTPATIENT)
Dept: CT IMAGING | Facility: HOSPITAL | Age: 81
Discharge: HOME | End: 2021-06-16
Payer: COMMERCIAL

## 2021-06-16 DIAGNOSIS — G20.A1 PARKINSON'S DISEASE (H): ICD-10-CM

## 2021-06-16 DIAGNOSIS — R93.0 ABNORMAL FINDINGS ON DIAGNOSTIC IMAGING OF SKULL AND HEAD, NOT ELSEWHERE CLASSIFIED: ICD-10-CM

## 2021-06-16 DIAGNOSIS — S06.5XAA SDH (SUBDURAL HEMATOMA) (H): ICD-10-CM

## 2021-06-16 DIAGNOSIS — I62.9 INTRACRANIAL HEMORRHAGE (H): ICD-10-CM

## 2021-06-16 DIAGNOSIS — S01.01XD LACERATION OF SCALP WITHOUT FOREIGN BODY, SUBSEQUENT ENCOUNTER: ICD-10-CM

## 2021-06-16 DIAGNOSIS — R41.0 CONFUSION: ICD-10-CM

## 2021-06-16 NOTE — PROGRESS NOTES
[facility]    Facility:   [facility] [[id number]]   Code Status: FULL CODE      CHIEF COMPLAINT/REASON FOR VISIT:  Chief Complaint   Patient presents with     Hospital Visit Follow Up     Acute metabolic , enceph,  Dyslipidemia       HISTORY:      HPI: Maikol is a 80 y.o. male with hypertension, hyperlipidemia and previous CVAs who had a fall and later developed confusion, weakness and disorientation with slurred speech.  His initial CT of the head was negative and MRI was negative as well.  He was found to have a UTI and was started on ceftriaxone.  He is also suspected to have underlying Parkinson's disease and was started on Sinemet.  He comes to the TCU for strengthening and gait training.  He is quite alert and oriented and lives at home with his wife.  He has a son here in the [place] and a daughter who lives in [facility].  He will complete 4 more days of Omnicef.  He has a urinary catheter for retention.  He has a history of self cathing for the last 3 to 4 years.  Will need to follow-up with urology before removing catheter.  For suspected Parkinson's disease, he is on Sinemet and will follow up with neurology in 6 to 8 weeks.  He had noninsulin-dependent diabetes and remains on Metformin.  A1c is 7.  Does come to the TCU with orders to check blood sugar.  We will discontinue those.    Past Medical History:   Diagnosis Date     HLD (hyperlipidemia)      HTN (hypertension)      Stroke (H)      TIA (transient ischemic attack)              No family history on file.  Social History     Socioeconomic History     Marital status:      Spouse name: Not on file     Number of children: Not on file     Years of education: Not on file     Highest education level: Not on file   Occupational History     Not on file   Social Needs     Financial resource strain: Not on file     Food insecurity     Worry: Not on file     Inability: Not on file     Transportation needs      Medical: Not on file     Non-medical: Not on file   Tobacco Use     Smoking status: Never Smoker   Substance and Sexual Activity     Alcohol use: No     Drug use: Not on file     Sexual activity: Not on file   Lifestyle     Physical activity     Days per week: Not on file     Minutes per session: Not on file     Stress: Not on file   Relationships     Social connections     Talks on phone: Not on file     Gets together: Not on file     Attends Hinduism service: Not on file     Active member of club or organization: Not on file     Attends meetings of clubs or organizations: Not on file     Relationship status: Not on file     Intimate partner violence     Fear of current or ex partner: Not on file     Emotionally abused: Not on file     Physically abused: Not on file     Forced sexual activity: Not on file   Other Topics Concern     Not on file   Social History Narrative     Not on file         Review of Systems   Constitutional: Negative.  Negative for fever.   HENT: Negative.    Respiratory: Negative for shortness of breath and wheezing.    Cardiovascular: Negative for chest pain and leg swelling.   Gastrointestinal: Negative for abdominal distention and abdominal pain.   Genitourinary: Positive for difficulty urinating. Negative for dysuria.   Musculoskeletal: Negative.    Skin: Negative.    Neurological: Positive for tremors and weakness.   Psychiatric/Behavioral: Negative.        Vitals:    04/14/21 1310   BP: 131/73   Pulse: (!) 55   Resp: 16   Temp: 98.1  F (36.7  C)   SpO2: 98%   Weight: 208 lb 3.2 oz (94.4 kg)   Height: 6' (1.829 m)       Physical Exam  Constitutional:       Appearance: He is normal weight.   HENT:      Head: Atraumatic.      Nose: No rhinorrhea.      Mouth/Throat:      Pharynx: No posterior oropharyngeal erythema.   Eyes:      General: No scleral icterus.  Cardiovascular:      Rate and Rhythm: Normal rate and regular rhythm.      Heart sounds: No murmur.   Pulmonary:      Effort: No  respiratory distress.      Breath sounds: Normal breath sounds.   Abdominal:      General: There is no distension.      Palpations: Abdomen is soft.   Musculoskeletal:      Right lower leg: No edema.      Left lower leg: No edema.   Skin:     General: Skin is warm and dry.   Neurological:      Mental Status: He is alert and oriented to person, place, and time. Mental status is at baseline.      Motor: Weakness present.      Gait: Gait abnormal.   Psychiatric:         Mood and Affect: Mood normal.         Behavior: Behavior normal.           LABS:   Reviewed.    ASSESSMENT:      ICD-10-CM    1. Acute urinary retention  R33.8    2. Dyslipidemia  E78.5    3. Parkinson's disease (H)  G20    4. Diabetes mellitus type 2, noninsulin dependent (H)  E11.9        PLAN:      Parkinson's disease:   -Continue Sinemet  - PT and OT.  -Urology follow-up 6 to 8 weeks.    Acute urinary retention on chronic  UTI  -Cefdinir twice a day x4 more days.  -Urology follow-up.  -Keep Hunter in until seen by urology.    History of stroke  CAD  Hypertension:  -Amlodipine 10 mg daily.  -Lisinopril 40 daily.  -Metoprolol 50 daily.  -Clopidogrel 75 daily.  -Simvastatin 20 mg daily.    Type 2 diabetes:  -Continue Metformin 1000 mg twice daily.  -Discontinue blood sugar checks.    Electronically signed by: Steve Salgado CNP

## 2021-06-16 NOTE — PROGRESS NOTES
[facility]    Facility:   [facility] [[id number]]   Code Status: FULL CODE  PCP: Antione Villeda MD   Phone: [phone]   Fax: [phone]      CHIEF COMPLAINT/REASON FOR VISIT:  Patient presents with:  Discharge Summary    HISTORY COURSE:  Maikol is a 80 y.o. male with hypertension, hyperlipidemia and previous CVAs who had a fall and later developed confusion, weakness and disorientation with slurred speech.  His initial CT of the head was negative and MRI was negative as well.  He was found to have a UTI and was started on ceftriaxone.  He is also suspected to have underlying Parkinson's disease and was started on Sinemet.  He comes to the TCU for strengthening and gait training.  He is quite alert and oriented and lives at home with his wife.  He has a son here in the [place] and a daughter who lives in [facility].  He completed cefdinir in the TCU.  He continues to have a Hunter catheter in place and will have follow-up with urology before removing this.  He does have a history of self cathing at home for 1 to 2 years.  For suspected Parkinson's disease, he is on Sinemet and will follow up with neurology in 6 to 8 weeks.   He had noninsulin-dependent diabetes and remains on Metformin.  A1c is 7.     He did well in the TCU, remained alert and oriented with no signs of confusion.  He was able to complete his therapies and ambulating up and down stairs with therapy and is ready to discharge to home with close follow-up.     PHYSICAL EXAM:   Constitutional:       Appearance: He is normal weight.   HENT:      Head: Atraumatic.      Nose: No rhinorrhea.      Mouth/Throat:      Pharynx: No posterior oropharyngeal erythema.   Eyes:      General: No scleral icterus.  Cardiovascular:      Rate and Rhythm: Normal rate and regular rhythm.      Heart sounds: No murmur.   Pulmonary:      Effort: No respiratory distress.      Breath sounds: Normal breath sounds.   Abdominal:       General: There is no distension.      Palpations: Abdomen is soft.   Musculoskeletal:      Right lower leg: No edema.      Left lower leg: No edema.   Skin:     General: Skin is warm and dry.   Neurological:      Mental Status: He is alert and oriented to person, place, and time. Mental status is at baseline.      Motor: Weakness present.      Gait: Gait abnormal.   Psychiatric:         Mood and Affect: Mood normal.         Behavior: Behavior normal    MEDICATION LIST:  Current Outpatient Medications:  amLODIPine (NORVASC) 10 MG tablet, Take 10 mg by mouth daily.  carbidopa-levodopa (SINEMET)  mg per tablet, Take 1 tablet by mouth 4 (four) times a day.  clopidogrel (PLAVIX) 75 mg tablet, Take 75 mg by mouth daily.  lisinopril (PRINIVIL,ZESTRIL) 40 MG tablet, Take 40 mg by mouth daily.  metFORMIN (GLUCOPHAGE) 500 MG tablet, Take 1,000 mg by mouth 2 (two) times a day with meals.   metoprolol succinate (TOPROL-XL) 50 MG 24 hr tablet, Take 50 mg by mouth daily.  multivitamin therapeutic (THERAGRAN) tablet, Take 1 tablet by mouth at bedtime.  simvastatin (ZOCOR) 20 MG tablet, Take 20 mg by mouth at bedtime.        DISCHARGE DIAGNOSIS:  1. Parkinson's disease (H)     2. Acute urinary retention     3. Confusion       Parkinson's disease:   -Continue Sinemet  -Nuerology follow-up 6 to 8 weeks.     Acute urinary retention on chronic  UTI  -Cefdinir complete.   -Urology follow-up; appt made by [facility].   -Keep Hunter in until seen by urology.     History of stroke  CAD  Hypertension:  -Amlodipine 10 mg daily.  -Lisinopril 40 daily.  -Metoprolol 50 daily.  -Clopidogrel 75 daily.  -Simvastatin 20 mg daily.     Type 2 diabetes:  -Continue Metformin 1000 mg twice daily.  -Discontinue blood sugar checks.       MEDICAL EQUIPMENT NEEDS:  None.     DISCHARGE PLAN/FACE TO FACE:  I certify that services are/were furnished while this patient was under the care of a physician and that a physician or an allowed non-physician practitioner  (NPP), had a face-to-face encounter that meets the physician face-to-face encounter requirements. The encounter was in whole, or in part, related to the primary reason for home health. The patient is confined to his/her home and needs intermittent skilled nursing, physical therapy, speech-language pathology, or the continued need for occupational therapy. A plan of care has been established by a physician and is periodically reviewed by a physician.  Date of Face-to-Face Encounter: 4/19/21    I certify that, based on my findings, the following services are medically necessary home health services: PT, OT, RN.     My clinical findings support the need for the above skilled services because: RN for medication teaching and management.  PT/OT for ongoing endurance and strengthening with home safety evaluation.    This patient is homebound because: Homebound Status: Weakness due to parkinsons disease, new diagnosis; caregiver burden     The patient is, or has been, under my care and I have initiated the establishment of the plan of care.     Schedule follow up visit with primary care provider within 7 days to reestablish care.    Electronically signed by: Steve Salgado CNP

## 2021-06-16 NOTE — PROGRESS NOTES
"[facility]    Facility:   [facility] [[id number]]   Code Status: FULL CODE      CHIEF COMPLAINT/REASON FOR VISIT:  Chief Complaint   Patient presents with     Follow Up     Edema, DMII, CVA, HTN, UTI       HISTORY:      HPI: Maikol is a 80 y.o. male  has a past medical history of HLD (hyperlipidemia), HTN (hypertension), Stroke (H), and TIA (transient ischemic attack). Patient seen today for follow-up visit in TCU. Patient notes he is doing okay besides being \"bored out of (his) mind\". He would like to go home sooner vs later. Concern re:d/c was the patient's ability to complete stairs necessary to return home - he did work with therapies today on stairs and this went very well. He is hopeful this will get him a clear to return home soon. Therapies are progressing well. Appetite is good. He is not sleeping well due to the hospital bed. Does c/o achiness and would like Tylenol for pain control. Would also like to try Melatonin PRN for sleep. Denies CP, palpitations, fatigue, nausea, vomiting, increased SOB/ARNOLD, fever, chills, and/or b/b concerns today.    Past Medical History:   Diagnosis Date     HLD (hyperlipidemia)      HTN (hypertension)      Stroke (H)      TIA (transient ischemic attack)              No family history on file.  Social History     Socioeconomic History     Marital status:      Spouse name: Not on file     Number of children: Not on file     Years of education: Not on file     Highest education level: Not on file   Occupational History     Not on file   Social Needs     Financial resource strain: Not on file     Food insecurity     Worry: Not on file     Inability: Not on file     Transportation needs     Medical: Not on file     Non-medical: Not on file   Tobacco Use     Smoking status: Never Smoker   Substance and Sexual Activity     Alcohol use: No     Drug use: Not on file     Sexual activity: Not on file   Lifestyle     Physical activity     Days " per week: Not on file     Minutes per session: Not on file     Stress: Not on file   Relationships     Social connections     Talks on phone: Not on file     Gets together: Not on file     Attends Judaism service: Not on file     Active member of club or organization: Not on file     Attends meetings of clubs or organizations: Not on file     Relationship status: Not on file     Intimate partner violence     Fear of current or ex partner: Not on file     Emotionally abused: Not on file     Physically abused: Not on file     Forced sexual activity: Not on file   Other Topics Concern     Not on file   Social History Narrative     Not on file       ROS:  10 point ROS of systems including Constitutional, Eyes, Respiratory, Cardiovascular, Gastroenterology, Genitourinary, Integumentary, Musculoskeletal, Psychiatric were all negative except for pertinent positives noted in my HPI.    EXAM:  Vitals:    04/16/21 1106   BP: 136/72   Pulse: 65   Resp: 16   Temp: 97.7  F (36.5  C)   SpO2: 99%   Weight: 209 lb 3.2 oz (94.9 kg)   Height: 6' (1.829 m)     GENERAL APPEARANCE:  Alert, in no distress, appears healthy, oriented, thin, cooperative  EYES:  EOM, conjunctivae, lids, pupils and irises normal, wears glasses  RESP:  respiratory effort and palpation of chest normal, lungs clear to auscultation , no respiratory distress  CV:  Palpation and auscultation of heart done , regular rate and rhythm, no murmur, rub, or gallop, +2 pedal pulses, peripheral edema 1+ in BLE  ABDOMEN:  normal bowel sounds, soft, nontender, no hepatosplenomegaly or other masses  M/S:   Gait and station abnormal - LUCINA 2/2 patient being in chair  Digits and nails abnormal - arthritic changes present  SKIN:  Inspection of skin and subcutaneous tissue baseline, Palpation of skin and subcutaneous tissue baseline  NEURO:   Cranial nerves 2-12 are normal tested and grossly at patient's baseline  PSYCH:  oriented X 3, normal insight, judgement and memory, affect  and mood normal      LABS:   No results found for this or any previous visit (from the past 48 hour(s)).      ASSESSMENT:        ICD-10-CM     1. Acute urinary retention  R33.8     2. Dyslipidemia  E78.5     3. Parkinson's disease (H)  G20     4. Diabetes mellitus type 2, noninsulin dependent (H)  E11.9           PLAN:    Parkinson's disease  Physical deconditioning  -Acute, unstable  -Continue Sinemet  - PT and OT following - adv per their recommendations  -Urology follow-up 6 to 8 weeks.     Acute urinary retention on chronic  UTI  -Acute, unstable.  -Cefdinir twice a day x2 more days.  -Urology follow-up.  -Keep Hunter in until seen by urology  -CBC 4/20/21     History of stroke  CAD  Hypertension:  -Chronic , stable.  -Amlodipine 10 mg daily.  -Lisinopril 40 daily.  -Metoprolol 50 daily.  -Clopidogrel 75 daily.  -Simvastatin 20 mg daily.  -CMP 4/20/21     Type 2 diabetes:  -Continue Metformin 1000 mg twice daily.    Edema:  -Acute - Unstable, improving  -Received Lasix burst with improvement  -CMP as above    Electronically signed by:   Dr. Marla Gupta, APRN, DNP  [facility]  [place] Office: [address] [place] [address] [address] [place] [address]   [place] Cell: [phone]  [place] Fax: [phone]    [facility] Office: [address] [place] [address]  [facility] Phone: [phone]  [facility] Voicemail: [phone]

## 2021-06-21 ENCOUNTER — AMBULATORY - HEALTHEAST (OUTPATIENT)
Dept: GERIATRICS | Facility: CLINIC | Age: 81
End: 2021-06-21

## 2021-06-21 NOTE — LETTER
Letter by Steve Salgado CNP at      Author: Steve Salgado CNP Service: -- Author Type: --    Filed:  Encounter Date: 4/19/2021 Status: (Other)         [facility]  [address] [address]                                  April 21, 2021    Patient: Maikol Glaser   MR Number: [id number]   YOB: 1940   Date of Visit: 4/19/2021     Dear Dr. Calvo:    Thank you for referring Maikol Glaser to me for evaluation. Below are the relevant portions of my assessment and plan of care.    If you have questions, please do not hesitate to call me. I look forward to following Maikol along with you.    Sincerely,        Steve Salgado CNP          CC  No Recipients  Steve Salgado CNP  4/21/2021 11:03 AM  Sign when Signing Visit    [facility]    Facility:   [facility] [[id number]]   Code Status: FULL CODE  PCP: Antione Villeda MD   Phone: [phone]   Fax: [phone]      CHIEF COMPLAINT/REASON FOR VISIT:  Patient presents with:  Discharge Summary    HISTORY COURSE:  Maikol is a 80 y.o. male with hypertension, hyperlipidemia and previous CVAs who had a fall and later developed confusion, weakness and disorientation with slurred speech.  His initial CT of the head was negative and MRI was negative as well.  He was found to have a UTI and was started on ceftriaxone.  He is also suspected to have underlying Parkinson's disease and was started on Sinemet.  He comes to the TCU for strengthening and gait training.  He is quite alert and oriented and lives at home with his wife.  He has a son here in the [place] and a daughter who lives in [facility].  He completed cefdinir in the TCU.  He continues to have a Hunter catheter in place and will have follow-up with urology before removing this.  He does have a history of self cathing at home for 1 to 2 years.  For suspected Parkinson's disease, he is on Sinemet and will follow  up with neurology in 6 to 8 weeks.   He had noninsulin-dependent diabetes and remains on Metformin.  A1c is 7.     He did well in the TCU, remained alert and oriented with no signs of confusion.  He was able to complete his therapies and ambulating up and down stairs with therapy and is ready to discharge to home with close follow-up.     PHYSICAL EXAM:   Constitutional:       Appearance: He is normal weight.   HENT:      Head: Atraumatic.      Nose: No rhinorrhea.      Mouth/Throat:      Pharynx: No posterior oropharyngeal erythema.   Eyes:      General: No scleral icterus.  Cardiovascular:      Rate and Rhythm: Normal rate and regular rhythm.      Heart sounds: No murmur.   Pulmonary:      Effort: No respiratory distress.      Breath sounds: Normal breath sounds.   Abdominal:      General: There is no distension.      Palpations: Abdomen is soft.   Musculoskeletal:      Right lower leg: No edema.      Left lower leg: No edema.   Skin:     General: Skin is warm and dry.   Neurological:      Mental Status: He is alert and oriented to person, place, and time. Mental status is at baseline.      Motor: Weakness present.      Gait: Gait abnormal.   Psychiatric:         Mood and Affect: Mood normal.         Behavior: Behavior normal    MEDICATION LIST:  Current Outpatient Medications:  amLODIPine (NORVASC) 10 MG tablet, Take 10 mg by mouth daily.  carbidopa-levodopa (SINEMET)  mg per tablet, Take 1 tablet by mouth 4 (four) times a day.  clopidogrel (PLAVIX) 75 mg tablet, Take 75 mg by mouth daily.  lisinopril (PRINIVIL,ZESTRIL) 40 MG tablet, Take 40 mg by mouth daily.  metFORMIN (GLUCOPHAGE) 500 MG tablet, Take 1,000 mg by mouth 2 (two) times a day with meals.   metoprolol succinate (TOPROL-XL) 50 MG 24 hr tablet, Take 50 mg by mouth daily.  multivitamin therapeutic (THERAGRAN) tablet, Take 1 tablet by mouth at bedtime.  simvastatin (ZOCOR) 20 MG tablet, Take 20 mg by mouth at bedtime.        DISCHARGE  DIAGNOSIS:  1. Parkinson's disease (H)     2. Acute urinary retention     3. Confusion       Parkinson's disease:   -Continue Sinemet  -Nuerology follow-up 6 to 8 weeks.     Acute urinary retention on chronic  UTI  -Cefdinir complete.   -Urology follow-up; appt made by MELISSA.   -Keep Hunter in until seen by urology.     History of stroke  CAD  Hypertension:  -Amlodipine 10 mg daily.  -Lisinopril 40 daily.  -Metoprolol 50 daily.  -Clopidogrel 75 daily.  -Simvastatin 20 mg daily.     Type 2 diabetes:  -Continue Metformin 1000 mg twice daily.  -Discontinue blood sugar checks.       MEDICAL EQUIPMENT NEEDS:  None.     DISCHARGE PLAN/FACE TO FACE:  I certify that services are/were furnished while this patient was under the care of a physician and that a physician or an allowed non-physician practitioner (NPP), had a face-to-face encounter that meets the physician face-to-face encounter requirements. The encounter was in whole, or in part, related to the primary reason for home health. The patient is confined to his/her home and needs intermittent skilled nursing, physical therapy, speech-language pathology, or the continued need for occupational therapy. A plan of care has been established by a physician and is periodically reviewed by a physician.  Date of Face-to-Face Encounter: 4/19/21    I certify that, based on my findings, the following services are medically necessary home health services: PT, OT, RN.     My clinical findings support the need for the above skilled services because: RN for medication teaching and management.  PT/OT for ongoing endurance and strengthening with home safety evaluation.    This patient is homebound because: Homebound Status: Weakness due to parkinsons disease, new diagnosis; caregiver burden     The patient is, or has been, under my care and I have initiated the establishment of the plan of care.     Schedule follow up visit with primary care provider within 7 days to reestablish  care.    Electronically signed by: Steve Salgado, GINA

## 2021-06-21 NOTE — LETTER
"Letter by Rosey Gupta NP at      Author: Rosey Gupta NP Service: -- Author Type: --    Filed:  Encounter Date: 4/16/2021 Status: (Other)         [facility]  [address] [address]                                  April 22, 2021    Patient: Maikol Glaser   MR Number: [id number]   YOB: 1940   Date of Visit: 4/16/2021     Dear Dr. Calvo:    Thank you for referring Maikol Glaser to me for evaluation. Below are the relevant portions of my assessment and plan of care.    If you have questions, please do not hesitate to call me. I look forward to following Maikol along with you.    Sincerely,        Rosey Gupta NP          CC  No Recipients  Rosey Gupta NP  4/22/2021 11:41 PM  Sign when Signing Visit  [facility]    Facility:   [facility] [[id number]]   Code Status: FULL CODE      CHIEF COMPLAINT/REASON FOR VISIT:  Chief Complaint   Patient presents with   ? Problem Visit       HISTORY:      HPI: Maiklo is a 80 y.o. male  has a past medical history of HLD (hyperlipidemia), HTN (hypertension), Stroke (H), and TIA (transient ischemic attack). Patient seen today for follow-up visit in TCU. Patient notes he is doing okay besides being \"bored out of (his) mind\". He would like to go home sooner vs later. Concern re:d/c was the patient's ability to complete stairs necessary to return home - he did work with therapies today on stairs and this went very well. He is hopeful this will get him a clear to return home soon. Therapies are progressing well. Appetite is good. He is not sleeping well due to the hospital bed. Does c/o achiness and would like Tylenol for pain control. Would also like to try Melatonin PRN for sleep. Denies CP, palpitations, fatigue, nausea, vomiting, increased SOB/ARNOLD, fever, chills, and/or b/b concerns today.    Past Medical History:   Diagnosis Date   ? HLD (hyperlipidemia)    ? HTN (hypertension)  "   ? Stroke (H)    ? TIA (transient ischemic attack)              No family history on file.  Social History     Socioeconomic History   ? Marital status:      Spouse name: Not on file   ? Number of children: Not on file   ? Years of education: Not on file   ? Highest education level: Not on file   Occupational History   ? Not on file   Social Needs   ? Financial resource strain: Not on file   ? Food insecurity     Worry: Not on file     Inability: Not on file   ? Transportation needs     Medical: Not on file     Non-medical: Not on file   Tobacco Use   ? Smoking status: Never Smoker   Substance and Sexual Activity   ? Alcohol use: No   ? Drug use: Not on file   ? Sexual activity: Not on file   Lifestyle   ? Physical activity     Days per week: Not on file     Minutes per session: Not on file   ? Stress: Not on file   Relationships   ? Social connections     Talks on phone: Not on file     Gets together: Not on file     Attends Shinto service: Not on file     Active member of club or organization: Not on file     Attends meetings of clubs or organizations: Not on file     Relationship status: Not on file   ? Intimate partner violence     Fear of current or ex partner: Not on file     Emotionally abused: Not on file     Physically abused: Not on file     Forced sexual activity: Not on file   Other Topics Concern   ? Not on file   Social History Narrative   ? Not on file       ROS:  10 point ROS of systems including Constitutional, Eyes, Respiratory, Cardiovascular, Gastroenterology, Genitourinary, Integumentary, Musculoskeletal, Psychiatric were all negative except for pertinent positives noted in my HPI.    EXAM:  Vitals:    04/16/21 1106   BP: 136/72   Pulse: 65   Resp: 16   Temp: 97.7  F (36.5  C)   SpO2: 99%   Weight: 209 lb 3.2 oz (94.9 kg)   Height: 6' (1.829 m)     GENERAL APPEARANCE:  Alert, in no distress, appears healthy, oriented, thin, cooperative  EYES:  EOM, conjunctivae, lids, pupils and  irises normal, wears glasses  RESP:  respiratory effort and palpation of chest normal, lungs clear to auscultation , no respiratory distress  CV:  Palpation and auscultation of heart done , regular rate and rhythm, no murmur, rub, or gallop, +2 pedal pulses, peripheral edema 1+ in BLE  ABDOMEN:  normal bowel sounds, soft, nontender, no hepatosplenomegaly or other masses  M/S:   Gait and station abnormal - LUCINA 2/2 patient being in chair  Digits and nails abnormal - arthritic changes present  SKIN:  Inspection of skin and subcutaneous tissue baseline, Palpation of skin and subcutaneous tissue baseline  NEURO:   Cranial nerves 2-12 are normal tested and grossly at patient's baseline  PSYCH:  oriented X 3, normal insight, judgement and memory, affect and mood normal      LABS:   No results found for this or any previous visit (from the past 48 hour(s)).      ASSESSMENT:        ICD-10-CM     1. Acute urinary retention  R33.8     2. Dyslipidemia  E78.5     3. Parkinson's disease (H)  G20     4. Diabetes mellitus type 2, noninsulin dependent (H)  E11.9           PLAN:    Parkinson's disease  Physical deconditioning  -Acute, unstable  -Continue Sinemet  - PT and OT following - adv per their recommendations  -Urology follow-up 6 to 8 weeks.     Acute urinary retention on chronic  UTI  -Acute, unstable.  -Cefdinir twice a day x2 more days.  -Urology follow-up.  -Keep Hunter in until seen by urology  -CBC 4/20/21     History of stroke  CAD  Hypertension:  -Chronic , stable.  -Amlodipine 10 mg daily.  -Lisinopril 40 daily.  -Metoprolol 50 daily.  -Clopidogrel 75 daily.  -Simvastatin 20 mg daily.  -CMP 4/20/21     Type 2 diabetes:  -Continue Metformin 1000 mg twice daily.    Edema:  -Acute - Unstable, improving  -Received Lasix burst with improvement  -CMP as above    Electronically signed by:   Dr. Marla Gupta, APRN, DNP  [facility]  [place] Office: [address] [place] [address]    [place] Cell: [phone]  [place] Fax: [phone]    [facility] Office: [address] [place] [address]  [facility] Phone: [phone]  [facility] Voicemail: [phone]

## 2021-06-21 NOTE — LETTER
Letter by Steve Salgado CNP at      Author: Steve Salgado CNP Service: -- Author Type: --    Filed:  Encounter Date: 4/14/2021 Status: (Other)         [facility]  [address] [address]                                  April 20, 2021    Patient: Maikol Glaser   MR Number: [id number]   YOB: 1940   Date of Visit: 4/14/2021     Dear  Tcu:    Thank you for referring Maikol Glaser to me for evaluation. Below are the relevant portions of my assessment and plan of care.    If you have questions, please do not hesitate to call me. I look forward to following Maikol along with you.    Sincerely,        Steve Salgado CNP          CC  No Recipients  Steve Salgado CNP  4/20/2021  9:46 PM  Sign when Signing Visit  [facility]    Facility:   [facility] [[id number]]   Code Status: FULL CODE      CHIEF COMPLAINT/REASON FOR VISIT:  Chief Complaint   Patient presents with   ? Hospital Visit Follow Up     Acute metabolic , enceph,  Dyslipidemia       HISTORY:      HPI: Maikol is a 80 y.o. male with hypertension, hyperlipidemia and previous CVAs who had a fall and later developed confusion, weakness and disorientation with slurred speech.  His initial CT of the head was negative and MRI was negative as well.  He was found to have a UTI and was started on ceftriaxone.  He is also suspected to have underlying Parkinson's disease and was started on Sinemet.  He comes to the TCU for strengthening and gait training.  He is quite alert and oriented and lives at home with his wife.  He has a son here in the [place] and a daughter who lives in [facility].  He will complete 4 more days of Omnicef.  He has a urinary catheter for retention.  He has a history of self cathing for the last 3 to 4 years.  Will need to follow-up with urology before removing catheter.  For suspected Parkinson's disease, he is on Sinemet and will  follow up with neurology in 6 to 8 weeks.  He had noninsulin-dependent diabetes and remains on Metformin.  A1c is 7.  Does come to the TCU with orders to check blood sugar.  We will discontinue those.    Past Medical History:   Diagnosis Date   ? HLD (hyperlipidemia)    ? HTN (hypertension)    ? Stroke (H)    ? TIA (transient ischemic attack)              No family history on file.  Social History     Socioeconomic History   ? Marital status:      Spouse name: Not on file   ? Number of children: Not on file   ? Years of education: Not on file   ? Highest education level: Not on file   Occupational History   ? Not on file   Social Needs   ? Financial resource strain: Not on file   ? Food insecurity     Worry: Not on file     Inability: Not on file   ? Transportation needs     Medical: Not on file     Non-medical: Not on file   Tobacco Use   ? Smoking status: Never Smoker   Substance and Sexual Activity   ? Alcohol use: No   ? Drug use: Not on file   ? Sexual activity: Not on file   Lifestyle   ? Physical activity     Days per week: Not on file     Minutes per session: Not on file   ? Stress: Not on file   Relationships   ? Social connections     Talks on phone: Not on file     Gets together: Not on file     Attends Anabaptism service: Not on file     Active member of club or organization: Not on file     Attends meetings of clubs or organizations: Not on file     Relationship status: Not on file   ? Intimate partner violence     Fear of current or ex partner: Not on file     Emotionally abused: Not on file     Physically abused: Not on file     Forced sexual activity: Not on file   Other Topics Concern   ? Not on file   Social History Narrative   ? Not on file         Review of Systems   Constitutional: Negative.  Negative for fever.   HENT: Negative.    Respiratory: Negative for shortness of breath and wheezing.    Cardiovascular: Negative for chest pain and leg swelling.   Gastrointestinal: Negative for  abdominal distention and abdominal pain.   Genitourinary: Positive for difficulty urinating. Negative for dysuria.   Musculoskeletal: Negative.    Skin: Negative.    Neurological: Positive for tremors and weakness.   Psychiatric/Behavioral: Negative.        Vitals:    04/14/21 1310   BP: 131/73   Pulse: (!) 55   Resp: 16   Temp: 98.1  F (36.7  C)   SpO2: 98%   Weight: 208 lb 3.2 oz (94.4 kg)   Height: 6' (1.829 m)       Physical Exam  Constitutional:       Appearance: He is normal weight.   HENT:      Head: Atraumatic.      Nose: No rhinorrhea.      Mouth/Throat:      Pharynx: No posterior oropharyngeal erythema.   Eyes:      General: No scleral icterus.  Cardiovascular:      Rate and Rhythm: Normal rate and regular rhythm.      Heart sounds: No murmur.   Pulmonary:      Effort: No respiratory distress.      Breath sounds: Normal breath sounds.   Abdominal:      General: There is no distension.      Palpations: Abdomen is soft.   Musculoskeletal:      Right lower leg: No edema.      Left lower leg: No edema.   Skin:     General: Skin is warm and dry.   Neurological:      Mental Status: He is alert and oriented to person, place, and time. Mental status is at baseline.      Motor: Weakness present.      Gait: Gait abnormal.   Psychiatric:         Mood and Affect: Mood normal.         Behavior: Behavior normal.           LABS:   Reviewed.    ASSESSMENT:      ICD-10-CM    1. Acute urinary retention  R33.8    2. Dyslipidemia  E78.5    3. Parkinson's disease (H)  G20    4. Diabetes mellitus type 2, noninsulin dependent (H)  E11.9        PLAN:      Parkinson's disease:   -Continue Sinemet  - PT and OT.  -Urology follow-up 6 to 8 weeks.    Acute urinary retention on chronic  UTI  -Cefdinir twice a day x4 more days.  -Urology follow-up.  -Keep Hunter in until seen by urology.    History of stroke  CAD  Hypertension:  -Amlodipine 10 mg daily.  -Lisinopril 40 daily.  -Metoprolol 50 daily.  -Clopidogrel 75 daily.  -Simvastatin  20 mg daily.    Type 2 diabetes:  -Continue Metformin 1000 mg twice daily.  -Discontinue blood sugar checks.    Electronically signed by: Steve Salgado CNP

## 2021-06-25 NOTE — PROGRESS NOTES
Pharmacy Note - Admission Medication History    Pertinent Provider Information: N/A     ______________________________________________________________________    Prior To Admission (PTA) med list completed and updated in EMR.       PTA Med List   Medication Sig Last Dose     acetaminophen (TYLENOL) 500 MG tablet Take 1,000 mg by mouth 2 (two) times a day. 5/25/2021 at AM     amLODIPine (NORVASC) 10 MG tablet Take 10 mg by mouth daily. 5/25/2021 at AM     carbidopa-levodopa (SINEMET)  mg per tablet Take 1 tablet by mouth 4 (four) times a day. (Patient taking differently: Take 1 tablet by mouth 4 (four) times a day. 0800, 1200, 1600, 2000) 5/25/2021 at 1600     clopidogrel (PLAVIX) 75 mg tablet Take 75 mg by mouth daily. 5/24/2021 at PM     lisinopril (PRINIVIL,ZESTRIL) 40 MG tablet Take 40 mg by mouth daily. 5/25/2021 at AM     metFORMIN (GLUCOPHAGE) 500 MG tablet Take 1,000 mg by mouth 2 (two) times a day with meals.  5/25/2021 at x1     metoprolol succinate (TOPROL-XL) 50 MG 24 hr tablet Take 50 mg by mouth daily. 5/25/2021 at AM     multivitamin therapeutic (THERAGRAN) tablet Take 1 tablet by mouth at bedtime. 5/24/2021 at HS     simvastatin (ZOCOR) 20 MG tablet Take 20 mg by mouth at bedtime. 5/24/2021 at HS       Information source(s): Patient, [facility] and [facility]  Method of interview communication: in-person    Summary of Changes to PTA Med List  New: APAP  Discontinued: None  Changed: None    Patient was asked about OTC/herbal products specifically.  PTA med list reflects this.    In the past week, patient estimated taking medication this percent of the time:  greater than 90%.    Allergies were reviewed, assessed, and updated with the patient.      Patient does not use any multi-dose medications prior to admission.    The information provided in this note is only as accurate as the sources available at the time of the update(s).    Thank you for the opportunity to participate in  the care of this patient.    Maryana Hugo, PharmD  5/25/2021 7:14 PM

## 2021-06-25 NOTE — TELEPHONE ENCOUNTER
I received a call from Nancy at [facility] ([phone]). Patient [place]. [facility], patient is to have a CT scan in 2 week and MRI in 3 months.     Patient has never been seen at our clinic. He had a head bleed but no surgery. Please advise how soon we want to see patient, if imaging is needed, and if he should establish care w surgeon or NP.

## 2021-06-25 NOTE — ED TRIAGE NOTES
Patient fell around 10 am this morning. He has a hematoma on his right forehead. Bleeding controlled. He takes Plavix. He has also had numbness and decreased movement in his right arm over the past 2 days.

## 2021-06-26 NOTE — PROGRESS NOTES
NEUROSURGERY CONSULTATION NOTE    6/16/2021       CHIEF COMPLAINT: ICH     HPI:    Maikol Glaser is a 80 y.o. male who is sent to us in consultation after hospitalization at [facility] 5/25/2021-6/1/2021 for fall, left parietal IPH. There was question that this hemorrhage was related to vascular malformation and MRI was recommended in three months as well as referral to neuro IR. He did well during his hospitalization and has since been discharged to TCU. He is hoping to be discharged from [facility] this week. Hx of parkinsons disease for which he has already seen neurology in follow up. He is following with [facility] as it is much closer to home. Repeat CT head today is stable with expected evolution of hemorrhage. No new hemorrhage. He was previously on plavix, switched to aspirin which was resumed on bleed day #7.  Today he notes no headaches, still with intermittent confusion, ambulating with walker. No discernable neuro deficits apart from confusion. Of note, he did have a CTA recently that was negative for mass/lesion    Past Medical History:   Diagnosis Date     HLD (hyperlipidemia)      HTN (hypertension)      Stroke (H)      TIA (transient ischemic attack)      No past surgical history on file.      REVIEW OF SYSTEMS:  Pt denies changes in bowel or bladder habits. No incontinence. A full 14 point review of systems was otherwise completed and is negative aside from that mentioned above in the HPI    MEDICATIONS:  Current Outpatient Medications   Medication Sig Dispense Refill     acetaminophen (TYLENOL) 500 MG tablet Take 1,000 mg by mouth 2 (two) times a day.       amLODIPine (NORVASC) 10 MG tablet Take 10 mg by mouth daily.  0     carbidopa-levodopa (SINEMET)  mg per tablet Take 1 tablet by mouth 4 (four) times a day. (Patient taking differently: Take 1 tablet by mouth 4 (four) times a day. 0800, 1200, 1600, 2000)  0     clopidogrel (PLAVIX) 75 mg tablet Take 75 mg by mouth daily.  0      lisinopril (PRINIVIL,ZESTRIL) 40 MG tablet Take 40 mg by mouth daily.  0     metFORMIN (GLUCOPHAGE) 500 MG tablet Take 1,000 mg by mouth 2 (two) times a day with meals.   0     metoprolol succinate (TOPROL-XL) 50 MG 24 hr tablet Take 50 mg by mouth daily.  0     multivitamin therapeutic (THERAGRAN) tablet Take 1 tablet by mouth at bedtime.       simvastatin (ZOCOR) 20 MG tablet Take 20 mg by mouth at bedtime.  2     No current facility-administered medications for this visit.          ALLERGIES/SENSITIVITIES:     Allergies   Allergen Reactions     Penicillins Rash     Childhood rxn.         PERTINENT SOCIAL HISTORY:   Social History     Socioeconomic History     Marital status:      Spouse name: Not on file     Number of children: Not on file     Years of education: Not on file     Highest education level: Not on file   Occupational History     Not on file   Social Needs     Financial resource strain: Not on file     Food insecurity     Worry: Not on file     Inability: Not on file     Transportation needs     Medical: Not on file     Non-medical: Not on file   Tobacco Use     Smoking status: Never Smoker   Substance and Sexual Activity     Alcohol use: No     Drug use: Not on file     Sexual activity: Not on file   Lifestyle     Physical activity     Days per week: Not on file     Minutes per session: Not on file     Stress: Not on file   Relationships     Social connections     Talks on phone: Not on file     Gets together: Not on file     Attends Buddhism service: Not on file     Active member of club or organization: Not on file     Attends meetings of clubs or organizations: Not on file     Relationship status: Not on file     Intimate partner violence     Fear of current or ex partner: Not on file     Emotionally abused: Not on file     Physically abused: Not on file     Forced sexual activity: Not on file   Other Topics Concern     Not on file   Social History Narrative     Not on file         FAMILY  HISTORY:  No family history on file.     PHYSICAL EXAM:     Constitution: /68   Pulse 72   Resp 18 .   Awake, alert and in NAD  Eyes: Conjugate gaze. Conjunctiva benign without icterus or injection  Heart: RRR  Lungs: Non-labored respiration without accessory muscle use  Skin: No obvious rash or lesion  Psych: Appropriate mood and affect, alert and oriented x 3  Mental Status:  Speech is fluent.  Recent and remote memory are intact.  Attention span and concentration are normal.     Cranial Nerves:  CN2: No funduscopic exam performed. CN3,4 & 6: Pupillary light response, lateral and vertical gaze normal.  No nystagmus. CN7: No facial weakness, smile, facial symmetry intact. CN8: Intact to spoken voice.      Motor: Normal bulk and tone all muscle groups of upper and lower extremities.     Right Left  Right Left   Deltoid 5 5 Hip flexion 5 5   Biceps 5 5 Hip extension 5 5   Triceps 5 5 Knee flexion 5 5   Wrist ex 5 5 Knee ex 5 5   Wrist flex 5 5 Dorsiflex 5 5   Finger ex 5 5 Plantar flex 5 5   Hand intrinsic 5 5 EHL 5 5    Full Full         Sensory: Sensation intact bilaterally to light touch and temperature throughout. Sensation is intact to pinprick in the bilateral LE.      Coordination:  Shuffling gait     Musculoskeletal: Negative straight leg raise bilaterally. Negative POLINA testing. Negative Jamil finger test    IMAGING: I personally reviewed all radiographic images  CT head reviewed from today  MRI from previous admission      CONSULTATION ASSESSMENT AND PLAN:    Maikol Glaser is a 80 y.o. male with IPH after fall. Repeat CT with expected evolution of hemorrhage, no new concerning findings. Referrals placed to neuro IR, MRI with and without in three months. If no AVM/lesion - would expect patient can be discharged from neurosurgery clinic and this was just a hemorrhagic contusion. Continue to follow with neurology.      I spent more than 60 minutes in this apt, examining the pt, reviewing the  scans, reviewing notes from chart, discussing treatment options with risks and benefits and coordinating care. >50 % clinic time was spent in face to face counseling and coordinating care    Jennie Becker       Cc:   Antione Villeda MD

## 2021-06-26 NOTE — PROGRESS NOTES
[facility]    Facility:   [facility] [[id number]]   Code Status: POLST AVAILABLE  PCP: Antione Villeda MD   Phone: [phone]   Fax: [phone]    CHIEF COMPLAINT/REASON FOR VISIT:  Patient presents with:  Discharge Summary    HISTORY COURSE:  HPI: Maikol is a 80 y.o. male with hypertension, hyperlipidemia and previous CVAs who had a fall and later developed confusion, weakness and disorientation with slurred speech. He experienced a fall in his kitchen and is believed to have hit his head ane subsequent intraparenchymal hemorrhage.  He was monitored in the [facility] with nonoperative management..      TCU:   He is known to us from admission in April.   He was started on Keppra for 7 days, aspirin was started 7 days post fall. He is more confused than his April admission but is able to tell me about his fall at home and remembered that he has a ct/mri today. He is ambulating with the walker without difficulty.   Developed a UTI week of 6/8 and was treated with levaquin with improvement in urine color and clarity.    He is diabetic with a recent A1c of 7.  On Metformin thousand twice daily. BG checks satisfactory and therefore discontinued week 2 of TCU stay.    He denies pain and appears comfortable. Looking forward to discharge to home with his wife.      PHYSICAL EXAM:   Constitutional:       Appearance: He is normal weight.   HENT:      Head: Atraumatic.      Nose: No rhinorrhea.      Mouth/Throat:      Pharynx: No posterior oropharyngeal erythema.   Eyes:      General: No scleral icterus.  Cardiovascular:      Rate and Rhythm: Normal rate and regular rhythm.      Heart sounds: No murmur.   Pulmonary:      Effort: No respiratory distress.      Breath sounds: Normal breath sounds.   Abdominal:      General: There is no distension.      Palpations: Abdomen is soft.   Musculoskeletal:      Right lower leg: No edema.      Left lower leg: No edema.   Skin:      General: Skin is warm and dry.      Comments: Head laceration; healing well.    Neurological:      Mental Status: He is alert and oriented to person, place, and time. Mental status is at baseline.      Motor: Weakness present.      Gait: Gait abnormal. Shuffle gate.   Psychiatric:         Mood and Affect: Mood normal.         Behavior: Behavior normal.       MEDICATION LIST:  Current Outpatient Medications:  acetaminophen (TYLENOL) 500 MG tablet, Take 1,000 mg by mouth 2 (two) times a day.  amLODIPine (NORVASC) 10 MG tablet, Take 10 mg by mouth daily.  carbidopa-levodopa (SINEMET)  mg per tablet, Take 1 tablet by mouth 4 (four) times a day. (Patient taking differently: Take 1 tablet by mouth 4 (four) times a day. 0800, 1200, 1600, 2000)  clopidogrel (PLAVIX) 75 mg tablet, Take 75 mg by mouth daily.  lisinopril (PRINIVIL,ZESTRIL) 40 MG tablet, Take 40 mg by mouth daily.  metFORMIN (GLUCOPHAGE) 500 MG tablet, Take 1,000 mg by mouth 2 (two) times a day with meals.   metoprolol succinate (TOPROL-XL) 50 MG 24 hr tablet, Take 50 mg by mouth daily.  multivitamin therapeutic (THERAGRAN) tablet, Take 1 tablet by mouth at bedtime.  simvastatin (ZOCOR) 20 MG tablet, Take 20 mg by mouth at bedtime.    DISCHARGE DIAGNOSIS:  (S01.01XD) Laceration of scalp without foreign body, subsequent encounter  (primary encounter diagnosis)    (R41.0) Confusion    (G20) Parkinson's disease (H)    Intraparenchymal hemorrhage: Intermittent confusion.   -Follow-up with TBI clinic as ordered.  -Initiated on aspirin 325 daily by neurology.     Parkinson's disease: Continues to be mildly unsteady. Confused as compared to baseline.  -Continue Sinemet 2 tabs po four times a day.   -[facility].      History of stroke  CAD  Hypertension:   -Amlodipine 10 mg daily.  -Lisinopril 40 daily.  -Metoprolol 50 daily.  -Simvastatin 20 mg daily.     Type 2 diabetes:  -Continue Metformin 1000 mg twice daily.     MEDICAL EQUIPMENT NEEDS:  None      DISCHARGE PLAN/FACE TO FACE:  I certify that services are/were furnished while this patient was under the care of a physician and that a physician or an allowed non-physician practitioner (NPP), had a face-to-face encounter that meets the physician face-to-face encounter requirements. The encounter was in whole, or in part, related to the primary reason for home health. The patient is confined to his/her home and needs intermittent skilled nursing, physical therapy, speech-language pathology, or the continued need for occupational therapy. A plan of care has been established by a physician and is periodically reviewed by a physician.  Date of Face-to-Face Encounter: 6/16/21    I certify that, based on my findings, the following services are medically necessary home health services: PT/OT.     My clinical findings support the need for the above skilled services because: RN for medication teaching and management.  PT/OT for ongoing endurance and strengthening with home safety evaluation.    This patient is homebound because: Homebound Status: weakness, impaired gait, caregiver burden.     The patient is, or has been, under my care and I have initiated the establishment of the plan of care. This patient will be followed by a physician who will periodically review the plan of care.    Schedule follow up visit with primary care provider within 7 days to reestablish care.    >30 minutes with >50% face to face time discussing follow up imaging, discharge plan and confusion.     Electronically signed by: Steve Salgado CNP

## 2021-06-26 NOTE — PROGRESS NOTES
[facility]    Facility:   [facility] [[id number]]   Code Status: FULL CODE      CHIEF COMPLAINT/REASON FOR VISIT:  Chief Complaint   Patient presents with     Problem Visit     remove stitches        HISTORY:      HPI: Maikol is a 80 y.o. male with hypertension, hyperlipidemia and previous CVAs who had a fall and later developed confusion, weakness and disorientation with slurred speech.  He is known to us from admission in April.  At that time he was suspected to have Parkinson's disease and was initiated on Sinemet, followed up with neurology and he continues his Sinemet now.  He lives at home with his wife and his son lives in the [facility].  He was discharged home at that time.  He returned to the ER after experiencing a fall with head injury and subsequent intraparenchymal hemorrhage.  He was monitored in the [facility] with nonoperative management.  He was started on Keppra for 7 days, aspirin was started 7 days post fall.  He had intermittent confusion and continues to have some confusion at least compared to his April admission in the TCU.  He is only able to give me a brief overlay of what happened in the hospital including that he fell.  He is currently laying in the bed and does not not get up to talk to me, he is pleasant and conversational but does not make a lot of eye contact.  He is denying pain.  Monitoring neurologic status.  He is diabetic with a recent A1c of 7.  On Metformin thousand twice daily.     Updates on Status Since Skilled nursing Admission:   Today: Seen today to follow-up in the TCU to review head laceration for removal of stitches. Stitches have been in for >2 weeks; area is healing well. Well approximated with no swelling, tenderness or drainage. Otherwise doing well in therapies and has no complaints aside from wanting to go home.       Past Medical History:   Diagnosis Date     HLD (hyperlipidemia)      HTN (hypertension)       Stroke (H)      TIA (transient ischemic attack)              No family history on file.  Social History     Socioeconomic History     Marital status:      Spouse name: Not on file     Number of children: Not on file     Years of education: Not on file     Highest education level: Not on file   Occupational History     Not on file   Social Needs     Financial resource strain: Not on file     Food insecurity     Worry: Not on file     Inability: Not on file     Transportation needs     Medical: Not on file     Non-medical: Not on file   Tobacco Use     Smoking status: Never Smoker   Substance and Sexual Activity     Alcohol use: No     Drug use: Not on file     Sexual activity: Not on file   Lifestyle     Physical activity     Days per week: Not on file     Minutes per session: Not on file     Stress: Not on file   Relationships     Social connections     Talks on phone: Not on file     Gets together: Not on file     Attends Mormon service: Not on file     Active member of club or organization: Not on file     Attends meetings of clubs or organizations: Not on file     Relationship status: Not on file     Intimate partner violence     Fear of current or ex partner: Not on file     Emotionally abused: Not on file     Physically abused: Not on file     Forced sexual activity: Not on file   Other Topics Concern     Not on file   Social History Narrative     Not on file       Review of Systems   Constitutional: Negative.  Negative for fever.   HENT: Negative.    Respiratory: Negative for shortness of breath and wheezing.    Cardiovascular: Negative for chest pain and leg swelling.   Gastrointestinal: Negative for abdominal distention and abdominal pain.   Genitourinary: Negative for dysuria.   Musculoskeletal: Negative.    Skin: Negative.    Neurological: Positive for tremors and weakness.   Psychiatric/Behavioral: Negative.        Vitals:    06/09/21 1340   BP: 107/67   Pulse: 66   Resp: 18   Temp: 97.9  F  (36.6  C)   SpO2: 95%   Weight: 210 lb 6.4 oz (95.4 kg)   Height: 6' (1.829 m)       Physical Exam  Constitutional:       Appearance: He is normal weight.   HENT:      Head: Atraumatic.      Nose: No rhinorrhea.      Mouth/Throat:      Pharynx: No posterior oropharyngeal erythema.   Eyes:      General: No scleral icterus.  Cardiovascular:      Rate and Rhythm: Normal rate and regular rhythm.      Heart sounds: No murmur.   Pulmonary:      Effort: No respiratory distress.      Breath sounds: Normal breath sounds.   Abdominal:      General: There is no distension.      Palpations: Abdomen is soft.   Musculoskeletal:      Right lower leg: No edema.      Left lower leg: No edema.   Skin:     General: Skin is warm and dry.      Comments: Head laceration with stitches, healing well.    Neurological:      Mental Status: He is alert and oriented to person, place, and time. Mental status is at baseline.      Motor: Weakness present.      Gait: Gait abnormal.   Psychiatric:         Mood and Affect: Mood normal.         Behavior: Behavior normal.           LABS:   Reviewed.    ASSESSMENT:      ICD-10-CM    1. Parkinson's disease (H)  G20    2. Laceration of scalp without foreign body, subsequent encounter  S01.01XD        PLAN:      Head laceration: well approximated.   Okay to remove stitches.     Intraparenchymal hemorrhage: Intermittent confusion.   -Follow-up with TBI clinic as ordered.  -PT and OT, consider slums exam.  -Monitor neuro status throughout stay.  -Initiated on aspirin 325 daily by neurology.  -Keppra 500 twice daily for 7 days only.    Parkinson's disease: Continues to be mildly unsteady. Confused as compared to baseline.  -Continue Sinemet 2 tabs po four times a day.   - PT and OT.  -[facility].     History of stroke  CAD  Hypertension: Plavix was stopped this admission.  -Amlodipine 10 mg daily.  -Lisinopril 40 daily.  -Metoprolol 50 daily.  -Simvastatin 20 mg daily.    Type 2 diabetes:  -Continue  Metformin 1000 mg twice daily.    Electronically signed by: Steve Salgado, GINA

## 2021-06-26 NOTE — PATIENT INSTRUCTIONS - HE
We will see you back in three months with MRI. Referral sent to interventional radiology to evaluate for underlying lesion. Avoid falls.       Call [phone] with the following symptoms:  *Worsening headache not relieved by the prescription given  *A headache that gets better or worse when you stand up or lie down  *Seizures  *Persistent nausea and vomiting  *Increased sleepiness or difficulty being awakened  *Change in ability to walk, see, think, or talk  *Worsening or new onset of weakness, or numbness and tingling  *Loss or change in your ability to control bowel or bladder function      !!!! IF YOU HAVE A SERIOUS OR THREATENING EMERGENCY, CALL 911 OR COME TO THE EMERGENCY ROOM.  IF YOUR CONDITIONS ALLOWS COME TO THE HOSPITAL WHERE YOUR SURGERY WAS PERFORMED.    QUESTIONS OR CONCERNS:   OUR OFFICE HOURS ARE FROM 9:00 A.M -4:30 P.M. MONDAY-FRIDAY.  AFTER OFFICE HOURS, CALLS ARE ANSWERED BY THE ON-CALL PROVIDER. PLEASE CALL WITH ROUTINE QUESTIONS DURING OFFICE HOURS. THE ON-CALL PROVIDER WILL NOT REFILL PRESCRIPTIONS.

## 2021-06-26 NOTE — PROGRESS NOTES
[facility]    Facility:   [facility] [[id number]]   Code Status: FULL CODE      CHIEF COMPLAINT/REASON FOR VISIT:  Chief Complaint   Patient presents with     Problem Visit     Mental status changes, Parkinson's disease, ambulation       HISTORY:      HPI: Maikol is a 80 y.o. male with hypertension, hyperlipidemia and previous CVAs who had a fall and later developed confusion, weakness and disorientation with slurred speech.  He is known to us from admission in April.  At that time he was suspected to have Parkinson's disease and was initiated on Sinemet, followed up with neurology and he continues his Sinemet now.  He lives at home with his wife and his son lives in the [facility].  He was discharged home at that time.  He returned to the ER after experiencing a fall with head injury and subsequent intraparenchymal hemorrhage.  He was monitored in the [facility] with nonoperative management.  He was started on Keppra for 7 days, aspirin was started 7 days post fall.  He had intermittent confusion and continues to have some confusion at least compared to his April admission in the [facility].  He is only able to give me a brief overlay of what happened in the hospital including that he fell.  He is currently laying in the bed and does not not get up to talk to me, he is pleasant and conversational but does not make a lot of eye contact.  He is denying pain.  Monitoring neurologic status.  He is diabetic with a recent A1c of 7.  On Metformin thousand twice daily.     Updates on Status Since Skilled nursing Admission:   Today: Seen today to follow-up in the TCU for multiple conditions. He is walking a little bit with therapy. He continues to seem a little bit more confused than he did during his last admission. He is denying any headache. Ecchymosis and bruising is improving and clearing up around his face. He has no constipation or diarrhea or urinary symptoms today.  Sitting up in his chair and is conversational and appropriate. Lower extremity edema +1-2. Overall doing better than he was when I admitted him last week. Blood sugars reviewed and are satisfactory so we will discontinue blood sugar checks.      Past Medical History:   Diagnosis Date     HLD (hyperlipidemia)      HTN (hypertension)      Stroke (H)      TIA (transient ischemic attack)              No family history on file.  Social History     Socioeconomic History     Marital status:      Spouse name: Not on file     Number of children: Not on file     Years of education: Not on file     Highest education level: Not on file   Occupational History     Not on file   Social Needs     Financial resource strain: Not on file     Food insecurity     Worry: Not on file     Inability: Not on file     Transportation needs     Medical: Not on file     Non-medical: Not on file   Tobacco Use     Smoking status: Never Smoker   Substance and Sexual Activity     Alcohol use: No     Drug use: Not on file     Sexual activity: Not on file   Lifestyle     Physical activity     Days per week: Not on file     Minutes per session: Not on file     Stress: Not on file   Relationships     Social connections     Talks on phone: Not on file     Gets together: Not on file     Attends [employer] service: Not on file     Active member of club or organization: Not on file     Attends meetings of clubs or organizations: Not on file     Relationship status: Not on file     Intimate partner violence     Fear of current or ex partner: Not on file     Emotionally abused: Not on file     Physically abused: Not on file     Forced sexual activity: Not on file   Other Topics Concern     Not on file   Social History Narrative     Not on file         Review of Systems   Constitutional: Negative.  Negative for fever.   HENT: Negative.    Respiratory: Negative for shortness of breath and wheezing.    Cardiovascular: Negative for chest pain and leg  swelling.   Gastrointestinal: Negative for abdominal distention and abdominal pain.   Genitourinary: Positive for difficulty urinating. Negative for dysuria.   Musculoskeletal: Negative.    Skin: Negative.    Neurological: Positive for tremors and weakness.   Psychiatric/Behavioral: Negative.        Vitals:    06/07/21 1242   BP: 99/64   Pulse: 63   Resp: 16   Temp: 98.1  F (36.7  C)   SpO2: 96%   Weight: 210 lb 6.4 oz (95.4 kg)   Height: 6' (1.829 m)       Physical Exam  Constitutional:       Appearance: He is normal weight.   HENT:      Head: Atraumatic.      Nose: No rhinorrhea.      Mouth/Throat:      Pharynx: No posterior oropharyngeal erythema.   Eyes:      General: No scleral icterus.  Cardiovascular:      Rate and Rhythm: Normal rate and regular rhythm.      Heart sounds: No murmur.   Pulmonary:      Effort: No respiratory distress.      Breath sounds: Normal breath sounds.   Abdominal:      General: There is no distension.      Palpations: Abdomen is soft.   Musculoskeletal:      Right lower leg: No edema.      Left lower leg: No edema.   Skin:     General: Skin is warm and dry.   Neurological:      Mental Status: He is alert and oriented to person, place, and time. Mental status is at baseline.      Motor: Weakness present.      Gait: Gait abnormal.   Psychiatric:         Mood and Affect: Mood normal.         Behavior: Behavior normal.           LABS:   Reviewed.    ASSESSMENT:      ICD-10-CM    1. Diabetes mellitus type 2, noninsulin dependent (H)  E11.9    2. Parkinson's disease (H)  G20    3. Confusion  R41.0    4. Edema, lower extremity  R60.0        PLAN:      Intraparenchymal hemorrhage: Intermittent confusion, seems much more confused compared to last admission.  -Follow-up with TBI clinic as ordered.  -PT and OT, consider slums exam.  -Monitor neuro status throughout stay.  -Initiated on aspirin 325 daily by neurology.  -Keppra 500 twice daily for 7 days only.    Parkinson's disease: Continues to  be mildly unsteady. Confused as compared to baseline.  -Continue Sinemet 2 tabs po four times a day.   - PT and OT.  -[facility]     History of stroke  CAD  Hypertension: Plavix was stopped this admission.  -Amlodipine 10 mg daily.  -Lisinopril 40 daily.  -Metoprolol 50 daily.  -Simvastatin 20 mg daily.    Type 2 diabetes:  -Continue Metformin 1000 mg twice daily.  -Discontinue blood sugar checks today.    Electronically signed by: Steve Salgado CNP

## 2021-06-26 NOTE — ED PROVIDER NOTES
EMERGENCY DEPARTMENT ENCOUNTER    NAME:  Maikol Glaser  AGE:  80 y.o. male  YOB: 1940  MRN:  [id number]  EVALUATION DATE:  5/25/2021    PCP:  Antione Villeda MD    ED PROVIDER:  Jorge Salgado MD      CHIEF COMPLAINT  Chief Complaint   Patient presents with     Fall     Head Injury         ______________________________________________________________________  ---------------------------------------------------------------------------------------------------------------------    FINAL IMPRESSION  1. Intraparenchymal hemorrhage of brain (H)    2. Fall from ground level    3. Facial laceration, initial encounter    4. Traumatic hematoma of forehead, initial encounter    5. RBBB (right bundle branch block)          PLAN  - transfer to [facility] for further care    ED COURSE & MEDICAL DECISION MAKING  6:03 PM I met with the patient to gather history and to perform my initial exam. I discussed the plan for care while in the Emergency Department.   7:32 PM I discussed CT imaging with radiology technician. CT shows a head bleed that she discussed with neuroradiologist and would explain patient's right hand weakness. No CTA recommended at this time.   7:38 PM I discussed the case with [facility] ED physician, Dr. Mueller, who accepts the patient for transfer and admission.  7:43 PM I re-evaluated patient and updated him and son on results and plan for transfer. Patient is feeling well and exam is unchanged. Patient and son are agreeable to transfer.  8:09 PM I discussed imaging with neuroradiologist. Patient has a left intraparenchymal  bleed. CTA and brain MRI from 6 weeks ago were unremarkable.  8:58 PM I discussed patient's case with JOSE EDUARDO Fontana of [place] neurosurgery. No further recommendations, will evaluate once transferred.  9:50 PM I re-evaluated patient, his exam is unchanged.  10:22 PM I performed a laceration repair.    ED Course as of May 25 2256   Tue May 25, 2021   1801 80yoM with history of  Parkinson's (diagnosed in past month or so), HTN, HLD, T2DM, stroke, tetanus up to date, Plavix use but no other blood thinners presenting for evaluation of head injury. Patient reports he tripped and fell around 10am this morning; hit head on garbage can. Wife returned home at 12pm and found dried blood; son (lives with them) returned at 4pm and noted hematoma to right forehead---thus brought to the ED. Patient notes mild pain to this area but no other pains including neck pain, chest pain, shortness of breath, arm/leg pain, abdominal pain. Does note new right hand weakness which started 2 days ago; no nontender currently. Patient notes he had similar weakness with prior stroke but states this was not ongoing and was definitely better 1 week ago.    Normal vitals on presentation. Exam with dried blood around right forehead laceration & hematoma with mild facial tenderness but no crepitus or focal tenderness, atraumatic nose with no septal hematoma, no c-spine tenderness with painless active ROM intact, no spinal tenderness otherwise, atraumatic extremities, no chest wall or abdominal tenderness. Neuro exam with right finger weakness and mild ataxia (unable to move fingers rapidly compared to left hand) with 5/5  strength and otherwise 5/5 strength to other joints of extremities bilaterally. Will image head from trauma standpoint but recent right hand weakness/ataxia notable as well. Subacute stroke certainly of concern. Will obtain CTA head/neck, screening EKG, blood to start. Will require suture repair as well for laceration. Patient and son comfortable with this plan; no further questions at this time.    [NS]   2141 CT head with left intraparenchymal hemorrhage with no midline shift; CTAs & MRI negative 6 weeks ago while getting Parkinson's workup. Patient with unchanged exam on recheck; no complaints himself. Still mild right hand weakness. May have had ICH causing right hand weakness the past couple days;  trauma still a possibility though. Given no beds here discussed with [facility] for transfer; accepted to ED. Discussed with Neurosurgery as well who agree with this plan. Incidentally, UA suggestive of UTI with +nitrite; given Rocephin for this. Does not meet sepsis criteria. Labs otherwise with normal kidney function, normal hemoglobin, no glaring electrolyte abnormality, normal LFTs.    [NS]   2222 Laceration repaired performed; 4 sutures to horizontal mid right forehead laceration. Glue to 1cm nongaping laceration just above right mid eyebrow. SBP 150s spontaneously at this time.    [NS]      ED Course User Index  [NS] Jorge Salgado MD         I wore the following PPE during this patient encounter:  N95 mask, face shield w/ eye protection, gloves      MEDICATIONS GIVEN IN TODAY'S ED VISIT  Medications   sodium chloride flush 10 mL (NS) (has no administration in time range)   sodium chloride 0.9% 500 mL (0 mL Intravenous Stopped 5/25/21 2118)   cefTRIAXone 1 g in NaCl 0.9 % 50 mL (MINI-BAG Plus) (ROCEPHIN) (1 g Intravenous New Bag 5/25/21 2137)       ______________________________________________________________________  ---------------------------------------------------------------------------------------------------------------------      HPI  Maikol Glaser is a 80 y.o. male with a history of Parkinson's disease, stroke, HTN, HLD, DM II, presenting for evaluation of unwitnessed fall with a head injury.    Per patient's son, he believes patient had an unwitnessed fall around 1000 as patient's wife returned home at 1200 and found patient on the floor with dried blood around him. Patient is on Plavix. Patient reports that he is unsure how he fell but remembers falling and hitting the wooden trash can. Patient's son states patient has been dragging his feet recently and believes patient tripped. Patient endorses a hematoma and laceration to right forehead. Bleeding is controlled but dried blood is noted around  laceration site. Patient endorses some diffuse facial pain as well.     Of note, patient reports his right arm has been numb with decreased movement in the past two days. He endorses current numbness to right fingers. Patient notes a history of similar symptoms with his prior stroke. Patient is left handed.    Patient notes a recent diagnosis of Parkinson's and has been taking his prescribed medications. Patient denies additional medical concerns or complaints at this time.    Patient lives at home with wife and son.    REVIEW OF SYSTEMS  Review of Systems   Constitutional: Negative for chills, diaphoresis and fever.   HENT: Negative for congestion and rhinorrhea.         Positive for diffuse facial pain.   Eyes: Negative for visual disturbance.   Respiratory: Negative for shortness of breath.    Cardiovascular: Negative for chest pain.   Gastrointestinal: Negative for abdominal pain, diarrhea, nausea and vomiting.   Genitourinary: Negative for dysuria, frequency and hematuria.   Musculoskeletal: Negative for back pain, gait problem and neck pain.        Positive for unwitnessed fall.   Skin: Positive for color change (hematoma to right forehead) and wound (laceration to right forehead). Negative for rash.   Neurological: Positive for numbness (right arm/fingers). Negative for dizziness, light-headedness and headaches.        Positive for decreased movement in right arm.   Hematological: Bruises/bleeds easily (on Plavix).   All other systems reviewed and are negative.      ALLERGIES  Allergies   Allergen Reactions     Penicillins Rash     Childhood rxn.         PAST MEDICAL HISTORY  Past Medical History:   Diagnosis Date     HLD (hyperlipidemia)      HTN (hypertension)      Stroke (H)      TIA (transient ischemic attack)     and   Patient Active Problem List   Diagnosis     Dyslipidemia     Hypertension     Atypical Chest Pain     Confusion     Right sided temporal headache     Altered mental status     Acute  metabolic encephalopathy     Acute urinary retention     Hypertensive urgency     Diabetes mellitus type 2, noninsulin dependent (H)     Stroke (H)     Parkinson's disease (H)     Stenosis of left internal carotid artery     Edema, lower extremity       PAST SURGICAL HISTORY  Relevant past surgical history reviewed with patient, unless otherwise stated in HPI, history not pertinent to this visit.    CURRENT MEDICATIONS  No current facility-administered medications on file prior to encounter.      Current Outpatient Medications on File Prior to Encounter   Medication Sig     acetaminophen (TYLENOL) 500 MG tablet Take 1,000 mg by mouth 2 (two) times a day.     amLODIPine (NORVASC) 10 MG tablet Take 10 mg by mouth daily.     carbidopa-levodopa (SINEMET)  mg per tablet Take 1 tablet by mouth 4 (four) times a day. (Patient taking differently: Take 1 tablet by mouth 4 (four) times a day. 0800, 1200, 1600, 2000)     clopidogrel (PLAVIX) 75 mg tablet Take 75 mg by mouth daily.     lisinopril (PRINIVIL,ZESTRIL) 40 MG tablet Take 40 mg by mouth daily.     metFORMIN (GLUCOPHAGE) 500 MG tablet Take 1,000 mg by mouth 2 (two) times a day with meals.      metoprolol succinate (TOPROL-XL) 50 MG 24 hr tablet Take 50 mg by mouth daily.     multivitamin therapeutic (THERAGRAN) tablet Take 1 tablet by mouth at bedtime.     simvastatin (ZOCOR) 20 MG tablet Take 20 mg by mouth at bedtime.       FAMILY HISTORY  History reviewed. No pertinent family history.    SOCIAL HISTORY  Social History     Socioeconomic History     Marital status:      Spouse name: None     Number of children: None     Years of education: None     Highest education level: None   Occupational History     None   Social Needs     Financial resource strain: None     Food insecurity     Worry: None     Inability: None     Transportation needs     Medical: None     Non-medical: None   Tobacco Use     Smoking status: Never Smoker   Substance and Sexual Activity      Alcohol use: No     Drug use: None     Sexual activity: None   Lifestyle     Physical activity     Days per week: None     Minutes per session: None     Stress: None   Relationships     Social connections     Talks on phone: None     Gets together: None     Attends [employer] service: None     Active member of club or organization: None     Attends meetings of clubs or organizations: None     Relationship status: None     Intimate partner violence     Fear of current or ex partner: None     Emotionally abused: None     Physically abused: None     Forced sexual activity: None   Other Topics Concern     None   Social History Narrative     None       PHYSICAL EXAM  VITALS:    Patient Vitals for the past 24 hrs:   BP Temp Temp src Pulse Resp SpO2 Height Weight   05/25/21 2145 148/70 -- -- 60 21 98 % -- --   05/25/21 2130 158/73 -- -- (!) 59 16 99 % -- --   05/25/21 2115 139/67 -- -- (!) 56 15 98 % -- --   05/25/21 2100 153/74 -- -- 61 21 99 % -- --   05/25/21 2045 156/70 -- -- 61 18 100 % -- --   05/25/21 2030 146/70 -- -- 60 16 99 % -- --   05/25/21 2000 167/89 -- -- 64 24 99 % -- --   05/25/21 1900 141/66 -- -- (!) 56 -- 97 % -- --   05/25/21 1845 137/73 -- -- (!) 57 -- 97 % -- --   05/25/21 1735 128/75 98.1  F (36.7  C) Temporal 98 (!) 74 97 % 6' (1.829 m) 220 lb (99.8 kg)     Const:  well-developed, well-nourished, no acute distress  Eyes: PERRL at 3 mm and EOMI. No proptosis. conjunctivae clear, conjugate gaze  HENT: 3 cm horizontal laceration to right mid forehead with moderate dry blood and underlying hematoma, no crepitus. 1 cm horizontal laceration above right eyebrow with very minimal gaping. Mild diffuse bilateral cheek tenderness with no crepitus or focality. external ears normal, nose atraumatic and normal without septal hematoma, oropharynx clear. Neck supple with no meningismus. No c-spine tenderness with painless active ROM intact.  Resp:  no respiratory distress, clear breath sounds bilaterally  CV:   HR 70s during exam, regular rhythm, no murmurs, brisk capillary refill; no chest wall tenderness  GI:  +BS, soft, nontender, no rebound, no guarding, nondistended  :  no CVA tenderness  MSK:    - no midline or paraspinal back tenderness or visual evidence of trauma  - pelvis stable and nontender  - Extremities atraumatic with painless active ROM intact, overlying skin intact, distal CMS intact.  Skin:  warm, dry, no rash  Neuro: awake, alert, answers questions appropriately, follows commands, moves all limbs. Cranial nerves II-XII intact. Negative pronator drift. Unable to wiggle fingers to right hand but  is 5/5. 5/5 strength otherwise to all extremities.  Psych:  calm, normal affect        EKG  Independently reviewed and interpreted by me.  NSR at 64bpm, no ST or T wave changes, unchanged TWI in III/aVF & V1-5, unchanged RBBB with  (prior 154), otherwise normal intervals  Unchanged from prior on 4/7/2021  My read    LABS  Independently reviewed and interpreted by me.  Results for orders placed or performed during the hospital encounter of 05/25/21   Basic Metabolic Panel   Result Value Ref Range    Sodium 140 136 - 145 mmol/L    Potassium 4.4 3.5 - 5.0 mmol/L    Chloride 107 98 - 107 mmol/L    CO2 22 22 - 31 mmol/L    Anion Gap, Calculation 11 5 - 18 mmol/L    Glucose 143 (H) 70 - 125 mg/dL    Calcium 9.3 8.5 - 10.5 mg/dL    BUN 25 8 - 28 mg/dL    Creatinine 1.08 0.70 - 1.30 mg/dL    GFR MDRD Af Amer >60 >60 mL/min/1.73m2    GFR MDRD Non Af Amer >60 >60 mL/min/1.73m2   Magnesium   Result Value Ref Range    Magnesium 1.9 1.8 - 2.6 mg/dL   Hepatic Profile   Result Value Ref Range    Bilirubin, Total 0.4 0.0 - 1.0 mg/dL    Bilirubin, Direct 0.2 <=0.5 mg/dL    Protein, Total 7.5 6.0 - 8.0 g/dL    Albumin 4.1 3.5 - 5.0 g/dL    Alkaline Phosphatase 122 (H) 45 - 120 U/L    AST 12 0 - 40 U/L    ALT <9 0 - 45 U/L   Thyroid Cascade   Result Value Ref Range    TSH 2.70 0.30 - 5.00 uIU/mL   INR   Result Value Ref  Range    INR 1.02 0.90 - 1.10   Urinalysis-UC if Indicated   Result Value Ref Range    Color, UA Light Yellow Light Yellow, Yellow    Clarity, UA Clear Clear    Glucose, UA Negative Negative    Protein, UA Negative Negative    Bilirubin, UA Negative Negative    Urobilinogen, UA <2.0 mg/dL <2.0 mg/dL    pH, UA 6.5 5.0 - 8.0    Blood, UA Negative Negative    Ketones, UA Negative Negative    Nitrite, UA Positive (!) Negative    Leukocytes,  Paulette/uL (!) Negative    Specific Gravity, UA 1.019 1.001 - 1.030    RBC, UA 0 <=2 hpf    WBC UA 26 (H) <=5 hpf    Bacteria, UA None Seen None Seen    Squamous Epithel, UA 0 <=5 /HPF    Mucus, UA Present (!) None Seen lpf   HM1 (CBC with Diff)   Result Value Ref Range    WBC 12.0 (H) 4.0 - 11.0 thou/uL    RBC 4.63 4.40 - 6.20 mill/uL    Hemoglobin 13.3 (L) 14.0 - 18.0 g/dL    Hematocrit 40.7 40.0 - 54.0 %    MCV 88 80 - 100 fL    MCH 28.7 27.0 - 34.0 pg    MCHC 32.7 32.0 - 36.0 g/dL    RDW 14.5 11.0 - 14.5 %    Platelets 216 140 - 440 thou/uL    MPV 10.7 8.5 - 12.5 fL    Neutrophils % 82 (H) 50 - 70 %    Lymphocytes % 9 (L) 20 - 40 %    Monocytes % 7 2 - 10 %    Eosinophils % 1 0 - 6 %    Basophils % 0 0 - 2 %    Immature Granulocyte % 1 (H) <=0 %    Neutrophils Absolute 9.8 (H) 2.0 - 7.7 thou/uL    Lymphocytes Absolute 1.1 0.8 - 4.4 thou/uL    Monocytes Absolute 0.8 0.0 - 0.9 thou/uL    Eosinophils Absolute 0.2 0.0 - 0.4 thou/uL    Basophils Absolute 0.1 0.0 - 0.2 thou/uL    Immature Granulocyte Absolute 0.1 (H) <=0.0 thou/uL       RADIOLOGY  Independently reviewed by me. Please see official radiology report for final read.  Ct Head Without Contrast    Result Date: 5/25/2021  EXAM: CT HEAD WO CONTRAST LOCATION: [facility] DATE/TIME: 5/25/2021 7:47 PM INDICATION: Facial trauma, right hand weakness COMPARISON: Brain MRI 04/08/2021, head and neck CTA 04/07/2021 TECHNIQUE: Routine CT Head without IV contrast. Multiplanar reformats. Dose reduction techniques  were used. FINDINGS: INTRACRANIAL CONTENTS: There is a 2.3 cm transverse by 1.8 cm AP by 2.9 cm craniocaudal acute parenchymal hemorrhage within the left parietal lobe with mild surrounding edema. Minor local mass effect. No lesion was evident within the brain at this location on the prior brain MRI or head CTA. Mild presumed chronic small vessel ischemic changes. Mild generalized volume loss. No hydrocephalus. VISUALIZED ORBITS/SINUSES/MASTOIDS: No intraorbital abnormality. No paranasal sinus mucosal disease. No middle ear or mastoid effusion. BONES/SOFT TISSUES: Right frontal scalp swelling.     1.  2.9 cm acute parenchymal hemorrhage in the left parietal lobe with mild surrounding edema. Findings were discussed with Dr. PHOENIX ENRIQUEZ via telephone at 2008 hours on 05/25/2021.    Ct Facial Bones Without Contrast    Result Date: 5/25/2021  EXAM: CT FACIAL BONES WO CONTRAST LOCATION: [facility] DATE/TIME: 5/25/2021 7:41 PM INDICATION: fall, head injury COMPARISON: None. TECHNIQUE: Routine CT Maxillofacial without IV contrast. Multiplanar reformats. Dose reduction techniques were used. FINDINGS: OSSEOUS STRUCTURES/SOFT TISSUES: Right frontal scalp swelling. No facial bone fracture or malalignment. No evidence for dental trauma or periapical abscess. ORBITAL CONTENTS: No acute abnormality. SINUSES: No paranasal sinus mucosal disease. VISUALIZED INTRACRANIAL CONTENTS: No acute abnormality.     1.  Right frontal scalp swelling. No facial bone fracture.       PROCEDURES  -Laceration Repair    Date/Time: 5/25/2021 10:22 PM  Performed by: Phoenix Enriquez MD  Authorized by: Phoenix Enriquez MD       [place] Protocol    Site marked: NA    Prior images obtained and reviewed: Yes    Required items: required blood products, implants, devices, and special equipment available    Patient identity confirmed: verbally with patient and arm band    Reevaluation: NA - No sedation, light  sedation, or local anesthesia    Confirmation checklist: patient's identity using two indicators and relevant allergies    Time out: Immediately prior to procedure a time out was called to verify the correct patient, procedure, equipment, support staff and site/side marked as required    Universal Protocol: Joint Commission Universal Protocol was followed    Preparation: Patient was prepped and draped in the usual sterile fashion    Anesthesia (see MAR for exact dosages)     Anesthesia method:  Local infiltration    Local anesthetic:  Lidocaine 1% WITH epi    Laceration Details     Location:  Face    Face location:  Forehead (right mid forehead)    Length (cm):  3    Depth (mm):  3    Repair Type     Repair type:  Simple    Exploration     Wound exploration: wound explored through full range of motion and entire depth of wound probed and visualized      Wound extent: areolar tissue violated      Wound extent: fascia not violated, no foreign body, no signs of injury, no nerve damage, no tendon damage, no underlying fracture and no vascular damage      Contaminated: no      Treatment     Area cleansed with:  Kaila    Amount of cleaning:  Standard    Irrigation solution:  Sterile saline    Irrigation method:  Syringe    Visualized foreign bodies/material removed: no      Skin Repair     Repair method:  Sutures    Suture size:  5-0    Suture material:  Nylon    Suture technique:  Simple interrupted    Number of sutures:  4    Approximation     Approximation:  Close    Post-procedure Details     Dressing:  Antibiotic ointment and non-adherent dressing    Post-procedure    Patient tolerance: patient tolerated the procedure well with no immediate complications-Laceration Repair    Date/Time: 5/25/2021 10:30 AM  Performed by: Jorge Salgado MD  Authorized by: Jorge Salgado MD       [place] Protocol    Site marked: NA    Prior images obtained and reviewed: Yes    Required items: required blood products,  implants, devices, and special equipment available    Patient identity confirmed: verbally with patient and arm band    Reevaluation: NA - No sedation, light sedation, or local anesthesia    Confirmation checklist: patient's identity using two indicators and relevant allergies    Time out: Immediately prior to procedure a time out was called to verify the correct patient, procedure, equipment, support staff and site/side marked as required    Universal Protocol: Joint Commission Universal Protocol was followed    Preparation: Patient was prepped and draped in the usual sterile fashion    Anesthesia (see MAR for exact dosages)     Anesthesia method:  None    Laceration Details     Location:  Face    Face location:  Forehead (just above right mid eyebrow)    Length (cm):  1    Depth (mm):  0.5    Repair Type     Repair type:  Simple    Exploration     Wound exploration: wound explored through full range of motion and entire depth of wound probed and visualized      Wound extent: areolar tissue violated      Wound extent: fascia not violated, no foreign body, no signs of injury, no nerve damage, no tendon damage, no underlying fracture and no vascular damage      Contaminated: no      Treatment     Area cleansed with:  Kaila    Amount of cleaning:  Standard    Irrigation solution:  Sterile saline    Irrigation method:  Syringe    Visualized foreign bodies/material removed: no      Skin Repair     Repair method:  Tissue adhesive    Approximation     Approximation:  Close    Post-procedure Details     Dressing:  Open (no dressing)    Post-procedure    Patient tolerance: patient tolerated the procedure well with no immediate complicationsCritical Care  Performed by: Jorge Salgado MD  Authorized by: Jorge Salgado MD   Total critical care time: 45 minutes  Critical care time was exclusive of separately billable procedures and treating other patients and teaching time.  Critical care was necessary to  treat or prevent imminent or life-threatening deterioration of the following conditions: trauma and CNS failure or compromise.  Critical care was time spent personally by me on the following activities: discussions with consultants, examination of patient, obtaining history from patient or surrogate, ordering and performing treatments and interventions, ordering and review of laboratory studies, ordering and review of radiographic studies, pulse oximetry, re-evaluation of patient's condition and review of old charts.  Comments: Fall with ICH requiring Neurosurgery consultation and transfer.            I, Shanthi Prescott, am serving as a scribe to document services personally performed by Jorge Salgado MD on my observation and the provider's statements to me.    I; Jorge Salgado MD; attest that Shanthi Prescott is acting in a scribe capacity, has observed my performance of the services, and has documented them in accordance with my direction.      Jorge Salgado MD  05/25/21  Emergency Medicine  [facility] EMERGENCY DEPARTMENT  [address]  [facility] [address]  Dept: [phone]  Loc: [phone]     Jorge Salgado MD  05/25/21 2302       Jorge Salgado MD  05/26/21 0108

## 2021-06-26 NOTE — PROGRESS NOTES
[facility]    Facility:   [facility] [[id number]]   Code Status: FULL CODE      CHIEF COMPLAINT/REASON FOR VISIT:  Chief Complaint   Patient presents with     Hospital Visit Follow Up     Fall with R head laceration, LE edema,        HISTORY:      HPI: Maikol is a 80 y.o. male with hypertension, hyperlipidemia and previous CVAs who had a fall and later developed confusion, weakness and disorientation with slurred speech.  He is known to us from admission in April.  At that time he was suspected to have Parkinson's disease and was initiated on Sinemet, followed up with neurology and he continues his Sinemet now.  He lives at home with his wife and his son lives in the [facility].  He was discharged home at that time.  He returned to the ER after experiencing a fall with head injury and subsequent intraparenchymal hemorrhage.  He was monitored in the [facility] with nonoperative management.  He was started on Keppra for 7 days, aspirin was started 7 days post fall.  He had intermittent confusion and continues to have some confusion at least compared to his April admission in the TCU.  He is only able to give me a brief overlay of what happened in the hospital including that he fell.  He is currently laying in the bed and does not not get up to talk to me, he is pleasant and conversational but does not make a lot of eye contact.  He is denying pain.  Monitoring neurologic status.  He is diabetic with a recent A1c of 7.  On Metformin thousand twice daily.     Updates on Status Since Skilled nursing Admission:   Patient seen today for admission visit to [facility]. Patient notes confusion. Therapies PT, OT. Appetite good.  Reports sleeping well. Denies CP, palpitations, fatigue, nausea, vomiting, increased SOB/ARNOLD, fever, chills, and/or b/b concerns today      Past Medical History:   Diagnosis Date     HLD (hyperlipidemia)      HTN (hypertension)       Stroke (H)      TIA (transient ischemic attack)              No family history on file.  Social History     Socioeconomic History     Marital status:      Spouse name: Not on file     Number of children: Not on file     Years of education: Not on file     Highest education level: Not on file   Occupational History     Not on file   Social Needs     Financial resource strain: Not on file     Food insecurity     Worry: Not on file     Inability: Not on file     Transportation needs     Medical: Not on file     Non-medical: Not on file   Tobacco Use     Smoking status: Never Smoker   Substance and Sexual Activity     Alcohol use: No     Drug use: Not on file     Sexual activity: Not on file   Lifestyle     Physical activity     Days per week: Not on file     Minutes per session: Not on file     Stress: Not on file   Relationships     Social connections     Talks on phone: Not on file     Gets together: Not on file     Attends Hoahaoism service: Not on file     Active member of club or organization: Not on file     Attends meetings of clubs or organizations: Not on file     Relationship status: Not on file     Intimate partner violence     Fear of current or ex partner: Not on file     Emotionally abused: Not on file     Physically abused: Not on file     Forced sexual activity: Not on file   Other Topics Concern     Not on file   Social History Narrative     Not on file         Review of Systems   Constitutional: Negative.  Negative for fever.   HENT: Negative.    Respiratory: Negative for shortness of breath and wheezing.    Cardiovascular: Negative for chest pain and leg swelling.   Gastrointestinal: Negative for abdominal distention and abdominal pain.   Genitourinary: Positive for difficulty urinating. Negative for dysuria.   Musculoskeletal: Negative.    Skin: Negative.    Neurological: Positive for tremors and weakness.   Psychiatric/Behavioral: Negative.        Vitals:    06/02/21 1404   BP: 128/63    Pulse: 65   Resp: 18   Temp: 98  F (36.7  C)   SpO2: 99%   Weight: 208 lb 12.8 oz (94.7 kg)   Height: 6' (1.829 m)       Physical Exam  Constitutional:       Appearance: He is normal weight.   HENT:      Head: Atraumatic.      Nose: No rhinorrhea.      Mouth/Throat:      Pharynx: No posterior oropharyngeal erythema.   Eyes:      General: No scleral icterus.  Cardiovascular:      Rate and Rhythm: Normal rate and regular rhythm.      Heart sounds: No murmur.   Pulmonary:      Effort: No respiratory distress.      Breath sounds: Normal breath sounds.   Abdominal:      General: There is no distension.      Palpations: Abdomen is soft.   Musculoskeletal:      Right lower leg: No edema.      Left lower leg: No edema.   Skin:     General: Skin is warm and dry.   Neurological:      Mental Status: He is alert and oriented to person, place, and time. Mental status is at baseline.      Motor: Weakness present.      Gait: Gait abnormal.   Psychiatric:         Mood and Affect: Mood normal.         Behavior: Behavior normal.           LABS:   Reviewed.    ASSESSMENT:      ICD-10-CM    1. Right sided temporal headache  R51.9    2. Edema, lower extremity  R60.0    3. Parkinson's disease (H)  G20    4. Diabetes mellitus type 2, noninsulin dependent (H)  E11.9    5. Cerebrovascular accident (CVA), unspecified mechanism (H)  I63.9        PLAN:      Intraparenchymal hemorrhage: Intermittent confusion, seems much more confused compared to last admission.  -Follow-up with TBI clinic as ordered.  -PT and OT, consider slums exam.  -Monitor neuro status throughout stay.  -Initiated on aspirin 325 daily by neurology.  -Keppra 500 twice daily for 7 days only.    Parkinson's disease:   -Continue Sinemet 2 tabs po four times a day.   - PT and OT.  -[facility]ed by [facility].     History of stroke  CAD  Hypertension: Plavix was stopped this admission.  -Amlodipine 10 mg daily.  -Lisinopril 40 daily.  -Metoprolol 50 daily.  -Simvastatin 20 mg  daily.    Type 2 diabetes:  -Continue Metformin 1000 mg twice daily.  -Monitor blood sugar checks, discontinue if satisfactory control after 1 week.    Electronically signed by: Steve Salgado CNP

## 2021-07-04 NOTE — LETTER
Letter by Steve Salgado CNP at      Author: Steve Salgado CNP Service: -- Author Type: --    Filed:  Encounter Date: 6/16/2021 Status: (Other)         [facility]  [address] [address]                                  June 17, 2021    Patient: Maikol Glaser   MR Number: [id number]   YOB: 1940   Date of Visit: 6/16/2021     Dear Dr. Calvo:    Thank you for referring Maikol Glaser to me for evaluation. Below are the relevant portions of my assessment and plan of care.    If you have questions, please do not hesitate to call me. I look forward to following Maikol along with you.    Sincerely,        Steve Salgado CNP          CC  No Recipients  Steve Salgado CNP  6/17/2021  1:07 PM  Sign when Signing Visit  [facility]    Facility:   [facility] [[id number]]   Code Status: POLST AVAILABLE  PCP: Antione Villeda MD   Phone: [phone]   Fax: [phone]    CHIEF COMPLAINT/REASON FOR VISIT:  Patient presents with:  Discharge Summary    HISTORY COURSE:  HPI: Maikol is a 80 y.o. male with hypertension, hyperlipidemia and previous CVAs who had a fall and later developed confusion, weakness and disorientation with slurred speech. He experienced a fall in his kitchen and is believed to have hit his head ane subsequent intraparenchymal hemorrhage.  He was monitored in the [facility] with nonoperative management..      TCU:   He is known to us from admission in April.   He was started on Keppra for 7 days, aspirin was started 7 days post fall. He is more confused than his April admission but is able to tell me about his fall at home and remembered that he has a ct/mri today. He is ambulating with the walker without difficulty.   Developed a UTI week of 6/8 and was treated with levaquin with improvement in urine color and clarity.    He is diabetic with a recent A1c of 7.  On Metformin thousand  twice daily. BG checks satisfactory and therefore discontinued week 2 of TCU stay.    He denies pain and appears comfortable. Looking forward to discharge to home with his wife.      PHYSICAL EXAM:   Constitutional:       Appearance: He is normal weight.   HENT:      Head: Atraumatic.      Nose: No rhinorrhea.      Mouth/Throat:      Pharynx: No posterior oropharyngeal erythema.   Eyes:      General: No scleral icterus.  Cardiovascular:      Rate and Rhythm: Normal rate and regular rhythm.      Heart sounds: No murmur.   Pulmonary:      Effort: No respiratory distress.      Breath sounds: Normal breath sounds.   Abdominal:      General: There is no distension.      Palpations: Abdomen is soft.   Musculoskeletal:      Right lower leg: No edema.      Left lower leg: No edema.   Skin:     General: Skin is warm and dry.      Comments: Head laceration; healing well.    Neurological:      Mental Status: He is alert and oriented to person, place, and time. Mental status is at baseline.      Motor: Weakness present.      Gait: Gait abnormal. Shuffle gate.   Psychiatric:         Mood and Affect: Mood normal.         Behavior: Behavior normal.       MEDICATION LIST:  Current Outpatient Medications:  acetaminophen (TYLENOL) 500 MG tablet, Take 1,000 mg by mouth 2 (two) times a day.  amLODIPine (NORVASC) 10 MG tablet, Take 10 mg by mouth daily.  carbidopa-levodopa (SINEMET)  mg per tablet, Take 1 tablet by mouth 4 (four) times a day. (Patient taking differently: Take 1 tablet by mouth 4 (four) times a day. 0800, 1200, 1600, 2000)  clopidogrel (PLAVIX) 75 mg tablet, Take 75 mg by mouth daily.  lisinopril (PRINIVIL,ZESTRIL) 40 MG tablet, Take 40 mg by mouth daily.  metFORMIN (GLUCOPHAGE) 500 MG tablet, Take 1,000 mg by mouth 2 (two) times a day with meals.   metoprolol succinate (TOPROL-XL) 50 MG 24 hr tablet, Take 50 mg by mouth daily.  multivitamin therapeutic (THERAGRAN) tablet, Take 1 tablet by mouth at  bedtime.  simvastatin (ZOCOR) 20 MG tablet, Take 20 mg by mouth at bedtime.    DISCHARGE DIAGNOSIS:  (S01.01XD) Laceration of scalp without foreign body, subsequent encounter  (primary encounter diagnosis)    (R41.0) Confusion    (G20) Parkinson's disease (H)    Intraparenchymal hemorrhage: Intermittent confusion.   -Follow-up with TBI clinic as ordered.  -Initiated on aspirin 325 daily by neurology.     Parkinson's disease: Continues to be mildly unsteady. Confused as compared to baseline.  -Continue Sinemet 2 tabs po four times a day.   -followed by [facility].      History of stroke  CAD  Hypertension:   -Amlodipine 10 mg daily.  -Lisinopril 40 daily.  -Metoprolol 50 daily.  -Simvastatin 20 mg daily.     Type 2 diabetes:  -Continue Metformin 1000 mg twice daily.     MEDICAL EQUIPMENT NEEDS:  None     DISCHARGE PLAN/FACE TO FACE:  I certify that services are/were furnished while this patient was under the care of a physician and that a physician or an allowed non-physician practitioner (NPP), had a face-to-face encounter that meets the physician face-to-face encounter requirements. The encounter was in whole, or in part, related to the primary reason for home health. The patient is confined to his/her home and needs intermittent skilled nursing, physical therapy, speech-language pathology, or the continued need for occupational therapy. A plan of care has been established by a physician and is periodically reviewed by a physician.  Date of Face-to-Face Encounter: 6/16/21    I certify that, based on my findings, the following services are medically necessary home health services: PT/OT.     My clinical findings support the need for the above skilled services because: RN for medication teaching and management.  PT/OT for ongoing endurance and strengthening with home safety evaluation.    This patient is homebound because: Homebound Status: weakness, impaired gait, caregiver burden.     The patient is, or has been, under  my care and I have initiated the establishment of the plan of care. This patient will be followed by a physician who will periodically review the plan of care.    Schedule follow up visit with primary care provider within 7 days to reestablish care.    >30 minutes with >50% face to face time discussing follow up imaging, discharge plan and confusion.     Electronically signed by: Steve Salgado CNP

## 2021-07-04 NOTE — LETTER
Letter by Steve Salgado CNP at      Author: Steve Salgado CNP Service: -- Author Type: --    Filed:  Encounter Date: 6/7/2021 Status: (Other)         [facility]  [address] [address]                                  June 9, 2021    Patient: Maikol Glaser   MR Number: [id number]   YOB: 1940   Date of Visit: 6/7/2021     Dear  Tcu:    Thank you for referring Maikol Glaser to me for evaluation. Below are the relevant portions of my assessment and plan of care.    If you have questions, please do not hesitate to call me. I look forward to following Maikol along with you.    Sincerely,        Steve Salgado CNP          CC  No Recipients  Steve Salgado CNP  6/9/2021 10:59 PM  Sign when Signing Visit  [facility]    Facility:   [facility] [[id number]]   Code Status: FULL CODE      CHIEF COMPLAINT/REASON FOR VISIT:  Chief Complaint   Patient presents with   ? Problem Visit     Mental status changes, Parkinson's disease, ambulation       HISTORY:      HPI: Maikol is a 80 y.o. male with hypertension, hyperlipidemia and previous CVAs who had a fall and later developed confusion, weakness and disorientation with slurred speech.  He is known to us from admission in April.  At that time he was suspected to have Parkinson's disease and was initiated on Sinemet, followed up with neurology and he continues his Sinemet now.  He lives at home with his wife and his son lives in the [facility].  He was discharged home at that time.  He returned to the ER after experiencing a fall with head injury and subsequent intraparenchymal hemorrhage.  He was monitored in the [facility] with nonoperative management.  He was started on Keppra for 7 days, aspirin was started 7 days post fall.  He had intermittent confusion and continues to have some confusion at least compared to his April admission in the TCU.   He is only able to give me a brief overlay of what happened in the hospital including that he fell.  He is currently laying in the bed and does not not get up to talk to me, he is pleasant and conversational but does not make a lot of eye contact.  He is denying pain.  Monitoring neurologic status.  He is diabetic with a recent A1c of 7.  On Metformin thousand twice daily.     Updates on Status Since Skilled nursing Admission:   Today: Seen today to follow-up in the TCU for multiple conditions. He is walking a little bit with therapy. He continues to seem a little bit more confused than he did during his last admission. He is denying any headache. Ecchymosis and bruising is improving and clearing up around his face. He has no constipation or diarrhea or urinary symptoms today. Sitting up in his chair and is conversational and appropriate. Lower extremity edema +1-2. Overall doing better than he was when I admitted him last week. Blood sugars reviewed and are satisfactory so we will discontinue blood sugar checks.      Past Medical History:   Diagnosis Date   ? HLD (hyperlipidemia)    ? HTN (hypertension)    ? Stroke (H)    ? TIA (transient ischemic attack)              No family history on file.  Social History     Socioeconomic History   ? Marital status:      Spouse name: Not on file   ? Number of children: Not on file   ? Years of education: Not on file   ? Highest education level: Not on file   Occupational History   ? Not on file   Social Needs   ? Financial resource strain: Not on file   ? Food insecurity     Worry: Not on file     Inability: Not on file   ? Transportation needs     Medical: Not on file     Non-medical: Not on file   Tobacco Use   ? Smoking status: Never Smoker   Substance and Sexual Activity   ? Alcohol use: No   ? Drug use: Not on file   ? Sexual activity: Not on file   Lifestyle   ? Physical activity     Days per week: Not on file     Minutes per session: Not on file   ? Stress: Not  on file   Relationships   ? Social connections     Talks on phone: Not on file     Gets together: Not on file     Attends Religion service: Not on file     Active member of club or organization: Not on file     Attends meetings of clubs or organizations: Not on file     Relationship status: Not on file   ? Intimate partner violence     Fear of current or ex partner: Not on file     Emotionally abused: Not on file     Physically abused: Not on file     Forced sexual activity: Not on file   Other Topics Concern   ? Not on file   Social History Narrative   ? Not on file         Review of Systems   Constitutional: Negative.  Negative for fever.   HENT: Negative.    Respiratory: Negative for shortness of breath and wheezing.    Cardiovascular: Negative for chest pain and leg swelling.   Gastrointestinal: Negative for abdominal distention and abdominal pain.   Genitourinary: Positive for difficulty urinating. Negative for dysuria.   Musculoskeletal: Negative.    Skin: Negative.    Neurological: Positive for tremors and weakness.   Psychiatric/Behavioral: Negative.        Vitals:    06/07/21 1242   BP: 99/64   Pulse: 63   Resp: 16   Temp: 98.1  F (36.7  C)   SpO2: 96%   Weight: 210 lb 6.4 oz (95.4 kg)   Height: 6' (1.829 m)       Physical Exam  Constitutional:       Appearance: He is normal weight.   HENT:      Head: Atraumatic.      Nose: No rhinorrhea.      Mouth/Throat:      Pharynx: No posterior oropharyngeal erythema.   Eyes:      General: No scleral icterus.  Cardiovascular:      Rate and Rhythm: Normal rate and regular rhythm.      Heart sounds: No murmur.   Pulmonary:      Effort: No respiratory distress.      Breath sounds: Normal breath sounds.   Abdominal:      General: There is no distension.      Palpations: Abdomen is soft.   Musculoskeletal:      Right lower leg: No edema.      Left lower leg: No edema.   Skin:     General: Skin is warm and dry.   Neurological:      Mental Status: He is alert and oriented  to person, place, and time. Mental status is at baseline.      Motor: Weakness present.      Gait: Gait abnormal.   Psychiatric:         Mood and Affect: Mood normal.         Behavior: Behavior normal.           LABS:   Reviewed.    ASSESSMENT:      ICD-10-CM    1. Diabetes mellitus type 2, noninsulin dependent (H)  E11.9    2. Parkinson's disease (H)  G20    3. Confusion  R41.0    4. Edema, lower extremity  R60.0        PLAN:      Intraparenchymal hemorrhage: Intermittent confusion, seems much more confused compared to last admission.  -Follow-up with TBI clinic as ordered.  -PT and OT, consider slums exam.  -Monitor neuro status throughout stay.  -Initiated on aspirin 325 daily by neurology.  -Keppra 500 twice daily for 7 days only.    Parkinson's disease: Continues to be mildly unsteady. Confused as compared to baseline.  -Continue Sinemet 2 tabs po four times a day.   - PT and OT.  -[facility]ed by [facility].     History of stroke  CAD  Hypertension: Plavix was stopped this admission.  -Amlodipine 10 mg daily.  -Lisinopril 40 daily.  -Metoprolol 50 daily.  -Simvastatin 20 mg daily.    Type 2 diabetes:  -Continue Metformin 1000 mg twice daily.  -Discontinue blood sugar checks today.    Electronically signed by: Steve Salgado, CNP

## 2021-07-04 NOTE — LETTER
Letter by Steve Salgado CNP at      Author: Steve Salgado CNP Service: -- Author Type: --    Filed:  Encounter Date: 6/9/2021 Status: (Other)         [facility]  [address] [address]                                  June 13, 2021    Patient: Maikol Glaser   MR Number: [id number]   YOB: 1940   Date of Visit: 6/9/2021     Dear Dr. Calvo:    Thank you for referring Maikol Glaser to me for evaluation. Below are the relevant portions of my assessment and plan of care.    If you have questions, please do not hesitate to call me. I look forward to following Maikol along with you.    Sincerely,        Steve Salgado CNP          CC  No Recipients  Steve Salgado CNP  6/13/2021  4:38 PM  Sign when Signing Visit  [facility]    Facility:   [facility] [[id number]]   Code Status: FULL CODE      CHIEF COMPLAINT/REASON FOR VISIT:  Chief Complaint   Patient presents with   ? Problem Visit     remove stitches        HISTORY:      HPI: Maikol is a 80 y.o. male with hypertension, hyperlipidemia and previous CVAs who had a fall and later developed confusion, weakness and disorientation with slurred speech.  He is known to us from admission in April.  At that time he was suspected to have Parkinson's disease and was initiated on Sinemet, followed up with neurology and he continues his Sinemet now.  He lives at home with his wife and his son lives in the [facility].  He was discharged home at that time.  He returned to the ER after experiencing a fall with head injury and subsequent intraparenchymal hemorrhage.  He was monitored in the [facility] with nonoperative management.  He was started on Keppra for 7 days, aspirin was started 7 days post fall.  He had intermittent confusion and continues to have some confusion at least compared to his April admission in the TCU.  He is only able to give me a brief  overlay of what happened in the hospital including that he fell.  He is currently laying in the bed and does not not get up to talk to me, he is pleasant and conversational but does not make a lot of eye contact.  He is denying pain.  Monitoring neurologic status.  He is diabetic with a recent A1c of 7.  On Metformin thousand twice daily.     Updates on Status Since Skilled nursing Admission:   Today: Seen today to follow-up in the TCU to review head laceration for removal of stitches. Stitches have been in for >2 weeks; area is healing well. Well approximated with no swelling, tenderness or drainage. Otherwise doing well in therapies and has no complaints aside from wanting to go home.       Past Medical History:   Diagnosis Date   ? HLD (hyperlipidemia)    ? HTN (hypertension)    ? Stroke (H)    ? TIA (transient ischemic attack)              No family history on file.  Social History     Socioeconomic History   ? Marital status:      Spouse name: Not on file   ? Number of children: Not on file   ? Years of education: Not on file   ? Highest education level: Not on file   Occupational History   ? Not on file   Social Needs   ? Financial resource strain: Not on file   ? Food insecurity     Worry: Not on file     Inability: Not on file   ? Transportation needs     Medical: Not on file     Non-medical: Not on file   Tobacco Use   ? Smoking status: Never Smoker   Substance and Sexual Activity   ? Alcohol use: No   ? Drug use: Not on file   ? Sexual activity: Not on file   Lifestyle   ? Physical activity     Days per week: Not on file     Minutes per session: Not on file   ? Stress: Not on file   Relationships   ? Social connections     Talks on phone: Not on file     Gets together: Not on file     Attends Confucianist service: Not on file     Active member of club or organization: Not on file     Attends meetings of clubs or organizations: Not on file     Relationship status: Not on file   ? Intimate partner  violence     Fear of current or ex partner: Not on file     Emotionally abused: Not on file     Physically abused: Not on file     Forced sexual activity: Not on file   Other Topics Concern   ? Not on file   Social History Narrative   ? Not on file       Review of Systems   Constitutional: Negative.  Negative for fever.   HENT: Negative.    Respiratory: Negative for shortness of breath and wheezing.    Cardiovascular: Negative for chest pain and leg swelling.   Gastrointestinal: Negative for abdominal distention and abdominal pain.   Genitourinary: Negative for dysuria.   Musculoskeletal: Negative.    Skin: Negative.    Neurological: Positive for tremors and weakness.   Psychiatric/Behavioral: Negative.        Vitals:    06/09/21 1340   BP: 107/67   Pulse: 66   Resp: 18   Temp: 97.9  F (36.6  C)   SpO2: 95%   Weight: 210 lb 6.4 oz (95.4 kg)   Height: 6' (1.829 m)       Physical Exam  Constitutional:       Appearance: He is normal weight.   HENT:      Head: Atraumatic.      Nose: No rhinorrhea.      Mouth/Throat:      Pharynx: No posterior oropharyngeal erythema.   Eyes:      General: No scleral icterus.  Cardiovascular:      Rate and Rhythm: Normal rate and regular rhythm.      Heart sounds: No murmur.   Pulmonary:      Effort: No respiratory distress.      Breath sounds: Normal breath sounds.   Abdominal:      General: There is no distension.      Palpations: Abdomen is soft.   Musculoskeletal:      Right lower leg: No edema.      Left lower leg: No edema.   Skin:     General: Skin is warm and dry.      Comments: Head laceration with stitches, healing well.    Neurological:      Mental Status: He is alert and oriented to person, place, and time. Mental status is at baseline.      Motor: Weakness present.      Gait: Gait abnormal.   Psychiatric:         Mood and Affect: Mood normal.         Behavior: Behavior normal.           LABS:   Reviewed.    ASSESSMENT:      ICD-10-CM    1. Parkinson's disease (H)  G20    2.  Laceration of scalp without foreign body, subsequent encounter  S01.01XD        PLAN:      Head laceration: well approximated.   Okay to remove stitches.     Intraparenchymal hemorrhage: Intermittent confusion.   -Follow-up with TBI clinic as ordered.  -PT and OT, consider slums exam.  -Monitor neuro status throughout stay.  -Initiated on aspirin 325 daily by neurology.  -Keppra 500 twice daily for 7 days only.    Parkinson's disease: Continues to be mildly unsteady. Confused as compared to baseline.  -Continue Sinemet 2 tabs po four times a day.   - PT and OT.  -[facility].     History of stroke  CAD  Hypertension: Plavix was stopped this admission.  -Amlodipine 10 mg daily.  -Lisinopril 40 daily.  -Metoprolol 50 daily.  -Simvastatin 20 mg daily.    Type 2 diabetes:  -Continue Metformin 1000 mg twice daily.    Electronically signed by: Steve Salgado, GINA

## 2021-07-04 NOTE — LETTER
Letter by Steve Salgado CNP at      Author: Steve Salgado CNP Service: -- Author Type: --    Filed:  Encounter Date: 6/2/2021 Status: (Other)         [facility]  [address] [address]                                  June 7, 2021    Patient: Maikol Glaser   MR Number: [id number]   YOB: 1940   Date of Visit: 6/2/2021     Dear  Tcu:    Thank you for referring Maikol Glaser to me for evaluation. Below are the relevant portions of my assessment and plan of care.    If you have questions, please do not hesitate to call me. I look forward to following Maikol along with you.    Sincerely,        Steve Salgado CNP          CC  No Recipients  Steve Salgado CNP  6/7/2021 10:22 AM  Sign when Signing Visit  [facility]    Facility:   [facility] [[id number]]   Code Status: FULL CODE      CHIEF COMPLAINT/REASON FOR VISIT:  Chief Complaint   Patient presents with   ? Hospital Visit Follow Up     Fall with R head laceration, LE edema,        HISTORY:      HPI: Maikol is a 80 y.o. male with hypertension, hyperlipidemia and previous CVAs who had a fall and later developed confusion, weakness and disorientation with slurred speech.  He is known to us from admission in April.  At that time he was suspected to have Parkinson's disease and was initiated on Sinemet, followed up with neurology and he continues his Sinemet now.  He lives at home with his wife and his son lives in the [facility].  He was discharged home at that time.  He returned to the ER after experiencing a fall with head injury and subsequent intraparenchymal hemorrhage.  He was monitored in the [facility] with nonoperative management.  He was started on Keppra for 7 days, aspirin was started 7 days post fall.  He had intermittent confusion and continues to have some confusion at least compared to his April admission in the TCU.  He is  only able to give me a brief overlay of what happened in the hospital including that he fell.  He is currently laying in the bed and does not not get up to talk to me, he is pleasant and conversational but does not make a lot of eye contact.  He is denying pain.  Monitoring neurologic status.  He is diabetic with a recent A1c of 7.  On Metformin thousand twice daily.     Updates on Status Since Skilled nursing Admission:   Patient seen today for admission visit to [facility]. Patient notes confusion. Therapies PT, OT. Appetite good.  Reports sleeping well. Denies CP, palpitations, fatigue, nausea, vomiting, increased SOB/ARNOLD, fever, chills, and/or b/b concerns today      Past Medical History:   Diagnosis Date   ? HLD (hyperlipidemia)    ? HTN (hypertension)    ? Stroke (H)    ? TIA (transient ischemic attack)              No family history on file.  Social History     Socioeconomic History   ? Marital status:      Spouse name: Not on file   ? Number of children: Not on file   ? Years of education: Not on file   ? Highest education level: Not on file   Occupational History   ? Not on file   Social Needs   ? Financial resource strain: Not on file   ? Food insecurity     Worry: Not on file     Inability: Not on file   ? Transportation needs     Medical: Not on file     Non-medical: Not on file   Tobacco Use   ? Smoking status: Never Smoker   Substance and Sexual Activity   ? Alcohol use: No   ? Drug use: Not on file   ? Sexual activity: Not on file   Lifestyle   ? Physical activity     Days per week: Not on file     Minutes per session: Not on file   ? Stress: Not on file   Relationships   ? Social connections     Talks on phone: Not on file     Gets together: Not on file     Attends Uatsdin service: Not on file     Active member of club or organization: Not on file     Attends meetings of clubs or organizations: Not on file     Relationship status: Not on file   ? Intimate partner violence      Fear of current or ex partner: Not on file     Emotionally abused: Not on file     Physically abused: Not on file     Forced sexual activity: Not on file   Other Topics Concern   ? Not on file   Social History Narrative   ? Not on file         Review of Systems   Constitutional: Negative.  Negative for fever.   HENT: Negative.    Respiratory: Negative for shortness of breath and wheezing.    Cardiovascular: Negative for chest pain and leg swelling.   Gastrointestinal: Negative for abdominal distention and abdominal pain.   Genitourinary: Positive for difficulty urinating. Negative for dysuria.   Musculoskeletal: Negative.    Skin: Negative.    Neurological: Positive for tremors and weakness.   Psychiatric/Behavioral: Negative.        Vitals:    06/02/21 1404   BP: 128/63   Pulse: 65   Resp: 18   Temp: 98  F (36.7  C)   SpO2: 99%   Weight: 208 lb 12.8 oz (94.7 kg)   Height: 6' (1.829 m)       Physical Exam  Constitutional:       Appearance: He is normal weight.   HENT:      Head: Atraumatic.      Nose: No rhinorrhea.      Mouth/Throat:      Pharynx: No posterior oropharyngeal erythema.   Eyes:      General: No scleral icterus.  Cardiovascular:      Rate and Rhythm: Normal rate and regular rhythm.      Heart sounds: No murmur.   Pulmonary:      Effort: No respiratory distress.      Breath sounds: Normal breath sounds.   Abdominal:      General: There is no distension.      Palpations: Abdomen is soft.   Musculoskeletal:      Right lower leg: No edema.      Left lower leg: No edema.   Skin:     General: Skin is warm and dry.   Neurological:      Mental Status: He is alert and oriented to person, place, and time. Mental status is at baseline.      Motor: Weakness present.      Gait: Gait abnormal.   Psychiatric:         Mood and Affect: Mood normal.         Behavior: Behavior normal.           LABS:   Reviewed.    ASSESSMENT:    No diagnosis found.    PLAN:      Intraparenchymal hemorrhage: Intermittent confusion, seems  much more confused compared to last admission.  -Follow-up with TBI clinic as ordered.  -PT and OT, consider slums exam.  -Monitor neuro status throughout stay.  -Initiated on aspirin 325 daily by neurology.  -Keppra 500 twice daily for 7 days only.    Parkinson's disease:   -Continue Sinemet 2 tabs po four times a day.   - PT and OT.  -followed by [facility].     History of stroke  CAD  Hypertension: Plavix was stopped this admission.  -Amlodipine 10 mg daily.  -Lisinopril 40 daily.  -Metoprolol 50 daily.  -Simvastatin 20 mg daily.    Type 2 diabetes:  -Continue Metformin 1000 mg twice daily.  -Monitor blood sugar checks, discontinue if satisfactory control after 1 week.    Electronically signed by: Steve Salgado, CNP

## 2021-07-06 VITALS
WEIGHT: 208.8 LBS | HEART RATE: 65 BPM | DIASTOLIC BLOOD PRESSURE: 63 MMHG | BODY MASS INDEX: 28.28 KG/M2 | HEIGHT: 72 IN | TEMPERATURE: 98 F | RESPIRATION RATE: 18 BRPM | OXYGEN SATURATION: 99 % | SYSTOLIC BLOOD PRESSURE: 128 MMHG

## 2021-07-06 VITALS
BODY MASS INDEX: 28.5 KG/M2 | DIASTOLIC BLOOD PRESSURE: 67 MMHG | TEMPERATURE: 97.9 F | RESPIRATION RATE: 18 BRPM | OXYGEN SATURATION: 95 % | SYSTOLIC BLOOD PRESSURE: 107 MMHG | HEART RATE: 66 BPM | HEIGHT: 72 IN | WEIGHT: 210.4 LBS

## 2021-07-06 VITALS
BODY MASS INDEX: 28.5 KG/M2 | DIASTOLIC BLOOD PRESSURE: 64 MMHG | OXYGEN SATURATION: 96 % | SYSTOLIC BLOOD PRESSURE: 99 MMHG | RESPIRATION RATE: 16 BRPM | WEIGHT: 210.4 LBS | HEART RATE: 63 BPM | HEIGHT: 72 IN | TEMPERATURE: 98.1 F

## 2021-07-06 VITALS — RESPIRATION RATE: 18 BRPM | SYSTOLIC BLOOD PRESSURE: 110 MMHG | HEART RATE: 72 BPM | DIASTOLIC BLOOD PRESSURE: 68 MMHG

## 2021-07-06 VITALS — WEIGHT: 220 LBS | HEIGHT: 72 IN | BODY MASS INDEX: 29.8 KG/M2

## 2021-07-18 ENCOUNTER — APPOINTMENT (OUTPATIENT)
Dept: SPEECH THERAPY | Facility: HOSPITAL | Age: 81
DRG: 065 | End: 2021-07-18
Attending: INTERNAL MEDICINE
Payer: COMMERCIAL

## 2021-07-18 ENCOUNTER — HOSPITAL ENCOUNTER (INPATIENT)
Dept: MRI IMAGING | Facility: HOSPITAL | Age: 81
DRG: 065 | End: 2021-07-18
Payer: COMMERCIAL

## 2021-07-18 ENCOUNTER — HOSPITAL ENCOUNTER (INPATIENT)
Dept: ULTRASOUND IMAGING | Facility: HOSPITAL | Age: 81
DRG: 065 | End: 2021-07-18
Payer: COMMERCIAL

## 2021-07-18 ENCOUNTER — HOSPITAL ENCOUNTER (EMERGENCY)
Dept: CT IMAGING | Facility: HOSPITAL | Age: 81
DRG: 065 | End: 2021-07-18
Attending: EMERGENCY MEDICINE
Payer: COMMERCIAL

## 2021-07-18 ENCOUNTER — APPOINTMENT (OUTPATIENT)
Dept: PHYSICAL THERAPY | Facility: HOSPITAL | Age: 81
DRG: 065 | End: 2021-07-18
Attending: INTERNAL MEDICINE
Payer: COMMERCIAL

## 2021-07-18 ENCOUNTER — APPOINTMENT (OUTPATIENT)
Dept: CARDIOLOGY | Facility: HOSPITAL | Age: 81
DRG: 065 | End: 2021-07-18
Attending: INTERNAL MEDICINE
Payer: COMMERCIAL

## 2021-07-18 ENCOUNTER — HOSPITAL ENCOUNTER (INPATIENT)
Facility: HOSPITAL | Age: 81
LOS: 2 days | Discharge: HOME | DRG: 065 | End: 2021-07-20
Attending: EMERGENCY MEDICINE | Admitting: INTERNAL MEDICINE
Payer: COMMERCIAL

## 2021-07-18 DIAGNOSIS — I63.312 CEREBRAL INFARCTION DUE TO THROMBOSIS OF LEFT MIDDLE CEREBRAL ARTERY (H): ICD-10-CM

## 2021-07-18 DIAGNOSIS — I10 ESSENTIAL HYPERTENSION: ICD-10-CM

## 2021-07-18 DIAGNOSIS — E11.9 DIABETES MELLITUS TYPE 2, NONINSULIN DEPENDENT (H): ICD-10-CM

## 2021-07-18 DIAGNOSIS — E78.2 MODERATE MIXED HYPERLIPIDEMIA NOT REQUIRING STATIN THERAPY: ICD-10-CM

## 2021-07-18 DIAGNOSIS — R29.898 RIGHT ARM WEAKNESS: ICD-10-CM

## 2021-07-18 DIAGNOSIS — I63.9 CEREBROVASCULAR ACCIDENT (CVA), UNSPECIFIED MECHANISM (H): ICD-10-CM

## 2021-07-18 DIAGNOSIS — I61.9 INTRAPARENCHYMAL HEMORRHAGE OF BRAIN (H): Primary | ICD-10-CM

## 2021-07-18 DIAGNOSIS — R29.898 RIGHT LEG WEAKNESS: ICD-10-CM

## 2021-07-18 PROBLEM — E66.9 OBESITY WITH BODY MASS INDEX 30 OR GREATER: Status: ACTIVE | Noted: 2021-07-18
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
## 2021-07-18 PROBLEM — I63.81 LEFT SIDED LACUNAR INFARCTION (H): Status: ACTIVE | Noted: 2021-07-18

## 2021-07-18 PROBLEM — N30.00 ACUTE CYSTITIS WITHOUT HEMATURIA: Status: RESOLVED | Noted: 2021-05-26 | Resolved: 2021-07-18

## 2021-07-18 PROBLEM — E78.5 DYSLIPIDEMIA: Status: ACTIVE | Noted: 2021-07-18

## 2021-07-18 PROBLEM — E78.5 HYPERLIPIDEMIA: Status: ACTIVE | Noted: 2021-07-18

## 2021-07-18 PROBLEM — I62.9 INTRACRANIAL HEMORRHAGE (H): Status: RESOLVED | Noted: 2021-05-26 | Resolved: 2021-07-18

## 2021-07-18 LAB
ANION GAP SERPL CALCULATED.3IONS-SCNC: 11 MMOL/L (ref 5–18)
APTT PPP: 38 SECONDS (ref 22–38)
BUN SERPL-MCNC: 25 MG/DL (ref 8–28)
CALCIUM SERPL-MCNC: 9.1 MG/DL (ref 8.5–10.5)
CHLORIDE BLD-SCNC: 109 MMOL/L (ref 98–107)
CO2 SERPL-SCNC: 23 MMOL/L (ref 22–31)
CREAT BLD-MCNC: 1.1 MG/DL (ref 0.7–1.3)
CREAT SERPL-MCNC: 1.02 MG/DL (ref 0.7–1.3)
ERYTHROCYTE [DISTWIDTH] IN BLOOD BY AUTOMATED COUNT: 14.6 % (ref 10–15)
GFR SERPL CREATININE-BSD FRML MDRD: 70 ML/MIN/1.73M2
GFR SERPL CREATININE-BSD FRML MDRD: >60 ML/MIN/1.73M2
GLUCOSE BLD-MCNC: 154 MG/DL (ref 70–125)
GLUCOSE BLDC GLUCOMTR-MCNC: 133 MG/DL (ref 70–125)
GLUCOSE BLDC GLUCOMTR-MCNC: 135 MG/DL (ref 70–125)
GLUCOSE BLDC GLUCOMTR-MCNC: 150 MG/DL (ref 70–125)
GLUCOSE BLDC GLUCOMTR-MCNC: 159 MG/DL (ref 70–125)
HCT VFR BLD AUTO: 41 % (ref 40–53)
HGB BLD-MCNC: 13.1 G/DL (ref 13.3–17.7)
HOLD SPECIMEN: NORMAL
INR PPP: 1.01 (ref 0.85–1.15)
MCH RBC QN AUTO: 28.5 PG (ref 26.5–33)
MCHC RBC AUTO-ENTMCNC: 32 G/DL (ref 31.5–36.5)
MCV RBC AUTO: 89 FL (ref 78–100)
PLATELET # BLD AUTO: 218 10E3/UL (ref 150–450)
POTASSIUM BLD-SCNC: 4.6 MMOL/L (ref 3.5–5)
RBC # BLD AUTO: 4.59 10E6/UL (ref 4.4–5.9)
SARS-COV-2 RNA RESP QL NAA+PROBE: NEGATIVE
SODIUM SERPL-SCNC: 143 MMOL/L (ref 136–145)
TROPONIN I SERPL-MCNC: <0.01 NG/ML (ref 0–0.29)
WBC # BLD AUTO: 8.4 10E3/UL (ref 4–11)

## 2021-07-18 PROCEDURE — 99207 PR NOT IN PERSON INPATIENT CONSULT STATISTICAL MARKER: CPT | Performed by: SURGERY

## 2021-07-18 PROCEDURE — 250N000011 HC RX IP 250 OP 636: Performed by: EMERGENCY MEDICINE

## 2021-07-18 PROCEDURE — 85730 THROMBOPLASTIN TIME PARTIAL: CPT | Performed by: EMERGENCY MEDICINE

## 2021-07-18 PROCEDURE — 97116 GAIT TRAINING THERAPY: CPT | Mod: GP

## 2021-07-18 PROCEDURE — 82565 ASSAY OF CREATININE: CPT

## 2021-07-18 PROCEDURE — 250N000013 HC RX MED GY IP 250 OP 250 PS 637: Performed by: STUDENT IN AN ORGANIZED HEALTH CARE EDUCATION/TRAINING PROGRAM

## 2021-07-18 PROCEDURE — 210N000001 HC R&B IMCU HEART CARE

## 2021-07-18 PROCEDURE — 87635 SARS-COV-2 COVID-19 AMP PRB: CPT | Performed by: PHYSICIAN ASSISTANT

## 2021-07-18 PROCEDURE — 255N000002 HC RX 255 OP 636: Performed by: SURGERY

## 2021-07-18 PROCEDURE — 97161 PT EVAL LOW COMPLEX 20 MIN: CPT | Mod: GP

## 2021-07-18 PROCEDURE — 92610 EVALUATE SWALLOWING FUNCTION: CPT | Mod: GN

## 2021-07-18 PROCEDURE — C9803 HOPD COVID-19 SPEC COLLECT: HCPCS

## 2021-07-18 PROCEDURE — 99285 EMERGENCY DEPT VISIT HI MDM: CPT | Mod: 25

## 2021-07-18 PROCEDURE — 93005 ELECTROCARDIOGRAM TRACING: CPT | Performed by: EMERGENCY MEDICINE

## 2021-07-18 PROCEDURE — 36592 COLLECT BLOOD FROM PICC: CPT | Performed by: STUDENT IN AN ORGANIZED HEALTH CARE EDUCATION/TRAINING PROGRAM

## 2021-07-18 PROCEDURE — 84484 ASSAY OF TROPONIN QUANT: CPT | Performed by: EMERGENCY MEDICINE

## 2021-07-18 PROCEDURE — 70496 CT ANGIOGRAPHY HEAD: CPT

## 2021-07-18 PROCEDURE — 70551 MRI BRAIN STEM W/O DYE: CPT

## 2021-07-18 PROCEDURE — 93306 TTE W/DOPPLER COMPLETE: CPT | Mod: 26 | Performed by: INTERNAL MEDICINE

## 2021-07-18 PROCEDURE — 250N000013 HC RX MED GY IP 250 OP 250 PS 637: Performed by: INTERNAL MEDICINE

## 2021-07-18 PROCEDURE — 99223 1ST HOSP IP/OBS HIGH 75: CPT | Performed by: PSYCHIATRY & NEUROLOGY

## 2021-07-18 PROCEDURE — 80048 BASIC METABOLIC PNL TOTAL CA: CPT | Performed by: EMERGENCY MEDICINE

## 2021-07-18 PROCEDURE — 85610 PROTHROMBIN TIME: CPT | Performed by: EMERGENCY MEDICINE

## 2021-07-18 PROCEDURE — 85027 COMPLETE CBC AUTOMATED: CPT | Performed by: EMERGENCY MEDICINE

## 2021-07-18 PROCEDURE — 97530 THERAPEUTIC ACTIVITIES: CPT | Mod: GP

## 2021-07-18 PROCEDURE — 999N000208 ECHOCARDIOGRAM COMPLETE

## 2021-07-18 PROCEDURE — 99223 1ST HOSP IP/OBS HIGH 75: CPT | Performed by: INTERNAL MEDICINE

## 2021-07-18 PROCEDURE — 93880 EXTRACRANIAL BILAT STUDY: CPT

## 2021-07-18 RX ORDER — SIMVASTATIN 10 MG
40 TABLET ORAL AT BEDTIME
Status: DISCONTINUED | OUTPATIENT
Start: 2021-07-18 | End: 2021-07-20 | Stop reason: HOSPADM

## 2021-07-18 RX ORDER — NICOTINE POLACRILEX 4 MG
15-30 LOZENGE BUCCAL
Status: DISCONTINUED | OUTPATIENT
Start: 2021-07-18 | End: 2021-07-20 | Stop reason: HOSPADM

## 2021-07-18 RX ORDER — IOPAMIDOL 755 MG/ML
75 INJECTION, SOLUTION INTRAVASCULAR ONCE
Status: COMPLETED | OUTPATIENT
Start: 2021-07-18 | End: 2021-07-18

## 2021-07-18 RX ORDER — LIDOCAINE 40 MG/G
CREAM TOPICAL
Status: DISCONTINUED | OUTPATIENT
Start: 2021-07-18 | End: 2021-07-20 | Stop reason: HOSPADM

## 2021-07-18 RX ORDER — ACETAMINOPHEN 325 MG/1
650 TABLET ORAL EVERY 4 HOURS PRN
Status: DISCONTINUED | OUTPATIENT
Start: 2021-07-18 | End: 2021-07-18

## 2021-07-18 RX ORDER — ONDANSETRON 4 MG/1
4 TABLET, ORALLY DISINTEGRATING ORAL EVERY 6 HOURS PRN
Status: DISCONTINUED | OUTPATIENT
Start: 2021-07-18 | End: 2021-07-20 | Stop reason: HOSPADM

## 2021-07-18 RX ORDER — DEXTROSE MONOHYDRATE 25 G/50ML
25-50 INJECTION, SOLUTION INTRAVENOUS
Status: DISCONTINUED | OUTPATIENT
Start: 2021-07-18 | End: 2021-07-20 | Stop reason: HOSPADM

## 2021-07-18 RX ORDER — CLOPIDOGREL BISULFATE 75 MG/1
75 TABLET ORAL DAILY
Status: DISCONTINUED | OUTPATIENT
Start: 2021-07-19 | End: 2021-07-20 | Stop reason: HOSPADM

## 2021-07-18 RX ORDER — ACETAMINOPHEN 325 MG/1
650 TABLET ORAL EVERY 4 HOURS PRN
Status: DISCONTINUED | OUTPATIENT
Start: 2021-07-18 | End: 2021-07-20 | Stop reason: HOSPADM

## 2021-07-18 RX ORDER — POLYETHYLENE GLYCOL 3350 17 G/17G
17 POWDER, FOR SOLUTION ORAL DAILY
Status: DISCONTINUED | OUTPATIENT
Start: 2021-07-18 | End: 2021-07-20 | Stop reason: HOSPADM

## 2021-07-18 RX ORDER — SODIUM CHLORIDE 9 MG/ML
INJECTION, SOLUTION INTRAVENOUS CONTINUOUS PRN
Status: DISCONTINUED | OUTPATIENT
Start: 2021-07-18 | End: 2021-07-20 | Stop reason: HOSPADM

## 2021-07-18 RX ORDER — LABETALOL HYDROCHLORIDE 5 MG/ML
10-20 INJECTION, SOLUTION INTRAVENOUS EVERY 10 MIN PRN
Status: DISCONTINUED | OUTPATIENT
Start: 2021-07-18 | End: 2021-07-20 | Stop reason: HOSPADM

## 2021-07-18 RX ORDER — CLOPIDOGREL BISULFATE 75 MG/1
75 TABLET ORAL DAILY
Status: ON HOLD | COMMUNITY
End: 2021-08-17

## 2021-07-18 RX ORDER — HYDRALAZINE HYDROCHLORIDE 20 MG/ML
10-20 INJECTION INTRAMUSCULAR; INTRAVENOUS
Status: DISCONTINUED | OUTPATIENT
Start: 2021-07-18 | End: 2021-07-20 | Stop reason: HOSPADM

## 2021-07-18 RX ORDER — CARBIDOPA AND LEVODOPA 25; 100 MG/1; MG/1
1 TABLET ORAL 4 TIMES DAILY
Status: DISCONTINUED | OUTPATIENT
Start: 2021-07-18 | End: 2021-07-20 | Stop reason: HOSPADM

## 2021-07-18 RX ORDER — ONDANSETRON 2 MG/ML
4 INJECTION INTRAMUSCULAR; INTRAVENOUS EVERY 6 HOURS PRN
Status: DISCONTINUED | OUTPATIENT
Start: 2021-07-18 | End: 2021-07-20 | Stop reason: HOSPADM

## 2021-07-18 RX ORDER — NYSTATIN 100000 U/G
CREAM TOPICAL 2 TIMES DAILY
Status: DISCONTINUED | OUTPATIENT
Start: 2021-07-18 | End: 2021-07-18

## 2021-07-18 RX ORDER — SIMVASTATIN 10 MG
20 TABLET ORAL AT BEDTIME
Status: DISCONTINUED | OUTPATIENT
Start: 2021-07-18 | End: 2021-07-18

## 2021-07-18 RX ADMIN — PERFLUTREN 3 ML: 6.52 INJECTION, SUSPENSION INTRAVENOUS at 14:55

## 2021-07-18 RX ADMIN — CARBIDOPA AND LEVODOPA 1 TABLET: 25; 100 TABLET ORAL at 20:26

## 2021-07-18 RX ADMIN — CARBIDOPA AND LEVODOPA 1 TABLET: 25; 100 TABLET ORAL at 16:10

## 2021-07-18 RX ADMIN — SIMVASTATIN 40 MG: 10 TABLET, FILM COATED ORAL at 20:26

## 2021-07-18 RX ADMIN — IOPAMIDOL 75 ML: 755 INJECTION, SOLUTION INTRAVENOUS at 08:31

## 2021-07-18 ASSESSMENT — ENCOUNTER SYMPTOMS
SHORTNESS OF BREATH: 0
BRUISES/BLEEDS EASILY: 1
CHILLS: 0
FEVER: 0
LIGHT-HEADEDNESS: 0
HEADACHES: 1
NAUSEA: 0
WEAKNESS: 1
DIZZINESS: 0
SPEECH DIFFICULTY: 0
NUMBNESS: 0
VOMITING: 0

## 2021-07-18 ASSESSMENT — MIFFLIN-ST. JEOR
SCORE: 1727.33
SCORE: 1705.55

## 2021-07-18 ASSESSMENT — ACTIVITIES OF DAILY LIVING (ADL): DEPENDENT_IADLS:: CLEANING;COOKING;LAUNDRY;SHOPPING;MEAL PREPARATION;MEDICATION MANAGEMENT;TRANSPORTATION

## 2021-07-18 NOTE — PLAN OF CARE
Stroke Education Note    The following information has been reviewed with the patient and family:    1. Warning signs of stroke    2. Calling 911 if having warning signs of stroke    3. All modifiable risk factors: hypertension, CAD, atrial fib, diabetes, hypercholesterolemia, smoking, substance abuse, diet, physical inactivity, obesity, sleep apnea    4. Patient's risk factors for stroke which include: Previous stroke, diabetes, hypertension.    5. Follow-up plan for after discharge    6. Discharge medications which include:     In addition, the above information was given to the patient in writing as a part of the Understanding Stroke Handbook.     Learner's response to risk factors / lifestyle modification education: Taking steps     Clementina Sargent RN

## 2021-07-18 NOTE — ED NOTES
Patient straight cathed for 1200 ml returns.  Patient self caths himself 4 times a day. Tolerated cath, urine is clear.  Patient now taken to MRI.

## 2021-07-18 NOTE — H&P
[facility]    History and Physical - Hospitalist Service       Date of Admission:  7/18/2021    Assessment & Plan         Patient is a  80 yr old /M with H/o stroke/TIA's : on simvastatin 20 mg daily, plavix. H/o Hemorrhagic stroke in may 2021 at [facility] post fall. He has been admitted with acute onset left side weakness that started around 5 am this morning. CT of the head did not show any acute stroke. CTA : Left ICA stenosis 70-75% : stable compared to previous CT in 4/2021. will consult vascular surgery. MRI of the head is pending. Will check echo, lipid panel. Neurology consulted. Patient was not a TPA candidate. We'll follow stroke protocol. BP goal will be at least < than 220/110. BP goal will be < 180/105 post TPA. Will do Swallow evaluation. Will continue plavix and statin.          A/p :           Acute onset right side weakness : 2/2 acute stroke      Acute ischemic stroke - left MCA infarction : on plavix, statin. Stroke work up in progress.       H/o stroke/TIA's : on simvastatin 20 mg daily, plavix      Hemorrhagic ( left parietal ) stroke in may 2021 at [facility] post fall. This was managed conservatively and was discharged.  There was a concern during hospitalization at [facility] if this hemorrhage was related to a vascular malformation and MRI of the brain and angiogram was recommended.  He is scheduled for an angiogram next month.       Left ICA stenosis 70-75% : stable compared to previous CT. Consult vascular surgery.      Parkinson disease : on sinemet  qid      HTN, Essential, uncontrolled : on norvasc 10 mg daily, lisinopril 40 mg daily, metoprolol 50 mg daily - at home. Allow permissive HTN.      DM2 : on metformin 1gm bid - at home. On sliding scale insulin.           Diet: NPO for Medical/Clinical Reasons Except for: No Exceptions    DVT Prophylaxis: Pneumatic Compression Devices  Hunter Catheter: Not present  Central Lines: None  Code Status:   full      Risk Factors Present on Admission             # Coagulation Defect: INR = 1.01 (Ref range: 0.85 - 1.15) and/or PTT = 38 Seconds (Ref range: 22 - 38 Seconds) on admission, will monitor for bleeding  # Platelet Defect: home medication list includes an antiplatelet medication      Disposition Plan   Expected discharge: 07/21/2021 recommended to disposition to be decided once stroke work up completed.     The patient's care was discussed with the Patient and Patient's Family.    Missael Arevalo MD  [facility]  Securely message with the Vocera Web Console (learn more here)  Text page via [facility] Paging/Directory      ______________________________________________________________________    Chief Complaint   Acute onset left side weakness    History is obtained from the patient    History of Present Illness        Patient is a  80 yr old /M with H/o stroke/TIA's : on simvastatin 20 mg daily, plavix. H/o Hemorrhagic stroke in may 2021 at [facility] post fall. Plavix was discontinued at discharge but patient has been taking plavix daily. He has been admitted with acute onset left side weakness that started around 5 am this morning. He did have some right hand weakness from previous strokes/TIA's CT of the head did not show any acute stroke. CTA : Left ICA stenosis 70-75% : stable compared to previous CT in 4/2021. will consult vascular surgery. MRI of the head is pending. Will check echo, lipid panel. Neurology consulted. Patient was not a TPA candidate. We'll follow stroke protocol. BP goal will be at least < than 220/110. BP goal will be < 180/105 post TPA. Will do Swallow evaluation. Will continue plavix and statin.        Admit as inpatient as > 2 MN stay anticipated considering current clinical situation.                   Review of Systems      No speech problems, headache or vision issues  No fever or chills  No cp, sob, cough or phlegm  No abdominal symptoms  No urinary symptoms      Past  Medical History    I have reviewed this patient's medical history and updated it with pertinent information if needed.   Past Medical History:   Diagnosis Date     Cerebral infarction (H)      HLD (hyperlipidemia)      HTN (hypertension)      Hyperlipidemia      Hypertension      Intracranial hemorrhage (H) 5/26/2021     Stroke (H)      TIA (transient ischemic attack)        Past Surgical History   I have reviewed this patient's surgical history and updated it with pertinent information if needed.  History reviewed. No pertinent surgical history.    Social History   I have reviewed this patient's social history and updated it with pertinent information if needed.  Social History     Tobacco Use     Smoking status: Never Smoker     Smokeless tobacco: Never Used   Substance Use Topics     Alcohol use: Not Currently     Drug use: Not Currently     Lives in [place] in a house with wife and son    2 children  Denies smoking, alcohol, drugs  Uses a cane and a walker  Retired      Family History   Non contributory    Prior to Admission Medications   Prior to Admission Medications   Prescriptions Last Dose Informant Patient Reported? Taking?   acetaminophen (TYLENOL) 325 MG tablet   No No   Sig: Take 2 tablets (650 mg) by mouth every 6 hours as needed for mild pain or fever   amLODIPine (NORVASC) 10 MG tablet   Yes No   Sig: Take 10 mg by mouth daily   aspirin (ASA) 325 MG tablet   No No   Sig: Take 1 tablet (325 mg) by mouth daily   carbidopa-levodopa (SINEMET)  MG tablet   Yes No   Sig: Take 1 tablet by mouth 4 times daily Takes at 0800, 1200, 1600, 2000   levETIRAcetam (KEPPRA) 500 MG tablet   No No   Sig: Take 1 tablet (500 mg) by mouth 2 times daily for 7 days   lisinopril (ZESTRIL) 40 MG tablet   Yes No   Sig: Take 40 mg by mouth daily   metFORMIN (GLUCOPHAGE) 1000 MG tablet   Yes No   Sig: Take 1,000 mg by mouth 2 times daily (with meals)   metoprolol succinate ER (TOPROL-XL) 50 MG 24 hr tablet   Yes No    Sig: Take 50 mg by mouth daily   multivitamin, therapeutic (THERA-VIT) TABS tablet   Yes No   Sig: Take 1 tablet by mouth every evening   nystatin (MYCOSTATIN) 868932 UNIT/GM external cream   No No   Sig: Apply topically 2 times daily   polyethylene glycol (MIRALAX) 17 GM/Dose powder   No No   Sig: Take 17 g by mouth daily   simvastatin (ZOCOR) 20 MG tablet   Yes No   Sig: Take 20 mg by mouth At Bedtime      Facility-Administered Medications: None     Allergies   Allergies   Allergen Reactions     Penicillins Rash     Childhood rxn.           Physical Exam   Vital Signs: Temp: 98  F (36.7  C) Temp src: Oral BP: (!) 174/86 Pulse: 54   Resp: 15 SpO2: 98 % O2 Device: None (Room air)    Weight: 210 lbs 0 oz       GENERAL: The patient is not in any acute distressed. Awake and alert.  HEENT: Nonicteric sclerae, PERRLA, EOMI. Oropharynx clear. Moist mucous membranes. Conjunctivae appear well perfused.  HEART: Regular rate and rhythm without murmurs.  LUNGS: Clear to auscultation bilaterally. No wheezing or crackles.  ABDOMEN: Soft, positive bowel sounds, nontender.  SKIN: No rash, no excessive bruising, petechiae, or purpura.  EXTREMITIES : no rashes, no swelling in legs.  NEURO : conscious, oriented, responds to commands, positive for left side weakness.  ROS: All other systems negative       Data   Data reviewed today: I reviewed all medications, new labs and imaging results over the last 24 hours. I personally reviewed the EKG tracing showing NSR.    Recent Labs   Lab 07/18/21  0829 07/18/21  0827 07/18/21  0813   WBC 8.4  --   --    HGB 13.1*  --   --    MCV 89  --   --      --   --    INR 1.01  --   --      --   --    POTASSIUM 4.6  --   --    CHLORIDE 109*  --   --    CO2 23  --   --    BUN 25  --   --    CR 1.02 1.1  --    ANIONGAP 11  --   --    DORA 9.1  --   --    *  --  150*

## 2021-07-18 NOTE — PROGRESS NOTES
07/18/21 1400   General Information   Onset of Illness/Injury or Date of Surgery 07/18/21   Pertinent History of Current Problem AMS; history of CVA/TIA; new right arm weakness   Past History of Dysphagia BSS in 4/2021 with no concerns   Type of Evaluation   Type of Evaluation Swallow Evaluation   Oral Motor   Oral Musculature generally intact   Dentition (Oral Motor)   Dentition (Oral Motor) edentulous   Comment, Dentition (Oral Motor) Patient reports wearing his dentures about 50% of the time when eating. He endorses 'mashing' food with his gums.    General Swallowing Observations   Current Diet/Method of Nutritional Intake (General Swallowing Observations, NIS) regular diet;thin liquids (level 0)   Swallowing Evaluation Clinical swallow evaluation   Clinical Swallow Evaluation   Feeding Assistance no assistance needed   Clinical Swallow Evaluation Textures Trialed pureed;solid foods;thin liquids   Clinical Swallow Eval: Thin Liquid Texture Trial   Mode of Presentation, Thin Liquids cup;straw   Volume of Liquid or Food Presented 3 ounces   Oral Phase of Swallow WFL   Pharyngeal Phase of Swallow intact   Diagnostic Statement No s/s aspiration    Clinical Swallow Evaluation: Puree Solid Texture Trial   Mode of Presentation, Puree spoon   Volume of Puree Presented 3 bites   Oral Phase, Puree WFL   Pharyngeal Phase, Puree intact   Diagnostic Statement No s/s aspiration   Clinical Swallow Evaluation: Solid Food Texture Trial   Mode of Presentation self-fed   Volume Presented 2 crackers   Oral Phase WFL   Pharyngeal Phase intact   Swallowing Recommendations   Diet Consistency Recommendations easy to chew (level 7);thin liquids (level 0)   Supervision Level for Intake patient independent   Instrumental Assessment Recommendations instrumental evaluation not recommended at this time   Comment, Swallowing Recommendations Patient selected easy to chew foods given lack of dentition and appears able to make decisions  regarding appropriate food textures.    SLP Therapy Assessment/Plan   Criteria for Skilled Therapeutic Interventions Met (SLP Eval) does not meet criteria for skilled intervention   Therapy Frequency (SLP Eval) evaluation only   Comment, Therapy Assessment/Plan (SLP) If any concerns, notify SLP. If easy to chew diet becomes restrictive, contact SLP.   SLP Discharge Planning    SLP Discharge Recommendation (DC Rec) home   SLP Rationale for DC Rec No ST needs   SLP Brief overview of current status  No s/s aspiration with any intake. Mastication WFL despite being edentulous. No concerns noted with speech or language.     Total Evaluation Time   Total Evaluation Time (Minutes) 12   Coping Strategies   Trust Relationship/Rapport care explained;choices provided;thoughts/feelings acknowledged

## 2021-07-18 NOTE — PHARMACY-ADMISSION MEDICATION HISTORY
Pharmacy Note - Admission Medication History    Pertinent Provider Information:     Patient was getting Plavix 75mg daily although he was not supposed to still be taking this medication. Family aware patient should stop taking Plavix.     Patient's wife gives all medications to patient. Patient was taking Tylenol without his wife knowing. Per the wife, she found an empty bottle of Tylenol and is not sure how often the patient was taking it.     ______________________________________________________________________    Prior To Admission (PTA) med list completed and updated in EMR.       Prior to Admission Medications   Prescriptions Last Dose Informant Patient Reported? Taking?   acetaminophen (TYLENOL) 325 MG tablet Past Week at Unknown time  No Yes   Sig: Take 2 tablets (650 mg) by mouth every 6 hours as needed for mild pain or fever   amLODIPine (NORVASC) 10 MG tablet 7/18/2021 at Unknown time  Yes Yes   Sig: Take 10 mg by mouth daily   aspirin (ASA) 325 MG tablet Not Taking at Unknown time  No No   Sig: Take 1 tablet (325 mg) by mouth daily   Patient not taking: Reported on 7/18/2021   carbidopa-levodopa (SINEMET)  MG tablet 7/18/2021 at 0800  Yes Yes   Sig: Take 1 tablet by mouth 4 times daily Takes at 0800, 1200, 1600, 2000   clopidogrel (PLAVIX) 75 MG tablet 7/18/2021 at 0800  Yes Yes   Sig: Take 75 mg by mouth daily   levETIRAcetam (KEPPRA) 500 MG tablet Not Taking at Unknown time  No No   Sig: Take 1 tablet (500 mg) by mouth 2 times daily for 7 days   Patient not taking: Reported on 7/18/2021   lisinopril (ZESTRIL) 40 MG tablet 7/18/2021 at 0800  Yes Yes   Sig: Take 40 mg by mouth daily   melatonin 3 MG CAPS 7/17/2021 at Unknown time  Yes Yes   Sig: Take 1 capful by mouth At Bedtime   metFORMIN (GLUCOPHAGE) 1000 MG tablet 7/18/2021 at 0800  Yes Yes   Sig: Take 1,000 mg by mouth 2 times daily (with meals)   metoprolol succinate ER (TOPROL-XL) 50 MG 24 hr tablet 7/18/2021 at 0800  Yes Yes   Sig: Take 50  mg by mouth daily   multivitamin, therapeutic (THERA-VIT) TABS tablet Not Taking at Unknown time  Yes No   Sig: Take 1 tablet by mouth every evening   Patient not taking: Reported on 7/18/2021   nystatin (MYCOSTATIN) 796309 UNIT/GM external cream Not Taking at Unknown time  No No   Sig: Apply topically 2 times daily   Patient not taking: Reported on 7/18/2021   polyethylene glycol (MIRALAX) 17 GM/Dose powder Not Taking at Unknown time  No No   Sig: Take 17 g by mouth daily   Patient not taking: Reported on 7/18/2021   simvastatin (ZOCOR) 20 MG tablet 7/17/2021 at PM  Yes Yes   Sig: Take 20 mg by mouth At Bedtime      Facility-Administered Medications: None       Information source(s): Patient, Family member, Caregiver and CareEverywhere/SureScripts  Method of interview communication: in-person and phone    Summary of Changes to PTA Med List  New: Plavix, melatonin  Discontinued: Aspirin, Keppra, MVI, Nystatin cream, Miralax  Changed: none    Patient was asked about OTC/herbal products specifically.  PTA med list reflects this.    In the past week, patient estimated taking medication this percent of the time:  greater than 90%.    Allergies were reviewed, assessed, and updated with the patient.      Patient does not use any multi-dose medications prior to admission.     The information provided in this note is only as accurate as the sources available at the time of the update(s).    Thank you for the opportunity to participate in the care of this patient.    Britt Costa  7/18/2021 10:29 AM

## 2021-07-18 NOTE — CONSULTS
"Care Management Initial Consult    General Information  Assessment completed with: Patient, Children, Maikol and son Salvador via i pad  Type of CM/SW Visit: Initial Assessment    Primary Care Provider verified and updated as needed: Yes   Readmission within the last 30 days: no previous admission in last 30 days      Reason for Consult: discharge planning  Advance Care Planning: Advance Care Planning Reviewed: other (comment) (\"don't have one\")          Communication Assessment  Patient's communication style: spoken language (English or Bilingual)             Cognitive  Cognitive/Neuro/Behavioral: WDL  Level of Consciousness: alert           Best Language: 0 - No aphasia  Speech: logical, clear    Living Environment:   People in home: spouse, child(marlo), adult (\"wife and son lives in the basement\")  Wife Nichelle; son Salvador  Current living Arrangements: house      Able to return to prior arrangements: yes       Family/Social Support:  Care provided by: self, spouse/significant other  Provides care for: no one  Marital Status:   Wife, Children  [place]       Description of Support System: Supportive, Involved    Support Assessment: Adequate family and caregiver support, Adequate social supports, Patient communicates needs well met    Current Resources:   Patient receiving home care services: No     Community Resources: DME  Equipment currently used at home: walker, rolling, cane, straight, crutches  Supplies currently used at home: Straight catheter    Employment/Financial:  Employment Status: retired        Financial Concerns:     Referral to Financial Counselor: No       Lifestyle & Psychosocial Needs:  Social Determinants of Health     Tobacco Use: Low Risk      Smoking Tobacco Use: Never Smoker     Smokeless Tobacco Use: Never Used   Alcohol Use: Not At Risk     Frequency of Alcohol Consumption: Never     Average Number of Drinks: Not on file     Frequency of Binge Drinking: Not on file   Financial Resource Strain:  " "    Difficulty of Paying Living Expenses:    Food Insecurity:      Worried About Running Out of Food in the Last Year:      Ran Out of Food in the Last Year:    Transportation Needs:      Lack of Transportation (Medical):      Lack of Transportation (Non-Medical):    Physical Activity:      Days of Exercise per Week:      Minutes of Exercise per Session:    Stress:      Feeling of Stress :    Social Connections:      Frequency of Communication with Friends and Family:      Frequency of Social Gatherings with Friends and Family:      Attends Mosque Services:      Active Member of Clubs or Organizations:      Attends Club or Organization Meetings:      Marital Status:    Intimate Partner Violence:      Fear of Current or Ex-Partner:      Emotionally Abused:      Physically Abused:      Sexually Abused:    Depression:      PHQ-2 Score:    Housing Stability:      Unable to Pay for Housing in the Last Year:      Number of Places Lived in the Last Year:      Unstable Housing in the Last Year:        Functional Status:  Prior to admission patient needed assistance:   Dependent ADLs:: Ambulation-walker, Bathing  Dependent IADLs:: Cleaning, Cooking, Laundry, Shopping, Meal Preparation, Medication Management, Transportation       Mental Health Status:          Chemical Dependency Status:                Values/Beliefs:  Spiritual, Cultural Beliefs, Mosque Practices, Values that affect care:                 Additional Information:  Maikol lives in a house with his wife and son lives in the basement. He is mostly independent with ADLs with some assistance from his wife. She \"helps with showers, medications, meal prep, shopping, and housekeeping.\" He does not drive and family helps with transportation.    He uses a walker for mobility. He also has a cane and crutches.    He was admitted 5/25/21 - 6/1/21 with an intraparenchymal hemorrhage. After that admission [facility], but is now home " again.    Unknown discharge needs at this time.    Family to transport at discharge.    Soco Acosta RN

## 2021-07-18 NOTE — ED NOTES
Bed: JNED-06  Expected date: 7/18/21  Expected time: 8:25 AM  Means of arrival:   Comments:  Stroke

## 2021-07-18 NOTE — PHARMACY-CONSULT NOTE
Pharmacy Consult to evaluate for medication related stroke core measures    Maikol Glaser, 80 year old male admitted for cerebral infarct on 7/18/2021.    Thrombolytic was not given because of Patient history contraindications (recent intracerebral hemorrhage)    VTE Prophylaxis SCDs /PCDs placed on 7/18/21, as appropriate prior to end of hospital day 2.    Antithrombotic: clopidogrel started on 7/18/21, as appropriate by end of hospital day 2. Continue antithrombotic therapy on discharge to meet quality measures, unless contraindicated.    Anticoagulation if history of A-fib/flutter: Patient does not have history of A-fib/flutter - anticoagulation not required for medication related stroke core measures.     LDL Cholesterol Calculated   Date Value Ref Range Status   03/02/2021 94 <=129 mg/dL Final       Patient's home statin, Zocor (simvastatin) restarted; continue statin on discharge to meet quality measures, unless contraindicated. (increased to 40 mg on 7/18/21)    Recommendations: None at this time    Thank you for the consult.    Tonya Reyna RPH 7/18/2021 2:20 PM

## 2021-07-18 NOTE — PROGRESS NOTES
07/18/21 1330   Quick Adds   Type of Visit Initial PT Evaluation   Living Environment   People in home child(marlo), adult;significant other   Current Living Arrangements house   Home Accessibility stairs to enter home;stairs within home   Number of Stairs, Main Entrance greater than 10 stairs  (13 stairs)   Stair Railings, Main Entrance railing on left side (ascending);railings safe and in good condition   Number of Stairs, Within Home, Primary greater than 10 stairs  (13 steps)   Stair Railings, Within Home, Primary railing on right side (ascending);railings safe and in good condition   Living Environment Comments tub shower, grab bars in the shower, no seat to sit on. regular height toilet, no grab bars by deepti stewart near the toilet.   Self-Care   Usual Activity Tolerance moderate   Current Activity Tolerance fair   Equipment Currently Used at Home cane, straight;walker, rolling;crutches  (cane most used)   General Information   Onset of Illness/Injury or Date of Surgery 07/18/21   Referring Physician Missael Arevalo MD   Patient/Family Therapy Goals Statement (PT) return home when ready   Pertinent History of Current Problem (include personal factors and/or comorbidities that impact the POC) recent CVA   Existing Precautions/Restrictions no known precautions/restrictions   Pain Assessment   Patient Currently in Pain No   Strength   Manual Muscle Testing Quick Adds MMT: Hip;MMT: Knee;MMT: Ankle   MMT: Hip, Rehab Eval   Hip Flexion - Left Side (4/5) good, left   Hip Flexion - Right Side (4-/5) good minus, right   MMT: Knee, Rehab Eval   Knee Flexion - Left Side (5/5) normal,left   Knee Extension - Left Side (5/5) normal,left   Knee Flexion - Right Side (4/5) good, right   Knee Extension - Right Side (4/5) good, right   MMT: Ankle, Rehab Eval   Ankle Dorsiflexion - Left Side 5/5) normal,left   Ankle Dorsiflexion - Right Side (3+/5) fair plus, right   Bed Mobility   Bed Mobility supine-sit;sit-supine   Supine-Sit  Bracken (Bed Mobility) supervision   Sit-Supine Bracken (Bed Mobility) supervision   Bed Mobility Limitations decreased ability to use arms for pushing/pulling   Impairments Contributing to Impaired Bed Mobility impaired balance;impaired coordination;decreased strength   Comment (Bed Mobility) needing inc assist for R arm.    Transfers   Transfers sit-stand transfer   Transfer Safety Concerns Noted decreased balance during turns;decreased step length;decreased weight-shifting ability   Impairments Contributing to Impaired Transfers impaired balance;decreased strength   Transfer Safety Comments has poor awareness of deficits   Sit-Stand Transfer   Sit-Stand Bracken (Transfers) contact guard;minimum assist (75% patient effort)   Assistive Device (Sit-Stand Transfers)   (HHA)   Sit/Stand Transfer Comments needing cues for safety, unsteady with sit <> stand, sit <> stand x 2   Gait/Stairs (Locomotion)   Bracken Level (Gait) minimum assist (75% patient effort)   Assistive Device (Gait)   (HHA)   Distance in Feet (Required for LE Total Joints) 5' from EOB to w/c   Pattern (Gait) step-to   Deviations/Abnormal Patterns (Gait) right sided deviations;festinating/shuffling;nanette decreased;stride length decreased   Right Sided Gait Deviations   (unable to pick RLE up, drags on floor)   Comment (Gait/Stairs) drags RLE   Clinical Impression   Criteria for Skilled Therapeutic Intervention yes, treatment indicated   PT Diagnosis (PT) impaired functional mobility, abnormal gait   Influenced by the following impairments generalized weakness, numbness   Functional limitations due to impairments impaired transfers, impaired functional mobility, abnormal gait   Clinical Presentation Stable/Uncomplicated   Clinical Presentation Rationale patient presents as medically diagnosed   Clinical Decision Making (Complexity) low complexity   Therapy Frequency (PT) Daily   Predicted Duration of Therapy Intervention (days/wks)  5 days   Planned Therapy Interventions (PT) balance training;bed mobility training;home exercise program;neuromuscular re-education;stair training;strengthening;transfer training   Anticipated Equipment Needs at Discharge (PT) cane, straight;walker, rolling  (has)   Risk & Benefits of therapy have been explained evaluation/treatment results reviewed;care plan/treatment goals reviewed;risks/benefits reviewed;current/potential barriers reviewed;participants voiced agreement with care plan;participants included;patient   Total Evaluation Time   Total Evaluation Time (Minutes) 8

## 2021-07-18 NOTE — ED PROVIDER NOTES
I am seeing this patient along with MAYKEL Naranjo.    HPI:  Patient woke from sleep with right arm and leg weakness at 0600 (~3 hours ago). He was fine at 0400 when he went to sleep. He has a mild headache at 1-2/10. Patient was admitted on 5/25-6/1 (~1.5 months ago) for a brain bleed. His wife gives the patient his medications, his son who is with him currently is unsure if she is giving the patient Plavix. Denies slurred speech, vision changes, abdominal pain, back or neck pain.     ROS:   Constitutional:  Denies fever, chills, weight loss or weakness  Eyes:  No pain, discharge, redness, vision changes.   HENT:  Denies sore throat, ear pain, congestion   Respiratory: No SOB, wheeze or cough  Cardiovascular:  No CP, palpitations  GI:  Denies abdominal pain, nausea, vomiting, diarrhea  Musculoskeletal:  Denies any new muscle/joint pain, swelling or loss of function.   Skin:  Denies rash, pallor   Neurologic: Positive for weakness (right arm and leg), headache. Denies slurred speech, sensory changes  Endocrine: Denies polyuria, denies difficulty with tolerating hot/cold temperatures  Lymph: Denies swollen nodes    All other systems negative unless noted in HPI.    Physical Exam:    VITAL SIGNS: BP (!) 160/78   Pulse 53   Temp 98  F (36.7  C) (Oral)   Resp 20   Ht 1.829 m (6')   Wt 95.3 kg (210 lb)   SpO2 99%   BMI 28.48 kg/m    General Appearance: Well-appearing, well-nourished, no acute distress   Head:  Normocephalic, without obvious abnormality, atraumatic  Eyes:  PERRL, conjunctiva/corneas clear, EOM's intact,  ENT:  Lips, mucosa, and tongue normal, membranes are moist without pallor  Neck:  Normal ROM, symmetrical, trachea midline    Cardio:  Regular rate and rhythm, no murmur, rub or gallop, 2+ pulses symmetric in all extremities  Pulm:  Clear to auscultation bilaterally, respirations unlabored,   Abdomen:  Soft, non-tender, no rebound or guarding.  Musculoskeletal: Full ROM, no edema, no cyanosis,  good ROM of major joints  Integument:  Warm, Dry, No erythema, No rash.    Neurologic:  Alert & oriented.    Right arm and right leg weakness compared to the left  Psychiatric:  Affect normal, Judgment normal, Mood normal.      LABS  Results for orders placed or performed during the hospital encounter of 07/18/21 (from the past 24 hour(s))   Glucose by meter   Result Value Ref Range    GLUCOSE BY METER POCT 150 (H) 70 - 125 mg/dL   Creatinine POCT   Result Value Ref Range    Creatinine POCT 1.1 0.7 - 1.3 mg/dL    GFR, ESTIMATED POCT >60 >60 mL/min/1.73m2   CBC with platelets   Result Value Ref Range    WBC Count 8.4 4.0 - 11.0 10e3/uL    RBC Count 4.59 4.40 - 5.90 10e6/uL    Hemoglobin 13.1 (L) 13.3 - 17.7 g/dL    Hematocrit 41.0 40.0 - 53.0 %    MCV 89 78 - 100 fL    MCH 28.5 26.5 - 33.0 pg    MCHC 32.0 31.5 - 36.5 g/dL    RDW 14.6 10.0 - 15.0 %    Platelet Count 218 150 - 450 10e3/uL   Partial thromboplastin time   Result Value Ref Range    aPTT 38 22 - 38 Seconds   INR   Result Value Ref Range    INR 1.01 0.85 - 1.15   Basic metabolic panel   Result Value Ref Range    Sodium 143 136 - 145 mmol/L    Potassium 4.6 3.5 - 5.0 mmol/L    Chloride 109 (H) 98 - 107 mmol/L    Carbon Dioxide (CO2) 23 22 - 31 mmol/L    Anion Gap 11 5 - 18 mmol/L    Urea Nitrogen 25 8 - 28 mg/dL    Creatinine 1.02 0.70 - 1.30 mg/dL    Calcium 9.1 8.5 - 10.5 mg/dL    Glucose 154 (H) 70 - 125 mg/dL    GFR Estimate 70 >60 mL/min/1.73m2   Troponin I   Result Value Ref Range    Troponin I <0.01 0.00 - 0.29 ng/mL   [place] Draw    Narrative    The following orders were created for panel order [place] [facility].  Procedure                               Abnormality         Status                     ---------                               -----------         ------                     Extra Red Top Tube[[id number]]                               Final result                 Please view results for these tests on the individual orders.   Extra Red Top Tube    Result Value Ref Range    Hold Specimen [facility]    CTA Head Neck with Contrast    Narrative    EXAM: CTA  HEAD NECK WITH CONTRAST  LOCATION: [facility]  DATE/TIME: 7/18/2021 8:18 AM    INDICATION: Right upper extremity weakness. Stroke code. History of intracranial hemorrhage.  COMPARISON: Head CT 06/16/2021. Head CT 05/26/2021. CTA 04/07/2021.  CONTRAST: 75ml isovue 370  TECHNIQUE: Head and neck CT angiogram with IV contrast. Noncontrast head CT followed by axial helical CT images of the head and neck vessels obtained during the arterial phase of intravenous contrast administration. Axial 2D reconstructed images and   multiplanar 3D MIP reconstructed images of the head and neck vessels were performed by the technologist. Dose reduction techniques were used. All stenosis measurements made according to NASCET criteria unless otherwise specified.    FINDINGS:   NONCONTRAST HEAD CT:   INTRACRANIAL CONTENTS: No acute intracranial hemorrhage, extraaxial collection, or mass effect. The area of previously acute parenchymal hemorrhage in the left parietal area now shows an area of low-density encephalomalacia. No CT evidence of acute   infarct. Mild presumed chronic small vessel ischemic changes. Mild generalized volume loss. No hydrocephalus.     VISUALIZED ORBITS/SINUSES/MASTOIDS: No intraorbital abnormality. No paranasal sinus mucosal disease. No middle ear or mastoid effusion.    BONES/SOFT TISSUES: No acute abnormality.    HEAD CTA:  ANTERIOR CIRCULATION: No stenosis/occlusion, aneurysm, or high flow vascular malformation. Standard [place] of [place] anatomy.    POSTERIOR CIRCULATION: No stenosis/occlusion, aneurysm, or high flow vascular malformation. Balanced vertebral arteries supply a normal basilar artery.     DURAL VENOUS SINUSES: Expected enhancement of the major dural venous sinuses.    NECK CTA:  RIGHT CAROTID: No measurable stenosis or dissection.    LEFT CAROTID: Prominent atheromatous changes in  "the proximal left ICA with a stenosis in the 70-75% range similar to prior CTA 04/07/2021. No dissection.    VERTEBRAL ARTERIES: No focal stenosis or dissection. Balanced vertebral arteries.    AORTIC ARCH: Classic aortic arch anatomy with no significant stenosis at the origin of the great vessels.    NONVASCULAR STRUCTURES: Unremarkable.      Impression    IMPRESSION:   HEAD CT:  1.  No evidence for acute intracranial hemorrhage, mass effect, obstructive hydrocephalus or definite acute infarct by CT.  2.  Previous area of acute hemorrhage in the left parietal area has now matured and shows an area of low-density encephalomalacia.  3.  Mild age-related changes.    HEAD CTA:   1.  All of the major large-caliber proximal intracranial vessels are patent without definite aneurysm or AVM.    NECK CTA:  1.  Stable approximately 70-75% stenosis in the proximal left ICA compared to CTA 04/07/2021. No dissection.  2.  No significant stenosis right ICA or in either vertebral artery.    3.  Noncontrast head CT findings discussed with RADHA Nicole in the [facility] ER at 8:30 AM. The CT angiogram results discussed with Jessica Nicole at 8:55 AM on 07/18/2021.   Social Work/ Care Management IP Consult    Narrative    Soco Acosta RN     7/18/2021  9:54 AM  Care Management Initial Consult    General Information  Assessment completed with: Patient, Children, Maikol and son Salvador   via i pad  Type of CM/SW Visit: Initial Assessment    Primary Care Provider verified and updated as needed: Yes   Readmission within the last 30 days: no previous admission in   last 30 days      Reason for Consult: discharge planning  Advance Care Planning: Advance Care Planning Reviewed: other   (comment) (\"don't have one\")          Communication Assessment  Patient's communication style: spoken language (English or   Bilingual)             Cognitive  Cognitive/Neuro/Behavioral: WDL  Level of Consciousness: alert             Best Language: 0 - No " "aphasia  Speech: logical, clear    Living Environment:   People in home: spouse, child(marlo), adult (\"wife and son lives in   the basement\")  Wife Kinue; son Salvador  Current living Arrangements: house      Able to return to prior arrangements: yes       Family/Social Support:  Care provided by: self, spouse/significant other  Provides care for: no one  Marital Status:   Wife, Children  Kinue       Description of Support System: Supportive, Involved    Support Assessment: Adequate family and caregiver support,   Adequate social supports, Patient communicates needs well met    Current Resources:   Patient receiving home care services: No     Community Resources: None  Equipment currently used at home: walker, rolling, cane,   straight, crutches  Supplies currently used at home: None    Employment/Financial:  Employment Status: retired        Financial Concerns:     Referral to Financial Counselor: No       Lifestyle & Psychosocial Needs:  Social Determinants of Health     Tobacco Use: Low Risk      Smoking Tobacco Use: Never Smoker     Smokeless Tobacco Use: Never Used   Alcohol Use: Not At Risk     Frequency of Alcohol Consumption: Never     Average Number of Drinks: Not on file     Frequency of Binge Drinking: Not on file   Financial Resource Strain:      Difficulty of Paying Living Expenses:    Food Insecurity:      Worried About Running Out of Food in the Last Year:      Ran Out of Food in the Last Year:    Transportation Needs:      Lack of Transportation (Medical):      Lack of Transportation (Non-Medical):    Physical Activity:      Days of Exercise per Week:      Minutes of Exercise per Session:    Stress:      Feeling of Stress :    Social Connections:      Frequency of Communication with Friends and Family:      Frequency of Social Gatherings with Friends and Family:      Attends Orthodox Services:      Active Member of Clubs or Organizations:      Attends Club or Organization Meetings:      Marital " "Status:    Intimate Partner Violence:      Fear of Current or Ex-Partner:      Emotionally Abused:      Physically Abused:      Sexually Abused:    Depression:      PHQ-2 Score:    Housing Stability:      Unable to Pay for Housing in the Last Year:      Number of Places Lived in the Last Year:      Unstable Housing in the Last Year:        Functional Status:  Prior to admission patient needed assistance:   Dependent ADLs:: Ambulation-walker, Bathing  Dependent IADLs:: Cleaning, Cooking, Laundry, Shopping, Meal   Preparation, Medication Management, Transportation       Mental Health Status:          Chemical Dependency Status:                Values/Beliefs:  Spiritual, Cultural Beliefs, Rastafarian Practices, Values that   affect care:                 Additional Information:  Maikol lives in a house with his wife and son lives in the   basement. He is mostly independent with ADLs with some assistance   from his wife. She \"helps with showers, medications, meal prep,   shopping, and housekeeping.\" He does not drive and family helps   with transportation.    He uses a walker for mobility. He also has a cane and crutches.    He was admitted 5/25/21 - 6/1/21 with an intraparenchymal   hemorrhage. After that admission he was at [facility]   for rehab, but is now home again.    Unknown discharge needs at this time.    Family to transport at discharge.    Soco Acosta RN       Asymptomatic COVID-19 Virus (Coronavirus) by PCR Nasopharyngeal    Specimen: Nasopharyngeal; Swab    Narrative    The following orders were created for panel order Asymptomatic COVID-19 Virus (Coronavirus) by PCR Nasopharyngeal.  Procedure                               Abnormality         Status                     ---------                               -----------         ------                     SARS-COV2 (COVID-19) Vir...[[id number]]  Normal              Final result                 Please view results for these tests on the " individual orders.   SARS-COV2 (COVID-19) Virus RT-PCR    Specimen: Nasopharyngeal; Swab   Result Value Ref Range    SARS CoV2 PCR Negative Negative    Narrative    Testing was performed using the go  SARS-CoV-2 & Influenza A/B Assay on the go  [employer]  System.  This test should be ordered for the detection of SARS-COV-2 in individuals who meet SARS-CoV-2 clinical and/or epidemiological criteria. Test performance is unknown in asymptomatic patients.  This test is for in vitro diagnostic use under the FDA EUA for laboratories certified under CLIA to perform moderate and/or high complexity testing. This test has not been FDA cleared or approved.  A negative test does not rule out the presence of PCR inhibitors in the specimen or target RNA in concentration below the limit of detection for the assay. The possibility of a false negative should be considered if the patient's recent exposure or clinical presentation suggests COVID-19.  [facility] are certified under the Clinical Laboratory Improvement Amendments of 1988 (CLIA-88) as qualified to perform moderate and/or high complexity laboratory testing.         RADIOLOGY  CTA Head Neck with Contrast   Final Result   IMPRESSION:    HEAD CT:   1.  No evidence for acute intracranial hemorrhage, mass effect, obstructive hydrocephalus or definite acute infarct by CT.   2.  Previous area of acute hemorrhage in the left parietal area has now matured and shows an area of low-density encephalomalacia.   3.  Mild age-related changes.      HEAD CTA:    1.  All of the major large-caliber proximal intracranial vessels are patent without definite aneurysm or AVM.      NECK CTA:   1.  Stable approximately 70-75% stenosis in the proximal left ICA compared to CTA 04/07/2021. No dissection.   2.  No significant stenosis right ICA or in either vertebral artery.      3.  Noncontrast head CT findings discussed with RADHA Nicole in the [facility] ER at 8:30 AM. The CT  angiogram results discussed with Jessica Nicole at 8:55 AM on 07/18/2021.      MR Brain w/o Contrast    (Results Pending)   Echocardiogram Complete - For age > 60 yrs    (Results Pending)        ED COURSE & MEDICAL DECISION MAKING    Pertinent Labs and Imagaing reviewed (see chart for details)    80 year old male presents emergency department with sudden onset of right arm and leg weakness.  The patient does report that he woke with the symptoms, but went to bed at 4 AM normal, waking 2 hours later with the symptoms.  On exam he has significant weakness in his arm and his leg in the right, although compared to the JAMES's evaluation it does appear that it has improved slightly since arrival.  Patient has had a previous hemorrhagic stroke so he is not a candidate for thrombolysis therapy.  CT and CTA were both negative for any evidence of acute cause.  Neurology did evaluate the patient emergency department, there is concern for acute stroke.  Patient will be admitted for further management.    8:20 AM Jessica Nicole PA-C, staffed the patient with me.  8:57 AM I introduced myself to the patient. Performed a physical exam. PPE worn: surgical mask.     At the conclusion of the encounter I discussed  the results of all of the tests and the disposition.   The questions were answered.  The patient or family acknowledged understanding and was agreeable with the care plan.       FINAL IMPRESSION        1. Cerebrovascular accident (CVA), unspecified mechanism (H)    2. Right arm weakness    3. Right leg weakness        IMichelle, am serving as a scribe to document services personally performed by Niko Perez DO, based on myobservations and the provider's statements to me.  I, Niko Perez DO, attest that Michelle Thompson is acting in a scribe capacity, has observed my performance of the services and has documented them in accordance with my direction.         Niko Perez DO  07/18/21 [id number]

## 2021-07-18 NOTE — ED NOTES
CM met patient in her room  Patient was informed of IMM and given a copy  IMM is in the chart  Patient was informed [facility] will deliver her IV ABX to her hospital room instead  Patient was notified nurse from [facility] will send a nurse to her home towards the end of the day to educate her on how to administer her IV ABX at home  Patient is in agreement with her discharge plan  Patient states her sister will transport her home after her 2PM IV ABX  Nurse and [facility] notified  Patient is not a TPA candidate.  Stroke code called off per MD..

## 2021-07-18 NOTE — CONSULTS
NEUROLOGY INPATIENT CONSULTATION NOTE       [facility]  [address] [place] [address]  Tel: [phone]  Fax: [phone]  [email or web address]     Maikol Glaser,  1940, MRN [id number]  PCP: Antione Villeda  Date: 2021     ASSESSMENT & PLAN     Diagnosis code: Cerebral infarction    Left MCA infarction (risk factors: HTN, HLD, DM, left ICA stenosis)   80-year-old male with history of HTN, HLD, multiple CVA in the past, Parkinson disease, left ICA stenosis with recent left parietal ICH admitted with acute onset of pure motor right-sided weakness suggesting a lacunar infarct due to multiple risk factors.  However, MRI scan shows cortical infarct in left MCA territory    Patient was not a candidate for tenecteplase due to recent intracerebral hemorrhage    CT of the head shows no evidence of acute ICH, previous area of hemorrhage in the left parietal lobe has not matured and shows encephalomalacia    CTA shows stable 70 to 75% left ICA stenosis    Check MRI of the head.  During recent admission to [facility] for left parietal ICH there was concern if this hemorrhage was due to vascular malformation and an MRI and cerebral angiogram was recommended and he is scheduled for cerebral angiogram next month at [facility]    Continue Plavix 75 mg daily.  Due to recent ICH I would not recommend dual antiplatelet therapy    Echocardiogram    Most recent LDL checked on 3/2/2021 was 94, increase simvastatin to 40 mg daily    Physical, occupational and speech therapy consult    Continue Sinemet 25/100 mg 1 tablet 4 times daily    Vascular surgery consult as left MCA territory infarction is symptomatic and he will need endarterectomy    Thank you again for this referral, please feel free to contact me if you have any questions.    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  This was managed conservatively and was discharged.  There was a concern during hospitalization at [facility] if this hemorrhage was related to a vascular malformation and MRI of the brain and angiogram was recommended.  He is scheduled for an angiogram next month.  Patient had regained most of his strength and went to bed yesterday normally.  When he woke up this morning he had right arm weakness that was significantly worse compared to the right leg.  He had no facial droop or any numbness tingling.  Speech was normal.  He was brought to the emergency room.  Initially a stroke code was called and he had a CT of the head and CTA that did not show any recurrent hemorrhage.  Previous area of acute hemorrhage in the left parietal region had matured and showed area of encephalomalacia.  No change in the left ICA 70 to 75% stenosis.  While in the ER patient's symptoms gradually started improving although he still has some weakness.    During his admission to the hospital in April 2021 he was noted to have resting tremors on the left side with hypophonia, increased rigidity and was started on Sinemet 25/100 mg 4 times daily with significant improvement in his tremors.  Outpatient DaTscan was recommended that has not yet been scheduled.     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    Past Surgical History: He  has no past surgical history on file.     SOCIAL HISTORY     Reviewed, and he  reports that he has never smoked. He has never used smokeless tobacco. He reports previous alcohol use. He reports previous drug use.     FAMILY HISTORY     Reviewed, and family history is not on file.     ALLERGIES     Allergies   Allergen Reactions     Penicillins Rash     Childhood rxn.            REVIEW OF SYSTEMS     A comprehensive review of systems was negative.     HOME & HOSPITAL MEDICATIONS     Prior to Admission Medications  At home patient was on amlodipine, aspirin, Sinemet, lisinopril, Metformin, Toprol all and simvastatin     PHYSICAL EXAM     Vital signs  Temp:  [97.4  F (36.3  C)-98  F (36.7  C)] 97.4  F (36.3  C)  Pulse:  [53-58] 53  Resp:  [15-26] 20  BP: (157-175)/(74-96) 157/74  SpO2:  [97 %-99 %] 98 %    Weight:   Wt Readings from Last 1 Encounters:   07/18/21 97.4 kg (214 lb 12.8 oz)      General Physical Exam: Patient is alert and oriented x 3. Vital signs were reviewed and are documented in EMR. Neck was supple, no carotid bruit, thyromegaly, JVD or lymphadenopathy noted.  Neurological Exam:  Mental status: Alert and oriented x3 no acute distress  Speech: Normal with no dysarthria or aphasia  Cranial nerves: 2 through 12 intact  Motor: Normal mass with increased tone on the left side.  He has cogwheel rigidity  on the left side.  Strength on the left 5/5 right upper extremity 3/5 right lower extremity 3+/5  Reflexes: 1+ in biceps triceps brachioradialis and knees absent at ankles with an upgoing toe on the right.  Downgoing toe on the left  Sensory: Intact to light touch pinprick no double simultaneous extinction.  Coordination: Intact finger-nose testing  Gait: Deferred for safety     DIAGNOSTIC STUDIES     Pertinent Radiology   Radiology Results: Reviewed impression and images     CT  HEAD CT:  1.  No evidence for acute intracranial hemorrhage, mass effect, obstructive hydrocephalus or definite acute infarct by CT.  2.  Previous area of acute hemorrhage in the left parietal area has now matured and shows an area of low-density encephalomalacia.  3.  Mild age-related changes.     HEAD CTA:   1.  All of the major large-caliber proximal intracranial vessels are patent without definite aneurysm or AVM.     NECK CTA:  1.  Stable approximately 70-75% stenosis in the proximal left ICA compared to CTA 04/07/2021. No dissection.  2.  No significant stenosis right ICA or in either vertebral artery.    MRI  Pending    Pertinent Labs   Lab Results: Personally Reviewed   Recent Results (from the past 24 hour(s))   Glucose by meter    Collection Time: 07/18/21  8:13 AM   Result Value Ref Range    GLUCOSE BY METER POCT 150 (H) 70 - 125 mg/dL   Creatinine POCT    Collection Time: 07/18/21  8:27 AM   Result Value Ref Range    Creatinine POCT 1.1 0.7 - 1.3 mg/dL    GFR, ESTIMATED POCT >60 >60 mL/min/1.73m2   CBC with platelets    Collection Time: 07/18/21  8:29 AM   Result Value Ref Range    WBC Count 8.4 4.0 - 11.0 10e3/uL    RBC Count 4.59 4.40 - 5.90 10e6/uL    Hemoglobin 13.1 (L) 13.3 - 17.7 g/dL    Hematocrit 41.0 40.0 - 53.0 %    MCV 89 78 - 100 fL    MCH 28.5 26.5 - 33.0 pg    MCHC 32.0 31.5 - 36.5 g/dL    RDW 14.6 10.0 - 15.0 %    Platelet Count 218 150 - 450 10e3/uL   Partial thromboplastin time    Collection Time: 07/18/21  8:29  AM   Result Value Ref Range    aPTT 38 22 - 38 Seconds   INR    Collection Time: 07/18/21  8:29 AM   Result Value Ref Range    INR 1.01 0.85 - 1.15   Basic metabolic panel    Collection Time: 07/18/21  8:29 AM   Result Value Ref Range    Sodium 143 136 - 145 mmol/L    Potassium 4.6 3.5 - 5.0 mmol/L    Chloride 109 (H) 98 - 107 mmol/L    Carbon Dioxide (CO2) 23 22 - 31 mmol/L    Anion Gap 11 5 - 18 mmol/L    Urea Nitrogen 25 8 - 28 mg/dL    Creatinine 1.02 0.70 - 1.30 mg/dL    Calcium 9.1 8.5 - 10.5 mg/dL    Glucose 154 (H) 70 - 125 mg/dL    GFR Estimate 70 >60 mL/min/1.73m2   Troponin I    Collection Time: 07/18/21  8:29 AM   Result Value Ref Range    Troponin I <0.01 0.00 - 0.29 ng/mL   Extra Red Top Tube    Collection Time: 07/18/21  8:29 AM   Result Value Ref Range    Hold Specimen [facility]    SARS-COV2 (COVID-19) Virus RT-PCR    Collection Time: 07/18/21 10:16 AM    Specimen: Nasopharyngeal; Swab   Result Value Ref Range    SARS CoV2 PCR Negative Negative   Glucose by meter    Collection Time: 07/18/21 12:30 PM   Result Value Ref Range    GLUCOSE BY METER POCT 135 (H) 70 - 125 mg/dL       Total time spent for face to face visit, reviewing labs/imaging studies, counseling and coordination of care was: 1 Hour 15 Minutes More than 50% of this time was spent on counseling and coordination of care.      This note was dictated using voice recognition software.  Any grammatical or context distortions are unintentional and inherent to the software.

## 2021-07-18 NOTE — H&P
Assessment            Acute onset right side weakness    H/o stroke/TIA's : on simvastatin 20 mg daily, plavix    Hemorrhagic stroke in may 2021 at [facility] post fall     Left ICA stenosis 70-75% : stable compared to previous CT    Parkinson disease : on sinemet  qid    HTN, Essential, uncontrolled : on norvasc 10 mg daily, lisinopril 40 mg daily, metoprolol 50 mg daily    DM2 : on metformin 1gm bid             Disposition/Plan :        Patient is a  80 yr old /M with H/o stroke/TIA's : on simvastatin 20 mg daily, plavix. H/o Hemorrhagic stroke in may 2021 at [facility] post fall. He has been admitted with acute onset left side weakness that started around 5 am this morning. CT of the head did not show any acute stroke. CTA : Left ICA stenosis 70-75% : stable compared to previous CT in 4/2021. MRI of the head is pending. Will check echo, lipid panel. Neurology consulted. Patient was not a TPA candidate. We'll follow stroke protocol. BP goal will be at least < than 220/110.  BP goal will be < 180/105 post TPA. Will do Swallow evaluation. Will continue plavix and statin.       Admit as inpatient as > 2 [place] stay anticipated considering current clinical situation.         CODE STATUS DISCUSSED  : Patient has opted for  Full    Code status.        ANAPHYLAXIS  TO ANY DRUGS :  NKDA                        REVIEWED : yes            Physical Exam:          HEENT : no distended veins, no lymphadenopathy, thyroid is normal  LUNGS : b/l air entry present, no significant crackles/wheezing.  ABDOMEN : soft, non-tender, non-distended, BS present.  HEART :  Regular rate & rhythm, S1 & S2 normal, no murmur, clicks/rubs, no ankle edema,  NEURO : conscious, oriented, responds to commands, positive for left side weakness.  MUSCULOSKELETAL : EXTREMITIES :  no calf tenderness.  SKIN : no rashes  BACK : wnl          DETAILED H & P is Pending at this time      DR. MCKAY ORTEGA  HOSPITALIST  [facility]

## 2021-07-18 NOTE — ED PROVIDER NOTES
EMERGENCY DEPARTMENT ENCOUNTER      NAME: Maikol Glaser  AGE: 80 year old male  YOB: 1940  MRN: [id number]  EVALUATION DATE & TIME: No admission date for patient encounter.    PCP: Antione Villeda    ED PROVIDER: Jessica Nicole PA-C      Chief Complaint   Patient presents with     Neurologic Problem         FINAL IMPRESSION:  1. Cerebrovascular accident (CVA), unspecified mechanism (H)    2. Right arm weakness    3. Right leg weakness          MEDICAL DECISION MAKING:    Pertinent Labs & Imaging studies reviewed. (See chart for details)  80 year old male  with a pertinent history of hypertension, HLD, multiple CVAs, Parkinson disease, left ICA stenosis with recent left parietal ICH in 05/2021 presents to the Emergency Department for evaluation of onset of right arm and leg weakness this morning. Went back to bed this morning at 0400 with no symptoms then woke up at 0600 with the symptoms (2 hours PTA). He denies any numbness, slurred speech, facial droop, vision changes, nausea, vomiting, chest pain, shortness of breath. Mild headache.     Met patient in triage. Noted to be hypertensive 162/76. POC glucose 150. Notable right sided weakness with 2/5 strength of right arm and 3/5 strength of right leg with drift. Sensation intact. No facial droop, slurred speech, confusion. He is alert and oriented x 3. Differential diagnosis includes but not limited to CVA, central cord complication, hypoglycemia, encephalopathy, demyelinating disease.    Stroke code called. Patient not a tenecteplase candidate given recent intracerebral hemorrhage. NIHSS 3. Fortunately CT/CTA without evidence of acute hemorrhagic stroke. Stable approximately 70-75% stenosis in the proximal left ICA compared to CTA 04/07/2021. Previous area of acute hemorrhage in the left parietal area has now matured and shows an area of low-density encephalomalacia. Dr. Love evaluated patient at bedside and suspects acute infarct which was confirmed  by MRI showing foci of restricted diffusion involving the cortex of the left precentral and postcentral gyri in addition to the left parietal lobe and left parieto-occipital junction corresponding to new areas of acute or early subacute ischemic change from prior. Patient already received his Plavix this morning. Dr. Love does not recommend dual antiplatelet given recent bleed. On re-evaluation, weakness is improving. Reports subjective paresthesias though gross sensation to light touch intact. Patient will be admitted to neuro telemetry floor for further evaluation and treatment.     0 minutes of critical care time     ED COURSE  8:16 AM I met with the patient, obtained history, performed an initial exam, and discussed options and plan for diagnostics and treatment here in the ED. Tier 1 stroke code called. Staffed patient with Dr. Perez.  8:16 AM Spoke with Dr. Love, neurology, regarding stroke code.  8:21 AM Son will check with patient's wife to see if patient has still been getting Plavix.  8:29 AM Spoke with stroke radiologist. Plain CT shows only chronic hemorrhage, nothing acute. Will proceed with CTA.  8:52 AM Reevaluated patient. Weakness in his right arm seems to be improving but is not resolved.  8:56 AM Spoke with radiologist regarding CTA. No acute findings.  9:05 AM Spoke with Dr. Love who will evaluate patient at bedside.  9:25 AM Dr. Love in department. Suspects lacunar infarct. Not TPA or IR candidate. Will admit and proceed with MRI. Stroke code cancelled. Patient already received his Plavix this morning.  9:38 AM Spoke with Dr. Arevalo, hospitalist, about the case. They accepted the admission.     At the conclusion of the encounter I discussed the results of all of the tests and the disposition. The questions were answered. The patient and family acknowledged understanding and were agreeable with the care plan.     MEDICATIONS GIVEN IN THE EMERGENCY:  Medications   lidocaine 1 % 0.1-1 mL (has  no administration in time range)   lidocaine (LMX4) cream (has no administration in time range)   sodium chloride (PF) 0.9% PF flush 3 mL (3 mLs Intracatheter Not Given 7/18/21 1235)   sodium chloride (PF) 0.9% PF flush 3 mL (has no administration in time range)   Medication Instructions - Avoid dextrose in IV solutions. (has no administration in time range)   medication instruction - No oral meds if patient didn't pass dysphagia screen (has no administration in time range)   sodium chloride 0.9% infusion (has no administration in time range)   labetalol (NORMODYNE/TRANDATE) injection 10-20 mg (has no administration in time range)     Or   hydrALAZINE (APRESOLINE) injection 10-20 mg (has no administration in time range)   clopidogrel (PLAVIX) tablet 75 mg (has no administration in time range)   ondansetron (ZOFRAN-ODT) ODT tab 4 mg (has no administration in time range)     Or   ondansetron (ZOFRAN) injection 4 mg (has no administration in time range)   glucose gel 15-30 g (has no administration in time range)     Or   dextrose 50 % injection 25-50 mL (has no administration in time range)     Or   glucagon injection 1 mg (has no administration in time range)   insulin aspart (NovoLOG) injection (RAPID ACTING) (1 Units Subcutaneous Not Given 7/18/21 1234)   insulin aspart (NovoLOG) injection (RAPID ACTING) (has no administration in time range)   carbidopa-levodopa (SINEMET)  MG per tablet 1 tablet (has no administration in time range)   polyethylene glycol (MIRALAX) powder 17 g (17 g Oral Not Given 7/18/21 1235)   acetaminophen (TYLENOL) tablet 650 mg (has no administration in time range)   simvastatin (ZOCOR) tablet 40 mg (has no administration in time range)   perflutren lipid microsphere (DEFINITY) injection SUSP 3 mL (3 mLs Intravenous Given 7/18/21 3165)       NEW PRESCRIPTIONS STARTED AT TODAY'S ER VISIT  Current Discharge Medication List                =================================================================    HPI    Patient information was obtained from: Patient and son    Use of Interpretor: N/A        Maikol Glaser is a 80 year old male with a pertinent history of hypertension, HLD, multiple CVAs, Parkinson disease, left ICA stenosis with recent left parietal ICH in 05/2021 who presents to this ED as a walk in for evaluation of right arm weakness.    Per chart review, patient seen and admitted 5/25-6/1/2021. He was found to have 2.9 cm parenchymal hemorrhage on CT. There was question that this hemorrhage was related to vascular malfornation and MRI was recommended in 3 months as well as referral to neuro IR. Repeat head CT stable    Patient reports he went to bed feeling normal at 0400 this morning. He then woke up at 0600 (2 hours PTA) with right sided arm and leg weakness. He denies any slurred speech or changes in vision. He endorses a minor headache. Per his son, patient's wife give him his medications and she has been giving him plavix, last received this morning.    REVIEW OF SYSTEMS   Review of Systems   Constitutional: Negative for chills and fever.   Eyes: Negative for visual disturbance.   Respiratory: Negative for shortness of breath.    Cardiovascular: Negative for chest pain.   Gastrointestinal: Negative for nausea and vomiting.   Neurological: Positive for weakness (right arm and leg) and headaches (minor). Negative for dizziness, speech difficulty, light-headedness and numbness.   Hematological: Bruises/bleeds easily (on Plavix).   All other systems reviewed and are negative.       PAST MEDICAL HISTORY:  Past Medical History:   Diagnosis Date     Cerebral infarction (H)      HLD (hyperlipidemia)      HTN (hypertension)      Hyperlipidemia      Hypertension      Intracranial hemorrhage (H) 5/26/2021     Stroke (H)      TIA (transient ischemic attack)        PAST SURGICAL HISTORY:  History reviewed. No pertinent surgical  history.        CURRENT MEDICATIONS:      Current Facility-Administered Medications:      acetaminophen (TYLENOL) tablet 650 mg, 650 mg, Oral or NG Tube, Q4H PRN, Missael Arevalo MD     carbidopa-levodopa (SINEMET)  MG per tablet 1 tablet, 1 tablet, Oral or Feeding Tube, 4x Daily, Missael Arevalo MD     [START ON 7/19/2021] clopidogrel (PLAVIX) tablet 75 mg, 75 mg, Oral or NG Tube, Daily, Missael Arevalo MD     glucose gel 15-30 g, 15-30 g, Oral, Q15 Min PRN **OR** dextrose 50 % injection 25-50 mL, 25-50 mL, Intravenous, Q15 Min PRN **OR** glucagon injection 1 mg, 1 mg, Subcutaneous, Q15 Min PRN, Missael Arevalo MD     labetalol (NORMODYNE/TRANDATE) injection 10-20 mg, 10-20 mg, Intravenous, Q10 Min PRN **OR** hydrALAZINE (APRESOLINE) injection 10-20 mg, 10-20 mg, Intravenous, Q1H PRN, Missael Arevalo MD     insulin aspart (NovoLOG) injection (RAPID ACTING), 1-3 Units, Subcutaneous, TID AC, Missael Arevalo MD     insulin aspart (NovoLOG) injection (RAPID ACTING), 1-3 Units, Subcutaneous, At Bedtime, Missael Arevalo MD     lidocaine (LMX4) cream, , Topical, Q1H PRN, Missael Arevalo MD     lidocaine 1 % 0.1-1 mL, 0.1-1 mL, Other, Q1H PRN, Missael Arevalo MD     medication instruction - No oral meds if patient didn't pass dysphagia screen, , Does not apply, Continuous PRN, Missael Arevalo MD     Medication Instructions - Avoid dextrose in IV solutions., , Intravenous, Continuous PRN, Missael Arevalo MD     ondansetron (ZOFRAN-ODT) ODT tab 4 mg, 4 mg, Oral, Q6H PRN **OR** ondansetron (ZOFRAN) injection 4 mg, 4 mg, Intravenous, Q6H PRN, Missael Arevalo MD     polyethylene glycol (MIRALAX) powder 17 g, 17 g, Oral, Daily, Missael Arevalo MD     simvastatin (ZOCOR) tablet 40 mg, 40 mg, Oral or Feeding Tube, At Bedtime, Daniel Shah MD     sodium chloride (PF) 0.9% PF flush 3 mL, 3 mL, Intracatheter, Q8H, Missael Arevalo MD     sodium chloride (PF) 0.9% PF flush 3 mL, 3 mL, Intracatheter, q1 min prn, Mickey,  MD Missael     sodium chloride 0.9% infusion, , Intravenous, Continuous PRN, Missael Arevalo MD      ALLERGIES:  Allergies   Allergen Reactions     Penicillins Rash     Childhood rxn.           FAMILY HISTORY:  History reviewed. No pertinent family history.    SOCIAL HISTORY:   Social History     Socioeconomic History     Marital status:      Spouse name: Not on file     Number of children: Not on file     Years of education: Not on file     Highest education level: Not on file   Occupational History     Not on file   Tobacco Use     Smoking status: Never Smoker     Smokeless tobacco: Never Used   Substance and Sexual Activity     Alcohol use: Not Currently     Drug use: Not Currently     Sexual activity: Not on file   Other Topics Concern     Not on file   Social History Narrative     Not on file     Social Determinants of Health     Financial Resource Strain:      Difficulty of Paying Living Expenses:    Food Insecurity:      Worried About Running Out of Food in the Last Year:      Ran Out of Food in the Last Year:    Transportation Needs:      Lack of Transportation (Medical):      Lack of Transportation (Non-Medical):    Physical Activity:      Days of Exercise per Week:      Minutes of Exercise per Session:    Stress:      Feeling of Stress :    Social Connections:      Frequency of Communication with Friends and Family:      Frequency of Social Gatherings with Friends and Family:      Attends [employer] Services:      Active Member of Clubs or Organizations:      Attends Club or Organization Meetings:      Marital Status:    Intimate Partner Violence:      Fear of Current or Ex-Partner:      Emotionally Abused:      Physically Abused:      Sexually Abused:        VITALS:  Patient Vitals for the past 24 hrs:   BP Temp Temp src Pulse Resp SpO2 Height Weight   07/18/21 1130 (!) 157/74 97.4  F (36.3  C) Oral 53 20 98 % 1.829 m (6') 97.4 kg (214 lb 12.8 oz)   07/18/21 1015 -- -- -- 57 26 99 % -- --   07/18/21  1000 -- -- -- 53 20 99 % -- --   07/18/21 0952 -- -- -- 54 15 98 % -- --   07/18/21 0945 (!) 160/78 -- -- 53 19 99 % -- --   07/18/21 0930 (!) 162/80 -- -- 53 17 98 % -- --   07/18/21 0915 (!) 174/86 -- -- 55 19 98 % -- --   07/18/21 0900 (!) 169/96 -- -- 56 17 97 % -- --   07/18/21 0845 (!) 175/78 -- -- 58 20 98 % -- --   07/18/21 0839 (!) 162/76 98  F (36.7  C) Oral 58 16 99 % 1.829 m (6') 95.3 kg (210 lb)   07/18/21 0830 (!) 162/76 -- -- -- -- -- -- --       PHYSICAL EXAM    Constitutional: Well developed, Well nourished, NAD  HENT: Normocephalic, Atraumatic, Bilateral external ears normal, Oropharynx normal, mucous membranes moist, Nose normal. No facial droop. No slurred speech.  Neck:  Normal range of motion, No tenderness, Supple, No stridor.    Eyes: PERRL, EOMI, Conjunctiva normal, No discharge.   Respiratory: Normal breath sounds, No respiratory distress, No wheezing, Speaks full sentences easily. No cough.    Cardiovascular: Normal heart rate, Regular rhythm,  No murmurs, No rubs, No gallops. Chest wall nontender.    GI: Soft, No tenderness, No masses, No flank tenderness. No rebound or guarding.     Musculoskeletal: 2+ DP pulses. 1+ pitting edema of bilateral ankles. No cyanosis, No clubbing. Good range of motion in all major joints. No tenderness to palpation or major deformities noted. No tenderness of the CTLS spine.  Integument: Warm, Dry, No erythema, No rash. No petechiae.  Neurologic: Patient is alert and oriented ×3. Face is symmetric. Speech is normal. Visual fields are full. Cranial nerves II through XII are intact. Asymmetric 2/5 strength and pronator drift of right arm, falls straight to the bed. Asymmetric 3/5 strength of right leg, able to hold up however drifts to bed eventually.  Sensory is intact to sharp and light touch.  Coordination is intact.  Normal finger nose to finger.  Psychiatric: Affect normal, Judgment normal, Mood normal. Cooperative.     [employer] Stroke  Scale  Exam Interval: Baseline   Score    Level of consciousness: 0    LOC questions: 0    LOC commands: 0    Best gaze: 0    Visual: 0    Facial palsy: 0    Motor arm (left): 0    Motor arm (right): 2    Motor leg (left): 0    Motor leg (right): 1    Limb ataxia: 0    Sensory: 0    Best language: 0    Dysarthria: 0    Extinction and inattention: 0        Total Score: 3            LAB:  All pertinent labs reviewed and interpreted.  Recent Results (from the past 24 hour(s))   Glucose by meter    Collection Time: 07/18/21  8:13 AM   Result Value Ref Range    GLUCOSE BY METER POCT 150 (H) 70 - 125 mg/dL   Creatinine POCT    Collection Time: 07/18/21  8:27 AM   Result Value Ref Range    Creatinine POCT 1.1 0.7 - 1.3 mg/dL    GFR, ESTIMATED POCT >60 >60 mL/min/1.73m2   CBC with platelets    Collection Time: 07/18/21  8:29 AM   Result Value Ref Range    WBC Count 8.4 4.0 - 11.0 10e3/uL    RBC Count 4.59 4.40 - 5.90 10e6/uL    Hemoglobin 13.1 (L) 13.3 - 17.7 g/dL    Hematocrit 41.0 40.0 - 53.0 %    MCV 89 78 - 100 fL    MCH 28.5 26.5 - 33.0 pg    MCHC 32.0 31.5 - 36.5 g/dL    RDW 14.6 10.0 - 15.0 %    Platelet Count 218 150 - 450 10e3/uL   Partial thromboplastin time    Collection Time: 07/18/21  8:29 AM   Result Value Ref Range    aPTT 38 22 - 38 Seconds   INR    Collection Time: 07/18/21  8:29 AM   Result Value Ref Range    INR 1.01 0.85 - 1.15   Basic metabolic panel    Collection Time: 07/18/21  8:29 AM   Result Value Ref Range    Sodium 143 136 - 145 mmol/L    Potassium 4.6 3.5 - 5.0 mmol/L    Chloride 109 (H) 98 - 107 mmol/L    Carbon Dioxide (CO2) 23 22 - 31 mmol/L    Anion Gap 11 5 - 18 mmol/L    Urea Nitrogen 25 8 - 28 mg/dL    Creatinine 1.02 0.70 - 1.30 mg/dL    Calcium 9.1 8.5 - 10.5 mg/dL    Glucose 154 (H) 70 - 125 mg/dL    GFR Estimate 70 >60 mL/min/1.73m2   Troponin I    Collection Time: 07/18/21  8:29 AM   Result Value Ref Range    Troponin I <0.01 0.00 - 0.29 ng/mL   Extra Red Top Tube    Collection Time:  07/18/21  8:29 AM   Result Value Ref Range    Hold Specimen [facility]    SARS-COV2 (COVID-19) Virus RT-PCR    Collection Time: 07/18/21 10:16 AM    Specimen: Nasopharyngeal; Swab   Result Value Ref Range    SARS CoV2 PCR Negative Negative   Glucose by meter    Collection Time: 07/18/21 12:30 PM   Result Value Ref Range    GLUCOSE BY METER POCT 135 (H) 70 - 125 mg/dL         RADIOLOGY:  Reviewed all pertinent imaging. Please see official radiology report.  MR Brain w/o Contrast   Final Result   IMPRESSION:   1.  Foci of restricted diffusion involving the cortex of the left precentral and postcentral gyri in addition to the left parietal lobe and left parieto-occipital junction corresponding to new areas of acute or early subacute ischemic change from prior    05/27/2021 imaging.   2.  Expected interval evolution of hemorrhage in the left parietal lobe with near complete resolution of previously seen parenchymal edema.      CTA Head Neck with Contrast   Final Result   IMPRESSION:    HEAD CT:   1.  No evidence for acute intracranial hemorrhage, mass effect, obstructive hydrocephalus or definite acute infarct by CT.   2.  Previous area of acute hemorrhage in the left parietal area has now matured and shows an area of low-density encephalomalacia.   3.  Mild age-related changes.      HEAD CTA:    1.  All of the major large-caliber proximal intracranial vessels are patent without definite aneurysm or AVM.      NECK CTA:   1.  Stable approximately 70-75% stenosis in the proximal left ICA compared to CTA 04/07/2021. No dissection.   2.  No significant stenosis right ICA or in either vertebral artery.      3.  Noncontrast head CT findings discussed with RADHA Nicole in the [facility] ER at 8:30 AM. The CT angiogram results discussed with Jessica Nicole at 8:55 AM on 07/18/2021.      Echocardiogram Complete - For age > 60 yrs    (Results Pending)   US Carotid Bilateral    (Results Pending)   NM Lexiscan stress test    (Results  Pending)       EKG:      Performed at: 0933    Impression: sinus bradycardia, RBBB    Rate: 53  Rhythm: sinus  Axis: 58  MI Interval: 198  QRS Interval: 158  QTc Interval: 454  ST Changes: No acute ST elevations or depressions.  Comparison: 05/26/2021 T wave inversions no longer present in anterolateral leads.    I have independently reviewed and interpreted the EKG(s) documented above.  Reviewed with Dr. Perez      I, Maikol Ashley, am serving as a scribe to document services personally performed by Jessica Nicole PA-C based on my observation and the provider's statements to me. I, Jessica Nicole PA-C attest that Maikol Ashley is acting in a scribe capacity, has observed my performance of the services and has documented them in accordance with my direction.    Jessica Nicole PA-C  Emergency Medicine  [facility]  7/18/2021      Jessica Nicole PA-C  07/18/21 5045

## 2021-07-18 NOTE — ED NOTES
"Patient is back from CT scan.  Son in the room with him.  Patient does straight cath himself 4 times a day. He noticed this am his right arm was weak.  He does have a drift present. Hand grasps almost equal.  Feels \"tingling\" in his arm. Able to lift his right leg but is more difficult than his left. Has bilateral ankle edema present.  No pain.   "

## 2021-07-18 NOTE — CONSULTS
[facility] PATIENT CONSULTATION NOTE  Consulted by: Dr. Arevalo  Reason for consultation: Eval of L ICA stenosis    HPI:  Maikol Glaser is a 80 year old year old male who has a PMH significant for hypertension, hyperlipidemia, status post fall May 2021 left parietal hemorrhagic stroke, newly diagnosed Parkinson syndrome, admitted today for acute onset left upper extremity and left lower extremity hemiplegia starting at approximately 5 a.m. this morning.  CT a head shown left ICA stenosis 70 to 75% which has been relatively stable since evaluation in April 2021.  MRI is showing left parietal occipital infarcts consistent with MCA territory.  Patient does not currently smoke, he is currently on statin and Plavix but no aspirin.  Patient was seen and evaluated bedside.  He reports some improvement in his right upper extremity, no residual deficits in his lower extremity.  He denies any amaurosis fugax, dysphagia, dysarthria or aphasia.  Vascular surgery has been consulted for evaluation of symptomatic carotid stenosis.    Review Of Systems:   10 Point review of system is negative except for noted in HPI    PAST MEDICAL HISTORY:  Past Medical History:   Diagnosis Date     Cerebral infarction (H)      HLD (hyperlipidemia)      HTN (hypertension)      Hyperlipidemia      Hypertension      Intracranial hemorrhage (H) 5/26/2021     Stroke (H)      TIA (transient ischemic attack)        PAST SURGICAL HISTORY:  History reviewed. No pertinent surgical history.    FAMILY HISTORY:  History reviewed. No pertinent family history.    SOCIAL HISTORY:   Social History     Tobacco Use     Smoking status: Never Smoker     Smokeless tobacco: Never Used   Substance Use Topics     Alcohol use: Not Currently         HOME MEDICATIONS:  Prior to Admission medications    Medication Sig Start Date End Date Taking? Authorizing Provider   acetaminophen (TYLENOL) 325 MG tablet Take 2 tablets (650 mg) by mouth every 6 hours as  needed for mild pain or fever 6/1/21  Yes Horace Pedersen APRN CNP   amLODIPine (NORVASC) 10 MG tablet Take 10 mg by mouth daily   Yes Unknown, Entered By History   carbidopa-levodopa (SINEMET)  MG tablet Take 1 tablet by mouth 4 times daily Takes at 0800, 1200, 1600, 2000   Yes Unknown, Entered By History   clopidogrel (PLAVIX) 75 MG tablet Take 75 mg by mouth daily   Yes Unknown, Entered By History   lisinopril (ZESTRIL) 40 MG tablet Take 40 mg by mouth daily   Yes Unknown, Entered By History   melatonin 3 MG CAPS Take 1 capful by mouth At Bedtime   Yes Unknown, Entered By History   metFORMIN (GLUCOPHAGE) 1000 MG tablet Take 1,000 mg by mouth 2 times daily (with meals)   Yes Unknown, Entered By History   metoprolol succinate ER (TOPROL-XL) 50 MG 24 hr tablet Take 50 mg by mouth daily   Yes Unknown, Entered By History   simvastatin (ZOCOR) 20 MG tablet Take 20 mg by mouth At Bedtime   Yes Unknown, Entered By History   aspirin (ASA) 325 MG tablet Take 1 tablet (325 mg) by mouth daily  Patient not taking: Reported on 7/18/2021 6/1/21   Horace Pedersen APRN CNP   levETIRAcetam (KEPPRA) 500 MG tablet Take 1 tablet (500 mg) by mouth 2 times daily for 7 days  Patient not taking: Reported on 7/18/2021 5/26/21 6/2/21  Horace Pedersen APRN CNP   multivitamin, therapeutic (THERA-VIT) TABS tablet Take 1 tablet by mouth every evening  Patient not taking: Reported on 7/18/2021    Unknown, Entered By History   nystatin (MYCOSTATIN) 927001 UNIT/GM external cream Apply topically 2 times daily  Patient not taking: Reported on 7/18/2021 5/31/21   Horace Pedersen APRN CNP   polyethylene glycol (MIRALAX) 17 GM/Dose powder Take 17 g by mouth daily  Patient not taking: Reported on 7/18/2021 5/31/21   Horace Pedersen APRN CNP       VITAL SIGNS:  BP (!) 157/74 (BP Location: Left arm)   Pulse 53   Temp 97.4  F (36.3  C) (Oral)   Resp 20   Ht 1.829 m (6')   Wt 97.4 kg (214 lb 12.8 oz)   SpO2 98%    BMI 29.13 kg/m      Intake/Output Summary (Last 24 hours) at 7/18/2021 1355  Last data filed at 7/18/2021 1030  Gross per 24 hour   Intake --   Output 1200 ml   Net -1200 ml       Labs:  ROUTINE IP LABS (Last four results)  BMP  Recent Labs   Lab 07/18/21  1230 07/18/21  0829 07/18/21  0827 07/18/21  0813   NA  --  143  --   --    POTASSIUM  --  4.6  --   --    CHLORIDE  --  109*  --   --    DORA  --  9.1  --   --    CO2  --  23  --   --    BUN  --  25  --   --    CR  --  1.02 1.1  --    * 154*  --  150*     CBC  Recent Labs   Lab 07/18/21  0829   WBC 8.4   RBC 4.59   HGB 13.1*   HCT 41.0   MCV 89   MCH 28.5   MCHC 32.0   RDW 14.6        INR  Recent Labs   Lab 07/18/21  0829   INR 1.01         PHYSICAL EXAM:  Constitutional: Normal appearance, no acute distress and cooperative   Psychiatric: Alert and oriented x3, normal affect  HENT: Normocephalic, atraumatic  Cardiovascular: RRR, no Murmurs  Respiratory: CTAB anteriorly, breathing unlabored without secondary muscle use  Neck: Supple, symmetrical, no masses  GI/Abdomen: +BS, abdomen soft, non distended, No masses, no CVAT, no rebound  MSK: No edema, able to move all extremities without difficulty  Extremities: no cyanosis, trauma, extremities warm and well perfused   Neuro: CN 2-12 grossly intact, strength and sensory grossly intact; except right upper extremity 4/5 strength  Vascular: Bilateral palpable radial and pedal pulses, bilateral palpable carotid pulses,         IMAGING:  Recent Results (from the past 24 hour(s))   CTA Head Neck with Contrast    Narrative    EXAM: CTA  HEAD NECK WITH CONTRAST  LOCATION: [facility]  DATE/TIME: 7/18/2021 8:18 AM    INDICATION: Right upper extremity weakness. Stroke code. History of intracranial hemorrhage.  COMPARISON: Head CT 06/16/2021. Head CT 05/26/2021. CTA 04/07/2021.  CONTRAST: 75ml isovue 370  TECHNIQUE: Head and neck CT angiogram with IV contrast. Noncontrast head CT followed by axial  helical CT images of the head and neck vessels obtained during the arterial phase of intravenous contrast administration. Axial 2D reconstructed images and   multiplanar 3D MIP reconstructed images of the head and neck vessels were performed by the technologist. Dose reduction techniques were used. All stenosis measurements made according to NASCET criteria unless otherwise specified.    FINDINGS:   NONCONTRAST HEAD CT:   INTRACRANIAL CONTENTS: No acute intracranial hemorrhage, extraaxial collection, or mass effect. The area of previously acute parenchymal hemorrhage in the left parietal area now shows an area of low-density encephalomalacia. No CT evidence of acute   infarct. Mild presumed chronic small vessel ischemic changes. Mild generalized volume loss. No hydrocephalus.     VISUALIZED ORBITS/SINUSES/MASTOIDS: No intraorbital abnormality. No paranasal sinus mucosal disease. No middle ear or mastoid effusion.    BONES/SOFT TISSUES: No acute abnormality.    HEAD CTA:  ANTERIOR CIRCULATION: No stenosis/occlusion, aneurysm, or high flow vascular malformation. Standard [place] anatomy.    POSTERIOR CIRCULATION: No stenosis/occlusion, aneurysm, or high flow vascular malformation. Balanced vertebral arteries supply a normal basilar artery.     DURAL VENOUS SINUSES: Expected enhancement of the major dural venous sinuses.    NECK CTA:  RIGHT CAROTID: No measurable stenosis or dissection.    LEFT CAROTID: Prominent atheromatous changes in the proximal left ICA with a stenosis in the 70-75% range similar to prior CTA 04/07/2021. No dissection.    VERTEBRAL ARTERIES: No focal stenosis or dissection. Balanced vertebral arteries.    AORTIC ARCH: Classic aortic arch anatomy with no significant stenosis at the origin of the great vessels.    NONVASCULAR STRUCTURES: Unremarkable.      Impression    IMPRESSION:   HEAD CT:  1.  No evidence for acute intracranial hemorrhage, mass effect, obstructive hydrocephalus or  definite acute infarct by CT.  2.  Previous area of acute hemorrhage in the left parietal area has now matured and shows an area of low-density encephalomalacia.  3.  Mild age-related changes.    HEAD CTA:   1.  All of the major large-caliber proximal intracranial vessels are patent without definite aneurysm or AVM.    NECK CTA:  1.  Stable approximately 70-75% stenosis in the proximal left ICA compared to CTA 04/07/2021. No dissection.  2.  No significant stenosis right ICA or in either vertebral artery.    3.  Noncontrast head CT findings discussed with RADHA Nicole in the [facility] ER at 8:30 AM. The CT angiogram results discussed with Jessica Nicole at 8:55 AM on 07/18/2021.   MR Brain w/o Contrast    Narrative    EXAM: MR BRAIN W/O CONTRAST  LOCATION: [facility]  DATE/TIME: 7/18/2021 10:46 AM    INDICATION: Right upper extremity weakness.  COMPARISON: CT head without contrast 05/26/2021. MRI of the brain 05/27/2021.  TECHNIQUE: Routine multiplanar multisequence head MRI without intravenous contrast.    FINDINGS:  INTRACRANIAL CONTENTS: Areas of restricted diffusion involving the cortex of the left precentral and postcentral gyri in addition to the left parietal lobe and left parieto-occipital junction. There is an area of T2 hypointense signal with associated   blooming artifact in the left parietal lobe on axial T2 image 18 corresponding to evolving parenchymal hemorrhage. Edema adjacent to the hemorrhage has nearly completely resolved from previous exam. No midline shift. Scattered nonspecific T2/FLAIR   hyperintensities within the cerebral white matter most consistent with mild chronic microvascular ischemic change. Mild generalized cerebral atrophy. No hydrocephalus. Mild cerebellar atrophy.     SELLA: No abnormality accounting for technique.    OSSEOUS STRUCTURES/SOFT TISSUES: Normal marrow signal. The major intracranial vascular flow voids are maintained.     ORBITS: No abnormality  accounting for technique.     SINUSES/MASTOIDS: No paranasal sinus mucosal disease. No middle ear or mastoid effusion.       Impression    IMPRESSION:  1.  Foci of restricted diffusion involving the cortex of the left precentral and postcentral gyri in addition to the left parietal lobe and left parieto-occipital junction corresponding to new areas of acute or early subacute ischemic change from prior   05/27/2021 imaging.  2.  Expected interval evolution of hemorrhage in the left parietal lobe with near complete resolution of previously seen parenchymal edema.         Patient Active Problem List   Diagnosis     Parkinson's disease (H)     Diabetes mellitus type 2, noninsulin dependent (H)     Dyslipidemia     Hypertension     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)     Right leg weakness     Right arm weakness     Hyperlipidemia     Intraparenchymal hemorrhage of brain (H)     Obesity with body mass index 30 or greater       ASSESSMENT:  Maikol Glaser is a 80 year old year old male who has a PMH significant for hypertension, hyperlipidemia, status post fall May 2021 left parietal hemorrhagic stroke, newly diagnosed Parkinson syndrome admitted today for acute CVA with left cortica infarction of L MCA distribution and left ICA stenosis 70 to 75% by NASCET criteria-likely symptomatic    PLAN:  We will obtain bilateral carotid duplex  We will obtain Lexiscan  Recommend high-dose statin   Continue antiplatelet therapy with Plavix  Will likely benefit from left carotid endarterectomy when symptoms plateau  Will discuss with neurology regarding ability to anticoagulate in the setting of previous hemorraghic stroke    Daniel Shah MD  Vascular Surgery fellow  [facility]

## 2021-07-18 NOTE — ED TRIAGE NOTES
"Last known well at 0400 when pt went to bed. Pt woke at 0600 w/ rt arm weakness. Hx of \" bleed in brain, on plavix\"  "

## 2021-07-19 ENCOUNTER — HOSPITAL ENCOUNTER (INPATIENT)
Dept: CARDIOLOGY | Facility: HOSPITAL | Age: 81
DRG: 065 | End: 2021-07-19
Payer: COMMERCIAL

## 2021-07-19 ENCOUNTER — HOSPITAL ENCOUNTER (INPATIENT)
Dept: NUCLEAR MEDICINE | Facility: HOSPITAL | Age: 81
DRG: 065 | End: 2021-07-19
Payer: COMMERCIAL

## 2021-07-19 ENCOUNTER — APPOINTMENT (OUTPATIENT)
Dept: PHYSICAL THERAPY | Facility: HOSPITAL | Age: 81
DRG: 065 | End: 2021-07-19
Payer: COMMERCIAL

## 2021-07-19 ENCOUNTER — APPOINTMENT (OUTPATIENT)
Dept: OCCUPATIONAL THERAPY | Facility: HOSPITAL | Age: 81
DRG: 065 | End: 2021-07-19
Attending: INTERNAL MEDICINE
Payer: COMMERCIAL

## 2021-07-19 DIAGNOSIS — Z01.810 PRE-OPERATIVE CARDIOVASCULAR EXAMINATION: Primary | ICD-10-CM

## 2021-07-19 LAB
ANION GAP SERPL CALCULATED.3IONS-SCNC: 12 MMOL/L (ref 5–18)
BUN SERPL-MCNC: 23 MG/DL (ref 8–28)
CALCIUM SERPL-MCNC: 9.2 MG/DL (ref 8.5–10.5)
CHLORIDE BLD-SCNC: 108 MMOL/L (ref 98–107)
CHOLEST SERPL-MCNC: 179 MG/DL
CO2 SERPL-SCNC: 22 MMOL/L (ref 22–31)
CREAT SERPL-MCNC: 1.04 MG/DL (ref 0.7–1.3)
FASTING STATUS PATIENT QL REPORTED: NORMAL
GFR SERPL CREATININE-BSD FRML MDRD: 68 ML/MIN/1.73M2
GLUCOSE BLD-MCNC: 122 MG/DL (ref 70–125)
GLUCOSE BLDC GLUCOMTR-MCNC: 170 MG/DL (ref 70–125)
HDLC SERPL-MCNC: 49 MG/DL
HOLD SPECIMEN: NORMAL
LDLC SERPL CALC-MCNC: 105 MG/DL
POTASSIUM BLD-SCNC: 4.4 MMOL/L (ref 3.5–5)
SODIUM SERPL-SCNC: 142 MMOL/L (ref 136–145)
TRIGL SERPL-MCNC: 124 MG/DL

## 2021-07-19 PROCEDURE — 343N000001 HC RX 343: Performed by: CLINICAL NURSE SPECIALIST

## 2021-07-19 PROCEDURE — 250N000013 HC RX MED GY IP 250 OP 250 PS 637: Performed by: INTERNAL MEDICINE

## 2021-07-19 PROCEDURE — 93018 CV STRESS TEST I&R ONLY: CPT | Performed by: INTERNAL MEDICINE

## 2021-07-19 PROCEDURE — 210N000001 HC R&B IMCU HEART CARE

## 2021-07-19 PROCEDURE — 36415 COLL VENOUS BLD VENIPUNCTURE: CPT | Performed by: INTERNAL MEDICINE

## 2021-07-19 PROCEDURE — 97110 THERAPEUTIC EXERCISES: CPT | Mod: GP

## 2021-07-19 PROCEDURE — 99233 SBSQ HOSP IP/OBS HIGH 50: CPT | Performed by: PSYCHIATRY & NEUROLOGY

## 2021-07-19 PROCEDURE — A9500 TC99M SESTAMIBI: HCPCS | Performed by: CLINICAL NURSE SPECIALIST

## 2021-07-19 PROCEDURE — 97166 OT EVAL MOD COMPLEX 45 MIN: CPT | Mod: GO

## 2021-07-19 PROCEDURE — 97112 NEUROMUSCULAR REEDUCATION: CPT | Mod: GO

## 2021-07-19 PROCEDURE — 250N000011 HC RX IP 250 OP 636: Performed by: STUDENT IN AN ORGANIZED HEALTH CARE EDUCATION/TRAINING PROGRAM

## 2021-07-19 PROCEDURE — 80048 BASIC METABOLIC PNL TOTAL CA: CPT | Performed by: INTERNAL MEDICINE

## 2021-07-19 PROCEDURE — 250N000013 HC RX MED GY IP 250 OP 250 PS 637: Performed by: STUDENT IN AN ORGANIZED HEALTH CARE EDUCATION/TRAINING PROGRAM

## 2021-07-19 PROCEDURE — 99233 SBSQ HOSP IP/OBS HIGH 50: CPT | Mod: GC | Performed by: INTERNAL MEDICINE

## 2021-07-19 PROCEDURE — 80061 LIPID PANEL: CPT | Performed by: INTERNAL MEDICINE

## 2021-07-19 PROCEDURE — 78452 HT MUSCLE IMAGE SPECT MULT: CPT | Mod: 26 | Performed by: INTERNAL MEDICINE

## 2021-07-19 PROCEDURE — 97116 GAIT TRAINING THERAPY: CPT | Mod: GP

## 2021-07-19 RX ORDER — AMINOPHYLLINE 25 MG/ML
50 INJECTION, SOLUTION INTRAVENOUS
Status: DISCONTINUED | OUTPATIENT
Start: 2021-07-19 | End: 2021-07-19 | Stop reason: HOSPADM

## 2021-07-19 RX ORDER — REGADENOSON 0.08 MG/ML
0.4 INJECTION, SOLUTION INTRAVENOUS ONCE
Status: COMPLETED | OUTPATIENT
Start: 2021-07-19 | End: 2021-07-19

## 2021-07-19 RX ORDER — ALBUTEROL SULFATE 0.83 MG/ML
2.5 SOLUTION RESPIRATORY (INHALATION)
Status: DISCONTINUED | OUTPATIENT
Start: 2021-07-19 | End: 2021-07-19 | Stop reason: HOSPADM

## 2021-07-19 RX ORDER — METOPROLOL SUCCINATE 25 MG/1
50 TABLET, EXTENDED RELEASE ORAL DAILY
Status: DISCONTINUED | OUTPATIENT
Start: 2021-07-19 | End: 2021-07-19

## 2021-07-19 RX ORDER — METOPROLOL SUCCINATE 25 MG/1
50 TABLET, EXTENDED RELEASE ORAL DAILY
Status: DISCONTINUED | OUTPATIENT
Start: 2021-07-20 | End: 2021-07-20 | Stop reason: HOSPADM

## 2021-07-19 RX ORDER — CAFFEINE CITRATE 20 MG/ML
60 SOLUTION INTRAVENOUS
Status: DISCONTINUED | OUTPATIENT
Start: 2021-07-19 | End: 2021-07-19 | Stop reason: HOSPADM

## 2021-07-19 RX ORDER — AMLODIPINE BESYLATE 5 MG/1
5 TABLET ORAL DAILY
Status: DISCONTINUED | OUTPATIENT
Start: 2021-07-19 | End: 2021-07-20 | Stop reason: HOSPADM

## 2021-07-19 RX ORDER — CAFFEINE 200 MG
200 TABLET ORAL
Status: DISCONTINUED | OUTPATIENT
Start: 2021-07-19 | End: 2021-07-19 | Stop reason: HOSPADM

## 2021-07-19 RX ADMIN — Medication 9 MCI.: at 10:01

## 2021-07-19 RX ADMIN — CLOPIDOGREL BISULFATE 75 MG: 75 TABLET ORAL at 08:47

## 2021-07-19 RX ADMIN — SIMVASTATIN 40 MG: 10 TABLET, FILM COATED ORAL at 20:35

## 2021-07-19 RX ADMIN — REGADENOSON 0.4 MG: 0.08 INJECTION, SOLUTION INTRAVENOUS at 11:53

## 2021-07-19 RX ADMIN — CARBIDOPA AND LEVODOPA 1 TABLET: 25; 100 TABLET ORAL at 20:36

## 2021-07-19 RX ADMIN — CARBIDOPA AND LEVODOPA 1 TABLET: 25; 100 TABLET ORAL at 17:19

## 2021-07-19 RX ADMIN — AMLODIPINE BESYLATE 5 MG: 5 TABLET ORAL at 08:47

## 2021-07-19 RX ADMIN — CARBIDOPA AND LEVODOPA 1 TABLET: 25; 100 TABLET ORAL at 13:28

## 2021-07-19 RX ADMIN — CARBIDOPA AND LEVODOPA 1 TABLET: 25; 100 TABLET ORAL at 08:47

## 2021-07-19 ASSESSMENT — MIFFLIN-ST. JEOR: SCORE: 1716.89

## 2021-07-19 NOTE — PROGRESS NOTES
Likely will plan on left CEA with Dr. Lemon.  No inducible ischemia on heart stress test.  EF of 70%.  This is a symptomatic lesion and likely caused his stroke.  The purpose of left CEA would be for future stroke prevention.  However, with recent ICH in May of 2021, would not do left CEA this week.  Would likely do this in about 2 weeks from now.  We can talk further with the patient and his family.  If they agree, we will work to schedule left CEA in around 2 weeks.     Abhi Vazquez MD, [facility]  Vascular Surgery Fellow

## 2021-07-19 NOTE — PROGRESS NOTES
NEUROLOGY INPATIENT PROGRESS NOTE       [facility] NEUROLOGY [place]  [address] [place] [address]  Tel: [phone]  Fax: [phone]  [email or web address]     ASSESSMENT & PLAN   Hospital Day#: 2  Visit diagnosis:  CVA    Left MCA infarction (risk factors: HTN, HLD, DM, left ICA stenosis)   80-year-old male with history of HTN, HLD, multiple CVA in the past, Parkinson disease, left ICA stenosis and recent left parietal ICH admitted with acute onset of right-sided weakness.    Patient was not a candidate for thrombolytics due to recent intracerebral hemorrhage    CT of the head showed no acute hemorrhage.  Previous area of parietal hemorrhage matured and area showed encephalomalacia.  CTA showed 70 to 75% left ICA stenosis.  MRI brain showed area of restricted diffusion in the left precentral and postcentral gyri in addition to left parietal lobe and left parietal occipital junction    Echocardiogram shows normal ejection fraction with no significant valvular heart abnormality.  There is mild left atrial enlargement with no significant change compared to previous echocardiogram done in 2017     Carotid ultrasound shows 70% left ICA stenosis with severe plaque and mild to moderate plaque formation with 50% stenosis right ICA stenosis.  Flow antegrade in both vertebral arteries    Neurosurgery recently recommended MRI and cerebral angiogram to delineate the cause of left parietal hemorrhage.  Recent MRI scan does not show any vascular abnormality.  He is scheduled for four-vessel cerebral angiogram at [facility] that I don't believe will add any meaningful information    Agree with vascular surgery that patient needs left endarterectomy in 2 weeks.  In the meantime we'll maximize medical care.  Continue Plavix and statin    IV hydration, keep systolic blood pressure less than 160 and diastolic less than 90    Patient also carries a diagnosis of Parkinson disease manifesting with  left-sided resting tremor that has improved with Sinemet.  Continue Sinemet 25/100 mg 1 tablet 4 times daily    Physical, occupational, speech and pharmacy consult appreciated they recommend transitional care unit    Neurology Discharge Planning :   DAPHNIE Love MD  [facility]  (Former[facility], [facility]     PROBLEM LIST      Patient Active Problem List   Diagnosis Code     Parkinson's disease (H) G20     Diabetes mellitus type 2, noninsulin dependent (H) E11.9     Dyslipidemia E78.5     Hypertension I10     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) I63.312     Right leg weakness R29.898     Right arm weakness R29.898     Hyperlipidemia E78.5     Intraparenchymal hemorrhage of brain (H) I61.9     Obesity with body mass index 30 or greater E66.9       Past Medical History:   Patient  has a past medical history of Cerebral infarction (H), HLD (hyperlipidemia), HTN (hypertension), Hyperlipidemia, Hypertension, Intracranial hemorrhage (H) (5/26/2021), Stroke (H), and TIA (transient ischemic attack).     SUBJECTIVE     No change in right-sided weakness.  Patient is anxious to go home.  Somewhat nervous about the surgery     OBJECTIVE     Vital signs in last 24 hours  Temp:  [97.5  F (36.4  C)-98.3  F (36.8  C)] 97.5  F (36.4  C)  Pulse:  [54-67] 67  Resp:  [18-21] 21  BP: (119-164)/(70-76) 153/72  SpO2:  [97 %-100 %] 100 %    Weight:   Wt Readings from Last 1 Encounters:   07/20/21 94.2 kg (207 lb 9.6 oz)         I/O last 24 hours  Intake/Output Summary (Last 24 hours) at 7/19/2021 0654  Last data filed at 7/19/2021 0500  Gross per 24 hour   Intake 260 ml   Output 3350 ml   Net -3090 ml     Review of Systems   Pertinent items are noted in HPI.    General Physical Exam: Patient is alert and oriented x 3. Vital signs were reviewed and are documented in EMR. Neck was supple, no carotid bruit, thyromegaly, JVD or lymphadenopathy  noted.  Neurological Exam:  Mental status: Alert and oriented x3 no acute distress  Speech: Normal with no dysarthria or aphasia  Cranial nerves: 2 through 12 intact  Motor: Normal mass with increased tone on the left side.  He has cogwheel rigidity on the left side.  Strength on the left 5/5 right upper extremity 3/5 right lower extremity 3+/5  Reflexes: 1+ in biceps triceps brachioradialis and knees absent at ankles with an upgoing toe on the right.  Downgoing toe on the left  Sensory: Intact to light touch pinprick no double simultaneous extinction.  Coordination: Intact finger-nose testing  Gait: Deferred for safety     DIAGNOSTIC STUDIES     Pertinent Radiology   Following imaging studies were reviewed:    CT  HEAD CT:  1.  No evidence for acute intracranial hemorrhage, mass effect, obstructive hydrocephalus or definite acute infarct by CT.  2.  Previous area of acute hemorrhage in the left parietal area has now matured and shows an area of low-density encephalomalacia.  3.  Mild age-related changes.     HEAD CTA:   1.  All of the major large-caliber proximal intracranial vessels are patent without definite aneurysm or AVM.     NECK CTA:  1.  Stable approximately 70-75% stenosis in the proximal left ICA compared to CTA 04/07/2021. No dissection.  2.  No significant stenosis right ICA or in either vertebral artery.    MRI  1.  Foci of restricted diffusion involving the cortex of the left precentral and postcentral gyri in addition to the left parietal lobe and left parieto-occipital junction corresponding to new areas of acute or early subacute ischemic change from prior   05/27/2021 imaging.   2.  Expected interval evolution of hemorrhage in the left parietal lobe with near complete resolution of previously seen parenchymal edema.    Echocardiogram  Echo result w/o MOPS: Interpretation Summary 1. Normal left ventricular size and systolic performance with a visuallyestimated ejection fraction of 55-60%.2. No  significant valvular heart disease is identified on this study.3. Normal right ventricular size and systolic performance.4. There is mild left atrial enlargement. When compared to the prior real-time echocardiogram dated 24 January 2017,there has been no major appreciable interval change.    Carotid Ultrasound  1.  Mild to moderate plaque formation, velocities consistent with less than 50% stenosis in the right internal carotid artery.   2.  Severe plaque formation, velocities consistent with greater than 70% stenosis in the left internal carotid artery.   3.  Flow within the vertebral arteries is antegrade.    Pertinent Labs   Lab Results: Personally Reviewed  Recent Results (from the past 24 hour(s))   NM Lexiscan stress test    Collection Time: 07/19/21 11:33 AM   Result Value Ref Range    Pharmacologic Protocol Lexiscan     Test Type Pharmacological     Baseline HR 59     Baseline Systolic      Baseline Diastolic BP 80     Last Stress HR 72     Last Stress Systolic      Last Stress Diastolic BP 65     Target      PERCENT HR 85%     ST Deviation Elevation I 0.4mm     Deviation Time III -0.6mm     ST Elevation Amount III 1.9mm     ST Deviation Amount he V2 -5.2mm     Final Resting /69     Final Resting HR 68     Max Treadmill Speed 0.0     Max Treadmill Grade 0.0     Peak Systolic /65     Peak Diastolic /80     Max HR 73     Stress Phase Resting     Stress Resting Pt Position MANUAL EVENT     Current HR 61     Current /80     Stress Phase Stress     Stage Minute EXE 00:00     Exercise Stage STAGE 2     Current HR 58     Current /80     Stress Phase Stress     Stage Minute EXE 01:00     Exercise Stage STAGE 3     Current HR 61     Current /80     Stress Phase Stress     Stage Minute EXE 01:19     Exercise Stage STAGE 3     Current HR 61     Current /80     Stress Phase Stress     Stage Minute EXE 02:00     Exercise Stage STAGE 4     Current HR 72      Current /80     Stress Phase Stress     Stage Minute EXE 02:16     Exercise Stage STAGE 4     Current HR 72     Current /66     Stress Phase Stress     Stage Minute EXE 02:51     Exercise Stage STAGE 4     Current HR 73     Current /65     Stress Phase Stress     Stage Minute EXE 03:00     Exercise Stage STAGE 5     Current HR 72     Current /65     Stress Phase Stress     Stage Minute EXE 04:00     Exercise Stage STAGE 6     Current HR 72     Current /65     Stress Phase Stress     Stage Minute EXE 04:00     Exercise Stage STAGE 6     Current HR 72     Current /65     Stress Phase Recovery     Stage Minute REC 00:49     Exercise Stage Recovery     Current HR 71     Current /80     Stress Phase Recovery     Stage Minute REC 00:59     Exercise Stage Recovery     Current HR 71     Current /80     Stress Phase Recovery     Stage Minute REC 01:59     Exercise Stage Recovery     Current HR 70     Current /80     Stress Phase Recovery     Stage Minute REC 02:46     Exercise Stage Recovery     Current HR 70     Current /69     Stress Phase Recovery     Stage Minute REC 02:59     Exercise Stage Recovery     Current HR 69     Current /69     Stress Phase Recovery     Stage Minute REC 03:59     Exercise Stage Recovery     Current HR 67     Current /69     Stress Phase Recovery     Stage Minute REC 04:03     Exercise Stage Recovery     Current HR 68     Current /69     Calculated Percent HR 52 %    Rate Pressure Product 10,220.0    Glucose by meter    Collection Time: 07/19/21  4:56 PM   Result Value Ref Range    GLUCOSE BY METER POCT 170 (H) 70 - 125 mg/dL         HOSPITAL MEDICATIONS       amLODIPine  5 mg Oral Daily     carbidopa-levodopa  1 tablet Oral or Feeding Tube 4x Daily     clopidogrel  75 mg Oral or NG Tube Daily     insulin aspart  1-3 Units Subcutaneous TID AC     insulin aspart  1-3 Units Subcutaneous At Bedtime     metoprolol  succinate ER  50 mg Oral Daily     polyethylene glycol  17 g Oral Daily     simvastatin  40 mg Oral or Feeding Tube At Bedtime     sodium chloride (PF)  3 mL Intracatheter Q8H        Total time spent for face to face visit, reviewing labs/imaging studies, counseling and coordination of care was: 30 Minutes More than 50% of this time was spent on counseling and coordination of care.        This note was dictated using voice recognition software.  Any grammatical or context distortions are unintentional and inherent to the software.

## 2021-07-19 NOTE — PROGRESS NOTES
07/19/21 0900   Quick Adds   Type of Visit Initial Occupational Therapy Evaluation   Self-Care   Usual Activity Tolerance good   Regular Exercise No   Activity/Exercise/Self-Care Comment Patient states he just finished with home care recently. States he has been managing BADLs I'ly.   General Information   Onset of Illness/Injury or Date of Surgery 07/18/21   Referring Physician Dr. MANN   Additional Occupational Profile Info/Pertinent History of Current Problem Moderate comlexity   Cognitive Status Examination   Orientation Status orientation to person, place and time   Affect/Mental Status (Cognitive) WNL   Follows Commands WNL   Safety Deficit ability to follow commands   Visual Perception   Visual Impairment/Limitations WNL   Sensory   Sensory Quick Adds Other (describe)   Sensory Comments States numbness in RUE from elbow down, fingers being the worst   Pain Assessment   Patient Currently in Pain No   Range of Motion Comprehensive   General Range of Motion upper extremity range of motion deficits identified   General Upper Extremity Assessment (Range of Motion)   Upper Extremity: Range of Motion RUE ROM WFL;LUE ROM WFL   Strength Comprehensive (MMT)   General Manual Muscle Testing (MMT) Assessment upper extremity strength deficits identified;hand strength deficits identified   Upper Extremity (Manual Muscle Testing)   Upper Extremity: Manual Muscle Testing (MMT) left UE strength is WNL;right shoulder strength deficit   Comment, MMT: Upper Extremity RUE sh flex- unable to complete MMT. Sh ext, elbow flex/ext WNL   Hand Strength   Hand Strength Comments weakness noted in R hand grasp   Coordination   Upper Extremity Coordination Right UE impaired   Coordination Comments Decreased FM/GM coordin in R hand   Transfers   Transfers toilet transfer;sit-stand transfer   Sit-Stand Transfer   Sit-Stand [place] (Transfers) contact guard   Assistive Device (Sit-Stand Transfers)   (HHA)   Toilet Transfer   Type (Toilet  Transfer) sit-stand;stand-sit   [place] Level (Toilet Transfer) contact guard   Assistive Device (Toilet Transfer) grab bars/safety frame   Activities of Daily Living   BADL Assessment lower body dressing   Lower Body Dressing Assessment   [place] Level (Lower Body Dressing) supervision   Position (Lower Body Dressing) unsupported sitting   Comment (Lower Body Dressing) Increased time/effort    Clinical Impression   Criteria for Skilled Therapeutic Interventions Met (OT) yes   OT Diagnosis Decreased independence with ADLs   OT Problem List-Impairments impacting ADL balance;coordination;range of motion (ROM);sensation;strength   Assessment of Occupational Performance 1-3 Performance Deficits   Planned Therapy Interventions (OT) ADL retraining;IADL retraining;balance training;neuromuscular re-education;strengthening;transfer training   Clinical Decision Making Complexity (OT) moderate complexity   Therapy Frequency (OT) 2x/day   Predicted Duration of Therapy 5   Risk & Benefits of therapy have been explained care plan/treatment goals reviewed   OT Discharge Planning    OT Discharge Recommendation (DC Rec) Transitional Care Facility   Total Evaluation Time (Minutes)   Total Evaluation Time (Minutes) 10   Skin WDL   Skin WDL WDL

## 2021-07-19 NOTE — PLAN OF CARE
Problem: Cerebral Tissue Perfusion (Stroke, Ischemic/Transient Ischemic Attack)  Goal: Optimal Cerebral Tissue Perfusion  Outcome: Improving     Problem: Functional Ability Impaired (Stroke, Ischemic/Transient Ischemic Attack)  Goal: Optimal Functional Ability  Outcome: Improving  Intervention: Optimize Functional Ability  Recent Flowsheet Documentation  Taken 7/18/2021 2000 by Judith Morris RN  Activity Management:   ambulated to bathroom   up in chair  Taken 7/18/2021 1600 by Judith Morris RN  Activity Management:   ambulated to bathroom   up in chair     Problem: Urinary Elimination Impaired (Stroke, Ischemic/Transient Ischemic Attack)  Goal: Effective Urinary Elimination  Outcome: Improving   VSS. NIHSS of one due to right arm weakness, right arm and right leg tingling. Patient denies pain, no shortness of breath noted. Patient straight cath for 950 ml of urine. Continue to monitor.

## 2021-07-19 NOTE — PROGRESS NOTES
Daily Progress Note        CODE STATUS:  Full Code    07/19/21  Assessment/Plan:  80-year-old male with history of HTN, HLD, multiple CVA in the past, Parkinson disease, left ICA stenosis and recent left parietal ICH who presented to ED for evaluation of right-sided weakness, found to have left MCA infarction and admitted for further management    Right-sided weakness due to left MCA infarction;  Recent admission at [facility] for left parietal intracerebral hemorrhage of unknown etiology.  MRI scan on this admission does not suggest vascular malformation per neurologist.  --MRI brain reported area of restricted diffusion in left precentral and post central gyri in addition to left parietal lobe and left parieto-occipital junction  --Appreciated neurology input, recommended to continue Plavix and statin.  --Continue stroke protocol    Left ICA stenosis with severe plaque;  --Appreciated vascular surgery input, reported that discussed with neurology who does not feel heparin GTT is safe given recent ICH.  --Continue medical management.  Vascular surgeon will be coming this afternoon to discuss with patient  --Stress test ordered by vascular surgery team and in process    Essential hypertension; uncontrolled  --Home medications includes Norvasc 10 mg daily, lisinopril 40 mg daily, Toprol-XL 50 mg daily  --Per neurologist, recommended allow permissive hypertension  --Resume home Toprol-XL, Norvasc at half dose.  Hold lisinopril for now.  Monitor vitals and adjust medications accordingly.    Type II DM with hyperglycemia;  --Continue insulin sliding scale and hypoglycemic protocol.    --Hold Metformin for 48 hours as patient received contrast on 7/18/21    History of parkinsonism; continue home medications    DVT prophylaxis; not on subcu heparin due to recent ICH, continue SCDs, ambulate    Disposition; pending  Barrier to discharge; work-up in process, clinical progress     LOS: 1 day     Subjective:  Interval History:  Patient is new to me.  Patient seen and examined. Notes, labs, imaging reports personally reviewed.  Patient sitting in a chair, denied feeling short of breath or chest pain, reported headache 1/10 intensity, reported ongoing right-sided weakness more on right hand but denied worsening.  Denied abdomen pain, nausea, vomiting.  Denied fever or chills.  Discussed with nursing staff at bedside.  Patient declined me to call any family members, reported they will be visiting this afternoon, told patient that if family has any medical questions then I am happy to answer.    Review of Systems:   As mentioned in subjective.    Patient Active Problem List   Diagnosis     Parkinson's disease (H)     Diabetes mellitus type 2, noninsulin dependent (H)     Dyslipidemia     Hypertension     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)     Right leg weakness     Right arm weakness     Hyperlipidemia     Intraparenchymal hemorrhage of brain (H)     Obesity with body mass index 30 or greater       Scheduled Meds:    amLODIPine  5 mg Oral Daily     carbidopa-levodopa  1 tablet Oral or Feeding Tube 4x Daily     clopidogrel  75 mg Oral or NG Tube Daily     insulin aspart  1-3 Units Subcutaneous TID AC     insulin aspart  1-3 Units Subcutaneous At Bedtime     [START ON 7/20/2021] metoprolol succinate ER  50 mg Oral Daily     polyethylene glycol  17 g Oral Daily     simvastatin  40 mg Oral or Feeding Tube At Bedtime     sodium chloride (PF)  3 mL Intracatheter Q8H     Continuous Infusions:    - MEDICATION INSTRUCTIONS -       - MEDICATION INSTRUCTIONS -       sodium chloride       PRN Meds:.acetaminophen, glucose **OR** dextrose **OR** glucagon, labetalol **OR** hydrALAZINE, lidocaine 4%, lidocaine (buffered or not buffered), - MEDICATION INSTRUCTIONS -, - MEDICATION INSTRUCTIONS -, ondansetron **OR** ondansetron, sodium chloride (PF), sodium chloride    Objective:  Vital signs in last 24 hours:  Temp:  [97.3   F (36.3  C)-97.6  F (36.4  C)] 97.5  F (36.4  C)  Pulse:  [56-67] 58  Resp:  [19-20] 20  BP: (131-165)/(70-78) 164/70  SpO2:  [98 %] 98 %  Weight:   [unfilled]      Intake/Output Summary (Last 24 hours) at 7/19/2021 1313  Last data filed at 7/19/2021 0500  Gross per 24 hour   Intake 360 ml   Output 2150 ml   Net -1790 ml       Physical Exam:  General: Not in obvious distress.  HEENT: Normocephalic, supple neck  Chest: Clear to auscultation bilateral anteriorly, no wheezing  Heart: S1S2 normal, regular  Abdomen: Soft. NT, ND. Bowel sounds- active.  Extremities: No legs swelling  Neuro: alert and awake, clear speech, did noticed mild weakness on right side, more on right hand/fingers, sensory grossly intact    Lab Results:(I have personally reviewed the results)    Recent Results (from the past 24 hour(s))   Glucose by meter    Collection Time: 07/18/21  5:19 PM   Result Value Ref Range    GLUCOSE BY METER POCT 133 (H) 70 - 125 mg/dL   Glucose by meter    Collection Time: 07/18/21  9:18 PM   Result Value Ref Range    GLUCOSE BY METER POCT 159 (H) 70 - 125 mg/dL   Lipid panel reflex to direct LDL: Non-fasting    Collection Time: 07/19/21  5:08 AM   Result Value Ref Range    Cholesterol 179 <=199 mg/dL    Triglycerides 124 <=149 mg/dL    Direct Measure HDL 49 >=40 mg/dL    LDL Cholesterol Calculated 105 <=129 mg/dL    Patient Fasting > 8hrs? Unknown    Basic metabolic panel    Collection Time: 07/19/21  5:08 AM   Result Value Ref Range    Sodium 142 136 - 145 mmol/L    Potassium 4.4 3.5 - 5.0 mmol/L    Chloride 108 (H) 98 - 107 mmol/L    Carbon Dioxide (CO2) 22 22 - 31 mmol/L    Anion Gap 12 5 - 18 mmol/L    Urea Nitrogen 23 8 - 28 mg/dL    Creatinine 1.04 0.70 - 1.30 mg/dL    Calcium 9.2 8.5 - 10.5 mg/dL    Glucose 122 70 - 125 mg/dL    GFR Estimate 68 >60 mL/min/1.73m2   Extra Purple Top Tube    Collection Time: 07/19/21  5:23 AM   Result Value Ref Range    Hold Specimen JIC    NM Lexiscan stress test    Collection  Time: 07/19/21 11:33 AM   Result Value Ref Range    Pharmacologic Protocol Lexiscan     Test Type Pharmacological     Baseline HR 59     Baseline Systolic      Baseline Diastolic BP 80     Last Stress HR 72     Last Stress Systolic      Last Stress Diastolic BP 65     Target      PERCENT HR 85%     ST Deviation Elevation I 0.4mm     Deviation Time III -0.6mm     ST Elevation Amount III 1.9mm     ST Deviation Amount he V2 -5.2mm     Final Resting /69     Final Resting HR 68     Max Treadmill Speed 0.0     Max Treadmill Grade 0.0     Peak Systolic /65     Peak Diastolic /80     Max HR 73     Stress Phase Resting     Stress Resting Pt Position MANUAL EVENT     Current HR 61     Current /80     Stress Phase Stress     Stage Minute EXE 00:00     Exercise Stage STAGE 2     Current HR 58     Current /80     Stress Phase Stress     Stage Minute EXE 01:00     Exercise Stage STAGE 3     Current HR 61     Current /80     Stress Phase Stress     Stage Minute EXE 01:19     Exercise Stage STAGE 3     Current HR 61     Current /80     Stress Phase Stress     Stage Minute EXE 02:00     Exercise Stage STAGE 4     Current HR 72     Current /80     Stress Phase Stress     Stage Minute EXE 02:16     Exercise Stage STAGE 4     Current HR 72     Current /66     Stress Phase Stress     Stage Minute EXE 02:51     Exercise Stage STAGE 4     Current HR 73     Current /65     Stress Phase Stress     Stage Minute EXE 03:00     Exercise Stage STAGE 5     Current HR 72     Current /65     Stress Phase Stress     Stage Minute EXE 04:00     Exercise Stage STAGE 6     Current HR 72     Current /65     Stress Phase Stress     Stage Minute EXE 04:00     Exercise Stage STAGE 6     Current HR 72     Current /65     Stress Phase Recovery     Stage Minute REC 00:49     Exercise Stage Recovery     Current HR 71     Current /80     Stress Phase Recovery      Stage Minute REC 00:59     Exercise Stage Recovery     Current HR 71     Current /80     Stress Phase Recovery     Stage Minute REC 01:59     Exercise Stage Recovery     Current HR 70     Current /80     Stress Phase Recovery     Stage Minute REC 02:46     Exercise Stage Recovery     Current HR 70     Current /69     Stress Phase Recovery     Stage Minute REC 02:59     Exercise Stage Recovery     Current HR 69     Current /69     Stress Phase Recovery     Stage Minute REC 03:59     Exercise Stage Recovery     Current HR 67     Current /69     Stress Phase Recovery     Stage Minute REC 04:03     Exercise Stage Recovery     Current HR 68     Current /69     Calculated Percent HR 52 %    Rate Pressure Product 10,220.0          Serum Glucose range:   Recent Labs   Lab 07/19/21  0508 07/18/21  2118 07/18/21  1719 07/18/21  1230    159* 133* 135*     ABG: No lab results found in last 7 days.  CBC:   Recent Labs   Lab 07/18/21  0829   WBC 8.4   HGB 13.1*   HCT 41.0   MCV 89        Chemistry:   Recent Labs   Lab 07/19/21  0508 07/18/21  0829 07/18/21  0827    143  --    POTASSIUM 4.4 4.6  --    CHLORIDE 108* 109*  --    CO2 22 23  --    BUN 23 25  --    CR 1.04 1.02 1.1   GFRESTIMATED 68 70 >60   DORA 9.2 9.1  --      Coags:  Recent Labs   Lab 07/18/21  0829   INR 1.01   PTT 38     Cardiac Markers:  Recent Labs   Lab 07/18/21  0829   TROPONINI <0.01      MR Brain w/o Contrast    Result Date: 7/18/2021  EXAM: MR BRAIN W/O CONTRAST LOCATION: [facility] DATE/TIME: 7/18/2021 10:46 AM INDICATION: Right upper extremity weakness. COMPARISON: CT head without contrast 05/26/2021. MRI of the brain 05/27/2021. TECHNIQUE: Routine multiplanar multisequence head MRI without intravenous contrast. FINDINGS: INTRACRANIAL CONTENTS: Areas of restricted diffusion involving the cortex of the left precentral and postcentral gyri in addition to the left parietal lobe and left  parieto-occipital junction. There is an area of T2 hypointense signal with associated blooming artifact in the left parietal lobe on axial T2 image 18 corresponding to evolving parenchymal hemorrhage. Edema adjacent to the hemorrhage has nearly completely resolved from previous exam. No midline shift. Scattered nonspecific T2/FLAIR hyperintensities within the cerebral white matter most consistent with mild chronic microvascular ischemic change. Mild generalized cerebral atrophy. No hydrocephalus. Mild cerebellar atrophy. SELLA: No abnormality accounting for technique. OSSEOUS STRUCTURES/SOFT TISSUES: Normal marrow signal. The major intracranial vascular flow voids are maintained. ORBITS: No abnormality accounting for technique. SINUSES/MASTOIDS: No paranasal sinus mucosal disease. No middle ear or mastoid effusion.     IMPRESSION: 1.  Foci of restricted diffusion involving the cortex of the left precentral and postcentral gyri in addition to the left parietal lobe and left parieto-occipital junction corresponding to new areas of acute or early subacute ischemic change from prior 2021 imaging. 2.  Expected interval evolution of hemorrhage in the left parietal lobe with near complete resolution of previously seen parenchymal edema.    Echocardiogram Complete - For age > 60 yrs    Result Date: 2021  [id number]^SHANNON^[place] [address]  Name: MIMA HENSON MRN: [id number] : 1940 Study Date: 2021 02:36 PM Age: 80 yrs Gender: Male Patient Location: [facility] Reason For Study: Cerebrovascular Incident Ordering Physician: MCKAY ORTEGA Performed By: WR  BSA: 2.2 m2 Height: 72 in Weight: 210 lb HR: 57 ______________________________________________________________________________ ______________________________________________________________________________ Interpretation Summary  1. Normal left ventricular size and systolic  performance with a visually estimated ejection fraction of 55-60%. 2. No significant valvular heart disease is identified on this study. 3. Normal right ventricular size and systolic performance. 4. There is mild left atrial enlargement.  When compared to the prior real-time echocardiogram dated 24 January 2017, there has been no major appreciable interval change. ______________________________________________________________________________ Left ventricle: Normal left ventricular size and systolic performance with a visually estimated ejection fraction of 55-60%. There is normal regional wall motion. Left ventricular wall thickness is normal.  Assessment of LV Diastolic Function: The cumulative findings are indeterminate in the evaluation of diastolic function [The septal e' velocity is < 7 cm/s & lateral e' velocity is < 10 cm/s. The average E/e' is < 14. The TR velocity cannot be determined due to insufficient tricuspid insufficiency signal. Left atrial volume index is greater than 34 mL/mÂ ].  Right ventricle: Normal right ventricular size and systolic performance.  Left atrium: There is mild left atrial enlargement.  Right atrium: The right atrium is of normal size.  IVC: The IVC is not well visualized.  Aortic valve: The aortic valve is comprised of three cusps. No significant aortic stenosis or aortic insufficiency is detected on this study.  Mitral valve: The mitral valve appears morphologically normal. There is trace mitral insufficiency.  Tricuspid valve: The tricuspid valve is grossly morphologically normal. There is trace tricuspid insufficiency.  Pulmonic valve: The pulmonic valve is grossly morphologically normal.  Thoracic aorta: The aortic root and proximal ascending aorta are of normal dimension.  Pericardium: There is no significant pericardial effusion.  ______________________________________________________________________________  ______________________________________________________________________________ MMode/2D Measurements & Calculations RVDd: 2.8 cm IVSd: 1.2 cm LVIDd: 4.7 cm LVIDs: 3.0 cm LVPWd: 1.2 cm FS: 35.4 % LV mass(C)d: 201.1 grams LV mass(C)dI: 92.5 grams/m2 Ao root diam: 3.4 cm LA dimension: 3.5 cm asc Aorta Diam: 3.4 cm LA/Ao: 1.0 LVOT diam: 2.0 cm LVOT area: 3.2 cm2  LA Volume Indexed (AL/bp): 34.3 ml/m2  Time Measurements Aortic HR: 57.0 BPM MM HR: 57.0 BPM  Doppler Measurements & Calculations MV E max rhonda: 69.4 cm/sec MV A max rhonda: 98.5 cm/sec MV E/A: 0.70 MV dec slope: 345.6 cm/sec2 MV dec time: 0.20 sec Ao V2 max: 126.8 cm/sec Ao max P.0 mmHg Ao V2 mean: 90.3 cm/sec Ao mean PG: 3.7 mmHg Ao V2 VTI: 35.0 cm RIP(I,D): 2.7 cm2 RIP(V,D): 3.0 cm2 LV V1 max P.5 mmHg LV V1 max: 117.4 cm/sec LV V1 VTI: 29.0 cm CO(LVOT): 5.3 l/min CI(LVOT): 2.4 l/min/m2 SV(LVOT): 93.1 ml SI(LVOT): 42.8 ml/m2 PA acc time: 0.11 sec RIP Index (cm2/m2): 1.2 E/E' avg: 10.3 Lateral E/e': 9.0  Medial E/e': 11.6  ______________________________________________________________________________ Report approved by: Nya Albert 2021 04:25 PM       US Carotid Bilateral    Result Date: 2021  EXAM: US CAROTID BILATERAL LOCATION: [facility] DATE/TIME: 2021 1:49 PM INDICATION: Evaluate baseline carotid disease, right-sided weakness. COMPARISON: CTA earlier today. TECHNIQUE: Duplex exam performed utilizing 2D gray-scale imaging, Doppler interrogation with color-flow and spectral waveform analysis. The percent diameter stenosis is determined using NASCET criteria and Society of Radiologists in Ultrasound Consensus Criteria. FINDINGS: RIGHT: Mild to moderate plaque at the bifurcation. The peak systolic velocity in the ICA is less than 125 cm/sec, consistent with less than 50% stenosis. Normal velocities in the ECA. Antegrade flow within the vertebral artery. LEFT: Severe plaque at the bifurcation. The peak systolic  velocity in the ICA is greater than 230 cm/sec, consistent with greater than 70% stenosis. Normal velocities in the ECA. Antegrade flow within the vertebral artery. VELOCITY CHART: The following velocities were obtained in the RIGHT carotid system. CCA: 64/12 cm/s ICA: 73/16 cm/s ECA: 94/8 cm/s ICA/CCA: PS 1.1, ED 1.3 The following velocities were obtained in the LEFT carotid system. CCA: 71/9 cm/s ICA: 232/45 cm/s ECA: 114/11 cm/s ICA/CCA: PS 3.3, ED 5.0     IMPRESSION: 1.  Mild to moderate plaque formation, velocities consistent with less than 50% stenosis in the right internal carotid artery. 2.  Severe plaque formation, velocities consistent with greater than 70% stenosis in the left internal carotid artery. 3.  Flow within the vertebral arteries is antegrade.    CTA Head Neck with Contrast    Result Date: 7/18/2021  EXAM: CTA  HEAD NECK WITH CONTRAST LOCATION: [facility] DATE/TIME: 7/18/2021 8:18 AM INDICATION: Right upper extremity weakness. Stroke code. History of intracranial hemorrhage. COMPARISON: Head CT 06/16/2021. Head CT 05/26/2021. CTA 04/07/2021. CONTRAST: 75ml isovue 370 TECHNIQUE: Head and neck CT angiogram with IV contrast. Noncontrast head CT followed by axial helical CT images of the head and neck vessels obtained during the arterial phase of intravenous contrast administration. Axial 2D reconstructed images and multiplanar 3D MIP reconstructed images of the head and neck vessels were performed by the technologist. Dose reduction techniques were used. All stenosis measurements made according to NASCET criteria unless otherwise specified. FINDINGS: NONCONTRAST HEAD CT: INTRACRANIAL CONTENTS: No acute intracranial hemorrhage, extraaxial collection, or mass effect. The area of previously acute parenchymal hemorrhage in the left parietal area now shows an area of low-density encephalomalacia. No CT evidence of acute infarct. Mild presumed chronic small vessel ischemic changes. Mild  generalized volume loss. No hydrocephalus. VISUALIZED ORBITS/SINUSES/MASTOIDS: No intraorbital abnormality. No paranasal sinus mucosal disease. No middle ear or mastoid effusion. BONES/SOFT TISSUES: No acute abnormality. HEAD CTA: ANTERIOR CIRCULATION: No stenosis/occlusion, aneurysm, or high flow vascular malformation. Standard [place] of [place] anatomy. POSTERIOR CIRCULATION: No stenosis/occlusion, aneurysm, or high flow vascular malformation. Balanced vertebral arteries supply a normal basilar artery. DURAL VENOUS SINUSES: Expected enhancement of the major dural venous sinuses. NECK CTA: RIGHT CAROTID: No measurable stenosis or dissection. LEFT CAROTID: Prominent atheromatous changes in the proximal left ICA with a stenosis in the 70-75% range similar to prior CTA 04/07/2021. No dissection. VERTEBRAL ARTERIES: No focal stenosis or dissection. Balanced vertebral arteries. AORTIC ARCH: Classic aortic arch anatomy with no significant stenosis at the origin of the great vessels. NONVASCULAR STRUCTURES: Unremarkable.     IMPRESSION: HEAD CT: 1.  No evidence for acute intracranial hemorrhage, mass effect, obstructive hydrocephalus or definite acute infarct by CT. 2.  Previous area of acute hemorrhage in the left parietal area has now matured and shows an area of low-density encephalomalacia. 3.  Mild age-related changes. HEAD CTA: 1.  All of the major large-caliber proximal intracranial vessels are patent without definite aneurysm or AVM. NECK CTA: 1.  Stable approximately 70-75% stenosis in the proximal left ICA compared to CTA 04/07/2021. No dissection. 2.  No significant stenosis right ICA or in either vertebral artery. 3.  Noncontrast head CT findings discussed with RADHA Nicole in the [facility] ER at 8:30 AM. The CT angiogram results discussed with Jessica Nicole at 8:55 AM on 07/18/2021.    Latest radiology report personally reviewed.    Note created using dragon voice recognition software so sounds alike errors  may have escaped editing.    07/19/2021   GONZÁLEZ ROCHA MD  HOSPITALIST, [facility]  PAGER NO. [phone]

## 2021-07-19 NOTE — PLAN OF CARE
Patient denied pain throughout the night, was able to sleep some in the recliner (this is normal for him); around 0500 was moved to the bed and straight cath for 1200; Patient reports he fells full or knows when to cath self; however he did not use call light to signal; writer approached him. Patients right arm/hand deficit is improving; drift remains meaning NIHS = 1 for right arm drift. Tele Sinus elba; HR 50's; 1AVB and BBB. Plan to complete stress test today; kept NPO since 0400 in anticipation of this.     Problem: Adult Inpatient Plan of Care  Goal: Plan of Care Review  Outcome: No Change     Problem: Adult Inpatient Plan of Care  Goal: Optimal Comfort and Wellbeing  Outcome: Improving     Problem: Functional Ability Impaired (Stroke, Ischemic/Transient Ischemic Attack)  Goal: Optimal Functional Ability  Outcome: Improving     Problem: Adult Inpatient Plan of Care  Goal: Absence of Hospital-Acquired Illness or Injury  Intervention: Identify and Manage Fall Risk  Recent Flowsheet Documentation  Taken 7/19/2021 0027 by Melissa Valadez RN  Safety Promotion/Fall Prevention:    assistive device/personal items within reach    mobility aid in reach    nonskid shoes/slippers when out of bed

## 2021-07-19 NOTE — PROGRESS NOTES
Nuclear stress test with Lexiscan 0.4 mg IV, typical vasodilator side effects abated without need for reversal, images pending.

## 2021-07-19 NOTE — PLAN OF CARE
Problem: Urinary Elimination Impaired (Stroke, Ischemic/Transient Ischemic Attack)  Goal: Effective Urinary Elimination  Outcome: No Change   Patient still requiring self catheterization. Not done this shift, offered patient when returned from [facility] but patient states that he hasn't had anything to drink so declined at that time. States would like to be reassessed at 1600.    Problem: Cerebral Tissue Perfusion (Stroke, Ischemic/Transient Ischemic Attack)  Goal: Optimal Cerebral Tissue Perfusion  Outcome: Improving    Patient went to [place] for Lexiscan today. No change from previous scan done 05/08/2012

## 2021-07-19 NOTE — PROGRESS NOTES
Vascular Brief Note:    This is a 80 year old male with recent left hemispheric hemorrhage in May 2021 with recurrent left hemispheric symptoms manifested by right arm and right leg weakness. By my read, his L ICA on CTA has ~ 65% stenosis. Vascular discussed possible hep gtt but neurology does not feel hep gtt is safe given recent ICH. With his recent hemorrhagic stroke in May, new onset Parkinsons disease, he may be best managed with medical management alone but will have a detailed discussion with the patient and Dr. Lemon on the risk versus benefit of left CEA for stroke reduction. Will see how he improves over next 24-48 hours as well.  Can move all four extremities to command but weakness on R compared to left persists.  Still able to  my hand and wiggle toes to command.  No slurred speech.      Dr. Lemon, Attending Surgeon, will see the patient later this afternoon and discuss further possible options of surgery versus purely medical management.    No plans for carotid surgery / intervention today or in the next few days.    Further recommendations to be made once Dr. Lemon has a chance to discuss the risk and benefits of intervention with the patient.    Continue Plavix and Statin.    Abhi Vazquez MD, [facility]  Vascular Surgery Fellow

## 2021-07-19 NOTE — PROGRESS NOTES
NEUROLOGY INPATIENT PROGRESS NOTE       [facility] NEUROLOGY [place]  [address] [place] [address]  Tel: [phone]  Fax: [phone]  [email or web address]     ASSESSMENT & PLAN   Hospital Day#: 1  Visit diagnosis:  CVA    Left MCA infarction (risk factors: HTN, HLD, DM, left ICA stenosis)   80-year-old male with history of HTN, HLD, multiple CVA in the past, Parkinson disease, left ICA stenosis and recent left parietal ICH admitted with acute onset of right-sided weakness.    Patient was not a candidate for thrombolytics due to recent intracerebral hemorrhage    CT of the head showed no acute hemorrhage.  Previous area of parietal hemorrhage matured and area showed encephalomalacia.  CTA showed 70 to 75% left ICA stenosis.  MRI brain showed area of restricted diffusion in the left precentral and postcentral gyri in addition to left parietal lobe and left parietal occipital junction    Echocardiogram shows normal ejection fraction with no significant valvular heart abnormality.  There is mild left atrial enlargement with no significant change compared to previous echocardiogram done in 2017     Carotid ultrasound shows 70% left ICA stenosis with severe plaque and mild to moderate plaque formation with 50% stenosis right ICA stenosis.  Flow antegrade in both vertebral arteries    After recent admission to [facility] for left parietal intracerebral hemorrhage of unknown etiology a follow-up MRI was recommended to ensure he does not have any vascular malformation.  MRI scan done yesterday does not suggest a vascular malformation.  Additionally, he was scheduled for cerebral angiogram and he tells me he is scheduled for four-vessel cerebral angiogram at [facility] on the 26th.  It is less likely he has vascular malformation as etiology for his intracerebral hemorrhage but I will defer to vascular surgery if they want to proceed with cerebral angiogram to better define the  extent of left ICA stenosis.    Evaluated by vascular surgery and although heparin was recommended due to recent CVA and intracerebral hemorrhage in the recent past I would not recommend using heparin.  I would like to maximize medical care with Plavix and statin and once medical condition is stabilized consider left carotid endarterectomy.  LDL is 105, continue simvastatin with goal less than 70.  Discussed with vascular surgery yesterday    Patient also carries a diagnosis of Parkinson disease manifesting with left-sided resting tremor that has improved with Sinemet.  Continue Sinemet 25/100 mg 1 tablet 4 times daily    Physical, occupational, speech and pharmacy consult    Neurology Discharge Planning :   DAPHNIE Love MD  [facility]  ([facility], [facility]     PROBLEM LIST      Patient Active Problem List   Diagnosis Code     Parkinson's disease (H) G20     Diabetes mellitus type 2, noninsulin dependent (H) E11.9     Dyslipidemia E78.5     Hypertension I10     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) I63.312     Right leg weakness R29.898     Right arm weakness R29.898     Hyperlipidemia E78.5     Intraparenchymal hemorrhage of brain (H) I61.9     Obesity with body mass index 30 or greater E66.9       Past Medical History:   Patient  has a past medical history of Cerebral infarction (H), HLD (hyperlipidemia), HTN (hypertension), Hyperlipidemia, Hypertension, Intracranial hemorrhage (H) (5/26/2021), Stroke (H), and TIA (transient ischemic attack).     SUBJECTIVE     No change in right-sided weakness.  Continues to have some resting tremor on the left.  Evaluated by vascular surgery they feel patient will benefit from endarterectomy.  Although the recommended heparin therapy I feel that we will be risky proposition with recent history of intracranial hemorrhage and recent cerebral infarction.     OBJECTIVE     Vital  signs in last 24 hours  Temp:  [97.3  F (36.3  C)-98  F (36.7  C)] 97.5  F (36.4  C)  Pulse:  [53-67] 56  Resp:  [15-26] 20  BP: (131-175)/(71-96) 131/71  SpO2:  [97 %-99 %] 98 %    Weight:   Wt Readings from Last 1 Encounters:   07/19/21 96.4 kg (212 lb 8 oz)         I/O last 24 hours  Intake/Output Summary (Last 24 hours) at 7/19/2021 0654  Last data filed at 7/19/2021 0500  Gross per 24 hour   Intake 260 ml   Output 3350 ml   Net -3090 ml     Review of Systems   Pertinent items are noted in HPI.    General Physical Exam: Patient is alert and oriented x 3. Vital signs were reviewed and are documented in EMR. Neck was supple, no carotid bruit, thyromegaly, JVD or lymphadenopathy noted.  Neurological Exam:  Mental status: Alert and oriented x3 no acute distress  Speech: Normal with no dysarthria or aphasia  Cranial nerves: 2 through 12 intact  Motor: Normal mass with increased tone on the left side.  He has cogwheel rigidity on the left side.  Strength on the left 5/5 right upper extremity 3/5 right lower extremity 3+/5  Reflexes: 1+ in biceps triceps brachioradialis and knees absent at ankles with an upgoing toe on the right.  Downgoing toe on the left  Sensory: Intact to light touch pinprick no double simultaneous extinction.  Coordination: Intact finger-nose testing  Gait: Deferred for safety     DIAGNOSTIC STUDIES     Pertinent Radiology   Following imaging studies were reviewed:    CT  HEAD CT:  1.  No evidence for acute intracranial hemorrhage, mass effect, obstructive hydrocephalus or definite acute infarct by CT.  2.  Previous area of acute hemorrhage in the left parietal area has now matured and shows an area of low-density encephalomalacia.  3.  Mild age-related changes.     HEAD CTA:   1.  All of the major large-caliber proximal intracranial vessels are patent without definite aneurysm or AVM.     NECK CTA:  1.  Stable approximately 70-75% stenosis in the proximal left ICA compared to CTA 04/07/2021. No  dissection.  2.  No significant stenosis right ICA or in either vertebral artery.    MRI  1.  Foci of restricted diffusion involving the cortex of the left precentral and postcentral gyri in addition to the left parietal lobe and left parieto-occipital junction corresponding to new areas of acute or early subacute ischemic change from prior   05/27/2021 imaging.   2.  Expected interval evolution of hemorrhage in the left parietal lobe with near complete resolution of previously seen parenchymal edema.    Echocardiogram  Echo result w/o MOPS: Interpretation Summary 1. Normal left ventricular size and systolic performance with a visuallyestimated ejection fraction of 55-60%.2. No significant valvular heart disease is identified on this study.3. Normal right ventricular size and systolic performance.4. There is mild left atrial enlargement. When compared to the prior real-time echocardiogram dated 24 January 2017,there has been no major appreciable interval change.    Carotid Ultrasound  1.  Mild to moderate plaque formation, velocities consistent with less than 50% stenosis in the right internal carotid artery.   2.  Severe plaque formation, velocities consistent with greater than 70% stenosis in the left internal carotid artery.   3.  Flow within the vertebral arteries is antegrade.    Pertinent Labs   Lab Results: Personally Reviewed  Recent Results (from the past 24 hour(s))   Glucose by meter    Collection Time: 07/18/21  8:13 AM   Result Value Ref Range    GLUCOSE BY METER POCT 150 (H) 70 - 125 mg/dL   Creatinine POCT    Collection Time: 07/18/21  8:27 AM   Result Value Ref Range    Creatinine POCT 1.1 0.7 - 1.3 mg/dL    GFR, ESTIMATED POCT >60 >60 mL/min/1.73m2   CBC with platelets    Collection Time: 07/18/21  8:29 AM   Result Value Ref Range    WBC Count 8.4 4.0 - 11.0 10e3/uL    RBC Count 4.59 4.40 - 5.90 10e6/uL    Hemoglobin 13.1 (L) 13.3 - 17.7 g/dL    Hematocrit 41.0 40.0 - 53.0 %    MCV 89 78 - 100 fL     MCH 28.5 26.5 - 33.0 pg    MCHC 32.0 31.5 - 36.5 g/dL    RDW 14.6 10.0 - 15.0 %    Platelet Count 218 150 - 450 10e3/uL   Partial thromboplastin time    Collection Time: 07/18/21  8:29 AM   Result Value Ref Range    aPTT 38 22 - 38 Seconds   INR    Collection Time: 07/18/21  8:29 AM   Result Value Ref Range    INR 1.01 0.85 - 1.15   Basic metabolic panel    Collection Time: 07/18/21  8:29 AM   Result Value Ref Range    Sodium 143 136 - 145 mmol/L    Potassium 4.6 3.5 - 5.0 mmol/L    Chloride 109 (H) 98 - 107 mmol/L    Carbon Dioxide (CO2) 23 22 - 31 mmol/L    Anion Gap 11 5 - 18 mmol/L    Urea Nitrogen 25 8 - 28 mg/dL    Creatinine 1.02 0.70 - 1.30 mg/dL    Calcium 9.1 8.5 - 10.5 mg/dL    Glucose 154 (H) 70 - 125 mg/dL    GFR Estimate 70 >60 mL/min/1.73m2   Troponin I    Collection Time: 07/18/21  8:29 AM   Result Value Ref Range    Troponin I <0.01 0.00 - 0.29 ng/mL   Extra Red Top Tube    Collection Time: 07/18/21  8:29 AM   Result Value Ref Range    Hold Specimen [facility]    SARS-COV2 (COVID-19) Virus RT-PCR    Collection Time: 07/18/21 10:16 AM    Specimen: Nasopharyngeal; Swab   Result Value Ref Range    SARS CoV2 PCR Negative Negative   Glucose by meter    Collection Time: 07/18/21 12:30 PM   Result Value Ref Range    GLUCOSE BY METER POCT 135 (H) 70 - 125 mg/dL   Glucose by meter    Collection Time: 07/18/21  5:19 PM   Result Value Ref Range    GLUCOSE BY METER POCT 133 (H) 70 - 125 mg/dL   Glucose by meter    Collection Time: 07/18/21  9:18 PM   Result Value Ref Range    GLUCOSE BY METER POCT 159 (H) 70 - 125 mg/dL   Lipid panel reflex to direct LDL: Non-fasting    Collection Time: 07/19/21  5:08 AM   Result Value Ref Range    Cholesterol 179 <=199 mg/dL    Triglycerides 124 <=149 mg/dL    Direct Measure HDL 49 >=40 mg/dL    LDL Cholesterol Calculated 105 <=129 mg/dL    Patient Fasting > 8hrs? Unknown    Basic metabolic panel    Collection Time: 07/19/21  5:08 AM   Result Value Ref Range    Sodium 142 136 - 145  mmol/L    Potassium 4.4 3.5 - 5.0 mmol/L    Chloride 108 (H) 98 - 107 mmol/L    Carbon Dioxide (CO2) 22 22 - 31 mmol/L    Anion Gap 12 5 - 18 mmol/L    Urea Nitrogen 23 8 - 28 mg/dL    Creatinine 1.04 0.70 - 1.30 mg/dL    Calcium 9.2 8.5 - 10.5 mg/dL    Glucose 122 70 - 125 mg/dL    GFR Estimate 68 >60 mL/min/1.73m2   Extra Purple Top Tube    Collection Time: 07/19/21  5:23 AM   Result Value Ref Range    Hold Specimen [facility] MEDICATIONS       carbidopa-levodopa  1 tablet Oral or Feeding Tube 4x Daily     clopidogrel  75 mg Oral or NG Tube Daily     insulin aspart  1-3 Units Subcutaneous TID AC     insulin aspart  1-3 Units Subcutaneous At Bedtime     polyethylene glycol  17 g Oral Daily     simvastatin  40 mg Oral or Feeding Tube At Bedtime     sodium chloride (PF)  3 mL Intracatheter Q8H        Total time spent for face to face visit, reviewing labs/imaging studies, counseling and coordination of care was: 30 Minutes More than 50% of this time was spent on counseling and coordination of care.        This note was dictated using voice recognition software.  Any grammatical or context distortions are unintentional and inherent to the software.

## 2021-07-20 ENCOUNTER — APPOINTMENT (OUTPATIENT)
Dept: PHYSICAL THERAPY | Facility: HOSPITAL | Age: 81
DRG: 065 | End: 2021-07-20
Payer: COMMERCIAL

## 2021-07-20 ENCOUNTER — APPOINTMENT (OUTPATIENT)
Dept: OCCUPATIONAL THERAPY | Facility: HOSPITAL | Age: 81
DRG: 065 | End: 2021-07-20
Payer: COMMERCIAL

## 2021-07-20 VITALS
OXYGEN SATURATION: 98 % | RESPIRATION RATE: 20 BRPM | BODY MASS INDEX: 28.12 KG/M2 | DIASTOLIC BLOOD PRESSURE: 64 MMHG | HEIGHT: 72 IN | WEIGHT: 207.6 LBS | HEART RATE: 58 BPM | SYSTOLIC BLOOD PRESSURE: 109 MMHG | TEMPERATURE: 97.6 F

## 2021-07-20 LAB
ATRIAL RATE - MUSE: 53 BPM
DIASTOLIC BLOOD PRESSURE - MUSE: 80 MMHG
GLUCOSE BLDC GLUCOMTR-MCNC: 131 MG/DL (ref 70–125)
GLUCOSE BLDC GLUCOMTR-MCNC: 180 MG/DL (ref 70–125)
GLUCOSE BLDC GLUCOMTR-MCNC: 235 MG/DL (ref 70–125)
INTERPRETATION ECG - MUSE: NORMAL
P AXIS - MUSE: 21 DEGREES
PR INTERVAL - MUSE: 198 MS
QRS DURATION - MUSE: 158 MS
QT - MUSE: 484 MS
QTC - MUSE: 454 MS
R AXIS - MUSE: 58 DEGREES
SYSTOLIC BLOOD PRESSURE - MUSE: 162 MMHG
T AXIS - MUSE: 6 DEGREES
VENTRICULAR RATE- MUSE: 53 BPM

## 2021-07-20 PROCEDURE — 97116 GAIT TRAINING THERAPY: CPT | Mod: GP

## 2021-07-20 PROCEDURE — 97112 NEUROMUSCULAR REEDUCATION: CPT | Mod: GO

## 2021-07-20 PROCEDURE — 97530 THERAPEUTIC ACTIVITIES: CPT | Mod: GP

## 2021-07-20 PROCEDURE — 250N000013 HC RX MED GY IP 250 OP 250 PS 637: Performed by: INTERNAL MEDICINE

## 2021-07-20 PROCEDURE — 97535 SELF CARE MNGMENT TRAINING: CPT | Mod: GO

## 2021-07-20 PROCEDURE — 250N000012 HC RX MED GY IP 250 OP 636 PS 637: Performed by: INTERNAL MEDICINE

## 2021-07-20 PROCEDURE — 99239 HOSP IP/OBS DSCHRG MGMT >30: CPT | Mod: GC | Performed by: INTERNAL MEDICINE

## 2021-07-20 PROCEDURE — 99232 SBSQ HOSP IP/OBS MODERATE 35: CPT | Performed by: PSYCHIATRY & NEUROLOGY

## 2021-07-20 RX ORDER — AMLODIPINE BESYLATE 5 MG/1
5 TABLET ORAL DAILY
Qty: 30 TABLET | Refills: 0 | Status: SHIPPED | OUTPATIENT
Start: 2021-07-21

## 2021-07-20 RX ORDER — SIMVASTATIN 40 MG
40 TABLET ORAL AT BEDTIME
Qty: 30 TABLET | Refills: 0 | Status: SHIPPED | OUTPATIENT
Start: 2021-07-20 | End: 2021-12-17

## 2021-07-20 RX ADMIN — CARBIDOPA AND LEVODOPA 1 TABLET: 25; 100 TABLET ORAL at 16:00

## 2021-07-20 RX ADMIN — CARBIDOPA AND LEVODOPA 1 TABLET: 25; 100 TABLET ORAL at 08:19

## 2021-07-20 RX ADMIN — METOPROLOL SUCCINATE 50 MG: 25 TABLET, EXTENDED RELEASE ORAL at 08:19

## 2021-07-20 RX ADMIN — CLOPIDOGREL BISULFATE 75 MG: 75 TABLET ORAL at 08:20

## 2021-07-20 RX ADMIN — CARBIDOPA AND LEVODOPA 1 TABLET: 25; 100 TABLET ORAL at 12:47

## 2021-07-20 RX ADMIN — INSULIN ASPART 1 UNITS: 100 INJECTION, SOLUTION INTRAVENOUS; SUBCUTANEOUS at 08:20

## 2021-07-20 RX ADMIN — INSULIN ASPART 1 UNITS: 100 INJECTION, SOLUTION INTRAVENOUS; SUBCUTANEOUS at 12:48

## 2021-07-20 ASSESSMENT — MIFFLIN-ST. JEOR: SCORE: 1694.67

## 2021-07-20 NOTE — PLAN OF CARE
Problem: Adult Inpatient Plan of Care  Goal: Plan of Care Review  Outcome: Adequate for Discharge  Goal: Patient-Specific Goal (Individualized)  Outcome: Adequate for Discharge  Goal: Absence of Hospital-Acquired Illness or Injury  Outcome: Adequate for Discharge  Intervention: Identify and Manage Fall Risk  Recent Flowsheet Documentation  Taken 7/20/2021 1558 by Zaira Collins RN  Safety Promotion/Fall Prevention: activity supervised  Taken 7/20/2021 1300 by Zaira Collins RN  Safety Promotion/Fall Prevention: activity supervised  Taken 7/20/2021 0800 by Zaira Collins RN  Safety Promotion/Fall Prevention: activity supervised  Goal: Optimal Comfort and Wellbeing  7/20/2021 1735 by Zaira Collins RN  Outcome: Adequate for Discharge  7/20/2021 1437 by Zaira Collins RN  Outcome: Improving  Goal: Readiness for Transition of Care  Outcome: Adequate for Discharge     Problem: Risk for Delirium  Goal: Optimal Coping  Outcome: Adequate for Discharge  Goal: Improved Behavioral Control  Outcome: Adequate for Discharge  Goal: Improved Attention and Thought Clarity  Outcome: Adequate for Discharge  Goal: Improved Sleep  Outcome: Adequate for Discharge     Problem: Adjustment to Illness (Stroke, Ischemic/Transient Ischemic Attack)  Goal: Optimal Coping  Outcome: Adequate for Discharge     Problem: Bowel Elimination Impaired (Stroke, Ischemic/Transient Ischemic Attack)  Goal: Effective Bowel Elimination  Outcome: Adequate for Discharge     Problem: Cerebral Tissue Perfusion (Stroke, Ischemic/Transient Ischemic Attack)  Goal: Optimal Cerebral Tissue Perfusion  Outcome: Adequate for Discharge     Problem: Cognitive Impairment (Stroke, Ischemic/Transient Ischemic Attack)  Goal: Optimal Cognitive Function  Outcome: Adequate for Discharge     Problem: Communication Impairment (Stroke, Ischemic/Transient Ischemic Attack)  Goal: Improved Communication Skills  Outcome: Adequate for Discharge     Problem:  Functional Ability Impaired (Stroke, Ischemic/Transient Ischemic Attack)  Goal: Optimal Functional Ability  Outcome: Adequate for Discharge     Problem: Respiratory Compromise (Stroke, Ischemic/Transient Ischemic Attack)  Goal: Effective Oxygenation and Ventilation  Outcome: Adequate for Discharge     Problem: Sensorimotor Impairment (Stroke, Ischemic/Transient Ischemic Attack)  Goal: Improved Sensorimotor Function  Outcome: Adequate for Discharge     Problem: Eating/Swallowing Impairment (Stroke, Ischemic/Transient Ischemic Attack)  Goal: Oral Intake without Aspiration  Outcome: Adequate for Discharge     Problem: Urinary Elimination Impaired (Stroke, Ischemic/Transient Ischemic Attack)  Goal: Effective Urinary Elimination  Outcome: Adequate for Discharge     Problem: OT General Care Plan  Goal: Transfer (OT)  Description: Transfer (OT)  Outcome: Adequate for Discharge

## 2021-07-20 NOTE — PROGRESS NOTES
Care Management Follow Up    Length of Stay (days): 2    Expected Discharge Date: 07/21/2021     Concerns to be Addressed:       Patient plan of care discussed at interdisciplinary rounds: Yes    Anticipated Discharge Disposition:  Home with wife and son assist     Anticipated Discharge Services:  Pt declining home services  Anticipated Discharge DME:      Patient/family educated on Medicare website which has current facility and service quality ratings:    Education Provided on the Discharge Plan:    Patient/Family in Agreement with the Plan:      Referrals Placed by CM/SW:    Private pay costs discussed: Not applicable    Additional Information:  SW met with pt in pt room and introduced self. Pt agreeable to visit to discuss discharge plan. Pt aware PT/OT recommend TCU and stated he does not want to go to a TCU, and does not want home care. He stated he wants to go home, and his family can assist. He reports he will be back in the hospital in 2 weeks for a procedure and only wants to go home now.  CM to be available for discharge planning      STEPHEN España

## 2021-07-20 NOTE — DISCHARGE SUMMARY
[facility]  Hospitalist Discharge Summary      Date of Admission:  7/18/2021  Date of Discharge:  7/20/2021  Discharging Provider: Venecia Camacho MD    Discharge Diagnoses   Left MCA infarction   Right-sided hemiplegia  Percent left ICA stenosis with severe plaque  Essential hypertension  2 diabetes mellitus with hyperglycemia  Parkinsonism    Follow-ups Needed After Discharge   Follow-up Appointments    Follow-up and recommended labs and tests      Follow up with primary care provider, Christopher Banda, within 7 days   for hospital follow- up.  The following labs/tests are recommended: CBC,   BMP, Mg.    Follow up with vascular surgery.  They will will work to schedule left CEA   in around 2 weeks.        Unresulted Labs Ordered in the Past 30 Days of this Admission     No orders found from 6/18/2021 to 7/19/2021.        Discharge Disposition   Discharged to home with family.  PT/OT recommended home health care with RN, PT, OT.  Patient declined.  Condition at discharge: Stable    Hospital Course   Left MCA infarction (risk factors: HTN, HLD, DM, left ICA stenosis)   80-year-old male with history of HTN, HLD, multiple CVA in the past, Parkinson disease, left ICA stenosis and recent left parietal ICH admitted with acute onset of right-sided weakness. He was not a candidate for thrombolytics due to recent intracerebral hemorrhage.  CT of the head showed no acute hemorrhage.  Previous area of parietal hemorrhage matured and area showed encephalomalacia.  CTA showed 70 to 75% left ICA stenosis.  MRI brain showed area of restricted diffusion in the left precentral and postcentral gyri in addition to left parietal lobe and left parietal occipital junction.  Echocardiogram shows normal ejection fraction with no significant valvular heart abnormality. There is mild left atrial enlargement with no significant change compared to previous echocardiogram done in 2017.  Carotid ultrasound shows 70%  left ICA stenosis with severe plaque and mild to moderate plaque formation with 50% stenosis right ICA stenosis.  Flow antegrade in both vertebral arteries.  Neurosurgery recently recommended MRI and cerebral angiogram to delineate the cause of left parietal hemorrhage.  Recent MRI scan does not show any vascular abnormality.  He is scheduled for four-vessel cerebral angiogram at [facility] that I don't believe will add any meaningful information.  Agree with vascular surgery that patient needs left endarterectomy in 2 weeks.  In the meantime we'll maximize medical care.  Continue Plavix and statin.  IV hydration, keep systolic blood pressure less than 160 and diastolic less than 90.  Patient also carries a diagnosis of Parkinson disease manifesting with left-sided resting tremor that has improved with Sinemet.  Continue Sinemet 25/100 mg 1 tablet 4 times daily.  Physical, occupational, speech and pharmacy consult appreciated they recommend transitional care unit patient declined.  At the day of discharge PT recommended home health care with PT-patient declined.  Diabetes, hypertension, parkinsonism-continued home medications.    Consultations This Hospital Stay   SOCIAL WORK IP CONSULT  NEUROLOGY IP CONSULT  SPEECH LANGUAGE PATH ADULT IP CONSULT  PHARMACY IP CONSULT  PHARMACY IP CONSULT  PHARMACY IP CONSULT  PHYSICAL THERAPY ADULT IP CONSULT  OCCUPATIONAL THERAPY ADULT IP CONSULT  CARE MANAGEMENT / SOCIAL WORK IP CONSULT  SPEECH LANGUAGE PATH ADULT IP CONSULT  VASCULAR SURGERY IP CONSULT  SMOKING CESSATION PROGRAM IP CONSULT    Code Status   Full Code    Time Spent on this Encounter   I, Venecia Camacho MD, personally saw the patient today and spent greater than 30 minutes discharging this patient.       Venecia Camacho MD  [facility]  [address] [place] [id number]  Phone: [phone]  Fax:  [phone]  ______________________________________________________________________    Physical Exam   Vital Signs: Temp: 97.6  F (36.4  C) Temp src: Oral BP: 109/64 Pulse: 58   Resp: 20 SpO2: 98 % O2 Device: None (Room air)    Weight: 207 lbs 9.6 oz  General Physical Exam: Patient is alert and oriented x 3. Vital signs were reviewed and are documented in EMR. Neck was supple, no carotid bruit, thyromegaly, JVD or lymphadenopathy noted.  Neurological Exam:  Mental status: Alert and oriented x3 no acute distress  Speech: Normal with no dysarthria or aphasia  Cranial nerves: 2 through 12 intact  Motor: Normal mass with increased tone on the left side.  He has cogwheel rigidity on the left side.  Strength on the left 5/5 right upper extremity 3/5 right lower extremity 3+/5  Reflexes: 1+ in biceps triceps brachioradialis and knees absent at ankles with an upgoing toe on the right.  Downgoing toe on the left  Sensory: Intact to light touch pinprick no double simultaneous extinction.  Coordination: Intact finger-nose testing  Gait: Deferred for safety       Primary Care Physician   Christopher Banda    Discharge Orders      Reason for your hospital stay    stroke     Activity    Your activity upon discharge: activity as tolerated and no driving till advised otherwise by neurology.     Follow-up and recommended labs and tests     Follow up with primary care provider, Christopher Banda, within 7 days for hospital follow- up.  The following labs/tests are recommended: CBC, BMP, Mg.    Follow up with vascular surgery.  They will will work to schedule left CEA in around 2 weeks.     Diet    Follow this diet upon discharge: Orders Placed This Encounter      Combination Diet Consistent Carb 60 Grams CHO per Meal Diet; Easy to Chew (level 7); Thin Liquids (level 0)     Significant Results and Procedures   Results for orders placed or performed during the hospital encounter of 07/18/21   CTA Head Neck with Contrast     Narrative    EXAM: CTA  HEAD NECK WITH CONTRAST  LOCATION: [facility]  DATE/TIME: 7/18/2021 8:18 AM    INDICATION: Right upper extremity weakness. Stroke code. History of intracranial hemorrhage.  COMPARISON: Head CT 06/16/2021. Head CT 05/26/2021. CTA 04/07/2021.  CONTRAST: 75ml isovue 370  TECHNIQUE: Head and neck CT angiogram with IV contrast. Noncontrast head CT followed by axial helical CT images of the head and neck vessels obtained during the arterial phase of intravenous contrast administration. Axial 2D reconstructed images and   multiplanar 3D MIP reconstructed images of the head and neck vessels were performed by the technologist. Dose reduction techniques were used. All stenosis measurements made according to NASCET criteria unless otherwise specified.    FINDINGS:   NONCONTRAST HEAD CT:   INTRACRANIAL CONTENTS: No acute intracranial hemorrhage, extraaxial collection, or mass effect. The area of previously acute parenchymal hemorrhage in the left parietal area now shows an area of low-density encephalomalacia. No CT evidence of acute   infarct. Mild presumed chronic small vessel ischemic changes. Mild generalized volume loss. No hydrocephalus.     VISUALIZED ORBITS/SINUSES/MASTOIDS: No intraorbital abnormality. No paranasal sinus mucosal disease. No middle ear or mastoid effusion.    BONES/SOFT TISSUES: No acute abnormality.    HEAD CTA:  ANTERIOR CIRCULATION: No stenosis/occlusion, aneurysm, or high flow vascular malformation. Standard [place] of [place] anatomy.    POSTERIOR CIRCULATION: No stenosis/occlusion, aneurysm, or high flow vascular malformation. Balanced vertebral arteries supply a normal basilar artery.     DURAL VENOUS SINUSES: Expected enhancement of the major dural venous sinuses.    NECK CTA:  RIGHT CAROTID: No measurable stenosis or dissection.    LEFT CAROTID: Prominent atheromatous changes in the proximal left ICA with a stenosis in the 70-75% range similar to prior CTA  04/07/2021. No dissection.    VERTEBRAL ARTERIES: No focal stenosis or dissection. Balanced vertebral arteries.    AORTIC ARCH: Classic aortic arch anatomy with no significant stenosis at the origin of the great vessels.    NONVASCULAR STRUCTURES: Unremarkable.      Impression    IMPRESSION:   HEAD CT:  1.  No evidence for acute intracranial hemorrhage, mass effect, obstructive hydrocephalus or definite acute infarct by CT.  2.  Previous area of acute hemorrhage in the left parietal area has now matured and shows an area of low-density encephalomalacia.  3.  Mild age-related changes.    HEAD CTA:   1.  All of the major large-caliber proximal intracranial vessels are patent without definite aneurysm or AVM.    NECK CTA:  1.  Stable approximately 70-75% stenosis in the proximal left ICA compared to CTA 04/07/2021. No dissection.  2.  No significant stenosis right ICA or in either vertebral artery.    3.  Noncontrast head CT findings discussed with RADHA Nicole in the [facility] ER at 8:30 AM. The CT angiogram results discussed with Jessica Nicole at 8:55 AM on 07/18/2021.   MR Brain w/o Contrast    Narrative    EXAM: MR BRAIN W/O CONTRAST  LOCATION: [facility]  DATE/TIME: 7/18/2021 10:46 AM    INDICATION: Right upper extremity weakness.  COMPARISON: CT head without contrast 05/26/2021. MRI of the brain 05/27/2021.  TECHNIQUE: Routine multiplanar multisequence head MRI without intravenous contrast.    FINDINGS:  INTRACRANIAL CONTENTS: Areas of restricted diffusion involving the cortex of the left precentral and postcentral gyri in addition to the left parietal lobe and left parieto-occipital junction. There is an area of T2 hypointense signal with associated   blooming artifact in the left parietal lobe on axial T2 image 18 corresponding to evolving parenchymal hemorrhage. Edema adjacent to the hemorrhage has nearly completely resolved from previous exam. No midline shift. Scattered nonspecific T2/FLAIR    hyperintensities within the cerebral white matter most consistent with mild chronic microvascular ischemic change. Mild generalized cerebral atrophy. No hydrocephalus. Mild cerebellar atrophy.     SELLA: No abnormality accounting for technique.    OSSEOUS STRUCTURES/SOFT TISSUES: Normal marrow signal. The major intracranial vascular flow voids are maintained.     ORBITS: No abnormality accounting for technique.     SINUSES/MASTOIDS: No paranasal sinus mucosal disease. No middle ear or mastoid effusion.       Impression    IMPRESSION:  1.  Foci of restricted diffusion involving the cortex of the left precentral and postcentral gyri in addition to the left parietal lobe and left parieto-occipital junction corresponding to new areas of acute or early subacute ischemic change from prior   05/27/2021 imaging.  2.  Expected interval evolution of hemorrhage in the left parietal lobe with near complete resolution of previously seen parenchymal edema.   US Carotid Bilateral    Narrative    EXAM: US CAROTID BILATERAL  LOCATION: [facility]  DATE/TIME: 7/18/2021 1:49 PM    INDICATION: Evaluate baseline carotid disease, right-sided weakness.  COMPARISON: CTA earlier today.  TECHNIQUE: Duplex exam performed utilizing 2D gray-scale imaging, Doppler interrogation with color-flow and spectral waveform analysis. The percent diameter stenosis is determined using NASCET criteria and Society of Radiologists in Ultrasound Consensus   Criteria.    FINDINGS:    RIGHT: Mild to moderate plaque at the bifurcation. The peak systolic velocity in the ICA is less than 125 cm/sec, consistent with less than 50% stenosis. Normal velocities in the ECA. Antegrade flow within the vertebral artery.     LEFT: Severe plaque at the bifurcation. The peak systolic velocity in the ICA is greater than 230 cm/sec, consistent with greater than 70% stenosis. Normal velocities in the ECA. Antegrade flow within the vertebral artery.    VELOCITY  CHART:   The following velocities were obtained in the RIGHT carotid system.  CCA: 64/12 cm/s  ICA: 73/16 cm/s  ECA: 94/8 cm/s  ICA/CCA: PS 1.1, ED 1.3    The following velocities were obtained in the LEFT carotid system.  CCA: 71/9 cm/s  ICA: 232/45 cm/s  ECA: 114/11 cm/s  ICA/CCA: PS 3.3, ED 5.0      Impression    IMPRESSION:  1.  Mild to moderate plaque formation, velocities consistent with less than 50% stenosis in the right internal carotid artery.  2.  Severe plaque formation, velocities consistent with greater than 70% stenosis in the left internal carotid artery.  3.  Flow within the vertebral arteries is antegrade.   Echocardiogram Complete - For age > 60 yrs    Narrative    [id number]  [id number]  [id number] [address]     Name: MIMA HENSON  MRN: [id number]  : 1940  Study Date: 2021 02:36 PM  Age: 80 yrs  Gender: Male  Patient Location: [facility]  Reason For Study: Cerebrovascular Incident  Ordering Physician: MCKAY ORTEGA  Performed By: WR     BSA: 2.2 m2  Height: 72 in  Weight: 210 lb  HR: 57  ______________________________________________________________________________  ______________________________________________________________________________  Interpretation Summary     1. Normal left ventricular size and systolic performance with a visually  estimated ejection fraction of 55-60%.  2. No significant valvular heart disease is identified on this study.  3. Normal right ventricular size and systolic performance.  4. There is mild left atrial enlargement.     When compared to the prior real-time echocardiogram dated 2017,  there has been no major appreciable interval change.  ______________________________________________________________________________  Left ventricle:  Normal left ventricular size and systolic performance with a visually  estimated ejection fraction of 55-60%. There is normal regional  wall motion.  Left ventricular wall thickness is normal.     Assessment of LV Diastolic Function: The cumulative findings are indeterminate  in the evaluation of diastolic function [The septal e' velocity is < 7 cm/s &  lateral e' velocity is < 10 cm/s. The average E/e' is < 14. The TR velocity  cannot be determined due to insufficient tricuspid insufficiency signal. Left  atrial volume index is greater than 34 mL/mÂ ].     Right ventricle:  Normal right ventricular size and systolic performance.     Left atrium:  There is mild left atrial enlargement.     Right atrium:  The right atrium is of normal size.     IVC:  The IVC is not well visualized.     Aortic valve:  The aortic valve is comprised of three cusps. No significant aortic stenosis  or aortic insufficiency is detected on this study.     Mitral valve:  The mitral valve appears morphologically normal. There is trace mitral  insufficiency.     Tricuspid valve:  The tricuspid valve is grossly morphologically normal. There is trace  tricuspid insufficiency.     Pulmonic valve:  The pulmonic valve is grossly morphologically normal.     Thoracic aorta:  The aortic root and proximal ascending aorta are of normal dimension.     Pericardium:  There is no significant pericardial effusion.     ______________________________________________________________________________  ______________________________________________________________________________  MMode/2D Measurements & Calculations  RVDd: 2.8 cm  IVSd: 1.2 cm  LVIDd: 4.7 cm  LVIDs: 3.0 cm  LVPWd: 1.2 cm  FS: 35.4 %  LV mass(C)d: 201.1 grams  LV mass(C)dI: 92.5 grams/m2  Ao root diam: 3.4 cm  LA dimension: 3.5 cm  asc Aorta Diam: 3.4 cm  LA/Ao: 1.0  LVOT diam: 2.0 cm  LVOT area: 3.2 cm2     LA Volume Indexed (AL/bp): 34.3 ml/m2     Time Measurements  Aortic HR: 57.0 BPM  MM HR: 57.0 BPM     Doppler Measurements & Calculations  MV E max rhonda: 69.4 cm/sec  MV A max rhonda: 98.5 cm/sec  MV E/A: 0.70  MV dec slope: 345.6  cm/sec2  MV dec time: 0.20 sec  Ao V2 max: 126.8 cm/sec  Ao max P.0 mmHg  Ao V2 mean: 90.3 cm/sec  Ao mean PG: 3.7 mmHg  Ao V2 VTI: 35.0 cm  RIP(I,D): 2.7 cm2  RIP(V,D): 3.0 cm2  LV V1 max P.5 mmHg  LV V1 max: 117.4 cm/sec  LV V1 VTI: 29.0 cm  CO(LVOT): 5.3 l/min  CI(LVOT): 2.4 l/min/m2  SV(LVOT): 93.1 ml  SI(LVOT): 42.8 ml/m2  PA acc time: 0.11 sec  RIP Index (cm2/m2): 1.2  E/E' avg: 10.3  Lateral E/e': 9.0     Medial E/e': 11.6     ______________________________________________________________________________  Report approved by: Nya Albert 2021 04:25 PM         NM Lexiscan stress test     Value    Pharmacologic Protocol Lexiscan    Test Type Pharmacological    Baseline HR 59    Baseline Systolic     Baseline Diastolic BP 80    Last Stress HR 72    Last Stress Systolic     Last Stress Diastolic BP 65    Target     PERCENT HR 85%    ST Deviation Elevation I 0.4mm    Deviation Time III -0.6mm    ST Elevation Amount III 1.9mm    ST Deviation Amount he V2 -5.2mm    Final Resting /69    Final Resting HR 68    Max Treadmill Speed 0.0    Max Treadmill Grade 0.0    Peak Systolic /65    Peak Diastolic /80    Max HR 73    Stress Phase Resting    Stress Resting Pt Position MANUAL EVENT    Current HR 61    Current /80    Stress Phase Stress    Stage Minute EXE 00:00    Exercise Stage STAGE 2    Current HR 58    Current /80    Stress Phase Stress    Stage Minute EXE 01:00    Exercise Stage STAGE 3    Current HR 61    Current /80    Stress Phase Stress    Stage Minute EXE 01:19    Exercise Stage STAGE 3    Current HR 61    Current /80    Stress Phase Stress    Stage Minute EXE 02:00    Exercise Stage STAGE 4    Current HR 72    Current /80    Stress Phase Stress    Stage Minute EXE 02:16    Exercise Stage STAGE 4    Current HR 72    Current /66    Stress Phase Stress    Stage Minute EXE 02:51    Exercise Stage STAGE 4    Current HR  73    Current /65    Stress Phase Stress    Stage Minute EXE 03:00    Exercise Stage STAGE 5    Current HR 72    Current /65    Stress Phase Stress    Stage Minute EXE 04:00    Exercise Stage STAGE 6    Current HR 72    Current /65    Stress Phase Stress    Stage Minute EXE 04:00    Exercise Stage STAGE 6    Current HR 72    Current /65    Stress Phase Recovery    Stage Minute REC 00:49    Exercise Stage Recovery    Current HR 71    Current /80    Stress Phase Recovery    Stage Minute REC 00:59    Exercise Stage Recovery    Current HR 71    Current /80    Stress Phase Recovery    Stage Minute REC 01:59    Exercise Stage Recovery    Current HR 70    Current /80    Stress Phase Recovery    Stage Minute REC 02:46    Exercise Stage Recovery    Current HR 70    Current /69    Stress Phase Recovery    Stage Minute REC 02:59    Exercise Stage Recovery    Current HR 69    Current /69    Stress Phase Recovery    Stage Minute REC 03:59    Exercise Stage Recovery    Current HR 67    Current /69    Stress Phase Recovery    Stage Minute REC 04:03    Exercise Stage Recovery    Current HR 68    Current /69    Calculated Percent HR 52    Rate Pressure Product 10,220.0    Narrative       The nuclear stress test is negative for inducible myocardial ischemia   or infarction.     The left ventricular ejection fraction at stress is greater than 70%.     A prior study was conducted on 5/8/2012.  This study has no change when   compared with the prior study.     The findings of this examination were communicated to the nursing staff   at [facility].           Discharge Medications   Current Discharge Medication List      CONTINUE these medications which have CHANGED    Details   amLODIPine (NORVASC) 5 MG tablet Take 1 tablet (5 mg) by mouth daily  Qty: 30 tablet, Refills: 0    Associated Diagnoses: Intraparenchymal hemorrhage of brain (H); Cerebral infarction due  to thrombosis of left middle cerebral artery (H); Essential hypertension; Diabetes mellitus type 2, noninsulin dependent (H)         CONTINUE these medications which have NOT CHANGED    Details   acetaminophen (TYLENOL) 325 MG tablet Take 2 tablets (650 mg) by mouth every 6 hours as needed for mild pain or fever  Qty:      Associated Diagnoses: Intracranial hemorrhage (H)      carbidopa-levodopa (SINEMET)  MG tablet Take 1 tablet by mouth 4 times daily Takes at 0800, 1200, 1600, 2000      clopidogrel (PLAVIX) 75 MG tablet Take 75 mg by mouth daily      melatonin 3 MG CAPS Take 1 capful by mouth At Bedtime      metoprolol succinate ER (TOPROL-XL) 50 MG 24 hr tablet Take 50 mg by mouth daily      simvastatin (ZOCOR) 40 MG tablet Take 40 mg by mouth At Bedtime         STOP taking these medications       aspirin (ASA) 325 MG tablet Comments:   Reason for Stopping:         levETIRAcetam (KEPPRA) 500 MG tablet Comments:   Reason for Stopping:         lisinopril (ZESTRIL) 40 MG tablet Comments:   Reason for Stopping:         metFORMIN (GLUCOPHAGE) 1000 MG tablet Comments:   Reason for Stopping:         multivitamin, therapeutic (THERA-VIT) TABS tablet Comments:   Reason for Stopping:         nystatin (MYCOSTATIN) 638338 UNIT/GM external cream Comments:   Reason for Stopping:         polyethylene glycol (MIRALAX) 17 GM/Dose powder Comments:   Reason for Stopping:             Allergies   Allergies   Allergen Reactions     Penicillins Rash     Childhood rxn.

## 2021-07-20 NOTE — PLAN OF CARE
Problem: Adult Inpatient Plan of Care  Goal: Optimal Comfort and Wellbeing  Outcome: Improving     Problem: Functional Ability Impaired (Stroke, Ischemic/Transient Ischemic Attack)  Goal: Optimal Functional Ability  Outcome: Improving  Intervention: Optimize Functional Ability  Recent Flowsheet Documentation  Taken 7/20/2021 0422 by Jemima Gusman, RN  Activity Management: activity adjusted per tolerance  Taken 7/19/2021 2336 by Jemima Gusman, RN  Activity Management: activity adjusted per tolerance    Pt was alert and oriented this shift    Is scoring a 1 on his NIH d/t arm drift- see flowsheets   Pt reports that he did not sleep much   Does not report pain     No significant events overnight   RN will continue to monitor

## 2021-07-20 NOTE — PLAN OF CARE
Problem: Adult Inpatient Plan of Care  Goal: Optimal Comfort and Wellbeing  Outcome: Improving     Problem: Risk for Delirium  Goal: Optimal Coping  Outcome: Improving     Problem: Adjustment to Illness (Stroke, Ischemic/Transient Ischemic Attack)  Goal: Optimal Coping  Outcome: Improving   Patient up in chair most of the shift, family visited briefly. Patient denies pain, denies shortness of breath. NIHSS score of 1 for right arm drift, no other deficits noted. Continue to monitor

## 2021-07-21 ENCOUNTER — TELEPHONE (OUTPATIENT)
Dept: VASCULAR SURGERY | Facility: CLINIC | Age: 81
End: 2021-07-21

## 2021-07-21 NOTE — PLAN OF CARE
Physical Therapy Discharge Summary    Reason for therapy discharge:    Discharged to home.    Progress towards therapy goal(s). See goals on Care Plan in [facility] electronic health record for goal details.  Goals not met.  Barriers to achieving goals:   discharge from facility.    Therapy recommendation(s):    Continued therapy is recommended.  Rationale/Recommendations:  Continued PT is recommended to mobility and strength. .

## 2021-07-21 NOTE — PLAN OF CARE
Occupational Therapy Discharge Summary    Reason for therapy discharge:    Discharged to home.    Progress towards therapy goal(s). See goals on Care Plan in [facility] electronic health record for goal details.  Goals met    Therapy recommendation(s):    Continued therapy is recommended.  Rationale/Recommendations:  Home care OT.      Ruth Tapia MOT, OTR/L, CLT 7/21/2021 , 8:32 AM

## 2021-07-21 NOTE — TELEPHONE ENCOUNTER
[facility] Dr. Lemon [facility] to discuss CEA. Held time on Dr. Lemon's schedule on Thursday 7/29/21 at 11:40AM in [place]. [phone]

## 2021-07-22 LAB
CV STRESS CURRENT BP HE: NORMAL
CV STRESS CURRENT HR HE: 58
CV STRESS CURRENT HR HE: 61
CV STRESS CURRENT HR HE: 67
CV STRESS CURRENT HR HE: 68
CV STRESS CURRENT HR HE: 69
CV STRESS CURRENT HR HE: 70
CV STRESS CURRENT HR HE: 70
CV STRESS CURRENT HR HE: 71
CV STRESS CURRENT HR HE: 71
CV STRESS CURRENT HR HE: 72
CV STRESS CURRENT HR HE: 73
CV STRESS DEVIATION TIME HE: NORMAL
CV STRESS ECHO PERCENT HR HE: NORMAL
CV STRESS EXERCISE STAGE HE: NORMAL
CV STRESS FINAL RESTING BP HE: NORMAL
CV STRESS FINAL RESTING HR HE: 68
CV STRESS MAX HR HE: 73
CV STRESS MAX TREADMILL GRADE HE: 0
CV STRESS MAX TREADMILL SPEED HE: 0
CV STRESS PEAK DIA BP HE: NORMAL
CV STRESS PEAK SYS BP HE: NORMAL
CV STRESS PHASE HE: NORMAL
CV STRESS PROTOCOL HE: NORMAL
CV STRESS RESTING PT POSITION HE: NORMAL
CV STRESS ST DEVIATION AMOUNT HE: NORMAL
CV STRESS ST DEVIATION ELEVATION HE: NORMAL
CV STRESS ST EVELATION AMOUNT HE: NORMAL
CV STRESS TEST TYPE HE: NORMAL
CV STRESS TOTAL STAGE TIME MIN 1 HE: NORMAL
NUC STRESS EJECTION FRACTION: 69 %
RATE PRESSURE PRODUCT: NORMAL
STRESS ECHO BASELINE DIASTOLIC HE: 80
STRESS ECHO BASELINE HR: 59
STRESS ECHO BASELINE SYSTOLIC BP: 168
STRESS ECHO CALCULATED PERCENT HR: 52 %
STRESS ECHO LAST STRESS DIASTOLIC BP: 65
STRESS ECHO LAST STRESS HR: 72
STRESS ECHO LAST STRESS SYSTOLIC BP: 140
STRESS ECHO TARGET HR: 141

## 2021-07-26 ENCOUNTER — LAB REQUISITION (OUTPATIENT)
Dept: LAB | Facility: CLINIC | Age: 81
End: 2021-07-26

## 2021-07-26 DIAGNOSIS — Z86.73 PERSONAL HISTORY OF TRANSIENT ISCHEMIC ATTACK (TIA), AND CEREBRAL INFARCTION WITHOUT RESIDUAL DEFICITS: ICD-10-CM

## 2021-07-26 LAB
ANION GAP SERPL CALCULATED.3IONS-SCNC: 13 MMOL/L (ref 5–18)
BUN SERPL-MCNC: 29 MG/DL (ref 8–28)
CALCIUM SERPL-MCNC: 9.1 MG/DL (ref 8.5–10.5)
CHLORIDE BLD-SCNC: 109 MMOL/L (ref 98–107)
CO2 SERPL-SCNC: 21 MMOL/L (ref 22–31)
CREAT SERPL-MCNC: 1.13 MG/DL (ref 0.7–1.3)
ERYTHROCYTE [DISTWIDTH] IN BLOOD BY AUTOMATED COUNT: 14.6 % (ref 10–15)
GFR SERPL CREATININE-BSD FRML MDRD: 61 ML/MIN/1.73M2
GLUCOSE BLD-MCNC: 134 MG/DL (ref 70–125)
HCT VFR BLD AUTO: 41.4 % (ref 40–53)
HGB BLD-MCNC: 13.2 G/DL (ref 13.3–17.7)
MAGNESIUM SERPL-MCNC: 2 MG/DL (ref 1.8–2.6)
MCH RBC QN AUTO: 28.3 PG (ref 26.5–33)
MCHC RBC AUTO-ENTMCNC: 31.9 G/DL (ref 31.5–36.5)
MCV RBC AUTO: 89 FL (ref 78–100)
PLATELET # BLD AUTO: 204 10E3/UL (ref 150–450)
POTASSIUM BLD-SCNC: 4.6 MMOL/L (ref 3.5–5)
RBC # BLD AUTO: 4.66 10E6/UL (ref 4.4–5.9)
SODIUM SERPL-SCNC: 143 MMOL/L (ref 136–145)
WBC # BLD AUTO: 9.5 10E3/UL (ref 4–11)

## 2021-07-26 PROCEDURE — 83735 ASSAY OF MAGNESIUM: CPT | Performed by: FAMILY MEDICINE

## 2021-07-26 PROCEDURE — 80048 BASIC METABOLIC PNL TOTAL CA: CPT | Performed by: FAMILY MEDICINE

## 2021-07-26 PROCEDURE — 85027 COMPLETE CBC AUTOMATED: CPT | Performed by: FAMILY MEDICINE

## 2021-07-29 ENCOUNTER — DOCUMENTATION ONLY (OUTPATIENT)
Dept: VASCULAR SURGERY | Facility: CLINIC | Age: 81
End: 2021-07-29

## 2021-07-29 ENCOUNTER — OFFICE VISIT (OUTPATIENT)
Dept: VASCULAR SURGERY | Facility: CLINIC | Age: 81
End: 2021-07-29
Attending: SURGERY
Payer: COMMERCIAL

## 2021-07-29 VITALS — SYSTOLIC BLOOD PRESSURE: 160 MMHG | RESPIRATION RATE: 17 BRPM | HEART RATE: 60 BPM | DIASTOLIC BLOOD PRESSURE: 80 MMHG

## 2021-07-29 DIAGNOSIS — Z11.59 ENCOUNTER FOR SCREENING FOR OTHER VIRAL DISEASES: ICD-10-CM

## 2021-07-29 DIAGNOSIS — I65.22 SYMPTOMATIC STENOSIS OF LEFT CAROTID ARTERY: Primary | ICD-10-CM

## 2021-07-29 PROCEDURE — G0463 HOSPITAL OUTPT CLINIC VISIT: HCPCS

## 2021-07-29 PROCEDURE — 99214 OFFICE O/P EST MOD 30 MIN: CPT | Performed by: SURGERY

## 2021-07-29 NOTE — PROGRESS NOTES
Surgery Scheduled      Surgery/Procedure: Left CEA    Special Equipment: EEG and U/S    Location: [facility]    Date: 8/11    Time: 8 am    Surgeon: Dr. Lemon    OR Confirmed/ :  Yes with [facility] on 7/29    Orders In:  Yes    Entered on [employer] Calendar:  Yes    Post Op: Nancy Lovett Scheduled: Nancy    Blood Thinners Addressed: No- Nancy  U/s Notified- Amberly

## 2021-07-29 NOTE — NURSING NOTE
[facility]        Patient is here for a consult to discuss Carotid stenosis. Patient has symptoms of history of stroke.    Pt is currently taking Statin and Plavix.    Patient's condition is stable.    BP (!) 160/80   Pulse 60   Resp 17     The provider has been notified that the patient has concerns of surgery and what it enatils.     Questions patient would like addressed today are: Do the benefits of surgery outweigh the benefits?    Refills are needed: No    Has homecare services and agency name:  Matilde Anne RN

## 2021-07-29 NOTE — PATIENT INSTRUCTIONS
Maikol    Your visit to [facility] for your surgery or procedure is coming soon and we look forward to seeing you! This friendly reminder and pre-procedure checklist will help to ensure your procedure/surgery goes smoothly and meets your expectations. At [facility], our goal is to provide you with a great patient experience and to deliver genuine, professional care to every patient.     Please complete all the steps in advance of your visit. If you have any questions about the items listed below, please give our office a call. We can be reached at [phone] or visit our website at [email or web address]  for more information.       Procedure Date :  8/11/21    Procedure Time :  TBD    Arrival Time: TBD    Covid Test: 8/8/21    Post Operative Appointment: 9/2/21    Admission Type: Inpatient    Surgeon: MD Jessica Lemon    Procedure: carotid endartectomy left side    Procedure Location: [facility]:  [address] [place] [address] (phone: [phone], Fax: [phone])    If you take blood thinners: SEE SPECIFIC INSTRUCTIONS BELOW    PLEASE DO NOT STOP YOUR ASPIRIN OR PLAVIX UNLESS SPECIFICALLY DIRECTED BY THE VASCULAR SURGEON TO STOP!  - In most cases Vascular surgeons want you to continue these. This is different from most NON vascular surgeries and may not be well known by your Primary Care Provider      IF YOU NEED TO RESCHEDULE OR CANCEL YOUR PROCEDURE FOR ANY REASON PLEASE CALL THE CLINIC AS SOON AS POSSIBLE -[phone].    Complete the following checklist before your procedure/surgery    [] Contact your insurance regarding coverage    If you would like a [facility] Estimate for your upcoming service/procedure at [facility] contact Cost of Care Estimates at [phone], advocates are available Monday through Friday 8am - 5pm.   You may also submit a request online at  "[email or web address]   - complete the secure online form found under \"Services and Procedure Pricing\"     If your procedure is at [facility] please contact the numbers below for Cost of Care Estimates.   Facility Charge: [phone]    Anesthesiology charge:  [phone]        []  You will need a preop physical within 30 days before surgery with your primary care provider. This exam is required by the anesthesiologist to ensure a safe surgical experience.    Failure  to obtain your pre-op physical will lead to cancellation of your surgery  Please contact your primary to schedule. Please ensure your Preoperative Physical is faxed to the Hospital (numbers listed above)    [] Preoperative Medication Instructions    Stop Ibuprofen 1 week prior to surgery.      Contact your prescribing provider for instructions before discontinuing any medications including anticoagulants. (Examples: Coumadin, Heparin, Xarelto, Eliquis, Pradaxa, Effiient, Briliant) We would like you stop them five days before surgery HOWEVER, please follow the advice of your primary care provider. Most surgeons will have you remain on your aspirin and Plavix.    Hold Herbal Supplements and Vitamin E 7 days before    Stop Cialis, Levitra and Viagra 2-3 days before surgery    [] Fasting Requirements    Do not eat anything after 11:00 pm     You may have clear liquids up to two hours before your arrival time (coffee, tea, water, or Gatorade. No cream or milk)    If your surgery is scheduled later in the day, fasting instructions may be modified.    If your primary care provider has instructed you to continue oral medications, you may take them on the morning of surgery with a small sip of water.      No alcohol or smoking after 12:00am the day of surgery    Day Before Surgery    [] A surgery nurse will call you 2 -3 days prior to your surgery to review your health history and provide detailed instructions. You will be given an " arrival time based on your      scheduled surgery.    [] STOP Smoking and Drinking: It is important to stop smoking and drinking at least 24 hours before surgery. Smoking and drinking may cause complications in your recovery from anesthesia and may lengthen the healing process.    [] Pack for your hospital stay: If it will be required for you to stay at the hospital after surgery, bring personal items such as a robe, slippers, pajamas, additional clothing and toiletries. Don't forget:    List of medication    Eyeglass case or contact case with solution. You cannot wear contacts during surgery    Copies of your physical exam , lab results and EKG    Copy of Health Care Directives, Living Will or Power of     Insurance Cards    Photo ID    CPAP machine    [] NOTHING BY MOUTH 8 HOURS BEFORE. This includes gum, hard candy, water and mints. The only exception is if you have been instructed by your doctor to take your medications with sips of water. You may rinse your mouth or brush your teeth, but do not swallow water.        Day of Surgery    [] Medications- Take as indicated with sips of water     [] Wear comfortable loose fitting clothes. Wear your glasses-Not contacts. Do not wear jewelry and remove body piercing's. Surgery may be cancelled if they are not removed.     [] If same day surgery-Have a someone come with you to surgery that can help you understand the surgeon's instructions, drive you home and stay with you overnight the first night.   A nurse will call you at home within 72 hours following surgery to see how you are doing. Our nurses and doctors will discuss recovery with you.    [] DO NOT BRING FMLA WITH TO SURGERY.  These should be sent to the provider's office by fax to [phone]        Notify our office right away, if you have any changes in your health status, or if you develop a cold, flu, diarrhea, infection, fever or sore throat before your scheduled surgery date. We can be reached at  [phone] Monday-Friday 8 am-4:30 pm if you have any questions.   Thank you for choosing [facility]  If you have any questions or need to reschedule your appointment, please call [phone]           Instructions for Patients Scheduled for Vascular or Podiatry Procedures During the COVID 19 Pandemic    Your Provider has determined that your condition warrants going ahead with your procedure at this time.  You will need to be tested for COVID-19 within 96 hours of your procedure.  If we do not receive the results in time, your procedure may be postponed or canceled.  Please make sure your test is collected 3-days prior to your procedure date.  The results will need to get faxed to [phone].    After testing, you will need to remain in self-quarantine until your procedure.  If you are notified of a positive COVID-19 result; you will need to call your provider for further recommendations.  Please call [phone] and press option 2 to speak to a nurse.  If the test is positive; DO NOT PRESENT FOR YOUR PROCEDURE until you have been given further instructions.  You will not be called with negative results so arrive as instructed unless otherwise notified.    Don't:    Don't invite visitors or friends into your home.    Don't leave your home unless absolutely necessary.    Don't share utensils and other household items with others in the home.  Do:    Wash your hands regularly with soap and water and use hand  with at least 60% alcohol if you don't have easy access to soap and water.     Disinfect surface areas daily, including doorknobs, electronics - especially phones, laptops and other devices.     Wash utensils and other items thoroughly.     Separate yourself from others in the household as best you can, including pets.    Keep your hands away from your face.     Practice all other prevention tips the CDC recommends, including covering your coughs and sneezes with a tissue or your  sleeve and immediately throwing the tissue into the trash and washing your hands.    Call your hospital if you develop the following signs before your surgery:    Fever.    Cough.    Shortness of breath.    Sore throat.    Runny or stuffy nose.    Muscle or body aches.    Headaches.    Fatigue.    Vomiting and diarrhea.    These steps will help keep you & your family, other patients, and hospital staff safe from COVID-19.     Patient Education     Having Carotid Endarterectomy  Endarterectomy is the removal of plaque from the carotid artery through a cut (incision) in the neck. This surgery has a low risk of stroke or complication (1% to 3%). It typically involves a quick recovery with little pain. You may be asleep under general anesthesia during surgery, or awake with local anesthesia to control pain. This will be discussed with you before surgery.   How the endarterectomy is done     A skin incision is made over the carotid artery.      A shunt helps keep blood flowing during the procedure.   1. Make the skin incision. The surgeon makes an incision in the skin over the carotid artery.  2. Open the artery. The surgeon places clamps on the artery above and below the blockage. This temporarily stops blood flow. The brain receives blood from the carotid artery on the other side of your neck. The surgeon then makes an incision in the artery itself.  3. Place shunt. A shunt may be used to preserve blood flow to the brain during the procedure. After the shunt is in place, the clamps are removed from the internal carotid artery. In some cases a shunt is not needed because the brain is receiving enough blood through other arteries (from the carotid artery on the other side of your neck).   4. Remove plaque. The surgeon loosens plaque from the artery wall. The plaque is then removed, often in a single piece. The surgeon inspects the artery to confirm that all of the plaque has been removed.  5. Close the incision. The  surgeon closes the incision using either sutures or a patch. The clamps are then removed. Next, the skin incision is sutured closed. A tube or drain may be put in place to keep fluids from collecting around the area.  The surgery usually takes around 2 hours, but may be longer depending on the anesthetic and your situation.   [employer] last reviewed this educational content on 12/1/2019 2000-2021 [employer]. All rights reserved. This information is not intended as a substitute for professional medical care. Always follow your healthcare professional's instructions.

## 2021-07-29 NOTE — PROGRESS NOTES
VASCULAR SURGERY OUTPATIENT CONSULT OR VISIT   VASCULAR SURGEON: Jessica Lemon MD    LOCATION:  [facility]    Maikol Glaser   Medical Record #:  [id number]  YOB: 1940  Age:  80 year old     Date of Service: 7/29/2021    PRIMARY CARE PROVIDER: Christopher Banda      Reason for consultation: Evaluation for carotid stenosis    IMPRESSION: Patient with symptomatic left carotid stenosis with high risk appearing plaque and recent hemispheric stroke which is left him with some weakening of his right hand and diminished coordination it is also weakened his right leg to where he has to walk with a cane.  Patient is fortunately left-handed.  No impact on his cognition or speech.  Patient has had 8 prior strokes but all of these were hemorrhagic and caused him issues with his speech and transiently with his cognition.  Is currently on Plavix and statin.  Not on any aspirin.  Normal heart stress test.  We had a long discussion and the patient really wants everything done so that he can continue to live in his home.  In fact if he has a complication that would leave him unable to live at home any longer he would rather not live.    RECOMMENDATION: Complex situation and high risk patient for endarterectomy but we should move forward with this with the intent of protecting him from a 1 and 4 risk of stroke over the next few months which could leave him more disabled.  Tentative plan for surgery on 11 August.  I want him to stay on his Plavix and not stop it.  We discussed using regional anesthesia as I think this would lower risks of record essence of his stroke symptoms which is associated with general anesthesia.  The patient is to uncomfortable with moving forward and so we will do it under general anesthesia.  We spent some time discussing that given his recent stroke he would likely require a prolonged hospitalization of at least 2 to 3 days before he was at a baseline where he could  go home.  I told him that they would likely discuss with him whether transient.  In rehab would help him get home.  The patient self catheterizes and in this context we will place a Hunter catheter at the time of surgery and leave it in until he is more functional.  The patient would really like to avoid this but understands.  I also discussed the surgery and the inherent risks of cranial nerve injury at about 5%, stroke, particularly hemorrhagic stroke in his case of uncertain risk, and a less than 1% risk of cardiac event given his normal heart stress test.  Going to see his primary care provider on Monday for a preop visit.  We will also need Covid testing.    HPI:  Maikol Glaser is a 80 year old male who was seen today for his carotid disease.  This gentleman we met in the hospital on July 19 after he presented with acute hemispheric stroke and was recognized to have significant left carotid stenosis.  The patient initially lost function in his leg and arm and this gradually improved.  He is now walking with a cane and has function of the whole arm although his hand is still weak and uncoordinated and the fourth and fifth digits do not function well.  Fortunately patient is left-handed and can do most things himself.  The patient does have to self catheterize.  He is able to do this even now.    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  These have affected his speech and his cognition when they have occurred but things have gone back to normal after each 1.    The patient does not have an important cardiac history.    No other major surgeries or health issues.  He is diabetic.  He does have some beginnings of Parkinson's disease.    Patient initially had wanted to avoid any surgery we discussed things at length and I gave him 3 options of no surgery with surveillance, surgery in the next week or so to maximize prevention of and additional major stroke but accepting a high  operative risk, and lastly surgery delayed with the idea of seeing him back in a couple of weeks to see how he is responding with recovery from the current stroke and give him a chance to think about things.  Both the patient and his wife want to move forward with surgery to minimize chance of a stroke as soon as possible after our discussion.  I did carefully outline the risks of hemorrhagic stroke given his history and the need to heparinize as well as other operative risks.  The patient has not had a living will made and we spent some time discussing all of this and it becomes clear that the patient really does not want to live in a nursing home if he has no chance of going back to his own home and even his wife says that she would not support this.  In this context the patient has a fairly narrow window for success with surgery.    PHH:    Past Medical History:   Diagnosis Date     Cerebral infarction (H)      HLD (hyperlipidemia)      HTN (hypertension)      Hyperlipidemia      Hypertension      Intracranial hemorrhage (H) 5/26/2021     Stroke (H)      TIA (transient ischemic attack)       No past surgical history on file.    ALLERGIES:  Penicillins    MEDS:    Current Outpatient Medications:      acetaminophen (TYLENOL) 325 MG tablet, Take 2 tablets (650 mg) by mouth every 6 hours as needed for mild pain or fever, Disp:  , Rfl:      amLODIPine (NORVASC) 5 MG tablet, Take 1 tablet (5 mg) by mouth daily, Disp: 30 tablet, Rfl: 0     carbidopa-levodopa (SINEMET)  MG tablet, Take 1 tablet by mouth 4 times daily Takes at 0800, 1200, 1600, 2000, Disp: , Rfl:      clopidogrel (PLAVIX) 75 MG tablet, Take 75 mg by mouth daily, Disp: , Rfl:      melatonin 3 MG CAPS, Take 1 capful by mouth At Bedtime, Disp: , Rfl:      metoprolol succinate ER (TOPROL-XL) 50 MG 24 hr tablet, Take 50 mg by mouth daily, Disp: , Rfl:      simvastatin (ZOCOR) 40 MG tablet, 1 tablet (40 mg) by Oral or Feeding Tube route At Bedtime, Disp:  30 tablet, Rfl: 0    SOCIAL HABITS:    History   Smoking Status     Never Smoker   Smokeless Tobacco     Never Used     Social History    Substance and Sexual Activity      Alcohol use: Not Currently      History   Drug Use Unknown       FAMILY HISTORY:  No family history on file.    REVIEW OF SYSTEMS:    A 12 point ROS was reviewed and except for what is listed in the HPI above, all others are negative    PE:  BP (!) 160/80   Pulse 60   Resp 17   Wt Readings from Last 1 Encounters:   07/20/21 94.2 kg (207 lb 9.6 oz)     There is no height or weight on file to calculate BMI.    EXAM:  GENERAL: This is a well-developed 80 year old male who appears his stated age  EYES: Grossly normal.  MOUTH: Buccal mucosa normal   MUSCULOSKELETAL: Grossly normal and both lower extremities are intact.  HEME/LYMPH: No lymphedema  NEUROLOGIC: Focally intact, Alert and oriented x 3.   PSYCH: appropriate affect  INTEGUMENT: No open lesions or ulcers        DIAGNOSTIC STUDIES:     Images:  MR Brain w/o Contrast    Result Date: 7/18/2021  EXAM: MR BRAIN W/O CONTRAST LOCATION: [facility] DATE/TIME: 7/18/2021 10:46 AM INDICATION: Right upper extremity weakness. COMPARISON: CT head without contrast 05/26/2021. MRI of the brain 05/27/2021. TECHNIQUE: Routine multiplanar multisequence head MRI without intravenous contrast. FINDINGS: INTRACRANIAL CONTENTS: Areas of restricted diffusion involving the cortex of the left precentral and postcentral gyri in addition to the left parietal lobe and left parieto-occipital junction. There is an area of T2 hypointense signal with associated blooming artifact in the left parietal lobe on axial T2 image 18 corresponding to evolving parenchymal hemorrhage. Edema adjacent to the hemorrhage has nearly completely resolved from previous exam. No midline shift. Scattered nonspecific T2/FLAIR hyperintensities within the cerebral white matter most consistent with mild chronic microvascular ischemic  change. Mild generalized cerebral atrophy. No hydrocephalus. Mild cerebellar atrophy. SELLA: No abnormality accounting for technique. OSSEOUS STRUCTURES/SOFT TISSUES: Normal marrow signal. The major intracranial vascular flow voids are maintained. ORBITS: No abnormality accounting for technique. SINUSES/MASTOIDS: No paranasal sinus mucosal disease. No middle ear or mastoid effusion.     IMPRESSION: 1.  Foci of restricted diffusion involving the cortex of the left precentral and postcentral gyri in addition to the left parietal lobe and left parieto-occipital junction corresponding to new areas of acute or early subacute ischemic change from prior 2021 imaging. 2.  Expected interval evolution of hemorrhage in the left parietal lobe with near complete resolution of previously seen parenchymal edema.    Echocardiogram Complete - For age > 60 yrs    Result Date: 2021  [id number]^[place] [address]  Name: MIMA HENSON MRN: [id number] : 1940 Study Date: 2021 02:36 PM Age: 80 yrs Gender: Male Patient Location: [facility] Reason For Study: Cerebrovascular Incident Ordering Physician: MCKAY ORTEGA Performed By: WR  BSA: 2.2 m2 Height: 72 in Weight: 210 lb HR: 57 ______________________________________________________________________________ ______________________________________________________________________________ Interpretation Summary  1. Normal left ventricular size and systolic performance with a visually estimated ejection fraction of 55-60%. 2. No significant valvular heart disease is identified on this study. 3. Normal right ventricular size and systolic performance. 4. There is mild left atrial enlargement.  When compared to the prior real-time echocardiogram dated 2017, there has been no major appreciable interval change. ______________________________________________________________________________  Left ventricle: Normal left ventricular size and systolic performance with a visually estimated ejection fraction of 55-60%. There is normal regional wall motion. Left ventricular wall thickness is normal.  Assessment of LV Diastolic Function: The cumulative findings are indeterminate in the evaluation of diastolic function [The septal e' velocity is < 7 cm/s & lateral e' velocity is < 10 cm/s. The average E/e' is < 14. The TR velocity cannot be determined due to insufficient tricuspid insufficiency signal. Left atrial volume index is greater than 34 mL/mÂ ].  Right ventricle: Normal right ventricular size and systolic performance.  Left atrium: There is mild left atrial enlargement.  Right atrium: The right atrium is of normal size.  IVC: The IVC is not well visualized.  Aortic valve: The aortic valve is comprised of three cusps. No significant aortic stenosis or aortic insufficiency is detected on this study.  Mitral valve: The mitral valve appears morphologically normal. There is trace mitral insufficiency.  Tricuspid valve: The tricuspid valve is grossly morphologically normal. There is trace tricuspid insufficiency.  Pulmonic valve: The pulmonic valve is grossly morphologically normal.  Thoracic aorta: The aortic root and proximal ascending aorta are of normal dimension.  Pericardium: There is no significant pericardial effusion.  ______________________________________________________________________________ ______________________________________________________________________________ MMode/2D Measurements & Calculations RVDd: 2.8 cm IVSd: 1.2 cm LVIDd: 4.7 cm LVIDs: 3.0 cm LVPWd: 1.2 cm FS: 35.4 % LV mass(C)d: 201.1 grams LV mass(C)dI: 92.5 grams/m2 Ao root diam: 3.4 cm LA dimension: 3.5 cm asc Aorta Diam: 3.4 cm LA/Ao: 1.0 LVOT diam: 2.0 cm LVOT area: 3.2 cm2  LA Volume Indexed (AL/bp): 34.3 ml/m2  Time Measurements Aortic HR: 57.0 BPM MM HR: 57.0 BPM  Doppler Measurements & Calculations MV E max rhonda: 69.4  cm/sec MV A max rhonda: 98.5 cm/sec MV E/A: 0.70 MV dec slope: 345.6 cm/sec2 MV dec time: 0.20 sec Ao V2 max: 126.8 cm/sec Ao max P.0 mmHg Ao V2 mean: 90.3 cm/sec Ao mean PG: 3.7 mmHg Ao V2 VTI: 35.0 cm RIP(I,D): 2.7 cm2 RIP(V,D): 3.0 cm2 LV V1 max P.5 mmHg LV V1 max: 117.4 cm/sec LV V1 VTI: 29.0 cm CO(LVOT): 5.3 l/min CI(LVOT): 2.4 l/min/m2 SV(LVOT): 93.1 ml SI(LVOT): 42.8 ml/m2 PA acc time: 0.11 sec RIP Index (cm2/m2): 1.2 E/E' avg: 10.3 Lateral E/e': 9.0  Medial E/e': 11.6  ______________________________________________________________________________ Report approved by: Nya Albert 2021 04:25 PM       US Carotid Bilateral    Result Date: 2021  EXAM: US CAROTID BILATERAL LOCATION: [facility] DATE/TIME: 2021 1:49 PM INDICATION: Evaluate baseline carotid disease, right-sided weakness. COMPARISON: CTA earlier today. TECHNIQUE: Duplex exam performed utilizing 2D gray-scale imaging, Doppler interrogation with color-flow and spectral waveform analysis. The percent diameter stenosis is determined using NASCET criteria and Society of Radiologists in Ultrasound Consensus Criteria. FINDINGS: RIGHT: Mild to moderate plaque at the bifurcation. The peak systolic velocity in the ICA is less than 125 cm/sec, consistent with less than 50% stenosis. Normal velocities in the ECA. Antegrade flow within the vertebral artery. LEFT: Severe plaque at the bifurcation. The peak systolic velocity in the ICA is greater than 230 cm/sec, consistent with greater than 70% stenosis. Normal velocities in the ECA. Antegrade flow within the vertebral artery. VELOCITY CHART: The following velocities were obtained in the RIGHT carotid system. CCA: 64/12 cm/s ICA: 73/16 cm/s ECA: 94/8 cm/s ICA/CCA: PS 1.1, ED 1.3 The following velocities were obtained in the LEFT carotid system. CCA: 71/9 cm/s ICA: 232/45 cm/s ECA: 114/11 cm/s ICA/CCA: PS 3.3, ED 5.0     IMPRESSION: 1.  Mild to moderate plaque formation,  velocities consistent with less than 50% stenosis in the right internal carotid artery. 2.  Severe plaque formation, velocities consistent with greater than 70% stenosis in the left internal carotid artery. 3.  Flow within the vertebral arteries is antegrade.    NM Lexiscan stress test    Addendum Date: 7/22/2021       The nuclear stress test is negative for inducible myocardial ischemia or infarction.    The left ventricular ejection fraction at stress is 69%.    A prior study was conducted on 5/8/2012.  This study has no change when compared with the prior study.    The findings of this examination were communicated to the nursing staff at [facility].     Result Date: 7/22/2021     The nuclear stress test is negative for inducible myocardial ischemia or infarction.    The left ventricular ejection fraction at stress is greater than 70%.    A prior study was conducted on 5/8/2012.  This study has no change when compared with the prior study.    The findings of this examination were communicated to the nursing staff at [facility].     CTA Head Neck with Contrast    Result Date: 7/18/2021  EXAM: CTA  HEAD NECK WITH CONTRAST LOCATION: [facility] DATE/TIME: 7/18/2021 8:18 AM INDICATION: Right upper extremity weakness. Stroke code. History of intracranial hemorrhage. COMPARISON: Head CT 06/16/2021. Head CT 05/26/2021. CTA 04/07/2021. CONTRAST: 75ml isovue 370 TECHNIQUE: Head and neck CT angiogram with IV contrast. Noncontrast head CT followed by axial helical CT images of the head and neck vessels obtained during the arterial phase of intravenous contrast administration. Axial 2D reconstructed images and multiplanar 3D MIP reconstructed images of the head and neck vessels were performed by the technologist. Dose reduction techniques were used. All stenosis measurements made according to NASCET criteria unless otherwise specified. FINDINGS: NONCONTRAST HEAD CT: INTRACRANIAL CONTENTS: No  acute intracranial hemorrhage, extraaxial collection, or mass effect. The area of previously acute parenchymal hemorrhage in the left parietal area now shows an area of low-density encephalomalacia. No CT evidence of acute infarct. Mild presumed chronic small vessel ischemic changes. Mild generalized volume loss. No hydrocephalus. VISUALIZED ORBITS/SINUSES/MASTOIDS: No intraorbital abnormality. No paranasal sinus mucosal disease. No middle ear or mastoid effusion. BONES/SOFT TISSUES: No acute abnormality. HEAD CTA: ANTERIOR CIRCULATION: No stenosis/occlusion, aneurysm, or high flow vascular malformation. Standard [place] anatomy. POSTERIOR CIRCULATION: No stenosis/occlusion, aneurysm, or high flow vascular malformation. Balanced vertebral arteries supply a normal basilar artery. DURAL VENOUS SINUSES: Expected enhancement of the major dural venous sinuses. NECK CTA: RIGHT CAROTID: No measurable stenosis or dissection. LEFT CAROTID: Prominent atheromatous changes in the proximal left ICA with a stenosis in the 70-75% range similar to prior CTA 04/07/2021. No dissection. VERTEBRAL ARTERIES: No focal stenosis or dissection. Balanced vertebral arteries. AORTIC ARCH: Classic aortic arch anatomy with no significant stenosis at the origin of the great vessels. NONVASCULAR STRUCTURES: Unremarkable.     IMPRESSION: HEAD CT: 1.  No evidence for acute intracranial hemorrhage, mass effect, obstructive hydrocephalus or definite acute infarct by CT. 2.  Previous area of acute hemorrhage in the left parietal area has now matured and shows an area of low-density encephalomalacia. 3.  Mild age-related changes. HEAD CTA: 1.  All of the major large-caliber proximal intracranial vessels are patent without definite aneurysm or AVM. NECK CTA: 1.  Stable approximately 70-75% stenosis in the proximal left ICA compared to CTA 04/07/2021. No dissection. 2.  No significant stenosis right ICA or in either vertebral artery. 3.   Noncontrast head CT findings discussed with RADHA Nicole in the [facility] ER at 8:30 AM. The CT angiogram results discussed with Jessica Nicole at 8:55 AM on 07/18/2021.      I personally reviewed the images and my interpretation is that his CT demonstrates severe irregular carotid bulb plaque that is consistent with embolic disease.  It appears to be surgically amenable to repair.  His [place] is intact.  Carotid duplex is consistent with a significant stenosis based on elevated peak systolic velocities..    LABS:      Sodium   Date Value Ref Range Status   07/26/2021 143 136 - 145 mmol/L Final   07/19/2021 142 136 - 145 mmol/L Final   07/18/2021 143 136 - 145 mmol/L Final     Comment:     IF not drawn today. FOR STROKE CODEFOR STROKE CODE   05/26/2021 143 133 - 144 mmol/L Final     Urea Nitrogen   Date Value Ref Range Status   07/26/2021 29 (H) 8 - 28 mg/dL Final   07/19/2021 23 8 - 28 mg/dL Final   07/18/2021 25 8 - 28 mg/dL Final     Comment:     IF not drawn today. FOR STROKE CODEFOR STROKE CODE   05/26/2021 22 7 - 30 mg/dL Final     Hemoglobin   Date Value Ref Range Status   07/26/2021 13.2 (L) 13.3 - 17.7 g/dL Final   07/18/2021 13.1 (L) 13.3 - 17.7 g/dL Final   06/10/2021 12.5 (L) 14.0 - 18.0 g/dL Final   05/26/2021 13.0 (L) 13.3 - 17.7 g/dL Final     Platelet Count   Date Value Ref Range Status   07/26/2021 204 150 - 450 10e3/uL Final   07/18/2021 218 150 - 450 10e3/uL Final   06/10/2021 214 140 - 440 thou/uL Final   05/26/2021 197 150 - 450 10e9/L Final     INR   Date Value Ref Range Status   07/18/2021 1.01 0.85 - 1.15 Final     Comment:     Effective 7/11/2021, the reference range for this assay has changed.   05/26/2021 0.99 0.86 - 1.14 Final   05/25/2021 1.02 0.90 - 1.10 Final   04/07/2021 0.97 0.90 - 1.10 Final         Jessica Lemon MD, MD  VASCULAR SURGERY

## 2021-08-02 ENCOUNTER — LAB REQUISITION (OUTPATIENT)
Dept: LAB | Facility: CLINIC | Age: 81
End: 2021-08-02

## 2021-08-02 DIAGNOSIS — E11.9 TYPE 2 DIABETES MELLITUS WITHOUT COMPLICATIONS (H): ICD-10-CM

## 2021-08-02 LAB
ANION GAP SERPL CALCULATED.3IONS-SCNC: 12 MMOL/L (ref 5–18)
BUN SERPL-MCNC: 30 MG/DL (ref 8–28)
CALCIUM SERPL-MCNC: 9.6 MG/DL (ref 8.5–10.5)
CHLORIDE BLD-SCNC: 108 MMOL/L (ref 98–107)
CO2 SERPL-SCNC: 20 MMOL/L (ref 22–31)
CREAT SERPL-MCNC: 0.99 MG/DL (ref 0.7–1.3)
GFR SERPL CREATININE-BSD FRML MDRD: 72 ML/MIN/1.73M2
GLUCOSE BLD-MCNC: 121 MG/DL (ref 70–125)
POTASSIUM BLD-SCNC: 4.5 MMOL/L (ref 3.5–5)
SODIUM SERPL-SCNC: 140 MMOL/L (ref 136–145)

## 2021-08-02 PROCEDURE — 80048 BASIC METABOLIC PNL TOTAL CA: CPT | Performed by: FAMILY MEDICINE

## 2021-08-08 ENCOUNTER — LAB (OUTPATIENT)
Dept: FAMILY MEDICINE | Facility: CLINIC | Age: 81
End: 2021-08-08
Attending: SURGERY
Payer: COMMERCIAL

## 2021-08-08 DIAGNOSIS — I65.22 SYMPTOMATIC STENOSIS OF LEFT CAROTID ARTERY: ICD-10-CM

## 2021-08-08 DIAGNOSIS — Z11.59 ENCOUNTER FOR SCREENING FOR OTHER VIRAL DISEASES: ICD-10-CM

## 2021-08-08 LAB — SARS-COV-2 RNA RESP QL NAA+PROBE: NEGATIVE

## 2021-08-08 PROCEDURE — U0003 INFECTIOUS AGENT DETECTION BY NUCLEIC ACID (DNA OR RNA); SEVERE ACUTE RESPIRATORY SYNDROME CORONAVIRUS 2 (SARS-COV-2) (CORONAVIRUS DISEASE [COVID-19]), AMPLIFIED PROBE TECHNIQUE, MAKING USE OF HIGH THROUGHPUT TECHNOLOGIES AS DESCRIBED BY CMS-2020-01-R: HCPCS

## 2021-08-08 PROCEDURE — U0005 INFEC AGEN DETEC AMPLI PROBE: HCPCS

## 2021-08-10 ENCOUNTER — ANESTHESIA EVENT (OUTPATIENT)
Dept: SURGERY | Facility: HOSPITAL | Age: 81
DRG: 037 | End: 2021-08-10
Payer: COMMERCIAL

## 2021-08-11 ENCOUNTER — HOSPITAL ENCOUNTER (OUTPATIENT)
Dept: ULTRASOUND IMAGING | Facility: HOSPITAL | Age: 81
DRG: 037 | End: 2021-08-11
Attending: SURGERY | Admitting: SURGERY
Payer: COMMERCIAL

## 2021-08-11 ENCOUNTER — ANCILLARY PROCEDURE (OUTPATIENT)
Dept: ULTRASOUND IMAGING | Facility: HOSPITAL | Age: 81
DRG: 037 | End: 2021-08-11
Attending: ANESTHESIOLOGY
Payer: COMMERCIAL

## 2021-08-11 ENCOUNTER — ANESTHESIA (OUTPATIENT)
Dept: SURGERY | Facility: HOSPITAL | Age: 81
DRG: 037 | End: 2021-08-11
Payer: COMMERCIAL

## 2021-08-11 ENCOUNTER — HOSPITAL ENCOUNTER (INPATIENT)
Facility: HOSPITAL | Age: 81
LOS: 6 days | Discharge: NURSING, REHAB OR LONG-TERM CARE | DRG: 037 | End: 2021-08-17
Attending: SURGERY | Admitting: SURGERY
Payer: COMMERCIAL

## 2021-08-11 DIAGNOSIS — I65.22 SYMPTOMATIC STENOSIS OF LEFT CAROTID ARTERY: ICD-10-CM

## 2021-08-11 DIAGNOSIS — I10 ESSENTIAL HYPERTENSION: ICD-10-CM

## 2021-08-11 DIAGNOSIS — E11.9 DIABETES MELLITUS TYPE 2, NONINSULIN DEPENDENT (H): ICD-10-CM

## 2021-08-11 DIAGNOSIS — I63.412 CEREBRAL INFARCTION DUE TO EMBOLISM OF LEFT MIDDLE CEREBRAL ARTERY (H): ICD-10-CM

## 2021-08-11 DIAGNOSIS — I63.312 CEREBRAL INFARCTION DUE TO THROMBOSIS OF LEFT MIDDLE CEREBRAL ARTERY (H): Primary | ICD-10-CM

## 2021-08-11 LAB
ABO/RH(D): NORMAL
ANTIBODY SCREEN: NEGATIVE
CHOLEST SERPL-MCNC: 118 MG/DL
FASTING STATUS PATIENT QL REPORTED: YES
GLUCOSE BLDC GLUCOMTR-MCNC: 119 MG/DL (ref 70–125)
GLUCOSE BLDC GLUCOMTR-MCNC: 162 MG/DL (ref 70–125)
GLUCOSE BLDC GLUCOMTR-MCNC: 170 MG/DL (ref 70–125)
GLUCOSE BLDC GLUCOMTR-MCNC: 179 MG/DL (ref 70–125)
HBA1C MFR BLD: 6.7 %
HDLC SERPL-MCNC: 40 MG/DL
LDLC SERPL CALC-MCNC: 58 MG/DL
SPECIMEN EXPIRATION DATE: NORMAL
TRIGL SERPL-MCNC: 102 MG/DL

## 2021-08-11 PROCEDURE — 370N000017 HC ANESTHESIA TECHNICAL FEE, PER MIN: Performed by: SURGERY

## 2021-08-11 PROCEDURE — 250N000009 HC RX 250: Performed by: NURSE ANESTHETIST, CERTIFIED REGISTERED

## 2021-08-11 PROCEDURE — 258N000003 HC RX IP 258 OP 636: Performed by: ANESTHESIOLOGY

## 2021-08-11 PROCEDURE — 86900 BLOOD TYPING SEROLOGIC ABO: CPT | Performed by: ANESTHESIOLOGY

## 2021-08-11 PROCEDURE — 200N000001 HC R&B ICU

## 2021-08-11 PROCEDURE — 272N000004 HC RX 272: Performed by: SURGERY

## 2021-08-11 PROCEDURE — 88311 DECALCIFY TISSUE: CPT | Mod: TC | Performed by: SURGERY

## 2021-08-11 PROCEDURE — 999N000063 US INTRAOPERATIVE

## 2021-08-11 PROCEDURE — 99221 1ST HOSP IP/OBS SF/LOW 40: CPT | Performed by: FAMILY MEDICINE

## 2021-08-11 PROCEDURE — 360N000077 HC SURGERY LEVEL 4, PER MIN: Performed by: SURGERY

## 2021-08-11 PROCEDURE — 36415 COLL VENOUS BLD VENIPUNCTURE: CPT | Performed by: ANESTHESIOLOGY

## 2021-08-11 PROCEDURE — 03CJ0ZZ EXTIRPATION OF MATTER FROM LEFT COMMON CAROTID ARTERY, OPEN APPROACH: ICD-10-PCS | Performed by: SURGERY

## 2021-08-11 PROCEDURE — C1763 CONN TISS, NON-HUMAN: HCPCS | Performed by: SURGERY

## 2021-08-11 PROCEDURE — 258N000003 HC RX IP 258 OP 636: Performed by: NURSE ANESTHETIST, CERTIFIED REGISTERED

## 2021-08-11 PROCEDURE — 250N000009 HC RX 250: Performed by: ANESTHESIOLOGY

## 2021-08-11 PROCEDURE — 82465 ASSAY BLD/SERUM CHOLESTEROL: CPT | Performed by: SURGERY

## 2021-08-11 PROCEDURE — 250N000009 HC RX 250

## 2021-08-11 PROCEDURE — 03UJ0KZ SUPPLEMENT LEFT COMMON CAROTID ARTERY WITH NONAUTOLOGOUS TISSUE SUBSTITUTE, OPEN APPROACH: ICD-10-PCS | Performed by: SURGERY

## 2021-08-11 PROCEDURE — 250N000025 HC SEVOFLURANE, PER MIN: Performed by: SURGERY

## 2021-08-11 PROCEDURE — 710N000010 HC RECOVERY PHASE 1, LEVEL 2, PER MIN: Performed by: SURGERY

## 2021-08-11 PROCEDURE — 250N000009 HC RX 250: Performed by: SURGERY

## 2021-08-11 PROCEDURE — 250N000011 HC RX IP 250 OP 636: Performed by: NURSE ANESTHETIST, CERTIFIED REGISTERED

## 2021-08-11 PROCEDURE — 35301 RECHANNELING OF ARTERY: CPT | Mod: AS | Performed by: NURSE PRACTITIONER

## 2021-08-11 PROCEDURE — 272N000001 HC OR GENERAL SUPPLY STERILE: Performed by: SURGERY

## 2021-08-11 PROCEDURE — 999N000141 HC STATISTIC PRE-PROCEDURE NURSING ASSESSMENT: Performed by: SURGERY

## 2021-08-11 PROCEDURE — 258N000001 HC RX 258: Performed by: SURGERY

## 2021-08-11 PROCEDURE — 250N000011 HC RX IP 250 OP 636: Performed by: SURGERY

## 2021-08-11 PROCEDURE — 250N000013 HC RX MED GY IP 250 OP 250 PS 637: Performed by: STUDENT IN AN ORGANIZED HEALTH CARE EDUCATION/TRAINING PROGRAM

## 2021-08-11 PROCEDURE — 35301 RECHANNELING OF ARTERY: CPT | Performed by: SURGERY

## 2021-08-11 PROCEDURE — 83036 HEMOGLOBIN GLYCOSYLATED A1C: CPT | Performed by: SURGERY

## 2021-08-11 PROCEDURE — 250N000012 HC RX MED GY IP 250 OP 636 PS 637: Performed by: FAMILY MEDICINE

## 2021-08-11 PROCEDURE — 258N000003 HC RX IP 258 OP 636: Performed by: STUDENT IN AN ORGANIZED HEALTH CARE EDUCATION/TRAINING PROGRAM

## 2021-08-11 PROCEDURE — 250N000013 HC RX MED GY IP 250 OP 250 PS 637: Performed by: ANESTHESIOLOGY

## 2021-08-11 DEVICE — GRAFT PATCH VASC XENOSURE BIOLOGIC 0.8X08CM E0.8P8: Type: IMPLANTABLE DEVICE | Site: NECK | Status: FUNCTIONAL

## 2021-08-11 RX ORDER — LIDOCAINE HYDROCHLORIDE 10 MG/ML
INJECTION, SOLUTION INFILTRATION; PERINEURAL
Status: COMPLETED | OUTPATIENT
Start: 2021-08-11 | End: 2021-08-11

## 2021-08-11 RX ORDER — SODIUM CHLORIDE, SODIUM LACTATE, POTASSIUM CHLORIDE, CALCIUM CHLORIDE 600; 310; 30; 20 MG/100ML; MG/100ML; MG/100ML; MG/100ML
INJECTION, SOLUTION INTRAVENOUS CONTINUOUS PRN
Status: DISCONTINUED | OUTPATIENT
Start: 2021-08-11 | End: 2021-08-11

## 2021-08-11 RX ORDER — ONDANSETRON 2 MG/ML
4 INJECTION INTRAMUSCULAR; INTRAVENOUS EVERY 30 MIN PRN
Status: DISCONTINUED | OUTPATIENT
Start: 2021-08-11 | End: 2021-08-11 | Stop reason: HOSPADM

## 2021-08-11 RX ORDER — HEPARIN SODIUM 1000 [USP'U]/ML
INJECTION, SOLUTION INTRAVENOUS; SUBCUTANEOUS PRN
Status: DISCONTINUED | OUTPATIENT
Start: 2021-08-11 | End: 2021-08-11

## 2021-08-11 RX ORDER — SIMVASTATIN 10 MG
40 TABLET ORAL AT BEDTIME
Status: DISCONTINUED | OUTPATIENT
Start: 2021-08-11 | End: 2021-08-17 | Stop reason: HOSPADM

## 2021-08-11 RX ORDER — KETAMINE HYDROCHLORIDE 10 MG/ML
INJECTION INTRAMUSCULAR; INTRAVENOUS PRN
Status: DISCONTINUED | OUTPATIENT
Start: 2021-08-11 | End: 2021-08-11

## 2021-08-11 RX ORDER — CLOPIDOGREL BISULFATE 75 MG/1
75 TABLET ORAL DAILY
Status: DISCONTINUED | OUTPATIENT
Start: 2021-08-12 | End: 2021-08-16

## 2021-08-11 RX ORDER — MAGNESIUM HYDROXIDE 1200 MG/15ML
LIQUID ORAL PRN
Status: DISCONTINUED | OUTPATIENT
Start: 2021-08-11 | End: 2021-08-11 | Stop reason: HOSPADM

## 2021-08-11 RX ORDER — NITROGLYCERIN 20 MG/100ML
INJECTION INTRAVENOUS
Status: DISCONTINUED
Start: 2021-08-11 | End: 2021-08-11 | Stop reason: HOSPADM

## 2021-08-11 RX ORDER — BUPIVACAINE HYDROCHLORIDE 2.5 MG/ML
INJECTION, SOLUTION EPIDURAL; INFILTRATION; INTRACAUDAL PRN
Status: DISCONTINUED | OUTPATIENT
Start: 2021-08-11 | End: 2021-08-11 | Stop reason: HOSPADM

## 2021-08-11 RX ORDER — DEXTROSE MONOHYDRATE 25 G/50ML
25-50 INJECTION, SOLUTION INTRAVENOUS
Status: DISCONTINUED | OUTPATIENT
Start: 2021-08-11 | End: 2021-08-17 | Stop reason: HOSPADM

## 2021-08-11 RX ORDER — HYDROMORPHONE HCL IN WATER/PF 6 MG/30 ML
0.4 PATIENT CONTROLLED ANALGESIA SYRINGE INTRAVENOUS EVERY 5 MIN PRN
Status: DISCONTINUED | OUTPATIENT
Start: 2021-08-11 | End: 2021-08-11 | Stop reason: HOSPADM

## 2021-08-11 RX ORDER — AMLODIPINE BESYLATE 5 MG/1
5 TABLET ORAL DAILY
Status: DISCONTINUED | OUTPATIENT
Start: 2021-08-12 | End: 2021-08-17 | Stop reason: HOSPADM

## 2021-08-11 RX ORDER — LIDOCAINE 40 MG/G
CREAM TOPICAL
Status: DISCONTINUED | OUTPATIENT
Start: 2021-08-11 | End: 2021-08-11 | Stop reason: HOSPADM

## 2021-08-11 RX ORDER — NICOTINE POLACRILEX 4 MG
15-30 LOZENGE BUCCAL
Status: DISCONTINUED | OUTPATIENT
Start: 2021-08-11 | End: 2021-08-17 | Stop reason: HOSPADM

## 2021-08-11 RX ORDER — HEPARIN SODIUM 1000 [USP'U]/ML
INJECTION, SOLUTION INTRAVENOUS; SUBCUTANEOUS PRN
Status: DISCONTINUED | OUTPATIENT
Start: 2021-08-11 | End: 2021-08-11 | Stop reason: HOSPADM

## 2021-08-11 RX ORDER — DEXMEDETOMIDINE HYDROCHLORIDE 4 UG/ML
INJECTION, SOLUTION INTRAVENOUS
Status: DISCONTINUED
Start: 2021-08-11 | End: 2021-08-11 | Stop reason: HOSPADM

## 2021-08-11 RX ORDER — ACETAMINOPHEN 325 MG/1
975 TABLET ORAL EVERY 6 HOURS PRN
Status: DISCONTINUED | OUTPATIENT
Start: 2021-08-11 | End: 2021-08-17 | Stop reason: HOSPADM

## 2021-08-11 RX ORDER — PROPOFOL 10 MG/ML
INJECTION, EMULSION INTRAVENOUS PRN
Status: DISCONTINUED | OUTPATIENT
Start: 2021-08-11 | End: 2021-08-11

## 2021-08-11 RX ORDER — PROCHLORPERAZINE MALEATE 5 MG
5 TABLET ORAL EVERY 6 HOURS PRN
Status: DISCONTINUED | OUTPATIENT
Start: 2021-08-11 | End: 2021-08-17 | Stop reason: HOSPADM

## 2021-08-11 RX ORDER — ONDANSETRON 2 MG/ML
INJECTION INTRAMUSCULAR; INTRAVENOUS PRN
Status: DISCONTINUED | OUTPATIENT
Start: 2021-08-11 | End: 2021-08-11

## 2021-08-11 RX ORDER — NALOXONE HYDROCHLORIDE 0.4 MG/ML
0.2 INJECTION, SOLUTION INTRAMUSCULAR; INTRAVENOUS; SUBCUTANEOUS
Status: DISCONTINUED | OUTPATIENT
Start: 2021-08-11 | End: 2021-08-17 | Stop reason: HOSPADM

## 2021-08-11 RX ORDER — BISACODYL 10 MG
10 SUPPOSITORY, RECTAL RECTAL DAILY PRN
Status: DISCONTINUED | OUTPATIENT
Start: 2021-08-11 | End: 2021-08-17 | Stop reason: HOSPADM

## 2021-08-11 RX ORDER — SODIUM CHLORIDE 9 MG/ML
INJECTION, SOLUTION INTRAVENOUS CONTINUOUS
Status: DISCONTINUED | OUTPATIENT
Start: 2021-08-11 | End: 2021-08-13

## 2021-08-11 RX ORDER — DIPHENHYDRAMINE HCL 12.5MG/5ML
12.5 LIQUID (ML) ORAL EVERY 6 HOURS PRN
Status: DISCONTINUED | OUTPATIENT
Start: 2021-08-11 | End: 2021-08-11 | Stop reason: HOSPADM

## 2021-08-11 RX ORDER — NALOXONE HYDROCHLORIDE 0.4 MG/ML
0.4 INJECTION, SOLUTION INTRAMUSCULAR; INTRAVENOUS; SUBCUTANEOUS
Status: DISCONTINUED | OUTPATIENT
Start: 2021-08-11 | End: 2021-08-17 | Stop reason: HOSPADM

## 2021-08-11 RX ORDER — NITROGLYCERIN 10 MG/100ML
INJECTION INTRAVENOUS PRN
Status: DISCONTINUED | OUTPATIENT
Start: 2021-08-11 | End: 2021-08-11

## 2021-08-11 RX ORDER — FENTANYL CITRATE 50 UG/ML
INJECTION, SOLUTION INTRAMUSCULAR; INTRAVENOUS PRN
Status: DISCONTINUED | OUTPATIENT
Start: 2021-08-11 | End: 2021-08-11

## 2021-08-11 RX ORDER — OXYCODONE HYDROCHLORIDE 5 MG/1
5 TABLET ORAL EVERY 4 HOURS PRN
Status: DISCONTINUED | OUTPATIENT
Start: 2021-08-11 | End: 2021-08-11

## 2021-08-11 RX ORDER — FENTANYL CITRATE 50 UG/ML
25 INJECTION, SOLUTION INTRAMUSCULAR; INTRAVENOUS EVERY 5 MIN PRN
Status: DISCONTINUED | OUTPATIENT
Start: 2021-08-11 | End: 2021-08-11 | Stop reason: HOSPADM

## 2021-08-11 RX ORDER — HALOPERIDOL 5 MG/ML
1 INJECTION INTRAMUSCULAR
Status: DISCONTINUED | OUTPATIENT
Start: 2021-08-11 | End: 2021-08-11 | Stop reason: HOSPADM

## 2021-08-11 RX ORDER — ALBUTEROL SULFATE 0.83 MG/ML
2.5 SOLUTION RESPIRATORY (INHALATION) EVERY 4 HOURS PRN
Status: DISCONTINUED | OUTPATIENT
Start: 2021-08-11 | End: 2021-08-11 | Stop reason: HOSPADM

## 2021-08-11 RX ORDER — DEXAMETHASONE SODIUM PHOSPHATE 4 MG/ML
INJECTION, SOLUTION INTRA-ARTICULAR; INTRALESIONAL; INTRAMUSCULAR; INTRAVENOUS; SOFT TISSUE PRN
Status: DISCONTINUED | OUTPATIENT
Start: 2021-08-11 | End: 2021-08-11

## 2021-08-11 RX ORDER — AMOXICILLIN 250 MG
1 CAPSULE ORAL 2 TIMES DAILY
Status: DISCONTINUED | OUTPATIENT
Start: 2021-08-11 | End: 2021-08-17 | Stop reason: HOSPADM

## 2021-08-11 RX ORDER — SODIUM CHLORIDE, SODIUM LACTATE, POTASSIUM CHLORIDE, CALCIUM CHLORIDE 600; 310; 30; 20 MG/100ML; MG/100ML; MG/100ML; MG/100ML
INJECTION, SOLUTION INTRAVENOUS CONTINUOUS
Status: DISCONTINUED | OUTPATIENT
Start: 2021-08-11 | End: 2021-08-11 | Stop reason: HOSPADM

## 2021-08-11 RX ORDER — OXYCODONE HYDROCHLORIDE 5 MG/1
5 TABLET ORAL EVERY 4 HOURS PRN
Status: DISCONTINUED | OUTPATIENT
Start: 2021-08-11 | End: 2021-08-17 | Stop reason: HOSPADM

## 2021-08-11 RX ORDER — LIDOCAINE HYDROCHLORIDE 20 MG/ML
INJECTION, SOLUTION INFILTRATION; PERINEURAL PRN
Status: DISCONTINUED | OUTPATIENT
Start: 2021-08-11 | End: 2021-08-11

## 2021-08-11 RX ORDER — DIPHENHYDRAMINE HYDROCHLORIDE 50 MG/ML
12.5 INJECTION INTRAMUSCULAR; INTRAVENOUS EVERY 6 HOURS PRN
Status: DISCONTINUED | OUTPATIENT
Start: 2021-08-11 | End: 2021-08-11 | Stop reason: HOSPADM

## 2021-08-11 RX ORDER — GLYCOPYRROLATE 0.2 MG/ML
INJECTION, SOLUTION INTRAMUSCULAR; INTRAVENOUS PRN
Status: DISCONTINUED | OUTPATIENT
Start: 2021-08-11 | End: 2021-08-11

## 2021-08-11 RX ORDER — ONDANSETRON 4 MG/1
4 TABLET, ORALLY DISINTEGRATING ORAL EVERY 30 MIN PRN
Status: DISCONTINUED | OUTPATIENT
Start: 2021-08-11 | End: 2021-08-11 | Stop reason: HOSPADM

## 2021-08-11 RX ORDER — MULTIVIT WITH MINERALS/LUTEIN
1 TABLET ORAL DAILY
COMMUNITY

## 2021-08-11 RX ORDER — POLYETHYLENE GLYCOL 3350 17 G/17G
17 POWDER, FOR SOLUTION ORAL DAILY
Status: DISCONTINUED | OUTPATIENT
Start: 2021-08-12 | End: 2021-08-17 | Stop reason: HOSPADM

## 2021-08-11 RX ORDER — ACETAMINOPHEN 325 MG/1
650 TABLET ORAL EVERY 6 HOURS PRN
COMMUNITY

## 2021-08-11 RX ORDER — CARBIDOPA AND LEVODOPA 25; 100 MG/1; MG/1
1 TABLET ORAL 4 TIMES DAILY
Status: DISCONTINUED | OUTPATIENT
Start: 2021-08-11 | End: 2021-08-17 | Stop reason: HOSPADM

## 2021-08-11 RX ORDER — CLINDAMYCIN PHOSPHATE 900 MG/50ML
900 INJECTION, SOLUTION INTRAVENOUS SEE ADMIN INSTRUCTIONS
Status: DISCONTINUED | OUTPATIENT
Start: 2021-08-11 | End: 2021-08-11 | Stop reason: HOSPADM

## 2021-08-11 RX ORDER — METOPROLOL SUCCINATE 25 MG/1
50 TABLET, EXTENDED RELEASE ORAL DAILY
Status: DISCONTINUED | OUTPATIENT
Start: 2021-08-12 | End: 2021-08-17 | Stop reason: HOSPADM

## 2021-08-11 RX ORDER — ONDANSETRON 2 MG/ML
4 INJECTION INTRAMUSCULAR; INTRAVENOUS EVERY 6 HOURS PRN
Status: DISCONTINUED | OUTPATIENT
Start: 2021-08-11 | End: 2021-08-17 | Stop reason: HOSPADM

## 2021-08-11 RX ORDER — CLINDAMYCIN PHOSPHATE 900 MG/50ML
900 INJECTION, SOLUTION INTRAVENOUS
Status: COMPLETED | OUTPATIENT
Start: 2021-08-11 | End: 2021-08-11

## 2021-08-11 RX ORDER — PROTAMINE SULFATE 10 MG/ML
INJECTION, SOLUTION INTRAVENOUS
Status: DISCONTINUED
Start: 2021-08-11 | End: 2021-08-11 | Stop reason: HOSPADM

## 2021-08-11 RX ORDER — LIDOCAINE 40 MG/G
CREAM TOPICAL
Status: DISCONTINUED | OUTPATIENT
Start: 2021-08-11 | End: 2021-08-17 | Stop reason: HOSPADM

## 2021-08-11 RX ORDER — ONDANSETRON 4 MG/1
4 TABLET, ORALLY DISINTEGRATING ORAL EVERY 6 HOURS PRN
Status: DISCONTINUED | OUTPATIENT
Start: 2021-08-11 | End: 2021-08-17 | Stop reason: HOSPADM

## 2021-08-11 RX ADMIN — ROCURONIUM BROMIDE 35 MG: 10 INJECTION, SOLUTION INTRAVENOUS at 08:16

## 2021-08-11 RX ADMIN — ONDANSETRON 4 MG: 2 INJECTION INTRAMUSCULAR; INTRAVENOUS at 08:37

## 2021-08-11 RX ADMIN — SIMVASTATIN 40 MG: 40 TABLET, FILM COATED ORAL at 22:09

## 2021-08-11 RX ADMIN — ACETAMINOPHEN 975 MG: 325 TABLET ORAL at 20:06

## 2021-08-11 RX ADMIN — MIDAZOLAM HYDROCHLORIDE 1 MG: 1 INJECTION, SOLUTION INTRAMUSCULAR; INTRAVENOUS at 07:35

## 2021-08-11 RX ADMIN — SODIUM CHLORIDE, POTASSIUM CHLORIDE, SODIUM LACTATE AND CALCIUM CHLORIDE: 600; 310; 30; 20 INJECTION, SOLUTION INTRAVENOUS at 06:38

## 2021-08-11 RX ADMIN — ROCURONIUM BROMIDE 15 MG: 10 INJECTION, SOLUTION INTRAVENOUS at 08:33

## 2021-08-11 RX ADMIN — SODIUM CHLORIDE: 9 INJECTION, SOLUTION INTRAVENOUS at 13:43

## 2021-08-11 RX ADMIN — LIDOCAINE HYDROCHLORIDE 1 ML: 10 INJECTION, SOLUTION INFILTRATION; PERINEURAL at 07:35

## 2021-08-11 RX ADMIN — LIDOCAINE HYDROCHLORIDE 80 MG: 20 INJECTION, SOLUTION INFILTRATION; PERINEURAL at 08:16

## 2021-08-11 RX ADMIN — SODIUM CHLORIDE, SODIUM LACTATE, POTASSIUM CHLORIDE, CALCIUM CHLORIDE: 600; 310; 30; 20 INJECTION, SOLUTION INTRAVENOUS at 08:24

## 2021-08-11 RX ADMIN — FENTANYL CITRATE 50 MCG: 50 INJECTION, SOLUTION INTRAMUSCULAR; INTRAVENOUS at 08:15

## 2021-08-11 RX ADMIN — INSULIN ASPART 1 UNITS: 100 INJECTION, SOLUTION INTRAVENOUS; SUBCUTANEOUS at 18:48

## 2021-08-11 RX ADMIN — HEPARIN SODIUM 5000 UNITS: 1000 INJECTION, SOLUTION INTRAVENOUS; SUBCUTANEOUS at 09:45

## 2021-08-11 RX ADMIN — NITROGLYCERIN 200 MCG: 10 INJECTION INTRAVENOUS at 09:56

## 2021-08-11 RX ADMIN — CARBIDOPA AND LEVODOPA 1 TABLET: 25; 100 TABLET ORAL at 20:07

## 2021-08-11 RX ADMIN — KETAMINE HYDROCHLORIDE 25 MG: 10 INJECTION, SOLUTION INTRAMUSCULAR; INTRAVENOUS at 08:33

## 2021-08-11 RX ADMIN — MIDAZOLAM HYDROCHLORIDE 1 MG: 1 INJECTION, SOLUTION INTRAMUSCULAR; INTRAVENOUS at 08:16

## 2021-08-11 RX ADMIN — OXYCODONE HYDROCHLORIDE 5 MG: 5 TABLET ORAL at 23:05

## 2021-08-11 RX ADMIN — HEPARIN SODIUM 2000 UNITS: 1000 INJECTION, SOLUTION INTRAVENOUS; SUBCUTANEOUS at 10:49

## 2021-08-11 RX ADMIN — SODIUM CHLORIDE, POTASSIUM CHLORIDE, SODIUM LACTATE AND CALCIUM CHLORIDE: 600; 310; 30; 20 INJECTION, SOLUTION INTRAVENOUS at 09:07

## 2021-08-11 RX ADMIN — GLYCOPYRROLATE 0.2 MG: 0.2 INJECTION, SOLUTION INTRAMUSCULAR; INTRAVENOUS at 08:43

## 2021-08-11 RX ADMIN — PROPOFOL 80 MG: 10 INJECTION, EMULSION INTRAVENOUS at 08:16

## 2021-08-11 RX ADMIN — Medication 0.4 MCG/KG/HR: at 08:25

## 2021-08-11 RX ADMIN — NITROGLYCERIN 200 MCG: 10 INJECTION INTRAVENOUS at 09:50

## 2021-08-11 RX ADMIN — NITROGLYCERIN 200 MCG: 10 INJECTION INTRAVENOUS at 09:44

## 2021-08-11 RX ADMIN — PHENYLEPHRINE HYDROCHLORIDE 0.2 MCG/KG/MIN: 10 INJECTION INTRAVENOUS at 08:30

## 2021-08-11 RX ADMIN — DEXAMETHASONE SODIUM PHOSPHATE 4 MG: 4 INJECTION, SOLUTION INTRA-ARTICULAR; INTRALESIONAL; INTRAMUSCULAR; INTRAVENOUS; SOFT TISSUE at 08:37

## 2021-08-11 RX ADMIN — OXYCODONE HYDROCHLORIDE 5 MG: 5 TABLET ORAL at 17:46

## 2021-08-11 RX ADMIN — GLYCOPYRROLATE 0.2 MG: 0.2 INJECTION, SOLUTION INTRAMUSCULAR; INTRAVENOUS at 08:38

## 2021-08-11 RX ADMIN — DOCUSATE SODIUM 50 MG AND SENNOSIDES 8.6 MG 1 TABLET: 8.6; 5 TABLET, FILM COATED ORAL at 20:07

## 2021-08-11 RX ADMIN — KETAMINE HYDROCHLORIDE 25 MG: 10 INJECTION, SOLUTION INTRAMUSCULAR; INTRAVENOUS at 09:04

## 2021-08-11 RX ADMIN — ROCURONIUM BROMIDE 30 MG: 10 INJECTION, SOLUTION INTRAVENOUS at 09:45

## 2021-08-11 RX ADMIN — CLINDAMYCIN PHOSPHATE 900 MG: 900 INJECTION, SOLUTION INTRAVENOUS at 06:54

## 2021-08-11 RX ADMIN — FENTANYL CITRATE 50 MCG: 50 INJECTION, SOLUTION INTRAMUSCULAR; INTRAVENOUS at 07:35

## 2021-08-11 RX ADMIN — SUGAMMADEX 200 MG: 100 INJECTION, SOLUTION INTRAVENOUS at 12:03

## 2021-08-11 RX ADMIN — ROCURONIUM BROMIDE 10 MG: 10 INJECTION, SOLUTION INTRAVENOUS at 11:02

## 2021-08-11 ASSESSMENT — ACTIVITIES OF DAILY LIVING (ADL): DEPENDENT_IADLS:: CLEANING;COOKING;LAUNDRY;SHOPPING;MEAL PREPARATION;TRANSPORTATION

## 2021-08-11 NOTE — PLAN OF CARE
Problem: Neurologic Deterioration (Carotid Endarterectomy)  Goal: Absence of Acute Neurologic Symptoms  Outcome: Improving   Pt right arm and hand movement starting to improve, Dr. Lemon at bedside and aware.  Will continue to monitor.

## 2021-08-11 NOTE — OP NOTE
Operative Note    Name: Maikol Glaser     Location: [facility] Main OR    Procedure Date:  8/11/2021    PCP:  Christopher Banda    Procedure(s):  ENDARTERECTOMY, CAROTID, with electroencephalogram and intraoperative ultrasound     Pre-Procedure Diagnosis:    Symptomatic stenosis of left carotid artery [I65.22]     Post-Procedure Diagnosis:    Same     Surgeon(s):    Jessica Lemon MD     Assistants:Riky Cunningham, CNP: Note that Riky was present for the entire operation and critical in this challenging high carotid lesion in a high risk patient.  She was involved in the dissection, the endarterectomy, the patch repair, and the closure.    MD Meeta, vascular surgery fellow    Findings:    High and very high risk plaque with clear intraplaque hemorrhage and caseous material.  I strongly think this is the etiology of his stroke.    No EEG changes throughout the case.    Intraoperative carotid duplex revealed no mobile debris or thrombus.  Normal waveforms with the following velocities in centimeters per second:    CCA: PSV 94/EDV 14  ICA: /EDV 25  ECA:       Heparin:  7000 U    Estimated Blood Loss:   150 cc's    Specimens:    ID Type Source Tests Collected by Time Destination   1 :  Tissue Carotid Plaque, Left SURGICAL PATHOLOGY EXAM Jessica Lemon MD 8/11/2021 10:29 AM           Drains:   10 [place] JOY      Implants:  Implant Name Type Inv. Item Serial No.  Lot No. LRB No. Used Action   GRAFT PATCH VASC XENOSURE BIOLOGIC 0.8X08CM E0.8P8 -  Bone/Tissue Synthetic GRAFT PATCH VASC XENOSURE BIOLOGIC 0.8X08CM E0.8P8 0000 [facility] VASCULAR IN [id number] Left 1 Implanted        Complications:    * No complications entered in OR log *     Brief Clinical Hx:  This is a 80-year-old gentleman with early Parkinson's disease and a history of 9 small hemorrhagic strokes but with fairly good independence and quality of life who desperately wants to preserve this.  Had hemispheric stroke  found to be related to left hemisphere.  Very irregular carotid plaque with areas of significant hypodensity seen on CT scan related to the lesion and consistent with a greater than 70% stenosis.  After careful discussion plan for high risk endarterectomy for fairly higher located high risk lesion in a patient with increased risk from heparinization with the intention of protecting him from stroke.  stable    Operative Details:  Patient is prepped draped access to his left neck after ultrasound has been performed of the carotid bifurcation and extent of internal iliac disease been marked out on the skin.  An oblique incision was made just above the bifurcation along skin lines and extended down subtenons tissue and platysma.  Subplatysmal flaps were raised.  The sternomastoid was mobilized laterally.  Large facial vein was identified and ligated and transected.  At the base of our incision the common carotid artery was identified and circumferentially dissected.  At the apex of her incision we attempted to identify the hypoglossal nerve.  There was significant branches of ansa cervicalis and these led into a fairly inflammatory area more cephalad that made this somewhat challenging.  Still we were able to identify what appeared to be the descendents branch of the hypoglossal nerve and follow this up.  The external carotid artery was identified and circumferentially dissected as was the superior thyroid artery.  Following the dizziness hypoglossal nerve up and staying between the internal jugular vein and it we were able to identify the internal carotid artery well above the area of disease without disturbing the most diseased segment.  We attempted to dissect this out here.  During this challenging dissection well under the digastric muscle we inadvertently entered the internal carotid artery.  At this point we controlled this with direct pressure and heparinized the patient fully.  There were no EEG changes.  At  this point we clamped the common carotid artery and the external carotid artery.  We placed the Omni retractor in the field and were able to better identify where the vessel was injured and placed a single 6-0 Prolene suture in it to repair it.  We then were able to further dissect out the internal carotid artery a little bit distal to this area and controlled circumferentially.  We now clamped the internal carotid artery here.  There appeared to be a backwall injury to the vessel as well but we could not easily access.  With the vessel completely clamped we dissected and of the surrounding inflammatory tissue in the area of disease and through the bulb.  We now entered the common carotid artery just below the area of disease and using Lynn this is transected through the plaque.  This is very heavy plaque with areas of significant caseous degeneration of intraplaque hemorrhage and I very strongly feel this was etiology of his stroke.  We followed this up the internal carotid artery to the area beyond the disease.  We did not have any EEG changes.  We now performed an endarterectomy in the usual fashion.  At the distal end the plaque ended cleanly.  At this point in the middle of her back fall we could see where there was a posterior wall defect of the artery.  We therefore repaired this with a single 7-0 Prolene stitch that also tacked down the backwall plaque.  2 additional tacking sutures were placed to fully tacked down the healthy intima.  We now took time to ensure there was no mobile debris in the neointimal bed.  We then repaired the common carotid artery with a bovine pericardium patch and 6-0 running Prolene suture.  Prior to completing a patch repair backflush and forward flush the vessels and then we completed our repair and restored flow.  First we backflush the internal carotid artery and then reclamped it.  We then restored flow first of the external carotid artery circulation and then up the internal  carotid artery circulation.  There were no EEG changes with restoration of flow.  Intraoperative duplex was performed with the findings noted above, namely no thrombus or mobile debris and normal waveforms.    This time we took the time to ensure good hemostasis and irrigated the wound bed thoroughly.  A 10 JOY drain was placed given that the patient is on Plavix.  A 2-0 Vicryl was used to reapproximate the sternomastoid muscle over the soft tissues and carotid.  We then reconstructed the platysma with a running 3-0 Vicryl suture.  4 Monocryl was used to close the skin.  Marcaine 0.25 was infused and subcutaneous tissue for pain management and skin glue was used.    Procedure was well-tolerated.      Jessica Lemon MD     Date: 8/11/2021  Time:12:05 PM

## 2021-08-11 NOTE — BRIEF OP NOTE
[facility]    Brief Operative Note    Pre-operative diagnosis: Symptomatic stenosis of left carotid artery [I65.22]  Post-operative diagnosis Same as pre-operative diagnosis    Procedure: Procedure(s):  ENDARTERECTOMY, CAROTID, with electroencephalogram and intraoperative ultrasound  Surgeon: Surgeon(s) and Role:     * Jessica Lemon MD - Primary     * Riky Cunningham, CNP - Assisting      * Ambrosio Tim MD -Fellow    Anesthesia: General   Estimated blood loss: 125 ml   Drains: JOY  Specimens:   ID Type Source Tests Collected by Time Destination   1 :  Tissue Carotid Plaque, Left SURGICAL PATHOLOGY EXAM Jessica Lemon MD 8/11/2021 10:29 AM      Findings:   ICA bleeding during dissection.  Carotid endarterectomy with patch angioplasty , normal ICA velocities at end of case .  Complications: None.  Implants:   Implant Name Type Inv. Item Serial No.  Lot No. LRB No. Used Action   GRAFT PATCH VASC XENOSURE BIOLOGIC 0.8X08CM E0.8P8 -  Bone/Tissue Synthetic GRAFT PATCH VASC XENOSURE BIOLOGIC 0.8X08CM E0.8P8 0000 [facility] VASCULAR IN [id number] Left 1 Implanted

## 2021-08-11 NOTE — PHARMACY-ADMISSION MEDICATION HISTORY
Pharmacy Note - Admission Medication History   ______________________________________________________________________    Prior To Admission (PTA) med list completed and updated in EMR.       PTA Med List   Medication Sig Last Dose     acetaminophen (TYLENOL) 500 MG tablet Take 1,000 mg by mouth every 6 hours as needed for mild pain Past Week     amLODIPine (NORVASC) 5 MG tablet Take 1 tablet (5 mg) by mouth daily 8/10/2021     carbidopa-levodopa (SINEMET)  MG tablet Take 1 tablet by mouth 4 times daily Takes at 0800, 1200, 1600, 2000 8/11/2021     clopidogrel (PLAVIX) 75 MG tablet Take 75 mg by mouth daily 8/10/2021     metoprolol succinate ER (TOPROL-XL) 50 MG 24 hr tablet Take 50 mg by mouth daily 8/11/2021     multivitamin (CENTRUM SILVER) tablet Take 1 tablet by mouth daily 8/10/2021     simvastatin (ZOCOR) 40 MG tablet 1 tablet (40 mg) by Oral or Feeding Tube route At Bedtime 8/10/2021       Information source(s): Patient, Hospital records and [facility]    Method of interview communication: in-person    Patient was asked about OTC/herbal products specifically.  PTA med list reflects this.    Based on the pharmacist's assessment, the PTA med list information appears reliable    Allergies were reviewed, assessed, and updated with the patient.      Patient does not use any multi-dose medications prior to admission.     Thank you for the opportunity to participate in the care of this patient.      Werner Mackenzie [facility]     8/11/2021     6:29 AM

## 2021-08-11 NOTE — OR NURSING
Dr. Lemon called to discuss patient not moving his RUE after surgery. Patient has a history of stroke with right sided deficits, but that had recovered. He is expecting that it will take some time after anesthesia wears off before the patient will return to his pre-op baseline of moving both arms equally.

## 2021-08-11 NOTE — CONSULTS
[facility]  Consult Note - Hospitalist Service     Date of Admission:  8/11/2021  Consult Requested by: Dr. Lemon  Reason for Consult: comanagement    Assessment & Plan   Maikol Glaser is a 80 year old male admitted on 8/11/2021 for elective left carotid endarterectomy due to symptomatic stenosis of the left carotid artery.    Hypertension  --- Continue nicardipine and Joey-Synephrine per parameters with vascular surgery overnight in the ICU.  --- Continue home Norvasc and Toprol; added hold parameters    Type 2 diabetes mellitus  --- A1c 6.7  --- To new holding Metformin  --- Ordered insulin sliding scale and diabetic diet    Recent left MCA infarction  --- Continue secondary prevention  --- Continue Plavix  --- Continue Toprol, Zocor    Left ICA stenosis  --- Continue management per vascular surgery  --- Clindamycin for prophylaxis.    Recent left parietal intracerebral hemorrhage  --- Not on any anticoagulants at this time.  --- Secondary to trauma.    Parkinson's--- continue home Sinemet     The patient's care was discussed with the Bedside Nurse.    Monique Jensen MD  [facility]  Securely message with the Vocera Web Console (learn more here)  Text page via [employer] Paging/Directory                       ______________________________________________________________________    Chief Complaint     Carotid stenosis    History of Present Illness   Maikol Glaser is a  80-year-old male with a history of cerebrovascular disease with multiple CVAs in the past as well as hypertension, hyperlipidemia, and Parkinson's disease who presented with right-sided weakness and found to have left MCA infarction in July 2021.  As part of work-up patient was found to have left ICA stenosis with severe plaquing.  Patient was seen during hospitalization for stroke by vascular surgery with input that this was patient's symptomatic lesion and felt concerning for causing recent stroke.  Plans  were made for elective carotid endarterectomy at that time.  Note he was not treated with heparin due to ICH recently.    Patient seen in the presence of his family.  They corroborate above history of strokes.  ICH hemorrhage brought on by trauma with him falling and hitting his head.  They did note when he woke up it appeared that he was having issues with right upper extremity strength.  This appears to be slowly getting better.  They did speak with vascular surgery about this.    Also note patient had a recent left parietal ICH treated at [facility].  He did undergo follow-up MRI which did not show any vascular malformations.    Note history obtained from the patient as well as review of chart and discussion with his son and wife.      Review of Systems   The 5 point Review of Systems is negative other than noted in the HPI or here.     Past Medical History    I have reviewed this patient's medical history and updated it with pertinent information if needed.   Past Medical History:   Diagnosis Date     Cerebral infarction (H)      Diabetes (H)      HLD (hyperlipidemia)      HTN (hypertension)      Hyperlipidemia      Hypertension      Intracranial hemorrhage (H) 5/26/2021     Stroke (H)      TIA (transient ischemic attack)        Past Surgical History   I have reviewed this patient's surgical history and updated it with pertinent information if needed.  History reviewed. No pertinent surgical history.    Social History   I have reviewed this patient's social history and updated it with pertinent information if needed.  Social History     Tobacco Use     Smoking status: Never Smoker     Smokeless tobacco: Never Used   Substance Use Topics     Alcohol use: Not Currently     Drug use: Not Currently       Family History     Unable to obtain due to: patient sleepiness    Medications   Medications Prior to Admission   Medication Sig Dispense Refill Last Dose     acetaminophen (TYLENOL) 500 MG tablet Take  1,000 mg by mouth every 6 hours as needed for mild pain   Past Week     amLODIPine (NORVASC) 5 MG tablet Take 1 tablet (5 mg) by mouth daily 30 tablet 0 8/10/2021     carbidopa-levodopa (SINEMET)  MG tablet Take 1 tablet by mouth 4 times daily Takes at 0800, 1200, 1600, 2000   8/11/2021     clopidogrel (PLAVIX) 75 MG tablet Take 75 mg by mouth daily   8/10/2021     metoprolol succinate ER (TOPROL-XL) 50 MG 24 hr tablet Take 50 mg by mouth daily   8/11/2021     multivitamin (CENTRUM SILVER) tablet Take 1 tablet by mouth daily   8/10/2021     simvastatin (ZOCOR) 40 MG tablet 1 tablet (40 mg) by Oral or Feeding Tube route At Bedtime 30 tablet 0 8/10/2021       Allergies   Allergies   Allergen Reactions     Penicillins Rash     Childhood rxn.           Physical Exam   Vital Signs: Temp: 97.7  F (36.5  C) Temp src: Axillary BP: (!) 143/67 Pulse: 55   Resp: 15 SpO2: 100 % O2 Device: Nasal cannula Oxygen Delivery: 2 LPM  Weight: 215 lbs 6.4 oz    General Appearance: sedated, teeth are in, responds to voice but drowsy and falls asleep  Eyes: PERRL  HEENT: false teeth in  Respiratory: CTA-B  Cardiovascular: RRR, no murmers  GI: soft, non-tender, no masses  Genitourinary: avilez in place, does self cath  Skin: drain in left neck with JOY drain in  Neurologic: resting, no obvious deficits although formal neuro testing not done.  He is moving both of his legs equally, per family he uses a cane    Data   Results for orders placed or performed during the hospital encounter of 08/11/21 (from the past 24 hour(s))   Glucose by meter   Result Value Ref Range    GLUCOSE BY METER POCT 119 70 - 125 mg/dL   ABO/Rh type and screen    Narrative    The following orders were created for panel order ABO/Rh type and screen.  Procedure                               Abnormality         Status                     ---------                               -----------         ------                     Adult Type and Screen[[id number]]                             Final result                 Please view results for these tests on the individual orders.   Hemoglobin A1c   Result Value Ref Range    Hemoglobin A1C 6.7 (H) <=5.6 %   Lipid panel   Result Value Ref Range    Cholesterol 118 <=199 mg/dL    Triglycerides 102 <=149 mg/dL    Direct Measure HDL 40 >=40 mg/dL    LDL Cholesterol Calculated 58 <=129 mg/dL    Patient Fasting > 8hrs? Yes    Adult Type and Screen   Result Value Ref Range    ABO/RH(D) O NEG     Antibody Screen Negative Negative    SPECIMEN EXPIRATION DATE [id number]    Glucose by meter   Result Value Ref Range    GLUCOSE BY METER POCT 179 (H) 70 - 125 mg/dL

## 2021-08-11 NOTE — ANESTHESIA PROCEDURE NOTES
Arterial Line Procedure Note  Pre-Procedure   Staff -        Anesthesiologist:  Roman Fernandez MD       Performed By: anesthesiologist       Location: pre-op       Procedure Start/Stop Times: 8/11/2021 7:35 AM and 8/11/2021 7:45 AM       Pre-Anesthestic Checklist: patient identified, IV checked, risks and benefits discussed, informed consent, monitors and equipment checked, pre-op evaluation and at physician/surgeon's request  Timeout:       Correct Patient: Yes        Correct Procedure: Yes        Correct Site: Yes        Correct Position: Yes   Procedure   Procedure: arterial line       Diagnosis: carotid stenosis       Laterality: right       Insertion Site: radial.  Sterile Prep        Standard elements of sterile barrier followed       Skin prep: Chloraprep  Insertion/Injection        Technique: Seldinger Technique        Catheter Type/Size: 20 G, 12 cm  Narrative         Secured by: suture       Tegaderm dressing used.       Complications: None apparent,        Arterial waveform: Yes        IBP within 10% of NIBP: Yes

## 2021-08-11 NOTE — ANESTHESIA POSTPROCEDURE EVALUATION
Patient: Maikol Glaser    Procedure(s):  ENDARTERECTOMY, CAROTID, with electroencephalogram and intraoperative ultrasound    Diagnosis:Symptomatic stenosis of left carotid artery [I65.22]  Diagnosis Additional Information: No value filed.    Anesthesia Type:  General    Note:  Disposition: Inpatient   Postop Pain Control: Uneventful            Sign Out: Well controlled pain   PONV: No   Neuro/Psych:             Sign Out: Other neuro status (patient moving right foot but not right arm at this time, prior stroke with deficits on right side)   Airway/Respiratory: Uneventful            Sign Out: Acceptable/Baseline resp. status   CV/Hemodynamics: Uneventful            Sign Out: Acceptable CV status; No obvious hypovolemia; No obvious fluid overload   Other NRE: NONE   DID A NON-ROUTINE EVENT OCCUR? No           Last vitals:  Vitals Value Taken Time   /51 08/11/21 1257   Temp 37.2  C (98.9  F) 08/11/21 1257   Pulse 53 08/11/21 1304   Resp 17 08/11/21 1303   SpO2 100 % 08/11/21 1304   Vitals shown include unvalidated device data.    Electronically Signed By: GIO Bray CRNA  August 11, 2021  1:05 PM

## 2021-08-11 NOTE — PROGRESS NOTES
Received message from Mary Ann with [facility] [phone] re: patient admitted to hospital for planned procedure but has home care RN, PT and OT. Requesting update on discharge planning.     Patient had surgery, carotid endarterectomy, today 8/11/2021 and was noted to have right upper extremity paresthesia post op. Per RN progress note 8/11, MD notified- patient has a history of stroke with right sided deficits. Continuing to monitor.  Fresh post op in ICU- has not been seen by therapy yet.     Chart assessment completed per CM protocol. Patient was a planned readmit from home.  Previously hospitalized for stroke 7/18 - 7/20/21. Admitted from home with wife, Kinue,  and adult son, Salvador as well as home care (see above message). At previous baseline, he used a straight cath, walker and cane and needed assist with bathing, walking as well as housekeeping, meals, and transportation.        Discharge planning pending hospital progression. CM will continue to follow and update home care liaision when further info is available.

## 2021-08-11 NOTE — ANESTHESIA PREPROCEDURE EVALUATION
Anesthesia Pre-Procedure Evaluation    Patient: Maikol Glaser   MRN: [id number] : 1940        Preoperative Diagnosis: Symptomatic stenosis of left carotid artery [I65.22]   Procedure : Procedure(s):  ENDARTERECTOMY, CAROTID, with electroencephalogram and intraoperative ultrasound     Past Medical History:   Diagnosis Date     Cerebral infarction (H)      Diabetes (H)      HLD (hyperlipidemia)      HTN (hypertension)      Hyperlipidemia      Hypertension      Intracranial hemorrhage (H) 2021     Stroke (H)      TIA (transient ischemic attack)       History reviewed. No pertinent surgical history.   Allergies   Allergen Reactions     Penicillins Rash     Childhood rxn.          Social History     Tobacco Use     Smoking status: Never Smoker     Smokeless tobacco: Never Used   Substance Use Topics     Alcohol use: Not Currently      Wt Readings from Last 1 Encounters:   21 97.7 kg (215 lb 6.4 oz)        Anesthesia Evaluation   Pt has had prior anesthetic.     No history of anesthetic complications       ROS/MED HX  ENT/Pulmonary:  - neg pulmonary ROS   (+) RICHARD risk factors,     Neurologic:     (+) CVA, date: , with deficits, - Right sided weakness. Parkinson's disease,     Cardiovascular:     (+) Dyslipidemia hypertension-----    METS/Exercise Tolerance:     Hematologic:  - neg hematologic  ROS     Musculoskeletal:  - neg musculoskeletal ROS     GI/Hepatic:  - neg GI/hepatic ROS     Renal/Genitourinary:  - neg Renal ROS     Endo:     (+) type II DM,     Psychiatric/Substance Use:  - neg psychiatric ROS     Infectious Disease:  - neg infectious disease ROS     Malignancy:  - neg malignancy ROS     Other:  - neg other ROS          Physical Exam    Airway  airway exam normal      Mallampati: I   TM distance: > 3 FB   Neck ROM: full   Mouth opening: > 3 cm    Respiratory Devices and Support         Dental  no notable dental history         Cardiovascular   cardiovascular exam normal       Rhythm  and rate: regular and normal     Pulmonary   pulmonary exam normal        breath sounds clear to auscultation           OUTSIDE LABS:  CBC:   Lab Results   Component Value Date    WBC 9.5 07/26/2021    WBC 8.4 07/18/2021    HGB 13.2 (L) 07/26/2021    HGB 13.1 (L) 07/18/2021    HCT 41.4 07/26/2021    HCT 41.0 07/18/2021     07/26/2021     07/18/2021     BMP:   Lab Results   Component Value Date     08/02/2021     07/26/2021    POTASSIUM 4.5 08/02/2021    POTASSIUM 4.6 07/26/2021    CHLORIDE 108 (H) 08/02/2021    CHLORIDE 109 (H) 07/26/2021    CO2 20 (L) 08/02/2021    CO2 21 (L) 07/26/2021    BUN 30 (H) 08/02/2021    BUN 29 (H) 07/26/2021    CR 0.99 08/02/2021    CR 1.13 07/26/2021     08/11/2021     08/02/2021     COAGS:   Lab Results   Component Value Date    PTT 38 07/18/2021    INR 1.01 07/18/2021     POC:   Lab Results   Component Value Date     (H) 06/01/2021     HEPATIC:   Lab Results   Component Value Date    ALBUMIN 4.1 05/25/2021    PROTTOTAL 7.5 05/25/2021    ALT <9 05/25/2021    AST 12 05/25/2021    ALKPHOS 122 (H) 05/25/2021    BILITOTAL 0.4 05/25/2021     OTHER:   Lab Results   Component Value Date    LACT 1.6 04/07/2021    A1C 6.7 (H) 08/11/2021    DORA 9.6 08/02/2021    PHOS 2.7 06/01/2021    MAG 2.0 07/26/2021    TSH 2.70 05/25/2021    CRP 0.2 04/14/2019    SED 12 04/14/2019       Anesthesia Plan    ASA Status:  3      Anesthesia Type: General.     - Airway: ETT   Induction: Intravenous, Propofol.   Maintenance: Balanced.   Techniques and Equipment:     - Lines/Monitors: 2nd IV, Arterial Line     Consents    Anesthesia Plan(s) and associated risks, benefits, and realistic alternatives discussed. Questions answered and patient/representative(s) expressed understanding.     - Discussed with:  Patient, Other (See Comment)      - Extended Intubation/Ventilatory Support Discussed: No.      - Patient is DNR/DNI Status: No    Use of blood products discussed: No .      Postoperative Care    Pain management: IV analgesics.   PONV prophylaxis: Ondansetron (or other 5HT-3), Dexamethasone or Solumedrol     Comments:       1. Normal left ventricular size and systolic performance with a visually  estimated ejection fraction of 55-60%.  2. No significant valvular heart disease is identified on this study.  3. Normal right ventricular size and systolic performance.  4. There is mild left atrial enlargement.            Roman Fernandez MD

## 2021-08-11 NOTE — ANESTHESIA PROCEDURE NOTES
Airway       Patient location during procedure: OR       Procedure Start/Stop Times: 8/11/2021 8:19 AM  Staff -        Anesthesiologist:  Roman Fernandez MD       CRNA: Sonu Worthington APRN CRNA       Other Anesthesia Staff: Jaime Ruelas       Performed By: SRNA  Consent for Airway        Urgency: elective  Indications and Patient Condition       Indications for airway management: jenna-procedural       Induction type:intravenous       Mask difficulty assessment: 1 - vent by mask    Final Airway Details       Final airway type: endotracheal airway       Successful airway: ETT - single  Endotracheal Airway Details        ETT size (mm): 7.5       Cuffed: yes       Successful intubation technique: direct laryngoscopy       DL Blade Type: Cisneros 2       Grade View of Cords: 1       Adjucts: stylet       Position: Right       Measured from: lips       Secured at (cm): 22       Bite block used: None    Post intubation assessment        Placement verified by: capnometry, equal breath sounds and chest rise        Number of attempts at approach: 1       Number of other approaches attempted: 0       Secured with: silk tape       Ease of procedure: easy       Dentition: Intact and Unchanged           Physician Discharge Summary     Patient ID:  Annabelle Aldana   [id number]   51 year old   1970     Admit date: 2021    Discharge date: 2021      Admitting Physician: Renato Quevedo MD    Discharge Physician: Same    Admission Diagnoses:   1. 51 year old  with  2. AUB-L  3. Body mass index is 35.94 kg/m².   4. HTN  5. Anemia    Discharge Diagnoses:   1. Same s/p total laparoscopic hysterectomy, bilateral salpingectomy, cystoscopy    Admission Condition: good    Discharged Condition: good    Indication for Admission/Hospital Course: Annabelle Aldana is 51 year old  who was admitted for a short period of observation after her above surgery. She is ambulating, eating, voiding, passing flatus, and pain is controlled on oral medications. She would like to go home. She is examined and felt safe for discharge, she is discharged with close follow-up coordinated.     Of note, she had postoperative hypertension, which improved after her home Lisinopril and one dose of IV Labetalol.     General :  Annabelle is a pleasant, female resting in no distress. A&O X3  Head: normocephalic, atraumatic.   Eyes: sclera anicteric, conjunctiva have no discharge or erythema  Mouth: No bleeding or discharge, mucosa is pink without ulceration.   Neck: supple.  No lymphadenopathy.  No thyromegaly, no nodules palpated, trachea midline.  Lungs: No respiratory distress, symmetric chest expansion, lungs are clear bilaterally no wheezes, rhonchi, or rales.  Heart:   Regular rate and rhythm.  S1, S2 crisp, no murmurs, rubs, or gallops   Abdomen: Soft, non-tender, non-distended.  No hepatosplenomegaly. Three laparoscopic port sites well approximated without erythema or induration.   Skin:  Warm and dry to touch.  No lesions or rashes noted.    Extremeties:  2+ DP pulses bilaterally, no LE edema, calfs are supple and soft bilaterally.   Psych: linear and goal directed speech, bright affect     Disposition: Home       Summary of your Discharge  Medications      Take these Medications      Details   Acetaminophen Extra Strength 500 MG tablet   Generic drug: acetaminophen  Take 2 tablets by mouth every 6 hours.     cholecalciferol 25 mcg (1,000 units) tablet  Commonly known as: VITAMIN D   Take 25 mcg by mouth daily.     gabapentin 300 MG capsule  Commonly known as: NEURONTIN   Take 1 capsule by mouth every 8 hours.     ibuprofen 800 MG tablet  Commonly known as: MOTRIN   Take 1 tablet by mouth every 8 hours as needed for Pain. Do not start before April 22, 2021.     Iron 325 (65 Fe) MG Tab   Take 1 tablet by mouth daily (with breakfast).     lisinopril 5 MG tablet  Commonly known as: ZESTRIL   Take 5 mg by mouth daily.     simethicone 125 MG chewable tablet  Commonly known as: MYLICON   Chew 1 tablet by mouth every 4 hours as needed for Flatulence.     traMADol 50 MG tablet  Commonly known as: ULTRAM   Take a HALF tablet by mouth every 6 hours as needed for Pain.     VITAMIN B COMPLEX PO   Take 1 tablet by mouth daily.            Patient Instructions:   Follow-up with Dr. Quevedo as scheduled.     Andi Fermin MD   4/22/2021     Attending Attestation:  4/22/2021    I have seen and evaluated the patient, reviewed the resident's note, and agree with the findings, assessment, and plan. She is clinically stable and is meeting milestones. Hgb 10.1 preop to 8.8 POD1; do not suspect ongoing bleeding given normal VS and benign exam. Appropriate for discharge. Precautions and restrictions reviewed.     Renato Quevedo MD  Obstetrics and Gynecology

## 2021-08-11 NOTE — ANESTHESIA CARE TRANSFER NOTE
Patient: Maikol Glaser    Procedure(s):  ENDARTERECTOMY, CAROTID, with electroencephalogram and intraoperative ultrasound    Diagnosis: Symptomatic stenosis of left carotid artery [I65.22]  Diagnosis Additional Information: No value filed.    Anesthesia Type:   General     Note:    Oropharynx: oropharynx clear of all foreign objects  Level of Consciousness: drowsy  Oxygen Supplementation: face mask    Independent Airway: airway patency satisfactory and stable  Dentition: dentition unchanged  Vital Signs Stable: post-procedure vital signs reviewed and stable  Report to RN Given: handoff report given  Patient transferred to: PACU    Handoff Report: Identifed the Patient, Reviewed Intra-OP anesthesia mangement and issues during anesthesia, Allowed opportunity for questions and acknowledgement of understanding, Identified the Reponsible Provider, Discussed the surgical course, Set expectations for post-procedure period and Reviewed the pertinent medical history      Vitals:  Vitals Value Taken Time   /51 08/11/21 1257   Temp 37.2  C (98.9  F) 08/11/21 1257   Pulse 53 08/11/21 1303   Resp 17 08/11/21 1303   SpO2 100 % 08/11/21 1303   Vitals shown include unvalidated device data.    Electronically Signed By: GIO Bray CRNA  August 11, 2021  1:04 PM

## 2021-08-11 NOTE — PROGRESS NOTES
VASCULAR SURGERY NOTE    Patient had RUE deficits at time of last stroke. Had RUE paralysis when waking up from anesthesia but able to wiggle toes on R.     O  BP (!) 143/67 (BP Location: Left arm)   Pulse 57   Temp 97.7  F (36.5  C) (Axillary)   Resp 18   Wt 97.7 kg (215 lb 6.4 oz)   SpO2 99%   BMI 29.21 kg/m    Gen: alert  Neuro: no tongue deviation, very mild facial asymmetry ( left droop from likely marginal mandibular nerve stretch during surgery), able to wiggle RLE toes , can  with R hand (weak as compared to L), rest of RUE weak (4 PM exam)    Slowly improving exam since OR. Intraoperative duplex with excellent ICA flows ( with non elevated PSVs), no neck hematoma    - Neuro checks  - SBP goal 100-180   - Expect gradual improvement in RUE motor function as he continues to recover from anesthesia    D/w Dr. Xochilt Tim MD  Fellow

## 2021-08-12 ENCOUNTER — APPOINTMENT (OUTPATIENT)
Dept: MRI IMAGING | Facility: HOSPITAL | Age: 81
DRG: 037 | End: 2021-08-12
Attending: SURGERY
Payer: COMMERCIAL

## 2021-08-12 LAB
ERYTHROCYTE [DISTWIDTH] IN BLOOD BY AUTOMATED COUNT: 14.1 % (ref 10–15)
GLUCOSE BLDC GLUCOMTR-MCNC: 129 MG/DL (ref 70–125)
GLUCOSE BLDC GLUCOMTR-MCNC: 144 MG/DL (ref 70–125)
GLUCOSE BLDC GLUCOMTR-MCNC: 144 MG/DL (ref 70–125)
GLUCOSE BLDC GLUCOMTR-MCNC: 145 MG/DL (ref 70–125)
HCT VFR BLD AUTO: 31.7 % (ref 40–53)
HGB BLD-MCNC: 10.1 G/DL (ref 13.3–17.7)
MCH RBC QN AUTO: 28.4 PG (ref 26.5–33)
MCHC RBC AUTO-ENTMCNC: 31.9 G/DL (ref 31.5–36.5)
MCV RBC AUTO: 89 FL (ref 78–100)
PATH REPORT.COMMENTS IMP SPEC: NORMAL
PATH REPORT.COMMENTS IMP SPEC: NORMAL
PATH REPORT.FINAL DX SPEC: NORMAL
PATH REPORT.GROSS SPEC: NORMAL
PATH REPORT.MICROSCOPIC SPEC OTHER STN: NORMAL
PATH REPORT.RELEVANT HX SPEC: NORMAL
PHOTO IMAGE: NORMAL
PLATELET # BLD AUTO: 209 10E3/UL (ref 150–450)
POTASSIUM BLD-SCNC: 4.2 MMOL/L (ref 3.5–5)
RBC # BLD AUTO: 3.56 10E6/UL (ref 4.4–5.9)
WBC # BLD AUTO: 12.1 10E3/UL (ref 4–11)

## 2021-08-12 PROCEDURE — 250N000013 HC RX MED GY IP 250 OP 250 PS 637: Performed by: ANESTHESIOLOGY

## 2021-08-12 PROCEDURE — 88311 DECALCIFY TISSUE: CPT | Mod: 26 | Performed by: PATHOLOGY

## 2021-08-12 PROCEDURE — 255N000002 HC RX 255 OP 636: Performed by: SURGERY

## 2021-08-12 PROCEDURE — 258N000003 HC RX IP 258 OP 636: Performed by: STUDENT IN AN ORGANIZED HEALTH CARE EDUCATION/TRAINING PROGRAM

## 2021-08-12 PROCEDURE — A9585 GADOBUTROL INJECTION: HCPCS | Performed by: SURGERY

## 2021-08-12 PROCEDURE — 84132 ASSAY OF SERUM POTASSIUM: CPT | Performed by: STUDENT IN AN ORGANIZED HEALTH CARE EDUCATION/TRAINING PROGRAM

## 2021-08-12 PROCEDURE — 99233 SBSQ HOSP IP/OBS HIGH 50: CPT | Performed by: HOSPITALIST

## 2021-08-12 PROCEDURE — 250N000013 HC RX MED GY IP 250 OP 250 PS 637: Performed by: FAMILY MEDICINE

## 2021-08-12 PROCEDURE — 250N000013 HC RX MED GY IP 250 OP 250 PS 637: Performed by: STUDENT IN AN ORGANIZED HEALTH CARE EDUCATION/TRAINING PROGRAM

## 2021-08-12 PROCEDURE — 88304 TISSUE EXAM BY PATHOLOGIST: CPT | Mod: 26 | Performed by: PATHOLOGY

## 2021-08-12 PROCEDURE — 200N000001 HC R&B ICU

## 2021-08-12 PROCEDURE — 70553 MRI BRAIN STEM W/O & W/DYE: CPT

## 2021-08-12 PROCEDURE — 85027 COMPLETE CBC AUTOMATED: CPT | Performed by: STUDENT IN AN ORGANIZED HEALTH CARE EDUCATION/TRAINING PROGRAM

## 2021-08-12 RX ORDER — GADOBUTROL 604.72 MG/ML
10 INJECTION INTRAVENOUS ONCE
Status: COMPLETED | OUTPATIENT
Start: 2021-08-12 | End: 2021-08-12

## 2021-08-12 RX ADMIN — AMLODIPINE BESYLATE 5 MG: 5 TABLET ORAL at 08:09

## 2021-08-12 RX ADMIN — CLOPIDOGREL BISULFATE 75 MG: 75 TABLET ORAL at 08:09

## 2021-08-12 RX ADMIN — OXYCODONE HYDROCHLORIDE 5 MG: 5 TABLET ORAL at 22:57

## 2021-08-12 RX ADMIN — DOCUSATE SODIUM 50 MG AND SENNOSIDES 8.6 MG 1 TABLET: 8.6; 5 TABLET, FILM COATED ORAL at 08:09

## 2021-08-12 RX ADMIN — CARBIDOPA AND LEVODOPA 1 TABLET: 25; 100 TABLET ORAL at 16:17

## 2021-08-12 RX ADMIN — DOCUSATE SODIUM 50 MG AND SENNOSIDES 8.6 MG 1 TABLET: 8.6; 5 TABLET, FILM COATED ORAL at 20:36

## 2021-08-12 RX ADMIN — SIMVASTATIN 40 MG: 40 TABLET, FILM COATED ORAL at 21:23

## 2021-08-12 RX ADMIN — OXYCODONE HYDROCHLORIDE 5 MG: 5 TABLET ORAL at 17:10

## 2021-08-12 RX ADMIN — CARBIDOPA AND LEVODOPA 1 TABLET: 25; 100 TABLET ORAL at 20:33

## 2021-08-12 RX ADMIN — GADOBUTROL 10 ML: 604.72 INJECTION INTRAVENOUS at 18:34

## 2021-08-12 RX ADMIN — ACETAMINOPHEN 975 MG: 325 TABLET ORAL at 18:58

## 2021-08-12 RX ADMIN — ACETAMINOPHEN 975 MG: 325 TABLET ORAL at 04:05

## 2021-08-12 RX ADMIN — CARBIDOPA AND LEVODOPA 1 TABLET: 25; 100 TABLET ORAL at 11:25

## 2021-08-12 RX ADMIN — OXYCODONE HYDROCHLORIDE 5 MG: 5 TABLET ORAL at 06:29

## 2021-08-12 RX ADMIN — INSULIN ASPART 1 UNITS: 100 INJECTION, SOLUTION INTRAVENOUS; SUBCUTANEOUS at 09:02

## 2021-08-12 RX ADMIN — SODIUM CHLORIDE: 9 INJECTION, SOLUTION INTRAVENOUS at 00:59

## 2021-08-12 RX ADMIN — CARBIDOPA AND LEVODOPA 1 TABLET: 25; 100 TABLET ORAL at 08:09

## 2021-08-12 RX ADMIN — POLYETHYLENE GLYCOL 3350 17 G: 17 POWDER, FOR SOLUTION ORAL at 08:10

## 2021-08-12 RX ADMIN — INSULIN ASPART 1 UNITS: 100 INJECTION, SOLUTION INTRAVENOUS; SUBCUTANEOUS at 11:24

## 2021-08-12 NOTE — PROGRESS NOTES
Hospitalist Progress Note    Assessment/Plan    Principal Problem:    Symptomatic stenosis of left carotid artery  Active Problems:    Diabetes mellitus type 2, noninsulin dependent (H)    Hypertension    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)  80-year-old patient with a history of stroke, hypertension, Parkinson disease, presented with right-sided weakness in July 2021 found to have left MCA infarction work-up came back with left ICA stenosis severe plaque, there is concern for lesion causing him to have stroke and patient came for elective left carotid endarterectomy, postoperatively hospitalist medicine consulted for medical management,    Left internal carotid artery stenosis  Status post left endarterectomy on August 11,  Had right arm paralysis when he woke up from anesthesia but able to wiggle his right leg toes  Improved function of the right upper extremity, he is known to have right upper extremity weakness from prior stroke,  Vascular surgery is following and ordering a brain MRI to rule out perioperative stroke,.  Vascular surgery his symptoms much better today compared to yesterday but also still far from his baseline  He does have history of multiple strokes in the past including hemorrhagic stroke  Recommended to keep blood pressure 100-1 80  Expect improvement of the right upper extremity motor function as he continues to recover  Pending MRI at this time, if any new stroke will need to involve neurology  Continue neurochecks,  Hypertension  Blood pressure controlled on Norvasc Toprol,  Per vascular surgery keep systolic blood pressure between 100 and 180       Type 2 diabetes  A1c 6.7, on insulin sliding scale, holding Metformin      Recent left parietal intracerebral hemorrhage secondary to trauma  Recent left MCA infarct  Not on anticoagulation,  Resume Toprol Plavix and Zocor    Parkinson disease   On Sinemet, stable      Barriers to Discharge: Pending brain  MRI,    Anticipated discharge date/Disposition: Home with home care versus TCU    Subjective  Patient was seen this morning, he does have difficulty with speech but states it has been like this since his stroke, awaiting for MRI to be done, discussed with vascular surgery    Objective    Vital signs in last 24 hours  Temp:  [97.4  F (36.3  C)-98.2  F (36.8  C)] 97.9  F (36.6  C)  Pulse:  [45-80] 68  Resp:  [9-25] 20  BP: ()/() 139/73  MAP:  [51 mmHg-105 mmHg] 62 mmHg  Arterial Line BP: ()/(34-69) 117/40  SpO2:  [93 %-100 %] 93 % [unfilled] O2 Device: None (Room air)    Weight:   [unfilled] Weight change: 2.895 kg (6 lb 6.1 oz)    Intake/Output last 3 shifts  I/O last 3 completed shifts:  In: 1409.83 [P.O.:410; I.V.:999.83]  Out: 1935 [Urine:1900; Drains:35]  Body mass index is 30.08 kg/m .    Physical Exam:  General Appearance: Alert, oriented x3, expressive aphasia  HEENT: Normocephalic. No scleral icterus, . Mucous membranes moist.  Dressing on the left side of the neck  Heart: :Regular rate and rhythm, normal S1 ,S2, No murmurs, no JVD, no pedal edema   Lungs: Clear to auscultation bilaterally. No wheezing or crackles  Abdomen: Soft, non tender, no rebound or rigidity, non distended, bowel sounds present.  Neurologic: Alert ,expressive aphasia, right sided weakness,      Pertinent Labs   Lab Results: personally reviewed.   No results for input(s): NA, CO2, BUN, CREATININE, ALBUMIN, BILITOT, ALKPHOS, ALT, AST, GLUCOSE in the last 168 hours.    Invalid input(s): K, CL, CALCIUM, LABALBU, PROT, MG  Recent Labs   Lab 08/12/21  0536   WBC 12.1*   HGB 10.1*   HCT 31.7*        No results for input(s): CKTOTAL, TROPONINI in the last 168 hours.    Invalid input(s): TROPONINT, CKMBINDEX  Invalid input(s): POCGLUFGR    Medications  Drug and lactation database from the [place] National Library of Medicine:  http://toxnet.nlm.nih.gov/cgi-bin/sis/htmlgen?LACT      Advanced Care  Planning:  Discharge Planning discussed with patient  Total time with this patient is 35 min with 50% of time spent in examining the patient, reviewing records, discussing plan of care and counseling, 50% of time spent in coordination of care.  Care discussed and coordinated with RN, vascular surgery team.      Lainey Woodard MD  Internal Medicine Hospitalist  8/12/2021

## 2021-08-12 NOTE — PLAN OF CARE
[id number] pt noted drowsy at the start of shift disoriented X3 . No movement to Minimal movement on his RUE at the start of shift this improved as shift progresses. Pt later was able to hold RUE up intermittently,  also noted exercising his  RUE with the assist of his LUE. Pain controlled with PRN oxycodone and tylenol was helpful. Minimal swelling at incision site. Intermittent restlessness causing his  A -line BP and manual BP different and high DR Lemon made aware. Pt DBP was low 30's while pt was sleeping no pressors was given overnight.  Continue to monitor .       Problem: Pain (Carotid Endarterectomy)  Goal: Acceptable Pain Control  Outcome: Improving

## 2021-08-12 NOTE — PROGRESS NOTES
VASCULAR SURGERY NOTE    S:   Patient had RUE deficits at time of last stroke. Had RUE paralysis when waking up from anesthesia but able to wiggle toes on R. Now with improving function of RUE (still weaker than left)    O  BP (!) 151/80   Pulse 68   Temp 97.9  F (36.6  C) (Oral)   Resp 20   Wt 100.6 kg (221 lb 12.5 oz)   SpO2 95%   BMI 30.08 kg/m    Gen: alert  Neuro: no tongue deviation, very mild facial asymmetry , able to wiggle RLE toes and move Right LE , can  with R hand (weak as compared to L), able to move proximal RUE too   Stable small non pulsatile hematoma and bruising around incision     A/P    POD 1 sp L CEA  Slowly improving exam since OR. Intraoperative duplex with excellent ICA flows ( with non elevated PSVs)    - Will obtain MRI to rule out perioperative stroke  - Ok to remove A line   - Neuro checks  - SBP goal 100-180   - Expect gradual improvement in RUE motor function as he continues to recover from anesthesia  - Reg diet  - Continue home meds   - Appreciate medicine involvement       D/w Dr. Xochilt Tim MD  Fellow

## 2021-08-12 NOTE — PLAN OF CARE
Problem: Risk for Delirium  Goal: Optimal Coping  Outcome: No Change     Problem: Risk for Delirium  Goal: Improved Behavioral Control  Outcome: No Change     Patient oriented to self. Able to move right arm and leg but weak. Will have MRI later today.

## 2021-08-12 NOTE — PROGRESS NOTES
Care Management Follow Up    Length of Stay (days): 1        Patient plan of care discussed at interdisciplinary rounds: Yes     Expected Discharge Date:   8/13 or 8/14/21       Concerns to be Addressed / Barriers to Discharge: medical surgical management S/p left carotid endarterectomy 8/11/21, post op cares, MRI to rule out intraop or post op stroke, IV meds as ordered     Anticipated Discharge Disposition: home with resumption of  Home care        Additional Information:  8/12/21 Post op day #1.  Called and spoke with Mary Ann at [facility] to update on no discharge today. Awaiting post op recommendations. anticipate potential discharge tomorrow 8/13 vs Sat 8/14 pending progression.  Patient is currently open for RN, PT and OT. Verified fax number for DC orders :# [phone]..

## 2021-08-13 ENCOUNTER — APPOINTMENT (OUTPATIENT)
Dept: PHYSICAL THERAPY | Facility: HOSPITAL | Age: 81
DRG: 037 | End: 2021-08-13
Attending: SURGERY
Payer: COMMERCIAL

## 2021-08-13 ENCOUNTER — APPOINTMENT (OUTPATIENT)
Dept: SPEECH THERAPY | Facility: HOSPITAL | Age: 81
DRG: 037 | End: 2021-08-13
Attending: PSYCHIATRY & NEUROLOGY
Payer: COMMERCIAL

## 2021-08-13 ENCOUNTER — APPOINTMENT (OUTPATIENT)
Dept: OCCUPATIONAL THERAPY | Facility: HOSPITAL | Age: 81
DRG: 037 | End: 2021-08-13
Attending: SURGERY
Payer: COMMERCIAL

## 2021-08-13 LAB
GLUCOSE BLDC GLUCOMTR-MCNC: 132 MG/DL (ref 70–125)
GLUCOSE BLDC GLUCOMTR-MCNC: 142 MG/DL (ref 70–125)
GLUCOSE BLDC GLUCOMTR-MCNC: 163 MG/DL (ref 70–125)
GLUCOSE BLDC GLUCOMTR-MCNC: 219 MG/DL (ref 70–125)

## 2021-08-13 PROCEDURE — 97530 THERAPEUTIC ACTIVITIES: CPT | Mod: GP

## 2021-08-13 PROCEDURE — 120N000001 HC R&B MED SURG/OB

## 2021-08-13 PROCEDURE — 97112 NEUROMUSCULAR REEDUCATION: CPT | Mod: GO

## 2021-08-13 PROCEDURE — 99223 1ST HOSP IP/OBS HIGH 75: CPT | Performed by: PSYCHIATRY & NEUROLOGY

## 2021-08-13 PROCEDURE — 250N000013 HC RX MED GY IP 250 OP 250 PS 637: Performed by: STUDENT IN AN ORGANIZED HEALTH CARE EDUCATION/TRAINING PROGRAM

## 2021-08-13 PROCEDURE — 92523 SPEECH SOUND LANG COMPREHEN: CPT | Mod: GN

## 2021-08-13 PROCEDURE — 92610 EVALUATE SWALLOWING FUNCTION: CPT | Mod: GN

## 2021-08-13 PROCEDURE — 97162 PT EVAL MOD COMPLEX 30 MIN: CPT | Mod: GP

## 2021-08-13 PROCEDURE — 99233 SBSQ HOSP IP/OBS HIGH 50: CPT | Performed by: HOSPITALIST

## 2021-08-13 PROCEDURE — 250N000013 HC RX MED GY IP 250 OP 250 PS 637: Performed by: FAMILY MEDICINE

## 2021-08-13 PROCEDURE — 97535 SELF CARE MNGMENT TRAINING: CPT | Mod: GO

## 2021-08-13 PROCEDURE — 97166 OT EVAL MOD COMPLEX 45 MIN: CPT | Mod: GO

## 2021-08-13 RX ADMIN — CARBIDOPA AND LEVODOPA 1 TABLET: 25; 100 TABLET ORAL at 09:02

## 2021-08-13 RX ADMIN — METOPROLOL SUCCINATE 50 MG: 25 TABLET, EXTENDED RELEASE ORAL at 09:02

## 2021-08-13 RX ADMIN — INSULIN ASPART 1 UNITS: 100 INJECTION, SOLUTION INTRAVENOUS; SUBCUTANEOUS at 17:41

## 2021-08-13 RX ADMIN — ACETAMINOPHEN 975 MG: 325 TABLET ORAL at 04:07

## 2021-08-13 RX ADMIN — MAGNESIUM HYDROXIDE 30 ML: 400 SUSPENSION ORAL at 20:42

## 2021-08-13 RX ADMIN — DOCUSATE SODIUM 50 MG AND SENNOSIDES 8.6 MG 1 TABLET: 8.6; 5 TABLET, FILM COATED ORAL at 09:02

## 2021-08-13 RX ADMIN — INSULIN ASPART 1 UNITS: 100 INJECTION, SOLUTION INTRAVENOUS; SUBCUTANEOUS at 12:07

## 2021-08-13 RX ADMIN — DOCUSATE SODIUM 50 MG AND SENNOSIDES 8.6 MG 1 TABLET: 8.6; 5 TABLET, FILM COATED ORAL at 20:42

## 2021-08-13 RX ADMIN — CARBIDOPA AND LEVODOPA 1 TABLET: 25; 100 TABLET ORAL at 20:42

## 2021-08-13 RX ADMIN — CARBIDOPA AND LEVODOPA 1 TABLET: 25; 100 TABLET ORAL at 17:00

## 2021-08-13 RX ADMIN — SIMVASTATIN 40 MG: 40 TABLET, FILM COATED ORAL at 22:15

## 2021-08-13 RX ADMIN — CARBIDOPA AND LEVODOPA 1 TABLET: 25; 100 TABLET ORAL at 12:07

## 2021-08-13 RX ADMIN — POLYETHYLENE GLYCOL 3350 17 G: 17 POWDER, FOR SOLUTION ORAL at 09:02

## 2021-08-13 RX ADMIN — CLOPIDOGREL BISULFATE 75 MG: 75 TABLET ORAL at 09:04

## 2021-08-13 RX ADMIN — AMLODIPINE BESYLATE 5 MG: 5 TABLET ORAL at 09:02

## 2021-08-13 ASSESSMENT — ACTIVITIES OF DAILY LIVING (ADL): PREVIOUS_RESPONSIBILITIES: YARDWORK

## 2021-08-13 NOTE — CONSULTS
[facility]      Neurology Stroke Consult    Patient Name: Maikol Glaser  : 1940 MRN#: [id number]    STROKE DATA    Stroke Code:  Stroke code not activated.  Time patient seen:  2021 915  Last known normal (pt's baseline):  21 2100    TPA treatment:  Not given due to outside treatment window, hx of ICH.     National Institutes of Health Stroke Scale (at presentation)  NIHSS done at:  time patient seen      Score    Level of consciousness:  (0)   Alert, keenly responsive     LOC questions:  (0)   Answers both questions correctly    LOC commands:  (0)   Performs both tasks correctly    Best gaze:  (0)   Normal    Visual:  (0)   No visual loss    Facial palsy:  (1)   Minor paralysis (flat nasolabial fold, smile asymmetry)    Motor arm (left):  (0)   No drift    Motor arm (right):  (2)   Some effort against gravity    Motor leg (left):  (0)   No drift    Motor leg (right):  (0)   No drift    Limb ataxia:  (0)   Absent    Sensory:  (0)   Normal- no sensory loss    Best language:  (1)   Mild to moderate aphasia    Dysarthria:  (1)   Mild to moderate dysarthria    Extinction and inattention:  (0)   No abnormality        NIHSS Total Score:  5        Dysphagia Screen  Time of screenin2021 0915  Screening results: Patient been tolerating diet for past day prior to confirmed stroke but has facial droop and mild dysarthria.  Ordered SLP consult     ASSESSMENT & RECOMMENDATIONS     The patient was seen for stroke.  The differential diagnosis includes stroke.     Impression:   Stroke: Likely at least in part perioperative from CEA, but some infarcts do appear they may have pre-dated the operation as well.  Patient woke up from carotid endarterectomy with worsening right arm and leg weakness.  He is fortunately been improving over the past 48 hours per vascular surgery team.  However, he states that the day prior to surgery he notices right arm was starting to get weaker again  raising the question of if he was throwing small clots from his unstable carotid plaque even prior to surgery.  He has had recent stroke work-up with echocardiogram within the last month, and with highly suspected etiology carotid stenosis and surgery I do not think additional stroke work-up is needed at this time.  With his history of intracerebral hemorrhage and the fact that his carotids are now treated I do think maintaining on a single antiplatelet instead of dual antiplatelets is the best course of action currently.  That being said with this question of him possibly having strokes prior to the surgery while on Plavix we will order Plavix resistance testing may consider    Recommendations:  -PT/OT/SLP,   -Stroke education ordered for you  -Plavix resistance testing ordered  -Inpatient neurology will sign off at this time  -Needs follow up in neurology regarding his Parkinson's disease and stroke, either here or [facility].     Prophylaxis          Minor hemorrhage seen on SWI imaging new since previous MRI imaging,  Would wait 3 days from stroke to start DVT prophylaxis.        HPI  Maikol Glaser is a 80 year old male with with complicated recent neurologic history including diabetes, hypertension, hyperlipidemia, history of recent intracerebral hemorrhage and also left MCA division stroke suspected due to ICA stenosis.  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  Last month he came in with worsening right-sided weakness was found to have embolic appearing strokes in the left MCA territory.  He was started on Plavix, and plan was for carotid endarterectomy.  Patient states that the day prior to his CVA he started to notice a little worsening of his right upper extremity weakness.  However, post procedure he noticed that his right upper extremity was definitely worse.  He also complained of dysarthria  but felt that may have been related to the procedure itself.  Initially it was thought that his right upper extremity symptoms were recrudescence in the setting of anesthesia, and it was slowly improving however when it was persistent for greater than 24 hours an MRI was obtained that showed new left MCA division strokes.    Patient feels he is getting stronger in his right upper extremity.  However he still notes prominent distal weakness.  Currently denies any headaches, vision changes, numbness, chest pain, shortness of breath.    Pertinent Past Medical/Surgical History  Past Medical History:   Diagnosis Date     Cerebral infarction (H)      Diabetes (H)      HLD (hyperlipidemia)      HTN (hypertension)      Hyperlipidemia      Hypertension      Intracranial hemorrhage (H) 5/26/2021     Stroke (H)      TIA (transient ischemic attack)        Past Surgical History:   Procedure Laterality Date     ENDARTERECTOMY CAROTID Left 8/11/2021    Procedure: ENDARTERECTOMY, CAROTID, with electroencephalogram and intraoperative ultrasound;  Surgeon: Jessica Lemon MD;  Location: [facility]       Medications: I have reviewed this patient's current medications.    Allergies: All allergies reviewed and addressed.    Family History: No family history of early stroke.    Social History: Patient denies smoking.  Denies current alcohol or drug use..      ROS:  The 10 point Review of Systems is negative other than noted in the HPI or here.     PHYSICAL EXAMINATION  Vital Signs:  B/P: 147/71,  T: 97.7,  P: 72,  R: 18    General:  patient lying in bed without any acute distress , pleasant and conversant with occasional word finding difficulties.  HEENT:  normocephalic/atraumatic  Cardio:  Regular rate  Pulmonary:  no respiratory distress  Abdomen:  soft, non-tender  Extremities:  no edema  Skin:  intact     Neurologic  Mental Status:  fully alert, attentive and oriented, follows commands, He has very mild dysarthria that he relates  to the procedure.  He also has subtle deficits in word finding when reading longer sentences.  Able to name appropriately.  Makes 1 mistake on repeating a sentence.  Cranial Nerves:  visual fields intact, PERRL, EOMI with normal smooth pursuit, facial sensation intact and symmetric, palate elevation symmetric and uvula midline, Mild dysarthria, and mild right facial droop.  Motor:  no abnormal movements, normal tone throughout, normal muscle bulk, Pronator drift present.  Strength full in bilateral lower extremities and left upper extremity.  Right upper extremity 4/5 strength in deltoids, 5 -/5 strength in biceps, 2/5 strength triceps, 3/5 strength in wrist extension, 5-/5 strength in finger flexion, 2/5 strength in finger extension and FDI  Reflexes: Right upper extremity slightly more brisk than left.  Right toe upgoing.  Sensory:  Denies any sensory deficits to light touch throughout.  No sensory extinction  Coordination: Finger-nose-finger with deficit due to weakness but no clear dysmetria.  Station/Gait:  Deferred    Labs  Labs and Imaging reviewed and used in developing the plan; pertinent results included.     Lab Results   Component Value Date     (H) 08/13/2021         Angelica Mabry, DO  Neurology    My total floor time today for this patient was at least 75 minutes, greater than half of which was for counseling and coordination of care

## 2021-08-13 NOTE — PROGRESS NOTES
Patient status post left carotid endarterectomy to prevent future stroke and patient recently had a significant stroke leaving him with right arm and leg weakness and speech difficulty.  Operation appears to have been complicated by multiple emboli and small strokes.  This is seen on MRI.  Clinically patient is almost back to his baseline but with some residual right arm weakness compared to preop.  I think with rehab this can return to that baseline and even better.  Patient's wife is strongly supportive of him going to rehab.  We will see what OT and PT think and if they think he qualifies for stroke rehab.  We will also see what neurology has to say about this.  Patient is oriented to place and to season as well as to himself and the recent procedure.  Moving leg without difficulty and arm much much better: Able to raise it above his head and open and close his hand but still with poor coordination.    Following along closely.

## 2021-08-13 NOTE — PROGRESS NOTES
VASCULAR SURGERY NOTE    S:   Patient had RUE deficits at time of last stroke.Improving RUE function. Alert. MRI with new areas of acute and subacute infarcts in L MCA distribution     O  BP (!) 157/72   Pulse 75   Temp 97.8  F (36.6  C)   Resp 28   Wt 100.6 kg (221 lb 12.5 oz)   SpO2 94%   BMI 30.08 kg/m    Gen: alert  Neuro: no tongue deviation, no facial asymmetry , able to wiggle RLE toes and move Right LE , can  with R hand (weak as compared to L), able to move proximal RUE too ( better than 8/12 exam) but still weaker than LUE  Stable small non pulsatile hematoma and bruising around incision     A/P    POD 2 sp L CEA  Slowly improving exam since OR. Intraoperative duplex with excellent ICA flows ( with non elevated PSVs). Periop stroke confirmed by MRI    - Neurology/OT/PT consult   - Reg diet  - Continue home meds   - DVT ppx  - Appreciate medicine involvement       D/w Dr. Xochilt Tim MD  Fellow

## 2021-08-13 NOTE — PROGRESS NOTES
Received from Yolanda DEAN in ICU. Pt was brought up to room via W/C. Pt was placed in reclining chair and given call light and room info. Pt was then placed on tele

## 2021-08-13 NOTE — PROGRESS NOTES
08/13/21 1015   Quick Adds   Type of Visit Initial Occupational Therapy Evaluation   Living Environment   People in home child(marlo), adult;spouse  (son)   Current Living Arrangements house   Home Accessibility stairs to enter home;stairs within home   Self-Care   Usual Activity Tolerance good   Current Activity Tolerance fair   Equipment Currently Used at Home cane, straight;walker, rolling;grab bar, tub/shower   Activity/Exercise/Self-Care Comment dressing and bathing I'ly   Instrumental Activities of Daily Living (IADL)   Previous Responsibilities yardwork   IADL Comments spouse takes care of cooking, cleaning, laundry, driving, grocery shopping   Disability/Function   Hearing Difficulty or Deaf no   Wear Glasses or Blind yes   Vision Management glasses   Concentrating, Remembering or Making Decisions Difficulty yes   Concentration Management slightly forgetful   Dressing/Bathing Difficulty no   Toileting issues no   General Information   Onset of Illness/Injury or Date of Surgery 08/11/21   Patient/Family Therapy Goal Statement (OT) go home   Existing Precautions/Restrictions no known precautions/restrictions   Cognitive Status Examination   Orientation Status person;place  (partially to time, month only)   Affect/Mental Status (Cognitive) emotionally labile   Follows Commands follows two-step commands   Visual Perception   Visual Impairment/Limitations WNL   Pain Assessment   Patient Currently in Pain No   Range of Motion Comprehensive   General Range of Motion upper extremity range of motion deficits identified   General Upper Extremity Assessment (Range of Motion)   Upper Extremity: Range of Motion scapula, right: UE ROM;shoulder, right: UE ROM;elbow/forearm, right: UE ROM;wrist, right: UE ROM   Strength Comprehensive (MMT)   General Manual Muscle Testing (MMT) Assessment upper extremity strength deficits identified   Comment, General Manual Muscle Testing (MMT) Assessment decreased strength mostly in R  hand, also presents with motor control deficits in R UE   Upper Extremity (Manual Muscle Testing)   Upper Extremity: Manual Muscle Testing (MMT) right shoulder strength deficit;right scapular strength deficit;right elbow/forearm strength deficit;right wrist strength deficit   Coordination   Upper Extremity Coordination Right UE impaired   Gross Motor Coordination overall decreased motor control with R UE   Fine Motor Coordination decreased fine motor control in  R hand, unable to fully extend fingers, decreased  strength   Transfers   Transfers sit-stand transfer   Sit-Stand Transfer   Sit-Stand [place] (Transfers) contact guard;minimum assist (75% patient effort);supervision;verbal cues   Balance   Balance Assessment standing balance: static   Standing Balance: Static fair balance;poor balance   Balance Comments decreased ability to correct balance with static standing   Clinical Impression   Criteria for Skilled Therapeutic Interventions Met (OT) yes   OT Diagnosis decreased UE motor control/strength leading to decreased ADL independence   OT Problem List-Impairments impacting ADL balance;motor control;range of motion (ROM);strength   Assessment of Occupational Performance 3-5 Performance Deficits   Identified Performance Deficits dressing, toileting, trsfs, mobility   Planned Therapy Interventions (OT) ADL retraining;fine motor coordination training;neuromuscular re-education;strengthening;cognition   Clinical Decision Making Complexity (OT) moderate complexity   Therapy Frequency (OT) 2x/day   Predicted Duration of Therapy 5   Risk & Benefits of therapy have been explained care plan/treatment goals reviewed;patient   OT Discharge Planning    OT Discharge Recommendation (DC Rec) Acute Rehab Center-Motivated patient will benefit from intensive, interdisciplinary therapy.  Anticipate will be able to tolerate 3 hours of therapy per day   OT Rationale for DC Rec decreased motor control of RUE leading to  decreased ADL independence   Total Evaluation Time (Minutes)   Total Evaluation Time (Minutes) 12

## 2021-08-13 NOTE — PLAN OF CARE
Problem: Pain (Carotid Endarterectomy)  Goal: Acceptable Pain Control  Outcome: Improving    Pt reported pain is Improving gave PRN oxycodone and tylenol  X 1 during shift was helpful . MRI result came backwith new changes Vascular and [facility] notified . Call from neurologist informing  writer he will see pt. Straight cath X 1 .700cc.  Continue to monitor

## 2021-08-13 NOTE — PROGRESS NOTES
08/13/21 1415   Quick Adds   Type of Visit Initial PT Evaluation   Living Environment   Living Environment Comments see OT note   Self-Care   Activity/Exercise/Self-Care Comment see OT note   General Information   Onset of Illness/Injury or Date of Surgery 08/11/21   Referring Physician Jessica Lemon MD   Patient/Family Therapy Goals Statement (PT) home when able   Pertinent History of Current Problem (include personal factors and/or comorbidities that impact the POC) recent CVA, Symptomatic left carotid artery stenosis status post left carotid endarterectomy   Pain Assessment   Patient Currently in Pain No   Range of Motion (ROM)   ROM Quick Adds ROM WFL   Strength   Manual Muscle Testing Quick Adds Deficits observed during functional mobility   Bed Mobility   Sit-Supine [place] (Bed Mobility) minimum assist (75% patient effort);moderate assist (50% patient effort)   Bed Mobility Limitations decreased ability to use legs for bridging/pushing;decreased ability to use arms for pushing/pulling   Impairments Contributing to Impaired Bed Mobility decreased strength   Assistive Device (Bed Mobility) bed rails   Comment (Bed Mobility) up in chair prior to PT   Transfers   Transfers sit-stand transfer   Sit-Stand Transfer   Sit-Stand [place] (Transfers) moderate assist (50% patient effort)   Assistive Device (Sit-Stand Transfers) walker, front-wheeled   Sit/Stand Transfer Comments multiple attempts, needing inc v/c for technique. Inc boost into standing, shows little control for descent onto bed   Gait/Stairs (Locomotion)   [place] Level (Gait) moderate assist (50% patient effort)   Assistive Device (Gait) walker, front-wheeled   Distance in Feet (Required for LE Total Joints) 3' fom chair to EOB   Pattern (Gait) step-to   Deviations/Abnormal Patterns (Gait) right sided deviations;festinating/shuffling;ataxic;weight shifting decreased;stride length decreased   Comment (Gait/Stairs) unable to pick RLE up,  poor weight shifting to R   Balance   Balance other (describe)   Standing Balance: Static poor balance   Balance Comments posterior lean in standing, needing modA x 1 for neutral   Balance Quick Add Standing balance: static   Clinical Impression   Criteria for Skilled Therapeutic Intervention yes, treatment indicated   PT Diagnosis (PT) impaired functional mobility, abnormal gait   Influenced by the following impairments generalized weakness, numbness   Functional limitations due to impairments impaired functional mobility, abnormal gait   Clinical Presentation Stable/Uncomplicated   Clinical Presentation Rationale pt presents as medically diagnosed   Clinical Decision Making (Complexity) low complexity   Therapy Frequency (PT) 2x/day  (as able)   Predicted Duration of Therapy Intervention (days/wks) 7 days   Planned Therapy Interventions (PT) balance training;bed mobility training;home exercise program;neuromuscular re-education;stair training;strengthening;transfer training   Anticipated Equipment Needs at Discharge (PT) cane, straight;walker, rolling  (has both)   Risk & Benefits of therapy have been explained evaluation/treatment results reviewed;care plan/treatment goals reviewed;risks/benefits reviewed;current/potential barriers reviewed;participants voiced agreement with care plan;participants included;patient   PT Discharge Planning    PT Discharge Recommendation (DC Rec) Acute Rehab Center-Motivated patient will benefit from intensive, interdisciplinary therapy.  Anticipate will be able to tolerate 3 hours of therapy per day   PT Rationale for DC Rec motivated, needing inc assist for transfers and ambulation   Total Evaluation Time   Total Evaluation Time (Minutes) 8

## 2021-08-13 NOTE — PROGRESS NOTES
08/13/21 1130   General Information   Onset of Illness/Injury or Date of Surgery 08/12/21   Patient/Family Therapy Goal Statement (SLP) Patient reports slowed rate of speech compared to recent baseline and endorses delay in word finding.    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)   General Observations Alert and cooperative; slowed speech   Past History of Dysphagia No dysphagia at recent admit (7/2021)   Type of Evaluation   Type of Evaluation Swallow Evaluation;Speech, Language, Cognition   Oral Motor   Comment, Dentition (Oral Motor) Upper and lower dentures, adequate fit   Facial Symmetry (Oral Motor)   Facial Symmetry (Oral Motor) WNL   Lip Function (Oral Motor)   Lip Range of Motion (Oral Motor) WNL   Tongue Function (Oral Motor)   Tongue ROM (Oral Motor) WNL   General Swallowing Observations   Current Diet/Method of Nutritional Intake (General Swallowing Observations, NIS) regular diet;thin liquids (level 0)   Swallowing Evaluation Clinical swallow evaluation   Respiratory Support (General Swallowing Observations) none   Clinical Swallow Evaluation   Feeding Assistance no assistance needed   Clinical Swallow Eval: Thin Liquid Texture Trial   Mode of Presentation, Thin Liquids straw   Volume of Liquid or Food Presented 3 + ounces   Oral Phase of Swallow WFL   Pharyngeal Phase of Swallow intact   Diagnostic Statement No s/s aspiration; Patient denies concerns or changes   Clinical Swallow Evaluation: Solid Food Texture Trial   Mode of Presentation self-fed   Volume Presented 1 cracker   Oral Phase WFL   Pharyngeal Phase intact   Diagnostic Statement No s/s aspiration; mastication adequate   Swallowing Recommendations   Diet Consistency Recommendations regular diet;thin liquids (level 0)   Supervision Level for Intake patient independent   Recommended Feeding/Eating Techniques (Swallow Eval) patient is independent, no specific  recommendations   Instrumental Assessment Recommendations instrumental evaluation not recommended at this time   Comment, Swallowing Recommendations No concerns for dysphagia at this time. Notify SLP if any concerns arise.   Speech   Speech Intelligibility (Motor Speech) WFL;other (see comments)  (nearly 100% with spontaneous slow rate of speech)   Comment, Motor Speech Assessment Effortful speech at times but largely functional and intelligible   Auditory Comprehension   Follows Commands (Auditory Comprehension) WNL  (pending alertness and awareness)   Yes/No Questions (Auditory Comprehension) WNL   Verbal Expression   Automatic Speech (Verbal Expression) WFL;counting   Confrontational Naming (Verbal Expression) WFL;objects   Comment, Assessment (Verbal Expression) Generative Naming-8 items with increased time; needed cues for attention to task   Cognition   Cognitive Function attention deficit;memory deficit   Cognitive Status Exam Comments Patient initially confused and thought he did not have his dentures in place, but with cues noted he did. He was able to recall this and locate his dentures. He appears to be at least partially aware of decreased attention.   Attention Deficit (Cognition) focused/sustained attention;requires cues/redirection to task   General Therapy Interventions   Intervention Comments 15 minutes dysphagia evaluation; 15 minutes speech evaluation   SLP Therapy Assessment/Plan   Criteria for Skilled Therapeutic Interventions Met (SLP Eval) yes   SLP Diagnosis aphasia, cognitive-linguistic   Rehab Potential (SLP Eval) good, to achieve stated therapy goals   Therapy Frequency (SLP Eval) 5 times/wk   Predicted Duration of Therapy Intervention (SLP Eval) LOS   Comment, Therapy Assessment/Plan (SLP) Patient presents with mild, higher level aphasia and cognition warrants further assessment.   SLP Discharge Planning    SLP Brief overview of current status  Anticipate need for ST post-acute.    Total  Evaluation Time   Total Evaluation Time (Minutes) 30   Wellbeing Promotion   Family/Support System Care support provided   Coping Strategies   Trust Relationship/Rapport care explained;choices provided

## 2021-08-13 NOTE — PROGRESS NOTES
Care Management Follow Up    Length of Stay (days): 2    Expected Discharge Date: 08/16/2021  Concerns to be Addressed:       Patient plan of care discussed at interdisciplinary rounds: Yes    Anticipated Discharge Disposition: TBD; OT/PT evaluation pending      Anticipated Discharge Services:    Anticipated Discharge DME:      Patient/family educated on Medicare website which has current facility and service quality ratings:    Education Provided on the Discharge Plan:    Patient/Family in Agreement with the Plan:      Referrals Placed by CM/SW:    Private pay costs discussed: Not applicable    Additional Information:    Call from Mary Ann with [facility] [phone], provided update. OT/PT evaluation pending. If pt stable to return home with home care services- [facility] able to resume RN,OT/PT on Tuesday, 8/17.       Ana Maria Lemus RN    2

## 2021-08-13 NOTE — PROGRESS NOTES
Care Management Follow Up    Length of Stay (days): 2    Expected Discharge Date: 08/16/2021     Concerns to be Addressed:       Patient plan of care discussed at interdisciplinary rounds: Yes    Anticipated Discharge Disposition:       Anticipated Discharge Services:    Anticipated Discharge DME:      Patient/family educated on Medicare website which has current facility and service quality ratings:    Education Provided on the Discharge Plan:    Patient/Family in Agreement with the Plan:      Referrals Placed by CM/SW:    Private pay costs discussed: Not applicable    Additional Information:  Met with patient and left voicemail with spouse to introduce care management role, progression of care, and possible services at discharge- including but not limited to: home care, AR[facility], TCU or other options. MD and PT recommendations TCU. Provided patient TCU list, pt reluctant about discharge options that of TCU. Defers to pt's spouse and son for discharge planning. Awaiting for call back from spouse.     1430 OT/PT recommendations ARF. Referrals made to [facility] pt's preference, [facility] and [facility]. Patient in agreement with above discharge plan. [facility], requesting review,  RNCM contact information provided.    Ana Maria Lemus RN

## 2021-08-13 NOTE — PROGRESS NOTES
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 Chan, postoperatively hospitalist medicine consulted for medical management    New stroke  -Was noted to have worsening weakness of the right side and was attributed to anesthesia postoperatively   -vascular surgery ordered MRI of the brain,  MRI of the brain yesterday showed development of cortical and subcortical areas of acute infarct in the left frontal parietal lobe and temporal lobe in the left MCA territory distribution with petechial hemorrhage in the area of the infarct of the left frontal lobe along with small chronic hemorrhage in the area of previously seen infarct in the left parieto-occipital lobe,  -Continue to be neurologically stable, transferred out of the ICU today, PT OT is working with him, and his neuro exam improved compared to yesterday,    Neurology evaluated the patient no further stroke work-up needed given the fact that he had a work-up for his stroke last month  , recommended Plavix resistance testing, believes that maintaining him on single antiplatelet instead of dual antiplatelets is the best course of action at this time, given that there is a question of him having a stroke prior to surgery while he was on Plavix,   neurology ordered Plavix resistant testing  -Discussed with neurology given the fact there are petechial hemorrhages on the MRI, will wait on starting Lovenox for DVT prophylaxis, at least 3 days from the acute  stroke      Symptomatic left carotid artery stenosis status post left carotid endarterectomy  Vascular surgery is following,  Was noted to have weakness in his right arm and speech difficulty MRI was ordered yesterday by vascular surgery and the results discussed above,  Vascular surgery recommended discharge to acute rehab outpatient he is reluctant to do that because of his bad experience in the past  I called the wife to discuss with her she did not answer so I was able to reach out to the son and talk to him about the discharge planning    Hypertension  Target blood pressure postoperatively was 100-1 80,   On amlodipine, Toprol with good control    Recent left parietal intracerebral hemorrhage secondary to trauma  Recent left MCA infarct July 2021, when he presented with right-sided weakness, did not get TPA because recent intracerebral hemorrhage, neurosurgery evaluated him in the prior admission    Parkinson's disease  On Sinemet, neurology recommended follow-up as an outpatient,    Type 2 diabetes  On insulin sliding scale, holding Metformin, blood sugar appears to be controlled    DVT prophylaxis  We will hold on Lovenox for now given the evidence of petechial hemorrhage on the MRI after I discussed with the neurology at least 3 days from the acute stroke    CODE STATUS  Full code    Barriers to Discharge: Pending clinical improvement, PT OT evaluation    Anticipated discharge date/Disposition: Acute rehab versus TCU, in 1 to 2 days    Subjective  Patient was seen this morning, he states he feels better than yesterday, his speech more clear, he does not like the idea of going to acute rehab because he had bad experience in the past and encouraged me to talk to his mother, I called the mother but there is no one to answer so I talked to his son, and updated him, he is concerned that his father has been to nursing her multiple times in the last 6 months and he is rather have him go home with home  care,    Objective    Vital signs in last 24 hours  Temp:  [97.4  F (36.3  C)-98.1  F (36.7  C)] 97.7  F (36.5  C)  Pulse:  [47-83] 72  Resp:  [11-31] 18  BP: (107-207)/(53-88) 147/71  SpO2:  [92 %-100 %] 98 % [unfilled] O2 Device: None (Room air)    Weight:   [unfilled] Weight change:     Intake/Output last 3 shifts  I/O last 3 completed shifts:  In: 583 [P.O.:580; I.V.:3]  Out: 1275 [Urine:1275]  Body mass index is 30.08 kg/m .    Physical Exam:  General Appearance: Alert, oriented x3, does not appear in distress.  HEENT: Normocephalic. No scleral icterus, . Mucous membranes moist.  Heart: :Regular rate and rhythm, normal S1 ,S2, No murmurs, no JVD, no pedal edema   Lungs: Clear to auscultation bilaterally. No wheezing or crackles  Abdomen: Soft, non tender, no rebound or rigidity, non distended, bowel sounds present.  Extremity: No deformity. No joint swelling.  Neurologic: Alert, Oriented x3, speech is intact, Muscle Power 5/5 of bilateral lower extremities, right-upper extremity muscle power 2/5 compared to 5/5 on the left upper extremity  , Mild right facial droop noted  Skin: No rash or redness    Pertinent Labs   Lab Results: personally reviewed.   No results for input(s): NA, CO2, BUN, CREATININE, ALBUMIN, BILITOT, ALKPHOS, ALT, AST, GLUCOSE in the last 168 hours.    Invalid input(s): K, CL, CALCIUM, LABALBU, PROT, MG  Recent Labs   Lab 08/12/21  0536   WBC 12.1*   HGB 10.1*   HCT 31.7*        No results for input(s): CKTOTAL, TROPONINI in the last 168 hours.    Invalid input(s): TROPONINT, CKMBINDEX  Invalid input(s): POCGLUFGR    Medications  Drug and lactation database from the [place] [employer] of Medicine:  http://toxnet.nlm.nih.gov/cgi-bin/sis/htmlgen?LACT      Pertinent Radiology   Radiology Results: {Reviewed Mri of the brain      Advanced Care Planning:  Discharge Planning discussed with patient and his son  Total time with this patient is 40 min with 50% of time spent in  examining the patient, reviewing records, discussing plan of care and counseling, 50% of time spent in coordination of care.  Care discussed and coordinated with RN, neurology.      Lainey Woodard MD  Internal Medicine Hospitalist  8/13/2021

## 2021-08-14 ENCOUNTER — APPOINTMENT (OUTPATIENT)
Dept: PHYSICAL THERAPY | Facility: HOSPITAL | Age: 81
DRG: 037 | End: 2021-08-14
Attending: SURGERY
Payer: COMMERCIAL

## 2021-08-14 ENCOUNTER — APPOINTMENT (OUTPATIENT)
Dept: SPEECH THERAPY | Facility: HOSPITAL | Age: 81
DRG: 037 | End: 2021-08-14
Attending: SURGERY
Payer: COMMERCIAL

## 2021-08-14 ENCOUNTER — APPOINTMENT (OUTPATIENT)
Dept: OCCUPATIONAL THERAPY | Facility: HOSPITAL | Age: 81
DRG: 037 | End: 2021-08-14
Attending: SURGERY
Payer: COMMERCIAL

## 2021-08-14 LAB
GLUCOSE BLDC GLUCOMTR-MCNC: 134 MG/DL (ref 70–125)
GLUCOSE BLDC GLUCOMTR-MCNC: 143 MG/DL (ref 70–125)
GLUCOSE BLDC GLUCOMTR-MCNC: 156 MG/DL (ref 70–125)
GLUCOSE BLDC GLUCOMTR-MCNC: 221 MG/DL (ref 70–125)
HOLD SPECIMEN: NORMAL
HOLD SPECIMEN: NORMAL
PA ADP BLD-ACNC: 124 PRU

## 2021-08-14 PROCEDURE — 250N000012 HC RX MED GY IP 250 OP 636 PS 637: Performed by: FAMILY MEDICINE

## 2021-08-14 PROCEDURE — 120N000001 HC R&B MED SURG/OB

## 2021-08-14 PROCEDURE — 97116 GAIT TRAINING THERAPY: CPT | Mod: GP

## 2021-08-14 PROCEDURE — 97112 NEUROMUSCULAR REEDUCATION: CPT | Mod: GO

## 2021-08-14 PROCEDURE — 92507 TX SP LANG VOICE COMM INDIV: CPT | Mod: GN

## 2021-08-14 PROCEDURE — 97530 THERAPEUTIC ACTIVITIES: CPT | Mod: GP

## 2021-08-14 PROCEDURE — 250N000013 HC RX MED GY IP 250 OP 250 PS 637: Performed by: STUDENT IN AN ORGANIZED HEALTH CARE EDUCATION/TRAINING PROGRAM

## 2021-08-14 PROCEDURE — 85576 BLOOD PLATELET AGGREGATION: CPT | Performed by: PATHOLOGY

## 2021-08-14 PROCEDURE — 250N000013 HC RX MED GY IP 250 OP 250 PS 637: Performed by: FAMILY MEDICINE

## 2021-08-14 PROCEDURE — 250N000011 HC RX IP 250 OP 636: Performed by: PSYCHIATRY & NEUROLOGY

## 2021-08-14 PROCEDURE — 97535 SELF CARE MNGMENT TRAINING: CPT | Mod: GO

## 2021-08-14 PROCEDURE — 99233 SBSQ HOSP IP/OBS HIGH 50: CPT | Performed by: HOSPITALIST

## 2021-08-14 PROCEDURE — 85576 BLOOD PLATELET AGGREGATION: CPT | Mod: TC | Performed by: PSYCHIATRY & NEUROLOGY

## 2021-08-14 PROCEDURE — 36415 COLL VENOUS BLD VENIPUNCTURE: CPT | Performed by: PSYCHIATRY & NEUROLOGY

## 2021-08-14 RX ADMIN — DOCUSATE SODIUM 50 MG AND SENNOSIDES 8.6 MG 1 TABLET: 8.6; 5 TABLET, FILM COATED ORAL at 08:11

## 2021-08-14 RX ADMIN — CARBIDOPA AND LEVODOPA 1 TABLET: 25; 100 TABLET ORAL at 20:36

## 2021-08-14 RX ADMIN — CLOPIDOGREL BISULFATE 75 MG: 75 TABLET ORAL at 08:11

## 2021-08-14 RX ADMIN — SIMVASTATIN 40 MG: 40 TABLET, FILM COATED ORAL at 21:24

## 2021-08-14 RX ADMIN — AMLODIPINE BESYLATE 5 MG: 5 TABLET ORAL at 08:11

## 2021-08-14 RX ADMIN — CARBIDOPA AND LEVODOPA 1 TABLET: 25; 100 TABLET ORAL at 16:57

## 2021-08-14 RX ADMIN — ENOXAPARIN SODIUM 40 MG: 40 INJECTION SUBCUTANEOUS at 08:11

## 2021-08-14 RX ADMIN — INSULIN ASPART 1 UNITS: 100 INJECTION, SOLUTION INTRAVENOUS; SUBCUTANEOUS at 21:24

## 2021-08-14 RX ADMIN — INSULIN ASPART 1 UNITS: 100 INJECTION, SOLUTION INTRAVENOUS; SUBCUTANEOUS at 12:38

## 2021-08-14 RX ADMIN — CARBIDOPA AND LEVODOPA 1 TABLET: 25; 100 TABLET ORAL at 12:38

## 2021-08-14 RX ADMIN — METOPROLOL SUCCINATE 50 MG: 25 TABLET, EXTENDED RELEASE ORAL at 08:11

## 2021-08-14 RX ADMIN — POLYETHYLENE GLYCOL 3350 17 G: 17 POWDER, FOR SOLUTION ORAL at 08:11

## 2021-08-14 RX ADMIN — CARBIDOPA AND LEVODOPA 1 TABLET: 25; 100 TABLET ORAL at 08:11

## 2021-08-14 RX ADMIN — INSULIN ASPART 1 UNITS: 100 INJECTION, SOLUTION INTRAVENOUS; SUBCUTANEOUS at 08:11

## 2021-08-14 NOTE — PLAN OF CARE
Problem: Adult Inpatient Plan of Care  Goal: Absence of Hospital-Acquired Illness or Injury  Outcome: No Change  Intervention: Identify and Manage Fall Risk  Recent Flowsheet Documentation  Taken 8/13/2021 1700 by Misty Lim RN  Safety Promotion/Fall Prevention:    chair alarm on    bed alarm on    clutter free environment maintained    lighting adjusted    nonskid shoes/slippers when out of bed    room door open    safety round/check completed    room organization consistent    toileting scheduled    patient and family education    assistive device/personal items within reach    fall prevention program maintained  Intervention: Prevent Skin Injury  Recent Flowsheet Documentation  Taken 8/13/2021 1700 by Misty Lim, RN  Body Position: sitting up in bed  Intervention: Prevent and Manage VTE (Venous Thromboembolism) Risk  Recent Flowsheet Documentation  Taken 8/13/2021 1700 by Misty Lim RN  VTE Prevention/Management:    pneumatic compression device    ambulation promoted    fluids promoted  Intervention: Prevent Infection  Recent Flowsheet Documentation  Taken 8/13/2021 1700 by Misty Lim RN  Infection Prevention:    single patient room provided    rest/sleep promoted     Problem: Bleeding (Carotid Endarterectomy)  Goal: Absence of Bleeding  Outcome: No Change     Problem: Neurologic Deterioration (Carotid Endarterectomy)  Goal: Absence of Acute Neurologic Symptoms  Outcome: No Change  Intervention: Optimize Cerebral Perfusion  Recent Flowsheet Documentation  Taken 8/13/2021 1700 by Misty Lim RN  Cerebral Perfusion Promotion: blood pressure monitored     Problem: Ongoing Anesthesia Effects (Carotid Endarterectomy)  Goal: Anesthesia/Sedation Recovery  Outcome: No Change  Intervention: Optimize Anesthesia Recovery  Recent Flowsheet Documentation  Taken 8/13/2021 1700 by Misty Lim RN  Safety Promotion/Fall Prevention:    chair alarm on    bed alarm on    clutter free  environment maintained    lighting adjusted    nonskid shoes/slippers when out of bed    room door open    safety round/check completed    room organization consistent    toileting scheduled    patient and family education    assistive device/personal items within reach    fall prevention program maintained  Reorientation Measures:    glasses use encouraged    clock in view    familiar social contact encouraged    reorientation provided  Level Incentive Spirometer (mL): 750     Problem: Adult Inpatient Plan of Care  Goal: Plan of Care Review  Outcome: Improving  Goal: Patient-Specific Goal (Individualized)  Outcome: Improving  Goal: Readiness for Transition of Care  Outcome: Improving     Problem: Adult Inpatient Plan of Care  Goal: Optimal Comfort and Wellbeing  Outcome: Adequate for Discharge  Intervention: Provide Person-Centered Care  Recent Flowsheet Documentation  Taken 8/13/2021 1700 by Misty Lim, RN  Trust Relationship/Rapport:    care explained    reassurance provided     Problem: Pain (Carotid Endarterectomy)  Goal: Acceptable Pain Control  Outcome: Adequate for Discharge     Problem: Postoperative Urinary Retention (Carotid Endarterectomy)  Goal: Effective Urinary Elimination  Outcome: Adequate for Discharge  Intervention: Monitor and Manage Urinary Retention  Recent Flowsheet Documentation  Taken 8/13/2021 1700 by Misty Lim, RN  Urinary Elimination Promotion:    toileting scheduled    bladder volume assessed by ultrasound    Denied pain throughout shift. BP elevated at one point, updated Dr. Chung & dyana was better. Continues on telemetry, NSR with BBB. Ate some soup for dinner only with encouragement from family & writer. Straight cath'd, 600 mL out just before 2300. Pt preoccupied with discharge plans, reminded that  sent referrals to [facility] (pt preference) as well as some other facilities. Pt refused to get into bed on shift & wants to stay in the recliner.

## 2021-08-14 NOTE — PROGRESS NOTES
VASCULAR SURGERY PROGRESS NOTE    Subjective:  Doing well. No acute distress. Resting comfortably in bed.     Objective:  Intake/Output Summary (Last 24 hours) at 8/14/2021 1048  Last data filed at 8/14/2021 0600  Gross per 24 hour   Intake 360 ml   Output 1775 ml   Net -1415 ml     PHYSICAL EXAM:  BP (!) 162/75 (BP Location: Left arm)   Pulse 75   Temp 97.5  F (36.4  C) (Oral)   Resp 18   Wt 100.6 kg (221 lb 12.5 oz)   SpO2 96%   BMI 30.08 kg/m    Well-appearing; no acute distress  Unlabored respirations on room air  Regular rate and rhythm    Right hand and digits are flexed at baseline but able to open the hand; slightly weaker right upper extremity compared to left upper extremity. Stable small non pulsatile hematoma and bruising around incision       ASSESSMENT:  81 yo M s/p L CEA for symptomatic L carotid stenosis on 8/14/2021 with perioperative stroke. Recovering appropriately.       PLAN:  - Neurology/OT/PT consult  - Regular diet  - Continue home meds  - DVT ppx  - Appreciate medicine involvement    Discussed pt history, exam, assessment and plan with Dr. Lemon of the vascular surgery service, who is in agreement with the above.    Mervin John MD  Division of Vascular Surgery   Pager: [phone]

## 2021-08-14 NOTE — PLAN OF CARE
Problem: Adult Inpatient Plan of Care  Goal: Absence of Hospital-Acquired Illness or Injury  Intervention: Identify and Manage Fall Risk  Recent Flowsheet Documentation  Taken 8/14/2021 0000 by Evelia Wright, RN  Safety Promotion/Fall Prevention:   room door open   nonskid shoes/slippers when out of bed   mobility aid in reach   lighting adjusted   bed alarm on   assistive device/personal items within reach   RUE remains weak with a drift, RLE has no drift but is weak on strength.    Some word finding with conversation.    Assist of one with walker for activity OOB.    Straight cath at 0600 for 375cc for a bladder scan of 446cc.   NSR on telemetry.    Evelia Wright, RN     Family Medicine

## 2021-08-14 NOTE — PLAN OF CARE
Problem: Adult Inpatient Plan of Care  Goal: Plan of Care Review  Outcome: No Change   Pt up in room and up to chair. Pt on tele. Pt getting straight cathed

## 2021-08-14 NOTE — PROGRESS NOTES
[facility]    Medicine Progress Note - Hospitalist Service       Date of Admission:  8/11/2021    Assessment & Plan           Maikol Glaser is a 80 year old male admitted on 8/11/2021. He has history of stroke, Parkinson's disease, diet-controlled diabetes, hypertension presented for scheduled carotid endarterectomy by Dr. Lemon for whom we were consulted for management of medical comorbidities.  He developed stroke in the postoperative interval.  He has persistent weakness and plan is for him to discharge to acute rehab.    #Recent left-sided stroke with right-sided weakness  #Left carotid stenosis, now 3 Days Post-Op from carotid endarterectomy  #Acute stroke in the postoperative interval  Patient had recent stroke, presented with right-sided weakness, found to have 70% stenosis of the left carotid artery.  He presented to our facility for scheduled carotid endarterectomy by Dr. Lemon.  Procedure was uncomplicated, patient was noted to have high risk plaque with clear intraplaque hemorrhage and caseous material. In the postoperative interval, patient developed worsening right-sided weakness, MRI showed numerous acute strokes in the left MCA distribution, in addition to the prior chronic appearing stroke in the parieto-occipital lobe.  These are presumed to be embolic related to the unstable plaque encountered during endarterectomy.  Seen by neurology, recommended PT, OT, speech therapy, and continue on home medication Plavix.  Plavix resistance testing ordered.  Some petechial hemorrhages seen within the areas of stroke, neurology recommend waiting at least 72 hours before initiation of pharmacologic DVT prophylaxis.  Plan is for patient to discharge to acute rehab given his persistent deficits.    #Acute encephalopathy  Patient with a little bit mixed up in the room today, seemed utterly perplexed by the pulse ox on his finger. Would have liked to discuss further with family whether  this is his baseline or not but unable to reach. He seems appropriate in conversation other than this.    #Acute blood loss anemia.  Baseline hemoglobin about 12.5, here 10.1 on postop day 2.  Minimal blood loss noted Intra-Op.  No significant apparent hematoma at surgical site on exam today.  Probably related to fluid shifts.  Can recheck tomorrow morning  #Parkinson's, continue home Sinemet.  Neurology does recommend that he establish with an outpatient neurologist  #Hypertension, continue home amlodipine and metoprolol  #Hyperlipidemia, continue home simvastatin  #Diabetes, diet controlled.  A1c 6.7  #Recent traumatic intracerebral hemorrhage, fell back in May and found to have 2cm parietal hemorrhage and was hospitalized and TCU.         Diet: Consistent Carb 60 grams CHO per Meal Diet    DVT Prophylaxis: Moderate risk. Pharmacologic prophylaxis contraindicated due to petechial hemorrhage.  We will plan to initiate pharmacologic DVT prophylaxis tomorrow.  Continue SCDs  Hunter Catheter: Not present  Central Lines: None  Code Status: Full Code      Disposition Plan   Expected discharge: 08/16/2021 recommended to Acute Rehab once safe disposition plan/ TCU bed available.     The patient's care was discussed with the Patient.    Ольга Morgan MD  Hospitalist Service  [facility]  Text page via [employer] Paging/Directory      Clinically Significant Risk Factors Present on Admission               ____________        Physical Exam   Vital Signs: Temp: 97.7  F (36.5  C) Temp src: Oral BP: 138/68 Pulse: 65   Resp: 18 SpO2: 98 % O2 Device: None (Room air)    Weight: 221 lbs 12.52 oz  General: in no apparent distress, non-toxic and alert male sitting in bedside chair oriented x3. He was perplexed trying to get the pulse ox cord free from where it was hung up slightly on the corner of the chair, and he seemed to have no idea what the pulse ox was.  HEENT: Head normocephalic atraumatic, oral mucosa moist.  Sclerae anicteric  CV: Regular rhythm, normal rate, no murmurs  Resp: No wheezes, no rales or rhonchi, no focal consolidations  GI: Belly soft, nondistended, nontender, bowel sounds present  Skin: No rashes or lesions  Extremities: No peripheral edema  Psych: Normal affect, mood euthymic  Neuro: Weak RUE and speech is slow and deliberate without obvious dysarthria or word finding difficulty      Data   Recent Results (from the past 24 hour(s))   Glucose by meter    Collection Time: 08/13/21  5:33 PM   Result Value Ref Range    GLUCOSE BY METER POCT 142 (H) 70 - 125 mg/dL   Glucose by meter    Collection Time: 08/13/21  9:36 PM   Result Value Ref Range    GLUCOSE BY METER POCT 163 (H) 70 - 125 mg/dL   Platelet Function P2Y12    Collection Time: 08/14/21  7:07 AM   Result Value Ref Range    Platelet Function P2Y12 124 PRU   Extra Purple Top Tube    Collection Time: 08/14/21  7:07 AM   Result Value Ref Range    Hold Specimen JIC    Extra Red Top Tube    Collection Time: 08/14/21  7:07 AM   Result Value Ref Range    Hold Specimen JIC    Glucose by meter    Collection Time: 08/14/21  7:58 AM   Result Value Ref Range    GLUCOSE BY METER POCT 143 (H) 70 - 125 mg/dL   Glucose by meter    Collection Time: 08/14/21 12:35 PM   Result Value Ref Range    GLUCOSE BY METER POCT 156 (H) 70 - 125 mg/dL     ____________  Interval History   Data reviewed today: I reviewed all medications, new labs and imaging results over the last 24 hours. I personally reviewed no images or EKG's today.  patient states he is doing ok today. still having trouble using R hand. he was perplexed by pulse of when I came in so we did remove this. he denies pain. speech deliberate but doesn t seem to have word finding difficulty. states he had a small BM yesterday. voids by straight cath chronically. he is agreeable to acute rehab when bed found. very motivated to get better. stable for discharge as soon as today. he did ask that I call his wife Kinue later  today, I did try several times to reach her no answer. I will make sure to check in with her before he leaves the hospital.

## 2021-08-15 ENCOUNTER — APPOINTMENT (OUTPATIENT)
Dept: PHYSICAL THERAPY | Facility: HOSPITAL | Age: 81
DRG: 037 | End: 2021-08-15
Attending: SURGERY
Payer: COMMERCIAL

## 2021-08-15 ENCOUNTER — APPOINTMENT (OUTPATIENT)
Dept: OCCUPATIONAL THERAPY | Facility: HOSPITAL | Age: 81
DRG: 037 | End: 2021-08-15
Attending: SURGERY
Payer: COMMERCIAL

## 2021-08-15 ENCOUNTER — APPOINTMENT (OUTPATIENT)
Dept: SPEECH THERAPY | Facility: HOSPITAL | Age: 81
DRG: 037 | End: 2021-08-15
Attending: SURGERY
Payer: COMMERCIAL

## 2021-08-15 LAB
ANION GAP SERPL CALCULATED.3IONS-SCNC: 9 MMOL/L (ref 5–18)
BUN SERPL-MCNC: 18 MG/DL (ref 8–28)
CALCIUM SERPL-MCNC: 9.2 MG/DL (ref 8.5–10.5)
CHLORIDE BLD-SCNC: 109 MMOL/L (ref 98–107)
CO2 SERPL-SCNC: 23 MMOL/L (ref 22–31)
CREAT SERPL-MCNC: 0.98 MG/DL (ref 0.7–1.3)
ERYTHROCYTE [DISTWIDTH] IN BLOOD BY AUTOMATED COUNT: 14.1 % (ref 10–15)
GFR SERPL CREATININE-BSD FRML MDRD: 73 ML/MIN/1.73M2
GLUCOSE BLD-MCNC: 132 MG/DL (ref 70–125)
GLUCOSE BLDC GLUCOMTR-MCNC: 126 MG/DL (ref 70–125)
GLUCOSE BLDC GLUCOMTR-MCNC: 145 MG/DL (ref 70–125)
GLUCOSE BLDC GLUCOMTR-MCNC: 147 MG/DL (ref 70–125)
GLUCOSE BLDC GLUCOMTR-MCNC: 198 MG/DL (ref 70–125)
HCT VFR BLD AUTO: 36.3 % (ref 40–53)
HGB BLD-MCNC: 11.5 G/DL (ref 13.3–17.7)
MCH RBC QN AUTO: 28.5 PG (ref 26.5–33)
MCHC RBC AUTO-ENTMCNC: 31.7 G/DL (ref 31.5–36.5)
MCV RBC AUTO: 90 FL (ref 78–100)
PLATELET # BLD AUTO: 230 10E3/UL (ref 150–450)
POTASSIUM BLD-SCNC: 4.1 MMOL/L (ref 3.5–5)
RBC # BLD AUTO: 4.04 10E6/UL (ref 4.4–5.9)
SODIUM SERPL-SCNC: 141 MMOL/L (ref 136–145)
WBC # BLD AUTO: 8.9 10E3/UL (ref 4–11)

## 2021-08-15 PROCEDURE — 97535 SELF CARE MNGMENT TRAINING: CPT | Mod: GO

## 2021-08-15 PROCEDURE — 80048 BASIC METABOLIC PNL TOTAL CA: CPT | Performed by: HOSPITALIST

## 2021-08-15 PROCEDURE — 97530 THERAPEUTIC ACTIVITIES: CPT | Mod: GO

## 2021-08-15 PROCEDURE — 36415 COLL VENOUS BLD VENIPUNCTURE: CPT | Performed by: HOSPITALIST

## 2021-08-15 PROCEDURE — 92507 TX SP LANG VOICE COMM INDIV: CPT | Mod: GN

## 2021-08-15 PROCEDURE — 97110 THERAPEUTIC EXERCISES: CPT | Mod: GP

## 2021-08-15 PROCEDURE — 85027 COMPLETE CBC AUTOMATED: CPT | Performed by: HOSPITALIST

## 2021-08-15 PROCEDURE — 250N000011 HC RX IP 250 OP 636: Performed by: PSYCHIATRY & NEUROLOGY

## 2021-08-15 PROCEDURE — 99233 SBSQ HOSP IP/OBS HIGH 50: CPT | Performed by: HOSPITALIST

## 2021-08-15 PROCEDURE — 97112 NEUROMUSCULAR REEDUCATION: CPT | Mod: GO

## 2021-08-15 PROCEDURE — 97116 GAIT TRAINING THERAPY: CPT | Mod: GP

## 2021-08-15 PROCEDURE — 250N000013 HC RX MED GY IP 250 OP 250 PS 637: Performed by: STUDENT IN AN ORGANIZED HEALTH CARE EDUCATION/TRAINING PROGRAM

## 2021-08-15 PROCEDURE — 250N000013 HC RX MED GY IP 250 OP 250 PS 637: Performed by: FAMILY MEDICINE

## 2021-08-15 PROCEDURE — 120N000001 HC R&B MED SURG/OB

## 2021-08-15 RX ADMIN — SIMVASTATIN 40 MG: 40 TABLET, FILM COATED ORAL at 21:15

## 2021-08-15 RX ADMIN — DOCUSATE SODIUM 50 MG AND SENNOSIDES 8.6 MG 1 TABLET: 8.6; 5 TABLET, FILM COATED ORAL at 08:06

## 2021-08-15 RX ADMIN — CARBIDOPA AND LEVODOPA 1 TABLET: 25; 100 TABLET ORAL at 20:15

## 2021-08-15 RX ADMIN — CARBIDOPA AND LEVODOPA 1 TABLET: 25; 100 TABLET ORAL at 11:45

## 2021-08-15 RX ADMIN — CLOPIDOGREL BISULFATE 75 MG: 75 TABLET ORAL at 08:06

## 2021-08-15 RX ADMIN — INSULIN ASPART 1 UNITS: 100 INJECTION, SOLUTION INTRAVENOUS; SUBCUTANEOUS at 11:45

## 2021-08-15 RX ADMIN — DOCUSATE SODIUM 50 MG AND SENNOSIDES 8.6 MG 1 TABLET: 8.6; 5 TABLET, FILM COATED ORAL at 21:16

## 2021-08-15 RX ADMIN — METOPROLOL SUCCINATE 50 MG: 25 TABLET, EXTENDED RELEASE ORAL at 08:06

## 2021-08-15 RX ADMIN — POLYETHYLENE GLYCOL 3350 17 G: 17 POWDER, FOR SOLUTION ORAL at 08:06

## 2021-08-15 RX ADMIN — ENOXAPARIN SODIUM 40 MG: 40 INJECTION SUBCUTANEOUS at 08:06

## 2021-08-15 RX ADMIN — INSULIN ASPART 1 UNITS: 100 INJECTION, SOLUTION INTRAVENOUS; SUBCUTANEOUS at 17:23

## 2021-08-15 RX ADMIN — AMLODIPINE BESYLATE 5 MG: 5 TABLET ORAL at 08:06

## 2021-08-15 RX ADMIN — CARBIDOPA AND LEVODOPA 1 TABLET: 25; 100 TABLET ORAL at 16:24

## 2021-08-15 RX ADMIN — CARBIDOPA AND LEVODOPA 1 TABLET: 25; 100 TABLET ORAL at 08:06

## 2021-08-15 NOTE — PLAN OF CARE
Problem: Adult Inpatient Plan of Care  Goal: Absence of Hospital-Acquired Illness or Injury  Intervention: Identify and Manage Fall Risk  Recent Flowsheet Documentation  Taken 8/14/2021 1900 by Mireya Shepard, RN  Safety Promotion/Fall Prevention:   activity supervised   assistive device/personal items within reach   bed alarm on   chair alarm on   fall prevention program maintained   clutter free environment maintained   nonskid shoes/slippers when out of bed   patient and family education   safety round/check completed     Problem: Adult Inpatient Plan of Care  Goal: Absence of Hospital-Acquired Illness or Injury  Intervention: Prevent Skin Injury  Recent Flowsheet Documentation  Taken 8/14/2021 1700 by Mireya Shepard, RN  Body Position: position changed independently   Pt up in chair most of shift. Up to bathroom with asisst of 1. Bed & chair alarm on.

## 2021-08-15 NOTE — PLAN OF CARE
Problem: Adult Inpatient Plan of Care  Goal: Plan of Care Review  Outcome: Improving   Pt up in chair. Pt getting straight cathed PRN. Pt a little emotional roday, waiting for TCU bed

## 2021-08-15 NOTE — PROGRESS NOTES
Care Management Follow Up    Length of Stay (days): 4    Expected Discharge Date: 08/16/2021     Concerns to be Addressed:       Patient plan of care discussed at interdisciplinary rounds: Yes    Anticipated Discharge Disposition: Acute Rehab facility pending accepting bed       Anticipated Discharge Services:    Anticipated Discharge DME:      Patient/family educated on Medicare website which has current facility and service quality ratings:    Education Provided on the Discharge Plan:    Patient/Family in Agreement with the Plan:      Referrals Placed by CM/SW:    Private pay costs discussed: Not applicable    Additional Information:  MD update, pt medically stable for discharge to an Acute Rehab Facility. Left voicemail for [facility] requesting call back on back on 8/16, writer's contact information provided. (no one in admissions this weekend). [facility] full til end of next week. Spoke with Jaylon Vidal ARF Liaison, requested that referral be resent, will review. [facility] has eight patient's on wait list.     1000 Patient reviewed via Jaylon Vidal ARF Liaison appropriate for ARF wait list. Care Management team to follow up with Nancy in admissions in AM on bed availability.    Patient updated on ARF referrals. Patient in agreement of discharging to an ARF bed. Discussed alternative option of TCU pending ARF bed, patient declining that option.      Ana Maria Lemus RN

## 2021-08-15 NOTE — PLAN OF CARE
Problem: Neurologic Deterioration (Carotid Endarterectomy)  Goal: Absence of Acute Neurologic Symptoms  Outcome: Improving   RUE remains weaker then left.     Slept in chair throughout night.  Bladder scan for 741cc, straight cath for 800cc.    Evelia Wright RN

## 2021-08-15 NOTE — PROGRESS NOTES
VASCULAR SURGERY PROGRESS NOTE    Subjective:  Doing well. No acute distress. Sitting up in chair. Really would like to go home - does not want to go to rehab facility. Lives at home with wife and son.    Objective:  Intake/Output Summary (Last 24 hours) at 8/14/2021 1048  Last data filed at 8/14/2021 0600  Gross per 24 hour   Intake 360 ml   Output 1775 ml   Net -1415 ml     PHYSICAL EXAM:  BP (!) 150/69 (BP Location: Left arm)   Pulse 58   Temp 98.4  F (36.9  C) (Oral)   Resp 18   Wt 100.6 kg (221 lb 12.5 oz)   SpO2 97%   BMI 30.08 kg/m    Well-appearing; no acute distress  Unlabored respirations on room air  Regular rate and rhythm    Right hand and digits are flexed at baseline but able to open the hand; slightly weaker right upper extremity compared to left upper extremity.   Some bruising along the left neck.      ASSESSMENT:  79 yo M s/p L CEA for symptomatic L carotid stenosis on 8/14/2021 with perioperative stroke. Recovering appropriately.       PLAN:  - Neurology/OT/PT consult  - Regular diet  - Continue home meds  - DVT ppx  - Appreciate medicine involvement    Discussed pt history, exam, assessment and plan with Dr. Lemon of the vascular surgery service, who is in agreement with the above.    Mervin John MD  Division of Vascular Surgery   Pager: [phone]

## 2021-08-15 NOTE — PHARMACY-CONSULT NOTE
Pharmacy Consult to evaluate for medication related stroke core measures    Maikol Glaser, 80 year old male admitted for carotid endarterectomy on 8/11/2021, with acute stroke post-op.    Thrombolytic was not given because of Time from onset contraindications    VTE Prophylaxis Enoxaparin given on 8/14 as appropriate prior to end of hospital day 2.    Antithrombotic: Patient was on clopidogrel prior to stroke  as appropriate by end of hospital day 2. Continue antithrombotic therapy on discharge to meet quality measures, unless contraindicated.    Anticoagulation if history of A-fib/flutter: Patient does not have history of A-fib/flutter - anticoagulation not required for medication related stroke core measures.     LDL Cholesterol Calculated   Date Value Ref Range Status   08/11/2021 58 <=129 mg/dL Final       Patient's home statin, Zocor (simvastatin) restarted; continue statin on discharge to meet quality measures, unless contraindicated.     Recommendations: Pending platelet function test for PGY 12 inhibitor.    Thank you for the consult.    Aruna Elias RPH 8/15/2021 3:52 PM

## 2021-08-15 NOTE — PROGRESS NOTES
[facility]    Medicine Progress Note - Hospitalist Service       Date of Admission:  8/11/2021    Assessment & Plan           Maikol Glaser is a 80 year old male admitted on 8/11/2021. He has history of stroke, Parkinson's disease, diet-controlled diabetes, hypertension presented for scheduled carotid endarterectomy by Dr. Lemon for whom we were consulted for management of medical comorbidities.  He developed stroke in the postoperative interval.  He has persistent weakness and plan is for him to discharge to acute rehab.    #Recent left-sided stroke with right-sided weakness  #Left carotid stenosis, now 4 Days Post-Op from carotid endarterectomy  #Acute stroke in the postoperative interval  Patient had recent stroke, presented with right-sided weakness, found to have 70% stenosis of the left carotid artery.  He presented to our facility for scheduled carotid endarterectomy by Dr. Lemon.  Procedure was uncomplicated, patient was noted to have high risk plaque with clear intraplaque hemorrhage and caseous material. In the postoperative interval, patient developed worsening right-sided weakness, MRI showed numerous acute strokes in the left MCA distribution, in addition to the prior chronic appearing stroke in the parieto-occipital lobe.  These are presumed to be embolic related to the unstable plaque encountered during endarterectomy.  Seen by neurology, recommended PT, OT, speech therapy, and continue on home medication Plavix.  Plavix resistance testing ordered.  Some petechial hemorrhages seen within the areas of stroke, neurology recommend waiting at least 72 hours before initiation of pharmacologic DVT prophylaxis, okay now to start prophylaxis.  Plan is for patient to discharge to acute rehab given his persistent deficits and high degree of motivation to get better.    #Acute encephalopathy  Seems better today.    #Chronic urine retention  His baseline is that he straight caths 4  times a day.  Continue his usual regimen.  It is not necessary to bladder scan him at all.  Orders clarified.    #Acute blood loss anemia  Baseline hemoglobin about 12.5, here 11.5 likely some minor intraop losses and does have expected neck ecchymosis. Hgb stable    #Parkinson's, continue home Sinemet.  Neurology does recommend that he establish with an outpatient neurologist  #Hypertension, continue home amlodipine and metoprolol  #Hyperlipidemia, continue home simvastatin  #Diabetes, diet controlled.  A1c 6.7  #Recent traumatic intracerebral hemorrhage, fell back in May and found to have 2cm parietal hemorrhage and was hospitalized and TCU.       Diet: Consistent Carb 60 grams CHO per Meal Diet    DVT Prophylaxis: Moderate risk. Starting Lovenox today  Hunter Catheter: Not present  Central Lines: None  Code Status: Full Code      Disposition Plan   Expected discharge: 08/16/2021 recommended to Acute Rehab once safe disposition plan/ TCU bed available.     The patient's care was discussed with the Patient.    Ольга Morgan MD  Hospitalist Service  [facility]  Text page via [employer] Paging/Directory      Clinically Significant Risk Factors Present on Admission                ____________        Physical Exam   Vital Signs: Temp: 97.5  F (36.4  C) Temp src: Oral BP: (!) 142/71 Pulse: 57   Resp: 18 SpO2: 98 % O2 Device: None (Room air)    Weight: 221 lbs 12.52 oz  General: in no apparent distress, non-toxic and alert male sitting in bedside chair oriented x3. Appropriate today.  HEENT: Head normocephalic atraumatic, oral mucosa moist. Sclerae anicteric  Skin: No rashes or lesions  Extremities: No peripheral edema  Psych: Normal affect, mood euthymic  Neuro: Weak RUE, but seems better today. Speech less slow today too.      Data   Recent Results (from the past 24 hour(s))   Glucose by meter    Collection Time: 08/14/21  5:15 PM   Result Value Ref Range    GLUCOSE BY METER POCT 134 (H) 70 - 125  mg/dL   Glucose by meter    Collection Time: 08/14/21  9:10 PM   Result Value Ref Range    GLUCOSE BY METER POCT 221 (H) 70 - 125 mg/dL   CBC with platelets    Collection Time: 08/15/21  5:31 AM   Result Value Ref Range    WBC Count 8.9 4.0 - 11.0 10e3/uL    RBC Count 4.04 (L) 4.40 - 5.90 10e6/uL    Hemoglobin 11.5 (L) 13.3 - 17.7 g/dL    Hematocrit 36.3 (L) 40.0 - 53.0 %    MCV 90 78 - 100 fL    MCH 28.5 26.5 - 33.0 pg    MCHC 31.7 31.5 - 36.5 g/dL    RDW 14.1 10.0 - 15.0 %    Platelet Count 230 150 - 450 10e3/uL   Basic metabolic panel    Collection Time: 08/15/21  5:31 AM   Result Value Ref Range    Sodium 141 136 - 145 mmol/L    Potassium 4.1 3.5 - 5.0 mmol/L    Chloride 109 (H) 98 - 107 mmol/L    Carbon Dioxide (CO2) 23 22 - 31 mmol/L    Anion Gap 9 5 - 18 mmol/L    Urea Nitrogen 18 8 - 28 mg/dL    Creatinine 0.98 0.70 - 1.30 mg/dL    Calcium 9.2 8.5 - 10.5 mg/dL    Glucose 132 (H) 70 - 125 mg/dL    GFR Estimate 73 >60 mL/min/1.73m2   Glucose by meter    Collection Time: 08/15/21  7:54 AM   Result Value Ref Range    GLUCOSE BY METER POCT 126 (H) 70 - 125 mg/dL   Glucose by meter    Collection Time: 08/15/21 11:38 AM   Result Value Ref Range    GLUCOSE BY METER POCT 147 (H) 70 - 125 mg/dL     ____________  Interval History   Data reviewed today: I reviewed all medications, new labs and imaging results over the last 24 hours. I personally reviewed no images or EKG's today.  .patient states he is doing fine today. R arm getting a bit stronger, fatigues quickly though. he is eager to discharge from the hospital. no other new issues.    I tried to call wife Nichelle, no answer. Patient tells me she is generally home in the morning. I will make sure to try again to touch base with her tomorrow morning.

## 2021-08-15 NOTE — PLAN OF CARE
"  Problem: SLP General Care Plan  Goal: SLP Frequency  Outcome: Completed  Goal: SLP Goal 1  Description: SLP Goal 1  Outcome: Completed  Goal: SLP Goal 2  Description: SLP Goal 2  Outcome: Completed   Pt alert and cooperative. He recalls our session from yesterday. He is independentlly orientd to the date using functional problem solving(he knew he got here on Wednesday the 11th and he knew that today was Sunday, so he figured the date out). We reviewed his \"demands at home\" and he states that his wife basically does everything and he has help from his son. He states that his cognition is back to baseline and that he does not have concerns re: cognition, when he returns home. It was noted that some of his responses are slightly latent responses, but functional.   "

## 2021-08-16 ENCOUNTER — APPOINTMENT (OUTPATIENT)
Dept: PHYSICAL THERAPY | Facility: HOSPITAL | Age: 81
DRG: 037 | End: 2021-08-16
Attending: SURGERY
Payer: COMMERCIAL

## 2021-08-16 ENCOUNTER — APPOINTMENT (OUTPATIENT)
Dept: OCCUPATIONAL THERAPY | Facility: HOSPITAL | Age: 81
DRG: 037 | End: 2021-08-16
Attending: SURGERY
Payer: COMMERCIAL

## 2021-08-16 LAB
GLUCOSE BLDC GLUCOMTR-MCNC: 128 MG/DL (ref 70–125)
GLUCOSE BLDC GLUCOMTR-MCNC: 132 MG/DL (ref 70–125)
GLUCOSE BLDC GLUCOMTR-MCNC: 198 MG/DL (ref 70–125)
GLUCOSE BLDC GLUCOMTR-MCNC: 231 MG/DL (ref 70–125)
SARS-COV-2 RNA RESP QL NAA+PROBE: NEGATIVE

## 2021-08-16 PROCEDURE — 97116 GAIT TRAINING THERAPY: CPT | Mod: GP

## 2021-08-16 PROCEDURE — 250N000011 HC RX IP 250 OP 636: Performed by: PSYCHIATRY & NEUROLOGY

## 2021-08-16 PROCEDURE — 250N000013 HC RX MED GY IP 250 OP 250 PS 637: Performed by: FAMILY MEDICINE

## 2021-08-16 PROCEDURE — 250N000013 HC RX MED GY IP 250 OP 250 PS 637: Performed by: STUDENT IN AN ORGANIZED HEALTH CARE EDUCATION/TRAINING PROGRAM

## 2021-08-16 PROCEDURE — 97530 THERAPEUTIC ACTIVITIES: CPT | Mod: GP

## 2021-08-16 PROCEDURE — 120N000001 HC R&B MED SURG/OB

## 2021-08-16 PROCEDURE — 87635 SARS-COV-2 COVID-19 AMP PRB: CPT | Performed by: HOSPITALIST

## 2021-08-16 PROCEDURE — 97112 NEUROMUSCULAR REEDUCATION: CPT | Mod: GO

## 2021-08-16 PROCEDURE — 99233 SBSQ HOSP IP/OBS HIGH 50: CPT | Performed by: HOSPITALIST

## 2021-08-16 RX ORDER — ASPIRIN 81 MG/1
81 TABLET ORAL DAILY
Status: DISCONTINUED | OUTPATIENT
Start: 2021-08-16 | End: 2021-08-17 | Stop reason: HOSPADM

## 2021-08-16 RX ADMIN — INSULIN ASPART 2 UNITS: 100 INJECTION, SOLUTION INTRAVENOUS; SUBCUTANEOUS at 21:25

## 2021-08-16 RX ADMIN — CARBIDOPA AND LEVODOPA 1 TABLET: 25; 100 TABLET ORAL at 21:18

## 2021-08-16 RX ADMIN — ENOXAPARIN SODIUM 40 MG: 40 INJECTION SUBCUTANEOUS at 08:35

## 2021-08-16 RX ADMIN — METOPROLOL SUCCINATE 50 MG: 25 TABLET, EXTENDED RELEASE ORAL at 08:35

## 2021-08-16 RX ADMIN — CARBIDOPA AND LEVODOPA 1 TABLET: 25; 100 TABLET ORAL at 15:33

## 2021-08-16 RX ADMIN — SIMVASTATIN 40 MG: 40 TABLET, FILM COATED ORAL at 21:18

## 2021-08-16 RX ADMIN — DOCUSATE SODIUM 50 MG AND SENNOSIDES 8.6 MG 1 TABLET: 8.6; 5 TABLET, FILM COATED ORAL at 21:18

## 2021-08-16 RX ADMIN — CARBIDOPA AND LEVODOPA 1 TABLET: 25; 100 TABLET ORAL at 11:46

## 2021-08-16 RX ADMIN — ASPIRIN 81 MG: 81 TABLET, COATED ORAL at 08:35

## 2021-08-16 RX ADMIN — INSULIN ASPART 1 UNITS: 100 INJECTION, SOLUTION INTRAVENOUS; SUBCUTANEOUS at 12:14

## 2021-08-16 RX ADMIN — CARBIDOPA AND LEVODOPA 1 TABLET: 25; 100 TABLET ORAL at 08:35

## 2021-08-16 RX ADMIN — POLYETHYLENE GLYCOL 3350 17 G: 17 POWDER, FOR SOLUTION ORAL at 08:35

## 2021-08-16 RX ADMIN — DOCUSATE SODIUM 50 MG AND SENNOSIDES 8.6 MG 1 TABLET: 8.6; 5 TABLET, FILM COATED ORAL at 08:35

## 2021-08-16 RX ADMIN — AMLODIPINE BESYLATE 5 MG: 5 TABLET ORAL at 08:35

## 2021-08-16 NOTE — INTERIM SUMMARY
[facility] Pre-Admission Screen    Referral Source:  [facility] P4 -23  Admit date to referring facility: 8/11/2021    Physical Medicine and Rehab Consult Completed: No    Rehab Diagnosis:    Stroke Ischemic 01.2 (R) Body Involvement (L) Brain    Justification for Acute Inpatient Rehabilitation  79 yo M s/p L CEA for symptomatic L carotid stenosis on 8/14/2021 with perioperative stroke. MRI showed numerous acute strokes in the left MCA distribution, in addition to the prior chronic appearing stroke in the parieto-occipital lobe.  These are presumed to be embolic related to the unstable plaque encountered during endarterectomy.     Patient requires an intensive inpatient rehab program to address the following acute impairments:impaired ROM, impaired strength, impaired tone, impaired balance, impaired coordination, impaired cognition and impaired weight shifting    Current Active Medical Management Needs/Risks for Clinical Complications  The patient requires the high level of rehabilitation physician supervision that accompanies the provision of intensive rehabilitation therapy.  The patient needs the services of the rehabilitation physician to assess the patient medically and functionally and to modify the course of treatment as needed to maximize the patient's capacity to benefit from the rehabilitation process.      Past Medical/Surgical History   Surgery in the past 100 days: Yes   Additional relevant past medical history: Parinsons, HTN, hyperlipidemia, DM2 A1c 6.7; recent traumatic ICH s/p fall in May 2021; Chronic urinary retention  His baseline is that he straight caths 4 times a day.  Continue his usual regimen.    Level of Functioning Prior to Admission:    LIVING ENVIRONMENT   People in home: child(marlo), adult, spouse (son)   Current Living Arrangements: house   Home Accessibility: stairs to enter home, stairs within home   Living Environment  "Comments: spouse takes care of cooking, cleaning, laundry, driving, grocery shopping; dressing and bathing I'ly    SELF-CARE   Usual Activity Tolerance: good     Equipment Currently Used at Home: cane, straight, walker, rolling, grab bar, tub/shower   Activity/Exercise/Self-Care Comment:     DISABILITY/FUNCTION   Concentrating, Remembering or Making Decisions Difficulty: yes    Concentration Management: slightly forgetful    Difficulty Communicating: yes    Communication: difficulty speaking    Communication Management: Give extra time & give reminders.   Difficulty Eating/Swallowing: no           Walking or Climbing Stairs Difficulty: yes    Walking or Climbing Stairs: other (see comments) (difficulty with all, needs assistance from 2 people)      Mobility Management: Uses walker/gait belt & assist of 2 staff   Dressing/Bathing Difficulty: no         Toileting issues: no           Doing Errands Independently Difficulty (such as shopping): yes    Errands Management: Needs help with errands.   Fall history within last six months: yes; Number of times patient has fallen within last six months: 1      Change in Functional Status Since Onset of Current Illness/Injury: no    Additional Comments: Wife is unable to physically assist but helps a lot with everything else per patient.  Son also involved with assisting at home.     Level of Function: GG Scale (Section GG Functional Ability and Goals; CMS's VILLASENOR Version 3.0 Manual effective 10.1.2019):  PT Current Function Goals for Rehab   Bed Rolling 3 Partial/moderate assistance 6 Independent   Supine to Sit 3 Partial/moderate assistance 6 Independent   Sit to Stand 3 Partial/moderate assistance 6 Independent   Transfer 3 Partial/moderate assistance 6 Independent   Ambulation 3 Partial/moderate assistance 4 Supervision or touching assitance   Stairs Not completed 4 Supervision or touching assitance     OT Current Function Goals for Rehab   Feeding {GG Scale:875301::\"6 " "Independent\"} {Goals for Rehab:397454}   Grooming {GG Scale:990172::\"6 Independent\"} {Goals for Rehab:222382}   Bathing {GG Scale:606675::\"6 Independent\"} {Goals for Rehab:884660}   Upper Body Dressing {GG Scale:850117::\"6 Independent\"} {Goals for Rehab:870872}   Lower Body Dressing {GG Scale:425206::\"6 Independent\"} {Goals for Rehab:295058}   Toileting {GG Scale:202106::\"6 Independent\"} {Goals for Rehab:915616}   Toilet Transfer 3 Partial/moderate assistance 6 Independent   Tub/Shower Transfer 3 Partial/moderate assistance 6 Independent   Cognition Impaired Supervision     SLP Current Function Goals for Rehab   Swallow Not Impaired Not applicable   Communication Not Impaired Not applicable       Current Diet:  0-Thin and 7-Regular/easy to chew    Summary Statement:  Posterior lean in standing, mod A x 1 for neutral.  R sided deviations; festinating/shuffling; ataxic, poor weight shifting to R and unable to  R LE.  Ambulated 3  from bed to chair with FWW, mod A. Poor balance. Pt required Min.A to assist w/ opening and closing to grasp items pt able to grab 7/8 objects w/ increased time and effort to complete tasks.     Expected Therapies and Services Required During Inpatient Rehab Admission  Intensity of Therapy: Patient requires intensive therapies not available in a lesser level of care. Patient is motivated, making gains, and can tolerate 3 hours of therapy a day.  Physical Therapy: 90 minutes per day, 7 days a week for 5 days  Occupational Therapy: 90 minutes per day, 7 days a week for 5 days    Rehabilitation Nursing Needs: Patient requires 24 hour Rehab Nursing to manage vitals, medication education, positioning, carryover of new rehab techniques, care coordination, skin integrity, blood sugar management, assess neurologic status, stroke education, provide safe environment for patient at falls risk and monitor nutritional intake.    Precautions/restrictions/special needs:   Precautions: fall " precautions   Restrictions: none   Special Needs: none    Expected Level of Improvement: mod I for basic household mobility and stairs, setup for basic ADLs and continue baseline assist from spouse for IADLs.   Expected Length of time to achieve: 5 days    Anticipated Discharge Needs:  Anticipated Discharge Destination: Home  Anticipated Discharge Support: Family member  24/7 support available : Yes  Identified caregiver(s):  Spouse/son  Anticipated Discharge Needs: Home with outpatient therapy    Identified challenges/barriers:  Pt is Parkinson's at baseline with some flexion of R arm at baseline.  Pt needs to be able to walk well, without assistance, as wife unable to support that piece of his care needs.         Physician statement of review and agreement:  I have reviewed and am in agreement of the need for IRF stay to address above functional and medical needs. In addition to above statements address, Patient requires intensive active and ongoing therapeutic intervention and multiple therapies; Patient requires medical supervision; Expected to actively participate in the intensive rehab program; Sufficiently stable to actively participate; Expectation for measurable improvement in functional capacity or adaption to impairments.

## 2021-08-16 NOTE — PROGRESS NOTES
[facility]    Medicine Progress Note - Hospitalist Service       Date of Admission:  8/11/2021    Assessment & Plan             Maikol Glaser is a 80 year old male admitted on 8/11/2021. He has history of stroke, Parkinson's disease, diet-controlled diabetes, hypertension presented for scheduled carotid endarterectomy by Dr. Lemon for whom we were consulted for management of medical comorbidities.  He developed stroke in the postoperative interval.  He has persistent weakness and plan is for him to discharge to acute rehab.    #Recent left-sided stroke with right-sided weakness  #Left carotid stenosis, now 5 Days Post-Op from carotid endarterectomy  #Acute stroke in the postoperative interval  Patient had recent stroke, presented with right-sided weakness, found to have 70% stenosis of the left carotid artery.  He presented to our facility for scheduled carotid endarterectomy by Dr. Lemon.  Procedure was uncomplicated, patient was noted to have high risk plaque with clear intraplaque hemorrhage and caseous material. In the postoperative interval, patient developed worsening right-sided weakness, MRI showed numerous acute strokes in the left MCA distribution, in addition to the prior chronic appearing stroke in the parieto-occipital lobe.  These are presumed to be embolic related to the unstable plaque encountered during endarterectomy.  Seen by neurology, recommended PT, OT, speech therapy.  Home Plavix changed to aspirin per Vascular.  Plavix resistance testing ordered seems to be normal to my interpretation.  Plan is for patient to discharge to acute rehab given his persistent deficits and high degree of motivation to get better.    #Acute encephalopathy  Seems better today.    #Chronic urine retention  His baseline is that he straight caths 4 times a day.  Continue his usual regimen.  It is not necessary to bladder scan him at all.  Orders clarified.    #Acute blood loss  anemia  Baseline hemoglobin about 12.5, here 11.5 likely some minor intraop losses and does have expected neck ecchymosis. Hgb stable    #Parkinson's, continue home Sinemet.  Neurology does recommend that he establish with an outpatient neurologist  #Hypertension, continue home amlodipine and metoprolol  #Hyperlipidemia, continue home simvastatin  #Diabetes, diet controlled.  A1c 6.7  #Recent traumatic intracerebral hemorrhage, fell back in May and found to have 2cm parietal hemorrhage and was hospitalized and TCU.       Diet: Consistent Carb 60 grams CHO per Meal Diet    DVT Prophylaxis: Moderate risk. Lovenox   Hunter Catheter: Not present  Central Lines: None  Code Status: Full Code      Disposition Plan   Expected discharge: 08/17/2021 recommended to Acute Rehab once safe disposition plan/ TCU bed available.     The patient's care was discussed with the Patient and Patient's Family.    Ольга Morgan MD  Hospitalist Service  [facility]  Text page via [employer] Paging/Directory      Clinically Significant Risk Factors Present on Admission                ____________        Physical Exam   Vital Signs: Temp: 98.2  F (36.8  C) Temp src: Oral BP: (!) 146/71 Pulse: 73   Resp: 18 SpO2: 95 % O2 Device: None (Room air)    Weight: 221 lbs 12.52 oz  General: in no apparent distress, non-toxic and alert male sitting in bedside chair oriented x3. Appropriate today.  HEENT: Head normocephalic atraumatic, oral mucosa moist. Sclerae anicteric  Skin: Evolving ecchymosis throughout mid neck  Extremities: No peripheral edema  Psych: Normal affect, mood euthymic  Neuro: Weak RUE, seems stable today. Speech less slow today too.      Data   Recent Results (from the past 24 hour(s))   Glucose by meter    Collection Time: 08/15/21  5:16 PM   Result Value Ref Range    GLUCOSE BY METER POCT 145 (H) 70 - 125 mg/dL   Glucose by meter    Collection Time: 08/15/21  9:39 PM   Result Value Ref Range    GLUCOSE BY METER POCT  198 (H) 70 - 125 mg/dL   Glucose by meter    Collection Time: 08/16/21  8:05 AM   Result Value Ref Range    GLUCOSE BY METER POCT 132 (H) 70 - 125 mg/dL   Glucose by meter    Collection Time: 08/16/21 12:02 PM   Result Value Ref Range    GLUCOSE BY METER POCT 198 (H) 70 - 125 mg/dL     ____________  Interval History   Data reviewed today: I reviewed all medications, new labs and imaging results over the last 24 hours. I personally reviewed no images or EKG's today.  Patient states R arm still weak and is sore today likely from exercising. He was discouraged about length of hospital stay last night and was saying he wanted to discharge home rather than Acute Rehab, but now he is telling me he is agreeable to rehab after all.    I called and updated wife Nichelle. She tells me she is not too worried about his arm, more worried about his legs being weak since she is not able to help him walk at home as she is a small statured person. I tried to explain importance of rehab for the R arm, Nichelle keeps coming back to the legs. Told me he doesn't need much strength in the R arm since he is L handed. She did agree he should go to Acute Rehab. She tells me she is overall very happy with surgical outcome since his speech was much worse before surgery and she feels he has improved a lot.    Family will benefit from ongoing stroke education. Would be good if wife could be present for a PT or OT session.    Ready for discharge whenever a bed at Acute Rehab is found

## 2021-08-16 NOTE — PROGRESS NOTES
Care Management Follow Up    Length of Stay (days): 5    Expected Discharge Date:  08/17/2021     Concerns to be Addressed:  Discharge Planning:  Acute Rehabilitation:  Accepting facility  Patient plan of care discussed at interdisciplinary rounds: Yes    Anticipated Discharge Disposition:  Acute Rehabilitation     Anticipated Discharge Services:  Acute Rehabilitation  Anticipated Discharge DME:  [facility]ON[facility] GRIFFITH    Patient/family educated on Medicare website which has current facility and service quality ratings:  Yes  Education Provided on the Discharge Plan:  Yes    Patient/Family in Agreement with the Plan:  Yes     Referrals Placed by CM/SW:  [facility], [facility]  Private pay costs discussed: transportation costs    Additional Information:  Discharge Plan is Acute Rehabilitation.    Referrals previously sent to [facility] and [facility].    This writer spoke with [facility], Lizabeth, who accepted the patient for 08/17/2021.    [facility] Wheelchair transportation scheduled for 08/17/2021 at 1100; the patient and his son, Salvador, agree to the Private Pay Transport cost.    This writer informed the patient and Salvador of the above Discharge Plan, with which they agree.    Care Management to follow.      Britt Méndez CM

## 2021-08-16 NOTE — DISCHARGE SUMMARY
[facility] MEDICINE  DISCHARGE SUMMARY     Primary Care Physician: Christopher Banda  Admission Date: 8/11/2021   Discharge Provider: Ольга Morgan Discharge Date: 8/17/2021   Diet:   Active Diet and Nourishment Order   Procedures     Consistent Carb 60 grams CHO per Meal Diet     Advance Diet as Tolerated       Code Status: Full Code   Activity: DCACTIVITY: Activity as tolerated        Condition at Discharge: Good     REASON FOR PRESENTATION(See Admission Note for Details)   Carotid endarterectomy    PRINCIPAL & ACTIVE DISCHARGE DIAGNOSES     Principal Problem:    Symptomatic stenosis of left carotid artery  Active Problems:    Diabetes mellitus type 2, noninsulin dependent (H)    Hypertension    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        none    PROCEDURES ( this hospitalization only)      Procedure(s):  ENDARTERECTOMY, CAROTID, with electroencephalogram and intraoperative ultrasound    RECOMMENDATIONS TO OUTPATIENT PROVIDER FOR F/U VISIT     Follow up with Acute Rehab physician per facility routine.  Follow up with Vascular Surgery 1 week.  Follow up with Neurology in 1-2 months.    DISPOSITION     Acute Rehab    SUMMARY OF HOSPITAL COURSE:      Maikol Glaser is a 80 year old male admitted on 8/11/2021. He has history of stroke, Parkinson's disease, diet-controlled diabetes, hypertension presented for scheduled carotid endarterectomy by Dr. Lemon for whom we were consulted for management of medical comorbidities.  He developed stroke in the postoperative interval.  He has persistent weakness and plan is for him to discharge to acute rehab.     #Recent left-sided stroke with right-sided weakness  #Left carotid stenosis, now 7 Days Post-Op from carotid endarterectomy  #Acute stroke in the postoperative  interval  Patient had stroke last month, had presented with right-sided weakness, found to have 70% stenosis of the left carotid artery.  He presented to our facility for scheduled carotid endarterectomy by Dr. Lemon.  Procedure was uncomplicated, patient was noted to have high risk plaque with clear intraplaque hemorrhage and caseous material. In the postoperative interval, patient developed worsening right-sided weakness, MRI showed numerous acute strokes in the left MCA distribution, in addition to the prior chronic appearing stroke in the parieto-occipital lobe.  These are presumed to be embolic related to the unstable plaque encountered during endarterectomy.  Seen by neurology, recommended PT, OT, speech therapy.  Home Plavix changed to aspirin per Vascular.  Plavix resistance testing ordered seems to be normal to my interpretation.  Plan is for patient to discharge to acute rehab given his persistent deficits and high degree of motivation to get better.  Follow up with Vascular Surgeon in clinic in 1-2 weeks.  Follow up with Neurology in 1-2 months.    #Chronic urine retention  His baseline is that he straight caths 4 times a day.  Continue his usual regimen.     #Parkinson's  Continue home Sinemet.  Neurology does recommend that he establish with an outpatient neurologist, he should follow up with Neuro anyway given stroke burden    #Hypertension, continue home amlodipine and metoprolol. Not well controlled today, with diastolic HTN I added lisinopril low dose and would recheck BMP 1 week    #Hyperlipidemia, continue home simvastatin    #Diabetes, diet controlled  A1c 6.7  BG at times over 200 here and would continue sliding scale at Acute Rehab    #Recent traumatic intracerebral hemorrhage  Fell back in May and found to have 2cm parietal hemorrhage and was hospitalized and TCU. Would benefit from resumption of home care PT ongoing when he leaves Acute Rehab this time.    Discharge Medications with Med  changes:     Current Discharge Medication List      START taking these medications    Details   aspirin (ASA) 81 MG EC tablet Take 1 tablet (81 mg) by mouth daily  Qty: 30 tablet, Refills: 0    Associated Diagnoses: Cerebral infarction due to embolism of left middle cerebral artery (H)      insulin aspart (NOVOLOG PEN) 100 UNIT/ML pen Inject 0-7 Units Subcutaneous 3 times daily (before meals) Check blood sugar 4 times daily. Inject SQ three times daily according to the following sliding scale: Glucose 141-180: 1 unit  181-220: 2 units  221-260: 3 units  261-300: 4 units  301-340: 5 units  341-400: 6 units  >400: 7 units  Qty: 3 mL, Refills: 0    Associated Diagnoses: Diabetes mellitus type 2, noninsulin dependent (H)      lisinopril (ZESTRIL) 10 MG tablet Take 1 tablet (10 mg) by mouth daily  Qty: 30 tablet, Refills: 0    Associated Diagnoses: Essential hypertension      polyethylene glycol (MIRALAX) 17 g packet Take 17 g by mouth daily  Qty: 30 packet, Refills: 0    Associated Diagnoses: Cerebral infarction due to embolism of left middle cerebral artery (H)      senna-docusate (SENOKOT-S/PERICOLACE) 8.6-50 MG tablet Take 1 tablet by mouth 2 times daily  Qty: 60 tablet, Refills: 0    Associated Diagnoses: Cerebral infarction due to embolism of left middle cerebral artery (H)         CONTINUE these medications which have NOT CHANGED    Details   acetaminophen (TYLENOL) 500 MG tablet Take 1,000 mg by mouth every 6 hours as needed for mild pain      amLODIPine (NORVASC) 5 MG tablet Take 1 tablet (5 mg) by mouth daily  Qty: 30 tablet, Refills: 0    Associated Diagnoses: Intraparenchymal hemorrhage of brain (H); 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); Essential hypertension; Diabetes mellitus type 2, noninsulin dependent (H)      carbidopa-levodopa (SINEMET)  MG tablet Take 1 tablet by mouth 4 times daily Takes at 0800, 1200, 1600, 2000      metoprolol succinate ER  (TOPROL-XL) 50 MG 24 hr tablet Take 50 mg by mouth daily      multivitamin (CENTRUM SILVER) tablet Take 1 tablet by mouth daily      simvastatin (ZOCOR) 40 MG tablet 1 tablet (40 mg) by Oral or Feeding Tube route At Bedtime  Qty: 30 tablet, Refills: 0    Associated Diagnoses: Moderate mixed hyperlipidemia not requiring statin therapy         STOP taking these medications       clopidogrel (PLAVIX) 75 MG tablet Comments:   Reason for Stopping:                 Consults     HOSPITALIST IP CONSULT  NEUROLOGY IP CONSULT  NEUROLOGY IP CONSULT  OCCUPATIONAL THERAPY ADULT IP CONSULT  PHYSICAL THERAPY ADULT IP CONSULT  SPEECH LANGUAGE PATH ADULT IP CONSULT  PHYSICAL THERAPY ADULT IP CONSULT  OCCUPATIONAL THERAPY ADULT IP CONSULT  SPEECH LANGUAGE PATH ADULT IP CONSULT        SIGNIFICANT IMAGING FINDINGS     Results for orders placed or performed during the hospital encounter of 08/11/21   MR Brain w/o & w Contrast    Impression    IMPRESSION:  1.  When compared to the 07/18/2021 MRI brain study, there has been interval development of numerous cortical and subcortical areas of acute infarct involving the left frontal lobe, parietal lobe, and temporal lobe, in a left MCA territory distribution.   There is a small amount of petechial hemorrhage involving areas of infarct in the left frontal lobe. Additionally, there are scattered areas of patchy enhancement related to to luxury perfusion.    2.  Interval development of a small area of now-chronic hemorrhage in the area of previously seen infarct in the left parieto-occipital lobe.           SIGNIFICANT LABORATORY FINDINGS     Most Recent 3 BMP's:Recent Labs   Lab Test 08/16/21  2105 08/16/21  1657 08/16/21  1202 08/15/21  0531 08/12/21  0536 08/02/21  1144 07/26/21  1050 07/26/21  1050   NA  --   --   --  141  --  140  --  143   POTASSIUM  --   --   --  4.1 4.2 4.5  --  4.6   CHLORIDE  --   --   --  109*  --  108*  --  109*   CO2  --   --   --  23  --  20*  --  21*   BUN  --    --   --  18  --  30*  --  29*   CR  --   --   --  0.98  --  0.99  --  1.13   ANIONGAP  --   --   --  9  --  12  --  13   DORA  --   --   --  9.2  --  9.6  --  9.1   * 128* 198* 132*  --  121   < > 134*    < > = values in this interval not displayed.     Discharge Orders        General info for SNF    Length of Stay Estimate: Short Term Care: Estimated # of Days <30  Condition at Discharge: Improving  Level of care:skilled   Rehabilitation Potential: Excellent  Admission H&P remains valid and up-to-date: Yes  Recent Chemotherapy: N/A  Use Nursing Home Standing Orders: Yes     Mantoux instructions    Give two-step Mantoux (PPD) Per Facility Policy Yes     Follow Up and recommended labs and tests    Follow up with Nursing home physician per facility routine.  Follow up with Vascular Surgery 1 week.  Follow up with Neurology in 1-2 months.     Activity - Up ad tarik     Weight bearing status    WBAT     Reason for your hospital stay    Carotid surgery and you had a stroke     Additional Discharge Instructions    Straight cath 4x daily (this is his normal baseline)  Monitor blood sugar 4x daily before meals and at bedtime. If no sliding scale needed for 2 days in a row then ok to discontinue sliding scale and check BG 2x daily at alternating times.     Full Code     Physical Therapy Adult Consult    Evaluate and treat as clinically indicated.    Reason:  stroke     Occupational Therapy Adult Consult    Evaluate and treat as clinically indicated.    Reason:  stroke     Speech Language Path Adult Consult    Evaluate and treat as clinically indicated.    Reason:  stroke     Advance Diet as Tolerated    Follow this diet upon discharge: Orders Placed This Encounter      Consistent Carb 60 grams CHO per Meal Diet         Examination   Physical Exam   Temp:  [97.5  F (36.4  C)-98.6  F (37  C)] 98.6  F (37  C)  Pulse:  [60-73] 66  Resp:  [18-20] 20  BP: (136-193)/(71-88) 193/88  SpO2:  [95 %-99 %] 99 %  Wt Readings from Last  1 Encounters:   08/12/21 100.6 kg (221 lb 12.5 oz)       General: in no apparent distress, non-toxic and alert male sitting in bedside chair oriented x3  HEENT: Head normocephalic atraumatic, oral mucosa moist. Sclerae anicteric  Skin: No rashes or lesions  Extremities: No peripheral edema  Psych: Normal affect, mood euthymic  Neuro: Weakness R arm    Please see EMR for more detailed significant labs, imaging, consultant notes etc.    I, Ольга Morgan MD, personally saw the patient today and spent greater than 30 minutes discharging this patient.    Ольга Morgan MD  [facility]    CC:Christopher Banda

## 2021-08-16 NOTE — PLAN OF CARE
"  Problem: Adult Inpatient Plan of Care  Goal: Optimal Comfort and Wellbeing  Intervention: Provide Person-Centered Care  Recent Flowsheet Documentation  Taken 8/16/2021 0412 by Evelia Wright, RN  Trust Relationship/Rapport:   care explained   questions answered   questions encouraged   reassurance provided   thoughts/feelings acknowledged   empathic listening provided   emotional support provided   Pt tearful upon waking this morning.  States, \"I don't care anymore, I just want to go home.\"    Interventions above effective.  Pt using stress ball in right hand for strengthening.      Evelia Wright, RN    "

## 2021-08-16 NOTE — PLAN OF CARE
Problem: Adult Inpatient Plan of Care  Goal: Readiness for Transition of Care  8/16/2021 1747 by Edie Marcus, RN  Outcome: Improving  8/16/2021 1414 by Edie Marcus, RN  Outcome: Improving   Pt. Ate well for dinner, BG this evening 128, pt. Was accepted to [facility] and plan to discharge tomorrow, son visiting this evening, pt. Request to wait until after dinner for a straight cath, will cont to monitor.

## 2021-08-16 NOTE — PLAN OF CARE
Problem: Risk for Delirium  Goal: Improved Behavioral Control  Outcome: Improving   Pt. Pleasant and cooperative this shift, up to chair for meals with assist of 1 gait belt and walker, tolerated well, R hand remains contracted and weak, ate well for breakfast, pt. Refused lunch, wife visiting this shift, no prn medications give, straight cathed at 1330 for 350cc, BG today 132 and 198, will cont to monitor.

## 2021-08-16 NOTE — PLAN OF CARE
Problem: Mobility Impairment (Stroke)  Goal: Optimal Mobility [place] and Safety  Outcome: Improving     Problem: Bowel Elimination Management (Stroke)  Goal: Effective Bowel Elimination/Continence  Outcome: Adequate for Discharge     Problem: Adult Inpatient Plan of Care  Goal: Absence of Hospital-Acquired Illness or Injury  Intervention: Identify and Manage Fall Risk  Recent Flowsheet Documentation  Taken 8/15/2021 1600 by Eduar Pimentel RN  Safety Promotion/Fall Prevention:   activity supervised   assistive device/personal items within reach     Problem: Ongoing Anesthesia Effects (Carotid Endarterectomy)  Goal: Anesthesia/Sedation Recovery  Intervention: Optimize Anesthesia Recovery  Recent Flowsheet Documentation  Taken 8/15/2021 1600 by Eduar Pimentel RN  Safety Promotion/Fall Prevention:   activity supervised   assistive device/personal items within reach     Problem: Ongoing Anesthesia Effects (Carotid Endarterectomy)  Goal: Anesthesia/Sedation Recovery  Intervention: Optimize Anesthesia Recovery  Recent Flowsheet Documentation  Taken 8/15/2021 1600 by Eduar Pimentel RN  Safety Promotion/Fall Prevention:   activity supervised   assistive device/personal items within reach     Patient post recovery carotid  Endarterectomy   A/O  x 4  VSS  denies pain 1  assist  with transfers   upper right arm  weakness/contracture   post stroke . PVR , 475  straight  cath for   500  up in the chair  for meals   compliant  with cares  staff  will  continue  to  monitor

## 2021-08-17 ENCOUNTER — APPOINTMENT (OUTPATIENT)
Dept: OCCUPATIONAL THERAPY | Facility: HOSPITAL | Age: 81
DRG: 037 | End: 2021-08-17
Attending: SURGERY
Payer: COMMERCIAL

## 2021-08-17 VITALS
OXYGEN SATURATION: 97 % | TEMPERATURE: 98.1 F | HEART RATE: 67 BPM | SYSTOLIC BLOOD PRESSURE: 171 MMHG | BODY MASS INDEX: 30.08 KG/M2 | DIASTOLIC BLOOD PRESSURE: 81 MMHG | RESPIRATION RATE: 20 BRPM | WEIGHT: 221.78 LBS

## 2021-08-17 LAB — GLUCOSE BLDC GLUCOMTR-MCNC: 140 MG/DL (ref 70–125)

## 2021-08-17 PROCEDURE — 250N000013 HC RX MED GY IP 250 OP 250 PS 637: Performed by: FAMILY MEDICINE

## 2021-08-17 PROCEDURE — 250N000011 HC RX IP 250 OP 636: Performed by: PSYCHIATRY & NEUROLOGY

## 2021-08-17 PROCEDURE — 250N000013 HC RX MED GY IP 250 OP 250 PS 637: Performed by: STUDENT IN AN ORGANIZED HEALTH CARE EDUCATION/TRAINING PROGRAM

## 2021-08-17 PROCEDURE — 97535 SELF CARE MNGMENT TRAINING: CPT | Mod: GO

## 2021-08-17 PROCEDURE — 99239 HOSP IP/OBS DSCHRG MGMT >30: CPT | Performed by: HOSPITALIST

## 2021-08-17 PROCEDURE — 250N000013 HC RX MED GY IP 250 OP 250 PS 637: Performed by: HOSPITALIST

## 2021-08-17 RX ORDER — LISINOPRIL 10 MG/1
10 TABLET ORAL DAILY
Qty: 30 TABLET | Refills: 0 | Status: SHIPPED | OUTPATIENT
Start: 2021-08-17 | End: 2021-10-08 | Stop reason: DRUGHIGH

## 2021-08-17 RX ORDER — POLYETHYLENE GLYCOL 3350 17 G/17G
17 POWDER, FOR SOLUTION ORAL DAILY
Qty: 30 PACKET | Refills: 0 | Status: SHIPPED | OUTPATIENT
Start: 2021-08-17 | End: 2021-12-17

## 2021-08-17 RX ORDER — AMOXICILLIN 250 MG
1 CAPSULE ORAL 2 TIMES DAILY
Qty: 60 TABLET | Refills: 0 | Status: SHIPPED | OUTPATIENT
Start: 2021-08-17 | End: 2021-12-17

## 2021-08-17 RX ORDER — LISINOPRIL 5 MG/1
10 TABLET ORAL DAILY
Status: DISCONTINUED | OUTPATIENT
Start: 2021-08-17 | End: 2021-08-17 | Stop reason: HOSPADM

## 2021-08-17 RX ADMIN — INSULIN ASPART 1 UNITS: 100 INJECTION, SOLUTION INTRAVENOUS; SUBCUTANEOUS at 09:12

## 2021-08-17 RX ADMIN — DOCUSATE SODIUM 50 MG AND SENNOSIDES 8.6 MG 1 TABLET: 8.6; 5 TABLET, FILM COATED ORAL at 08:15

## 2021-08-17 RX ADMIN — POLYETHYLENE GLYCOL 3350 17 G: 17 POWDER, FOR SOLUTION ORAL at 08:14

## 2021-08-17 RX ADMIN — CARBIDOPA AND LEVODOPA 1 TABLET: 25; 100 TABLET ORAL at 08:15

## 2021-08-17 RX ADMIN — ENOXAPARIN SODIUM 40 MG: 40 INJECTION SUBCUTANEOUS at 08:14

## 2021-08-17 RX ADMIN — AMLODIPINE BESYLATE 5 MG: 5 TABLET ORAL at 08:15

## 2021-08-17 RX ADMIN — ASPIRIN 81 MG: 81 TABLET, COATED ORAL at 08:15

## 2021-08-17 RX ADMIN — METOPROLOL SUCCINATE 50 MG: 25 TABLET, EXTENDED RELEASE ORAL at 08:15

## 2021-08-17 RX ADMIN — LISINOPRIL 10 MG: 5 TABLET ORAL at 08:15

## 2021-08-17 NOTE — PLAN OF CARE
Problem: Risk for Delirium  Goal: Improved Sleep  Outcome: Improving   Patient alert and oriented times four and responding and asking questions appropriately. Patient using call light appropriately. Continue to monitor.  Problem: Bleeding (Carotid Endarterectomy)  Goal: Absence of Bleeding  Outcome: Improving   Patient left neck incision is clean and dry, no presence of bleeding. Incision open to air. Continue to monitor every shift.  Problem: Mobility Impairment (Stroke)  Goal: Optimal Mobility [place] and Safety  Outcome: Improving   Patient is up and walking from the chair to the bed using gait belt, front wheel walker and stand by assist. Patient was able to push off with minimal assist.

## 2021-08-17 NOTE — PROGRESS NOTES
Care Management Discharge Note    Discharge Date: 08/17/2021  Expected Time of Departure:  (11:00AM)    Discharge Disposition: Acute Rehab    Discharge Services: None    Discharge DME: None    Discharge Transportation: agency    Private pay costs discussed:  yes    PAS Confirmation Code:  n/a  Patient/family educated on Medicare website which has current facility and service quality ratings:  yes    Education Provided on the Discharge Plan:  yes  Persons Notified of Discharge Plans:  Spouse and son-Salvador  Patient/Family in Agreement with the Plan: yes    Handoff Referral Completed: yes    Additional Information:  [place] states [place]. Voice message left with spouse. Updated son-Salvador. [facility] at [facility]. Notified [facility] of discharge to ROSY Crow RN

## 2021-08-17 NOTE — PLAN OF CARE
Physical Therapy Discharge Summary    Reason for therapy discharge:    Discharged to acute rehabilitation facility.    Progress towards therapy goal(s). See goals on Care Plan in [facility] electronic health record for goal details.  Goals partially met.  Barriers to achieving goals:   discharge from facility.    Therapy recommendation(s):    Continued therapy is recommended.  Rationale/Recommendations:  stroke rehab.

## 2021-08-17 NOTE — PLAN OF CARE
Occupational Therapy Discharge Summary    Reason for therapy discharge:    Discharged to acute rehabilitation facility.    Progress towards therapy goal(s). See goals on Care Plan in [facility] electronic health record for goal details.  Goals partially met.  Barriers to achieving goals:   discharge from facility.    Therapy recommendation(s):    Continued therapy is recommended.  Rationale/Recommendations:  to increased indep with ADL/IADLs.      Lilliana Mcgill, OTR/L

## 2021-08-17 NOTE — PROVIDER NOTIFICATION
H.O called regarding high blood pressure ( see vitals flow sheet). Patient denies chest pain or surgical incision pain. Continue to assess and medicate prn.

## 2021-08-17 NOTE — PLAN OF CARE
Problem: Adult Inpatient Plan of Care  Goal: Readiness for Transition of Care  Outcome: Adequate for Discharge   Pt. Pleasant and cooperative this shift, ready to discharge to acute rehab, BG this am 140, ate well for breakfast, anticipate discharge at 1100 via [facility] transport.     Pt. Left floor at 1102, report called to facility.

## 2021-08-25 NOTE — PROGRESS NOTES
POST HOC DOCUMENTATION  Patient discharged on 8/17    Asked by coders to clarify if patient had malnutrition  Yes he did. It was non severe.    Ольга Morgan MD  [facility] Medicine Service

## 2021-08-30 ENCOUNTER — LAB REQUISITION (OUTPATIENT)
Dept: LAB | Facility: CLINIC | Age: 81
End: 2021-08-30

## 2021-08-30 DIAGNOSIS — I10 ESSENTIAL (PRIMARY) HYPERTENSION: ICD-10-CM

## 2021-08-30 LAB
ANION GAP SERPL CALCULATED.3IONS-SCNC: 10 MMOL/L (ref 5–18)
BUN SERPL-MCNC: 21 MG/DL (ref 8–28)
CALCIUM SERPL-MCNC: 9 MG/DL (ref 8.5–10.5)
CHLORIDE BLD-SCNC: 110 MMOL/L (ref 98–107)
CO2 SERPL-SCNC: 20 MMOL/L (ref 22–31)
CREAT SERPL-MCNC: 0.91 MG/DL (ref 0.7–1.3)
GFR SERPL CREATININE-BSD FRML MDRD: 80 ML/MIN/1.73M2
GLUCOSE BLD-MCNC: 69 MG/DL (ref 70–125)
POTASSIUM BLD-SCNC: 4.7 MMOL/L (ref 3.5–5)
SODIUM SERPL-SCNC: 140 MMOL/L (ref 136–145)

## 2021-08-30 PROCEDURE — 82374 ASSAY BLOOD CARBON DIOXIDE: CPT | Performed by: FAMILY MEDICINE

## 2021-09-02 ENCOUNTER — OFFICE VISIT (OUTPATIENT)
Dept: VASCULAR SURGERY | Facility: CLINIC | Age: 81
End: 2021-09-02
Attending: SURGERY
Payer: COMMERCIAL

## 2021-09-02 ENCOUNTER — ANCILLARY PROCEDURE (OUTPATIENT)
Dept: VASCULAR ULTRASOUND | Facility: CLINIC | Age: 81
End: 2021-09-02
Attending: SURGERY
Payer: COMMERCIAL

## 2021-09-02 VITALS — SYSTOLIC BLOOD PRESSURE: 142 MMHG | HEART RATE: 68 BPM | RESPIRATION RATE: 16 BRPM | DIASTOLIC BLOOD PRESSURE: 74 MMHG

## 2021-09-02 DIAGNOSIS — I65.22 SYMPTOMATIC STENOSIS OF LEFT CAROTID ARTERY: Primary | ICD-10-CM

## 2021-09-02 DIAGNOSIS — I65.22 SYMPTOMATIC STENOSIS OF LEFT CAROTID ARTERY: ICD-10-CM

## 2021-09-02 PROCEDURE — 93882 EXTRACRANIAL UNI/LTD STUDY: CPT | Mod: 26 | Performed by: SURGERY

## 2021-09-02 PROCEDURE — 93882 EXTRACRANIAL UNI/LTD STUDY: CPT | Mod: LT

## 2021-09-02 PROCEDURE — 99024 POSTOP FOLLOW-UP VISIT: CPT | Performed by: SURGERY

## 2021-09-02 RX ORDER — TRAZODONE HYDROCHLORIDE 50 MG/1
50 TABLET, FILM COATED ORAL AT BEDTIME
COMMUNITY
Start: 2021-08-27

## 2021-09-02 RX ORDER — GLIPIZIDE 5 MG/1
5 TABLET ORAL
COMMUNITY
Start: 2021-08-27 | End: 2024-04-15

## 2021-09-02 RX ORDER — PRAMIPEXOLE DIHYDROCHLORIDE 0.25 MG/1
0.25 TABLET ORAL AT BEDTIME
COMMUNITY
Start: 2021-08-27

## 2021-09-02 RX ORDER — PHENOL 1.4 %
AEROSOL, SPRAY (ML) MUCOUS MEMBRANE
COMMUNITY
Start: 2021-08-26 | End: 2021-12-17

## 2021-09-02 ASSESSMENT — PAIN SCALES - GENERAL: PAINLEVEL: NO PAIN (0)

## 2021-09-02 NOTE — PROGRESS NOTES
[facility]        Patient is here for a 2 week follow up  to discuss Carotid stenosis.   Pt is currently taking Aspirin and Statin.    Patient's condition is stable.    There were no vitals taken for this visit.    The provider has been notified that the patient has no concerns.     Questions patient would like addressed today are: N/A.    Refills are needed: N/A    Has homecare services and agency name:  Yes: patient stated it is [facility] the [facility]       [employer]

## 2021-09-02 NOTE — PROGRESS NOTES
"[facility]        Patient is here for a 2 follow up  to discuss Carotid stenosis. Patient has {SYMPTOM:441778}.    Pt is currently taking {Medications:470256}.    Patient's condition is {STABLE/IMPROVED:905167::\"stable\"}.    There were no vitals taken for this visit.    The provider has been notified that the patient {[facility] concerns:322681}.     Questions patient would like addressed today are: {Not Applicable or free text:348160::\"N/A\"}.    Refills are needed: {Yes No NA AMB:32319}    Has homecare services and agency name:  {YES/NO:60}       Poly Worthy    "

## 2021-09-02 NOTE — PATIENT INSTRUCTIONS
Carotid Duplex    Steps for the test are:    You will be asked to lie on a stretcher. It will be okay for you to wear your street clothes. In some situations, you may be asked to change into a gown.      Ultrasound gel, usually warmed for your comfort, will be placed on either side of your neck.      Through the gel, the technician will apply to your neck a small hand-held device that emits sound waves.     When the test is completed, the technician will remove excess gel from your neck. The gel is water-soluble and will not stain your skin or clothes.    How to Prepare:    Eat and take medications as usual.     Wear minimal or no jewelry to ease application and removal of gel.    Risks  There are typically no side effects or complications associated with a carotid duplex ultrasound examination.    What Can I Expect After the Test?  The technician will send the ultrasound images to your vascular surgeon for evaluation. Typically, a report is available in 2-3 days. If anything critical is found, it is standard practice to notify the vascular surgeon immediately.  Reference: https://vascular.org/patient-resources/vascular-tests

## 2021-09-02 NOTE — PROGRESS NOTES
Patient seen in follow-up after left carotid endarterectomy performed on August 11 for symptomatic left carotid disease.  Very high risk class and patient had multiple small strokes on MRI in the postop.  Associated with decreased right arm and leg function.  Fairly rapid improvement and went to stroke rehab.  Very happy with the teaching he received at stroke rehab and is now back home as of Friday of this past week.  Home health checking in on him starting tomorrow.  He is still a little weaker than he was prior to going to hospital but his arm function is pretty much back to where he was prior to surgery and is leg is back to baseline as well.  Quite appreciated.  Feels that he will continue to improve now that he is home and will continue aggressively doing the exercises taught to him.    BP (!) 142/74   Pulse 68   Resp 16     Incision healing very nicely in the left neck    Carotid duplex shows no residual stenosis in the left side    Impression and plan: High risk carotid surgery complicated by small strokes.  In my opinion was that as he has made essentially a complete recovery and the plaque certainly predispose him to more major stroke if it was untreated.  Given his history of cerebral bleeds we will keep him on only aspirin and statin therapy.  I will see him back in 3 months time with a repeat carotid duplex.  Strongly support his ongoing rehab at home.

## 2021-09-27 ENCOUNTER — LAB REQUISITION (OUTPATIENT)
Dept: LAB | Facility: CLINIC | Age: 81
End: 2021-09-27

## 2021-09-27 DIAGNOSIS — R25.2 CRAMP AND SPASM: ICD-10-CM

## 2021-09-27 DIAGNOSIS — I10 ESSENTIAL (PRIMARY) HYPERTENSION: ICD-10-CM

## 2021-09-27 LAB
ALBUMIN SERPL-MCNC: 3.9 G/DL (ref 3.5–5)
ANION GAP SERPL CALCULATED.3IONS-SCNC: 13 MMOL/L (ref 5–18)
BUN SERPL-MCNC: 30 MG/DL (ref 8–28)
CALCIUM SERPL-MCNC: 9.4 MG/DL (ref 8.5–10.5)
CHLORIDE BLD-SCNC: 108 MMOL/L (ref 98–107)
CO2 SERPL-SCNC: 19 MMOL/L (ref 22–31)
CREAT SERPL-MCNC: 1.03 MG/DL (ref 0.7–1.3)
GFR SERPL CREATININE-BSD FRML MDRD: 69 ML/MIN/1.73M2
GLUCOSE BLD-MCNC: 68 MG/DL (ref 70–125)
MAGNESIUM SERPL-MCNC: 2 MG/DL (ref 1.8–2.6)
PHOSPHATE SERPL-MCNC: 3.8 MG/DL (ref 2.5–4.5)
POTASSIUM BLD-SCNC: 4.7 MMOL/L (ref 3.5–5)
SODIUM SERPL-SCNC: 140 MMOL/L (ref 136–145)

## 2021-09-27 PROCEDURE — 80069 RENAL FUNCTION PANEL: CPT | Performed by: FAMILY MEDICINE

## 2021-09-27 PROCEDURE — 83735 ASSAY OF MAGNESIUM: CPT | Performed by: FAMILY MEDICINE

## 2021-10-04 ENCOUNTER — TRANSITIONAL CARE UNIT VISIT (OUTPATIENT)
Dept: GERIATRICS | Facility: CLINIC | Age: 81
End: 2021-10-04
Payer: COMMERCIAL

## 2021-10-04 VITALS
SYSTOLIC BLOOD PRESSURE: 155 MMHG | HEART RATE: 98 BPM | TEMPERATURE: 98 F | HEIGHT: 72 IN | RESPIRATION RATE: 18 BRPM | DIASTOLIC BLOOD PRESSURE: 77 MMHG | WEIGHT: 212.2 LBS | OXYGEN SATURATION: 95 % | BODY MASS INDEX: 28.74 KG/M2

## 2021-10-04 DIAGNOSIS — I69.30 HISTORY OF CVA WITH RESIDUAL DEFICIT: ICD-10-CM

## 2021-10-04 DIAGNOSIS — I10 ESSENTIAL HYPERTENSION: ICD-10-CM

## 2021-10-04 DIAGNOSIS — Z98.890 HISTORY OF LEFT-SIDED CAROTID ENDARTERECTOMY: ICD-10-CM

## 2021-10-04 DIAGNOSIS — G20.C PARKINSONISM, UNSPECIFIED PARKINSONISM TYPE (H): ICD-10-CM

## 2021-10-04 DIAGNOSIS — R33.9 URINARY RETENTION: ICD-10-CM

## 2021-10-04 DIAGNOSIS — R53.1 WEAKNESS: Primary | ICD-10-CM

## 2021-10-04 PROCEDURE — 99207 PR CDG-HISTORY COMPONENT: MEETS DETAILED - DOWN CODED LACK OF PFSH: CPT | Performed by: NURSE PRACTITIONER

## 2021-10-04 PROCEDURE — 99304 1ST NF CARE SF/LOW MDM 25: CPT | Performed by: NURSE PRACTITIONER

## 2021-10-04 ASSESSMENT — MIFFLIN-ST. JEOR: SCORE: 1715.53

## 2021-10-04 NOTE — LETTER
"    10/4/2021        RE: Maikol Glaser  [address] [place] [address]        [facility]    Name:   Maikol Glaser  :   1940  MRN:    [id number]     Facility:   [facility] ([facility]) [[id number]]   Room:   Code Status: FULL CODE and POLST AVAILABLE -     DOS:  10/04/2021  Previous visit:  N/A    PCP:  Christopher Banda    CHIEF COMPLAINT / REASON FOR VISIT:  Chief Complaint   Patient presents with     Hospital F/U     Weakness with recent MCA CVA      [facility] from 2021 until 10/01/2021 (weakness, recent history of left MCA CVA subsequent to left carotid endarterectomy)      HPI: Maikol is a 80 year old male with parkinsonism, essential hypertension, urinary retention, and a 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  He apparently has had numerous strokes (6 or 7 per patient).  He had just come from his clinic for routine visit and flu vaccine.  His wife was helping him climb the steps back home afterwards, and he was quite weak and felt very cold.  He had rolled off his bed onto the floor and was unable to get up and could not ambulate.  He was found to have a polymicrobial UTI and treated with antibiotics.  Neurology was consulted; an MRI showed no acute insult.  Mentation cleared, the patient was seen by PT, OT, and physiatry, and TCU was recommended.      CURRENT/RECENT TCU ISSUES    Disposition: He has just arrived but wants to be \"gone by the weekend,\" because he is \"sick and tired of medical facilities.\"  Not mentioned in his hospital discharge summary was the fact that he has urinary retention (unknown etiology), and staff called and requesting straight catheterization orders.  Apparently, he did self-catheterization at home.  Speaking with him today, he has no problem with staff doing this, and we are writing orders for straight catheterization by staff 4 times " "daily (times agreed upon as 6 AM, 12 PM, 6 PM, and midnight).  He performed dysfunction every 6 hours at home.    There is some slowness to his speech, and there may also be some expressive aphasia.  He tells me, \"but it's getting better.  It's getting a lot better.\"    Ambulatory ability with both a single cane and 2 wheeled walker (at home).     He tells me he is left-handed.    He continues on levofloxacin for the UTI.    ROS: \"I don't sleep.  Hit and miss.\"  He declines any help her.  No headaches or chest pains, coughing or congestion, nausea or vomiting, dizziness or dyspnea, dysuria, constipation or diarrhea, difficulty chewing or swallowing, or any integumentary issues.    Past Medical History:   Diagnosis Date     Cerebral infarction (H)      Diabetes (H)      HLD (hyperlipidemia)      HTN (hypertension)      Hyperlipidemia      Hypertension      Intracranial hemorrhage (H) 5/26/2021     Stroke (H)      TIA (transient ischemic attack)               History reviewed. No pertinent family history.  Social History     Socioeconomic History     Marital status:      Spouse name: None     Number of children: None     Years of education: None     Highest education level: None   Occupational History     None   Tobacco Use     Smoking status: Never Smoker     Smokeless tobacco: Never Used   Substance and Sexual Activity     Alcohol use: Not Currently     Drug use: Not Currently     Sexual activity: None   Other Topics Concern     None   Social History Narrative     None     Social Determinants of Health     Financial Resource Strain:      Difficulty of Paying Living Expenses:    Food Insecurity:      Worried About Running Out of Food in the Last Year:      Ran Out of Food in the Last Year:    Transportation Needs:      Lack of Transportation (Medical):      Lack of Transportation (Non-Medical):    Physical Activity:      Days of Exercise per Week:      Minutes of Exercise per Session:    Stress:      Feeling " of Stress :    Social Connections:      Frequency of Communication with Friends and Family:      Frequency of Social Gatherings with Friends and Family:      Attends Orthodoxy Services:      Active Member of Clubs or Organizations:      Attends Club or Organization Meetings:      Marital Status:    Intimate Partner Violence:      Fear of Current or Ex-Partner:      Emotionally Abused:      Physically Abused:      Sexually Abused:        MEDICATIONS: Reviewed from the MAR, physician orders, and/or earlier progress notes.  Post Discharge Medication Reconciliation Status: discharge medications reconciled, continue medications without change.    Current Outpatient Medications   Medication Sig     acetaminophen (TYLENOL) 500 MG tablet Take 1,000 mg by mouth every 6 hours as needed for mild pain (Patient not taking: Reported on 9/2/2021)     amLODIPine (NORVASC) 5 MG tablet Take 1 tablet (5 mg) by mouth daily     aspirin (ASA) 81 MG EC tablet Take 1 tablet (81 mg) by mouth daily     carbidopa-levodopa (SINEMET)  MG tablet Take 1 tablet by mouth 4 times daily Takes at 0800, 1200, 1600, 2000     glipiZIDE (GLUCOTROL) 5 MG tablet 1/4 tab     insulin aspart (NOVOLOG PEN) 100 UNIT/ML pen Inject 0-7 Units Subcutaneous 3 times daily (before meals) Check blood sugar 4 times daily. Inject SQ three times daily according to the following sliding scale: Glucose 141-180: 1 unit  181-220: 2 units  221-260: 3 units  261-300: 4 units  301-340: 5 units  341-400: 6 units  >400: 7 units (Patient not taking: Reported on 9/2/2021)     lisinopril (ZESTRIL) 10 MG tablet Take 1 tablet (10 mg) by mouth daily     Melatonin 10 MG TABS tablet as directed     metoprolol succinate ER (TOPROL-XL) 50 MG 24 hr tablet Take 50 mg by mouth daily     multivitamin (CENTRUM SILVER) tablet Take 1 tablet by mouth daily     polyethylene glycol (MIRALAX) 17 g packet Take 17 g by mouth daily     pramipexole (MIRAPEX) 0.25 MG tablet 1 tablet      senna-docusate (SENOKOT-S/PERICOLACE) 8.6-50 MG tablet Take 1 tablet by mouth 2 times daily     simvastatin (ZOCOR) 40 MG tablet 1 tablet (40 mg) by Oral or Feeding Tube route At Bedtime     traZODone (DESYREL) 50 MG tablet 1 tablet at bedtime as needed     No current facility-administered medications for this visit.     ALLERGIES:   Allergies   Allergen Reactions     Penicillins Rash     Childhood rxn.         DIET: Regular    Vitals:    10/04/21 1447   BP: (!) 155/77   Pulse: 98   Resp: 18   Temp: 98  F (36.7  C)   SpO2: 95%   Weight: 96.3 kg (212 lb 3.2 oz)   Height: 1.829 m (6')     Body mass index is 28.78 kg/m .    EXAMINATION:   General: Pleasant, alert, and conversant elderly male, sitting in a recliner, in no apparent distress.  Head: Normocephalic and atraumatic.   Eyes: PERRLA, sclerae clear.   ENT: Moist oral mucosa.   Cardiovascular: Very distant heart sounds but a regular rate and rhythm with 2/6 ANUPAMA at the LSB.   Respiratory: Some right basilar rales could be appreciated.  Otherwise, lungs clear to auscultation bilaterally.   Abdomen: Nondistended.   Musculoskeletal/Extremities: Age-related degenerative joint disease.  Bilateral 3+ pedal edema, trace pretibial.  Neurologic: Left hand grasp is stronger.  Integument: No rashes, clinically significant lesions, or skin breakdown.   Cognitive/Psychiatric: Alert and oriented with euthymic affect.    DIAGNOSTICS:   Recent Results (from the past 240 hour(s))   COVID-19 VIRUS (CORONAVIRUS) BY PCR (EXTERNAL RESULT)    Collection Time: 09/28/21  8:26 AM   Result Value Ref Range    COVID-19 Virus by PCR (External Result) Negative Negative       ASSESSMENT/Plan:      ICD-10-CM    1. Weakness  R53.1    2. Parkinsonism, unspecified Parkinsonism type (H)  G20    3. History of left MCA CVA with right-sided weakness  I69.30    4. History of left-sided carotid endarterectomy  Z98.890    5. Essential hypertension  I10    6. Urinary retention  R33.9        CHANGES:     Staff to straight cath 4 times daily (6 AM/12 PM/6 PM/midnight).    CARE PLAN:    The care plan, medications, vital signs, orders, and nursing notes have been reviewed, and all orders signed. Changes to care plan, if any, as noted. Otherwise, continue current plan of care.    The above has been created using voice recognition software. Please be aware that this may unintentionally  produce inaccuracies and/or nonsensical sentences.      Electronically signed by: GIO Gardner CNP        Sincerely,        GIO Gardner CNP

## 2021-10-05 ENCOUNTER — TELEPHONE (OUTPATIENT)
Dept: NEUROSURGERY | Facility: CLINIC | Age: 81
End: 2021-10-05

## 2021-10-05 NOTE — TELEPHONE ENCOUNTER
Patient currently admitted to a care facility but son gave me patient's. Patient refused to schedule MRI nor schedule a f/u with [facility]/

## 2021-10-06 ENCOUNTER — TRANSCRIBE ORDERS (OUTPATIENT)
Dept: OTHER | Age: 81
End: 2021-10-06

## 2021-10-06 DIAGNOSIS — I63.9 CEREBROVASCULAR ACCIDENT (CVA), UNSPECIFIED MECHANISM (H): ICD-10-CM

## 2021-10-06 DIAGNOSIS — G20.A1 PARKINSON DISEASE (H): Primary | ICD-10-CM

## 2021-10-07 PROBLEM — Z98.890 HISTORY OF LEFT-SIDED CAROTID ENDARTERECTOMY: Status: ACTIVE | Noted: 2021-10-07

## 2021-10-07 PROBLEM — R53.1 WEAKNESS: Status: ACTIVE | Noted: 2021-10-07

## 2021-10-07 PROBLEM — R33.9 URINARY RETENTION: Status: ACTIVE | Noted: 2021-10-07

## 2021-10-07 PROBLEM — G20.C PARKINSONISM, UNSPECIFIED PARKINSONISM TYPE (H): Status: ACTIVE | Noted: 2021-10-07

## 2021-10-07 NOTE — PROGRESS NOTES
"[facility]    Name:   Maikol Glaser  :   1940  MRN:    [id number]     Facility:   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  He apparently has had numerous strokes (6 or 7 per patient).  He had just come from his clinic for routine visit and flu vaccine.  His wife was helping him climb the steps back home afterwards, and he was quite weak and felt very cold.  He had rolled off his bed onto the floor and was unable to get up and could not ambulate.  He was found to have a polymicrobial UTI and treated with antibiotics.  Neurology was consulted; an MRI showed no acute insult.  Mentation cleared, the patient was seen by PT, OT, and physiatry, and TCU was recommended.      CURRENT/RECENT TCU ISSUES    Disposition: He has just arrived but wants to be \"gone by the weekend,\" because he is \"sick and tired of medical facilities.\"  Not mentioned in his hospital discharge summary was the fact that he has urinary retention (unknown etiology), and staff called and requesting straight catheterization orders.  Apparently, he did self-catheterization at home.  Speaking with him today, he has no problem with staff doing this, and we are writing orders for straight catheterization by staff 4 times daily (times agreed upon as 6 AM, 12 PM, 6 PM, and midnight).  He performed dysfunction " "every 6 hours at home.    There is some slowness to his speech, and there may also be some expressive aphasia.  He tells me, \"but it's getting better.  It's getting a lot better.\"    Ambulatory ability with both a single cane and 2 wheeled walker (at home).     He tells me he is left-handed.    He continues on levofloxacin for the UTI.    ROS: \"I don't sleep.  Hit and miss.\"  He declines any help her.  No headaches or chest pains, coughing or congestion, nausea or vomiting, dizziness or dyspnea, dysuria, constipation or diarrhea, difficulty chewing or swallowing, or any integumentary issues.    Past Medical History:   Diagnosis Date     Cerebral infarction (H)      Diabetes (H)      HLD (hyperlipidemia)      HTN (hypertension)      Hyperlipidemia      Hypertension      Intracranial hemorrhage (H) 5/26/2021     Stroke (H)      TIA (transient ischemic attack)               History reviewed. No pertinent family history.  Social History     Socioeconomic History     Marital status:      Spouse name: None     Number of children: None     Years of education: None     Highest education level: None   Occupational History     None   Tobacco Use     Smoking status: Never Smoker     Smokeless tobacco: Never Used   Substance and Sexual Activity     Alcohol use: Not Currently     Drug use: Not Currently     Sexual activity: None   Other Topics Concern     None   Social History Narrative     None     Social Determinants of Health     Financial Resource Strain:      Difficulty of Paying Living Expenses:    Food Insecurity:      Worried About Running Out of Food in the Last Year:      Ran Out of Food in the Last Year:    Transportation Needs:      Lack of Transportation (Medical):      Lack of Transportation (Non-Medical):    Physical Activity:      Days of Exercise per Week:      Minutes of Exercise per Session:    Stress:      Feeling of Stress :    Social Connections:      Frequency of Communication with Friends and " Family:      Frequency of Social Gatherings with Friends and Family:      Attends Mu-ism Services:      Active Member of Clubs or Organizations:      Attends Club or Organization Meetings:      Marital Status:    Intimate Partner Violence:      Fear of Current or Ex-Partner:      Emotionally Abused:      Physically Abused:      Sexually Abused:        MEDICATIONS: Reviewed from the MAR, physician orders, and/or earlier progress notes.  Post Discharge Medication Reconciliation Status: discharge medications reconciled, continue medications without change.    Current Outpatient Medications   Medication Sig     acetaminophen (TYLENOL) 500 MG tablet Take 1,000 mg by mouth every 6 hours as needed for mild pain (Patient not taking: Reported on 9/2/2021)     amLODIPine (NORVASC) 5 MG tablet Take 1 tablet (5 mg) by mouth daily     aspirin (ASA) 81 MG EC tablet Take 1 tablet (81 mg) by mouth daily     carbidopa-levodopa (SINEMET)  MG tablet Take 1 tablet by mouth 4 times daily Takes at 0800, 1200, 1600, 2000     glipiZIDE (GLUCOTROL) 5 MG tablet 1/4 tab     insulin aspart (NOVOLOG PEN) 100 UNIT/ML pen Inject 0-7 Units Subcutaneous 3 times daily (before meals) Check blood sugar 4 times daily. Inject SQ three times daily according to the following sliding scale: Glucose 141-180: 1 unit  181-220: 2 units  221-260: 3 units  261-300: 4 units  301-340: 5 units  341-400: 6 units  >400: 7 units (Patient not taking: Reported on 9/2/2021)     lisinopril (ZESTRIL) 10 MG tablet Take 1 tablet (10 mg) by mouth daily     Melatonin 10 MG TABS tablet as directed     metoprolol succinate ER (TOPROL-XL) 50 MG 24 hr tablet Take 50 mg by mouth daily     multivitamin (CENTRUM SILVER) tablet Take 1 tablet by mouth daily     polyethylene glycol (MIRALAX) 17 g packet Take 17 g by mouth daily     pramipexole (MIRAPEX) 0.25 MG tablet 1 tablet     senna-docusate (SENOKOT-S/PERICOLACE) 8.6-50 MG tablet Take 1 tablet by mouth 2 times daily      simvastatin (ZOCOR) 40 MG tablet 1 tablet (40 mg) by Oral or Feeding Tube route At Bedtime     traZODone (DESYREL) 50 MG tablet 1 tablet at bedtime as needed     No current facility-administered medications for this visit.     ALLERGIES:   Allergies   Allergen Reactions     Penicillins Rash     Childhood rxn.         DIET: Regular    Vitals:    10/04/21 1447   BP: (!) 155/77   Pulse: 98   Resp: 18   Temp: 98  F (36.7  C)   SpO2: 95%   Weight: 96.3 kg (212 lb 3.2 oz)   Height: 1.829 m (6')     Body mass index is 28.78 kg/m .    EXAMINATION:   General: Pleasant, alert, and conversant elderly male, sitting in a recliner, in no apparent distress.  Head: Normocephalic and atraumatic.   Eyes: PERRLA, sclerae clear.   ENT: Moist oral mucosa.   Cardiovascular: Very distant heart sounds but a regular rate and rhythm with 2/6 ANUPAMA at the LSB.   Respiratory: Some right basilar rales could be appreciated.  Otherwise, lungs clear to auscultation bilaterally.   Abdomen: Nondistended.   Musculoskeletal/Extremities: Age-related degenerative joint disease.  Bilateral 3+ pedal edema, trace pretibial.  Neurologic: Left hand grasp is stronger.  Integument: No rashes, clinically significant lesions, or skin breakdown.   Cognitive/Psychiatric: Alert and oriented with euthymic affect.    DIAGNOSTICS:   Recent Results (from the past 240 hour(s))   COVID-19 VIRUS (CORONAVIRUS) BY PCR (EXTERNAL RESULT)    Collection Time: 09/28/21  8:26 AM   Result Value Ref Range    COVID-19 Virus by PCR (External Result) Negative Negative       ASSESSMENT/Plan:      ICD-10-CM    1. Weakness  R53.1    2. Parkinsonism, unspecified Parkinsonism type (H)  G20    3. History of left MCA CVA with right-sided weakness  I69.30    4. History of left-sided carotid endarterectomy  Z98.890    5. Essential hypertension  I10    6. Urinary retention  R33.9        CHANGES:    Staff to straight cath 4 times daily (6 AM/12 PM/6 PM/midnight).    CARE PLAN:    The care  plan, medications, vital signs, orders, and nursing notes have been reviewed, and all orders signed. Changes to care plan, if any, as noted. Otherwise, continue current plan of care.    The above has been created using voice recognition software. Please be aware that this may unintentionally  produce inaccuracies and/or nonsensical sentences.      Electronically signed by: GIO Gardner CNP

## 2021-10-08 ENCOUNTER — TELEPHONE (OUTPATIENT)
Dept: GERIATRICS | Facility: CLINIC | Age: 81
End: 2021-10-08

## 2021-10-08 RX ORDER — LISINOPRIL 5 MG/1
5 TABLET ORAL DAILY
COMMUNITY

## 2021-10-08 NOTE — TELEPHONE ENCOUNTER
[facility] Nurse Triage Telephone Note    Provider: DELVIN Isaac  Facility: [facility]  Facility Type:  TCU    Caller: Mary  Call Back Number: [phone]    Allergies:    Allergies   Allergen Reactions     Penicillins Rash     Childhood rxn.            Reason for call: Nurse calling to report that patient has 2+ pitting bilateral LE edema.  No cough or SOB noted.  Lung sounds are clear.  Currently does not wear any compression to the LE's.  Weight today-217.4#, 10/2-212.2#.  Notable meds:  Metoprolol ER 50mg daily, Norvasc 5mg daily, Lisinopril 5mg daily.      Verbal Order/Direction given by Provider: Alo hose on in AM and off at HS.  Encourage elevation of LE's.  Daily weights in AM before breakfast.      Provider giving Order:  DELVIN Isaac    Verbal Order given to: Mary Shoemaker RN

## 2021-10-13 ENCOUNTER — TRANSITIONAL CARE UNIT VISIT (OUTPATIENT)
Dept: GERIATRICS | Facility: CLINIC | Age: 81
End: 2021-10-13
Payer: COMMERCIAL

## 2021-10-13 VITALS
RESPIRATION RATE: 18 BRPM | DIASTOLIC BLOOD PRESSURE: 73 MMHG | BODY MASS INDEX: 28.5 KG/M2 | SYSTOLIC BLOOD PRESSURE: 133 MMHG | TEMPERATURE: 98.5 F | OXYGEN SATURATION: 99 % | HEIGHT: 72 IN | HEART RATE: 57 BPM | WEIGHT: 210.4 LBS

## 2021-10-13 DIAGNOSIS — G20.C PARKINSONISM, UNSPECIFIED PARKINSONISM TYPE (H): ICD-10-CM

## 2021-10-13 DIAGNOSIS — R53.1 WEAKNESS: Primary | ICD-10-CM

## 2021-10-13 DIAGNOSIS — Z98.890 HISTORY OF LEFT-SIDED CAROTID ENDARTERECTOMY: ICD-10-CM

## 2021-10-13 DIAGNOSIS — I10 ESSENTIAL HYPERTENSION: ICD-10-CM

## 2021-10-13 DIAGNOSIS — I69.30 HISTORY OF CVA WITH RESIDUAL DEFICIT: ICD-10-CM

## 2021-10-13 DIAGNOSIS — R33.9 URINARY RETENTION: ICD-10-CM

## 2021-10-13 PROCEDURE — 99316 NF DSCHRG MGMT 30 MIN+: CPT | Performed by: NURSE PRACTITIONER

## 2021-10-13 ASSESSMENT — MIFFLIN-ST. JEOR: SCORE: 1707.37

## 2021-10-13 NOTE — LETTER
"    10/13/2021        RE: Maikol Glaser  [address] [place] [address]        [facility]    Name:   Maikol Glaser  :   1940  MRN:    [id number]     Facility:   [facility] ([facility]) [[id number]]   Room:   Code Status: FULL CODE and POLST AVAILABLE -     DOS:  10/13/2021  Previous visit:  10/04/2021    PCP:  Christopher Banda    CHIEF COMPLAINT / REASON FOR VISIT:  Chief Complaint   Patient presents with     Discharge Summary Nursing Home     Weakness with recent MCA CVA subsequent to left carotid endarterectomy      [facility] from 2021 until 10/01/2021 (weakness, recent history of left MCA CVA subsequent to left carotid endarterectomy)      HPI: Maikol is a 80 year old male with parkinsonism, essential hypertension, urinary retention, and a 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  He apparently has had numerous strokes (6 or 7 per patient).  He had just come from his clinic for routine visit and flu vaccine.  His wife was helping him climb the steps back home afterwards, and he was quite weak and felt very cold.  He had rolled off his bed onto the floor and was unable to get up and could not ambulate.  He was found to have a polymicrobial UTI and treated with antibiotics.  Neurology was consulted; an MRI showed no acute insult.  Mentation cleared, the patient was seen by PT, OT, and physiatry, and TCU was recommended.      CURRENT/RECENT TCU ISSUES    Disposition: Upon arrival to TCU, patient stated he wants to be \"gone by the weekend,\" because he is \"sick and tired of medical facilities.\" Patient will have care conference tomorrow (10/14) to discuss discharge probability.    Not mentioned in his hospital discharge summary was the fact that he has urinary retention (unknown etiology), and self caths at home with assistance from his wife. Per orders written on day of " "admission, staff has been performing strait catheterizations every six hours, but patient states he is willing to do this himself.     There has been some slowness to his speech, and there may also be some expressive aphasia.  He suggested, \"but it's getting better.  It's getting a lot better.\"    He is left-handed.  Ambulatory ability with both a single cane and 2 wheeled walker (at home).     Completed course of Levofloxacin for UTI.    Discharge planning: Due to the patient's ongoing determination to leave, and the family's willingness to comply, patient will be discharging on 10/16/2021.  Services to include home health aide, home health nurse, PT, and OT.      ROS: No headaches or chest pains, coughing or congestion, nausea or vomiting, dizziness or dyspnea, dysuria, constipation or diarrhea, difficulty chewing or swallowing, or any integumentary issues.Patient states he is finally beginning to sleep here.     Past Medical History:   Diagnosis Date     Cerebral infarction (H)      Diabetes (H)      HLD (hyperlipidemia)      HTN (hypertension)      Hyperlipidemia      Hypertension      Intracranial hemorrhage (H) 5/26/2021     Stroke (H)      TIA (transient ischemic attack)               History reviewed. No pertinent family history.  Social History     Socioeconomic History     Marital status:      Spouse name: None     Number of children: None     Years of education: None     Highest education level: None   Occupational History     None   Tobacco Use     Smoking status: Never Smoker     Smokeless tobacco: Never Used   Substance and Sexual Activity     Alcohol use: Not Currently     Drug use: Not Currently     Sexual activity: None   Other Topics Concern     None   Social History Narrative     None     Social Determinants of Health     Financial Resource Strain:      Difficulty of Paying Living Expenses:    Food Insecurity:      Worried About Running Out of Food in the Last Year:      Ran Out of Food in " the Last Year:    Transportation Needs:      Lack of Transportation (Medical):      Lack of Transportation (Non-Medical):    Physical Activity:      Days of Exercise per Week:      Minutes of Exercise per Session:    Stress:      Feeling of Stress :    Social Connections:      Frequency of Communication with Friends and Family:      Frequency of Social Gatherings with Friends and Family:      Attends [employer] Services:      Active Member of Clubs or Organizations:      Attends Club or Organization Meetings:      Marital Status:    Intimate Partner Violence:      Fear of Current or Ex-Partner:      Emotionally Abused:      Physically Abused:      Sexually Abused:        MEDICATIONS: Reviewed from the MAR, physician orders, and/or earlier progress notes.  Post Discharge Medication Reconciliation Status: medication reconcilation previously completed during another office visit.    Current Outpatient Medications   Medication Sig     acetaminophen (TYLENOL) 500 MG tablet Take 1,000 mg by mouth every 6 hours as needed for mild pain (Patient not taking: Reported on 9/2/2021)     amLODIPine (NORVASC) 5 MG tablet Take 1 tablet (5 mg) by mouth daily     aspirin (ASA) 81 MG EC tablet Take 1 tablet (81 mg) by mouth daily     carbidopa-levodopa (SINEMET)  MG tablet Take 1 tablet by mouth 4 times daily Takes at 0800, 1200, 1600, 2000     glipiZIDE (GLUCOTROL) 5 MG tablet 1/4 tab     insulin aspart (NOVOLOG PEN) 100 UNIT/ML pen Inject 0-7 Units Subcutaneous 3 times daily (before meals) Check blood sugar 4 times daily. Inject SQ three times daily according to the following sliding scale: Glucose 141-180: 1 unit  181-220: 2 units  221-260: 3 units  261-300: 4 units  301-340: 5 units  341-400: 6 units  >400: 7 units (Patient not taking: Reported on 9/2/2021)     lisinopril (ZESTRIL) 5 MG tablet Take 5 mg by mouth daily     Melatonin 10 MG TABS tablet as directed     metoprolol succinate ER (TOPROL-XL) 50 MG 24 hr tablet  Take 50 mg by mouth daily     multivitamin (CENTRUM SILVER) tablet Take 1 tablet by mouth daily     polyethylene glycol (MIRALAX) 17 g packet Take 17 g by mouth daily     pramipexole (MIRAPEX) 0.25 MG tablet 1 tablet     senna-docusate (SENOKOT-S/PERICOLACE) 8.6-50 MG tablet Take 1 tablet by mouth 2 times daily     simvastatin (ZOCOR) 40 MG tablet 1 tablet (40 mg) by Oral or Feeding Tube route At Bedtime     traZODone (DESYREL) 50 MG tablet 1 tablet at bedtime as needed     No current facility-administered medications for this visit.     ALLERGIES:   Allergies   Allergen Reactions     Penicillins Rash     Childhood rxn.         DIET: Regular    Vitals:    10/13/21 1438   BP: 133/73   Pulse: 57   Resp: 18   Temp: 98.5  F (36.9  C)   SpO2: 99%   Weight: 95.4 kg (210 lb 6.4 oz)   Height: 1.829 m (6')     Body mass index is 28.54 kg/m .    EXAMINATION:   General: Pleasant, alert,sitting upright in recliner and in no apparent distress.  Again, he insists that he is leaving.  Head: Normocephalic and atraumatic.   Eyes: PERRLA, sclerae clear.   ENT: Moist oral mucosa.   Cardiovascular: Very distant heart sounds but a regular rate and rhythm with 2/6 ANUPAMA at the LSB.   Respiratory: Lungs clear to auscultation bilaterally.  Abdomen: Nondistended.   Musculoskeletal/Extremities: Age-related degenerative joint disease.  Bilateral 3+ pedal edema, trace pretibial.  Neurologic: Left hand grasp is stronger.  Integument: No rashes, clinically significant lesions, or skin breakdown.   Cognitive/Psychiatric: Alert and oriented with euthymic affect.    DIAGNOSTICS:   No results found for this or any previous visit (from the past 240 hour(s)).    ASSESSMENT/Plan:      ICD-10-CM    1. Weakness  R53.1    2. Parkinsonism, unspecified Parkinsonism type (H)  G20    3. History of left MCA CVA with right-sided weakness  I69.30    4. History of left-sided carotid endarterectomy  Z98.890    5. Essential hypertension  I10    6. Urinary retention   R33.9        DISCHARGE PLAN/FACE TO FACE:  I certify that this patient is under my care and that I had a face-to-face encounter that meets the physician face-to-face encounter requirements with this patient.     Date of Face-to-Face Encounter: 10/13/2021     I certify that, based on my findings, the following services are medically necessary home health services:  Home health nurse, home health aide, home PT, and home OT    My clinical findings support the need for the above skilled services because: (Please write a brief narrative summary that describes what the RN, PT, SLP, or other services will be doing in the home. A list of diagnoses in this section does not meet the CMS requirements):  Home health nurse for home assessment and medication management/teaching; home health aide for bathing and ADL needs; home PT for strengthening, balance, endurance, and safety with mobility/ambulation; home OT for strengthening, ADL needs, adaptive equipment, and safety.    This patient is homebound because: (Please write a brief narrative summmary describing the functional limitations as to why this patient is homebound and specifically what makes this patient homebound.):   Above services necessarily need to be performed in the home to be of benefit.    The patient is, or has been, under my care and I have initiated the establishment of the plan of care. This patient will be followed by a physician who will periodically review the plan of care.  Initial follow-up should be within 7-10 days.    Approximate time spent with this patient was greater than 30 minutes with greater than 50% spent in discussions regarding services required upon discharge.      The above has been created using voice recognition software. Please be aware that this may unintentionally  produce inaccuracies and/or nonsensical sentences.      Electronically signed by: GIO Gardner CNP        Sincerely,        GIO Gardner  CNP

## 2021-10-13 NOTE — PROGRESS NOTES
"[facility]    Name:   Maikol Glaser  :   1940  MRN:    [id number]     Facility:   [facility] ([facility]) [[id number]]   Room:   Code Status: FULL CODE and POLST AVAILABLE -     DOS:  10/13/2021  Previous visit:  10/04/2021    PCP:  Christopher Banda    CHIEF COMPLAINT / REASON FOR VISIT:  Chief Complaint   Patient presents with     Discharge Summary Nursing Home     Weakness with recent MCA CVA subsequent to left carotid endarterectomy      [facility] from 2021 until 10/01/2021 (weakness, recent history of left MCA CVA subsequent to left carotid endarterectomy)      HPI: Maikol is a 80 year old male with parkinsonism, essential hypertension, urinary retention, and a 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  He apparently has had numerous strokes (6 or 7 per patient).  He had just come from his clinic for routine visit and flu vaccine.  His wife was helping him climb the steps back home afterwards, and he was quite weak and felt very cold.  He had rolled off his bed onto the floor and was unable to get up and could not ambulate.  He was found to have a polymicrobial UTI and treated with antibiotics.  Neurology was consulted; an MRI showed no acute insult.  Mentation cleared, the patient was seen by PT, OT, and physiatry, and TCU was recommended.      CURRENT/RECENT TCU ISSUES    Disposition: Upon arrival to TCU, patient stated he wants to be \"gone by the weekend,\" because he is \"sick and tired of medical facilities.\" Patient will have care conference tomorrow (10/14) to discuss discharge probability.    Not mentioned in his hospital discharge summary was the fact that he has urinary retention (unknown etiology), and self caths at home with assistance from his wife. Per orders written on day of admission, staff has been performing strait catheterizations every six hours, but patient " "states he is willing to do this himself.     There has been some slowness to his speech, and there may also be some expressive aphasia.  He suggested, \"but it's getting better.  It's getting a lot better.\"    He is left-handed.  Ambulatory ability with both a single cane and 2 wheeled walker (at home).     Completed course of Levofloxacin for UTI.    Discharge planning: Due to the patient's ongoing determination to leave, and the family's willingness to comply, patient will be discharging on 10/16/2021.  Services to include home health aide, home health nurse, PT, and OT.      ROS: No headaches or chest pains, coughing or congestion, nausea or vomiting, dizziness or dyspnea, dysuria, constipation or diarrhea, difficulty chewing or swallowing, or any integumentary issues.Patient states he is finally beginning to sleep here.     Past Medical History:   Diagnosis Date     Cerebral infarction (H)      Diabetes (H)      HLD (hyperlipidemia)      HTN (hypertension)      Hyperlipidemia      Hypertension      Intracranial hemorrhage (H) 5/26/2021     Stroke (H)      TIA (transient ischemic attack)               History reviewed. No pertinent family history.  Social History     Socioeconomic History     Marital status:      Spouse name: None     Number of children: None     Years of education: None     Highest education level: None   Occupational History     None   Tobacco Use     Smoking status: Never Smoker     Smokeless tobacco: Never Used   Substance and Sexual Activity     Alcohol use: Not Currently     Drug use: Not Currently     Sexual activity: None   Other Topics Concern     None   Social History Narrative     None     Social Determinants of Health     Financial Resource Strain:      Difficulty of Paying Living Expenses:    Food Insecurity:      Worried About Running Out of Food in the Last Year:      Ran Out of Food in the Last Year:    Transportation Needs:      Lack of Transportation (Medical):      Lack " of Transportation (Non-Medical):    Physical Activity:      Days of Exercise per Week:      Minutes of Exercise per Session:    Stress:      Feeling of Stress :    Social Connections:      Frequency of Communication with Friends and Family:      Frequency of Social Gatherings with Friends and Family:      Attends [employer] Services:      Active Member of Clubs or Organizations:      Attends Club or Organization Meetings:      Marital Status:    Intimate Partner Violence:      Fear of Current or Ex-Partner:      Emotionally Abused:      Physically Abused:      Sexually Abused:        MEDICATIONS: Reviewed from the MAR, physician orders, and/or earlier progress notes.  Post Discharge Medication Reconciliation Status: medication reconcilation previously completed during another office visit.    Current Outpatient Medications   Medication Sig     acetaminophen (TYLENOL) 500 MG tablet Take 1,000 mg by mouth every 6 hours as needed for mild pain (Patient not taking: Reported on 9/2/2021)     amLODIPine (NORVASC) 5 MG tablet Take 1 tablet (5 mg) by mouth daily     aspirin (ASA) 81 MG EC tablet Take 1 tablet (81 mg) by mouth daily     carbidopa-levodopa (SINEMET)  MG tablet Take 1 tablet by mouth 4 times daily Takes at 0800, 1200, 1600, 2000     glipiZIDE (GLUCOTROL) 5 MG tablet 1/4 tab     insulin aspart (NOVOLOG PEN) 100 UNIT/ML pen Inject 0-7 Units Subcutaneous 3 times daily (before meals) Check blood sugar 4 times daily. Inject SQ three times daily according to the following sliding scale: Glucose 141-180: 1 unit  181-220: 2 units  221-260: 3 units  261-300: 4 units  301-340: 5 units  341-400: 6 units  >400: 7 units (Patient not taking: Reported on 9/2/2021)     lisinopril (ZESTRIL) 5 MG tablet Take 5 mg by mouth daily     Melatonin 10 MG TABS tablet as directed     metoprolol succinate ER (TOPROL-XL) 50 MG 24 hr tablet Take 50 mg by mouth daily     multivitamin (CENTRUM SILVER) tablet Take 1 tablet by mouth  daily     polyethylene glycol (MIRALAX) 17 g packet Take 17 g by mouth daily     pramipexole (MIRAPEX) 0.25 MG tablet 1 tablet     senna-docusate (SENOKOT-S/PERICOLACE) 8.6-50 MG tablet Take 1 tablet by mouth 2 times daily     simvastatin (ZOCOR) 40 MG tablet 1 tablet (40 mg) by Oral or Feeding Tube route At Bedtime     traZODone (DESYREL) 50 MG tablet 1 tablet at bedtime as needed     No current facility-administered medications for this visit.     ALLERGIES:   Allergies   Allergen Reactions     Penicillins Rash     Childhood rxn.         DIET: Regular    Vitals:    10/13/21 1438   BP: 133/73   Pulse: 57   Resp: 18   Temp: 98.5  F (36.9  C)   SpO2: 99%   Weight: 95.4 kg (210 lb 6.4 oz)   Height: 1.829 m (6')     Body mass index is 28.54 kg/m .    EXAMINATION:   General: Pleasant, alert,sitting upright in recliner and in no apparent distress.  Again, he insists that he is leaving.  Head: Normocephalic and atraumatic.   Eyes: PERRLA, sclerae clear.   ENT: Moist oral mucosa.   Cardiovascular: Very distant heart sounds but a regular rate and rhythm with 2/6 ANUPAMA at the LSB.   Respiratory: Lungs clear to auscultation bilaterally.  Abdomen: Nondistended.   Musculoskeletal/Extremities: Age-related degenerative joint disease.  Bilateral 3+ pedal edema, trace pretibial.  Neurologic: Left hand grasp is stronger.  Integument: No rashes, clinically significant lesions, or skin breakdown.   Cognitive/Psychiatric: Alert and oriented with euthymic affect.    DIAGNOSTICS:   No results found for this or any previous visit (from the past 240 hour(s)).    ASSESSMENT/Plan:      ICD-10-CM    1. Weakness  R53.1    2. Parkinsonism, unspecified Parkinsonism type (H)  G20    3. History of left MCA CVA with right-sided weakness  I69.30    4. History of left-sided carotid endarterectomy  Z98.890    5. Essential hypertension  I10    6. Urinary retention  R33.9        DISCHARGE PLAN/FACE TO FACE:  I certify that this patient is under my care  and that I had a face-to-face encounter that meets the physician face-to-face encounter requirements with this patient.     Date of Face-to-Face Encounter: 10/13/2021     I certify that, based on my findings, the following services are medically necessary home health services:  Home health nurse, home health aide, home PT, and home OT    My clinical findings support the need for the above skilled services because: (Please write a brief narrative summary that describes what the RN, PT, SLP, or other services will be doing in the home. A list of diagnoses in this section does not meet the CMS requirements):  Home health nurse for home assessment and medication management/teaching; home health aide for bathing and ADL needs; home PT for strengthening, balance, endurance, and safety with mobility/ambulation; home OT for strengthening, ADL needs, adaptive equipment, and safety.    This patient is homebound because: (Please write a brief narrative summmary describing the functional limitations as to why this patient is homebound and specifically what makes this patient homebound.):   Above services necessarily need to be performed in the home to be of benefit.    The patient is, or has been, under my care and I have initiated the establishment of the plan of care. This patient will be followed by a physician who will periodically review the plan of care.  Initial follow-up should be within 7-10 days.    Approximate time spent with this patient was greater than 30 minutes with greater than 50% spent in discussions regarding services required upon discharge.      The above has been created using voice recognition software. Please be aware that this may unintentionally  produce inaccuracies and/or nonsensical sentences.      Electronically signed by: IGO Gardner CNP

## 2021-10-31 ENCOUNTER — HOSPITAL ENCOUNTER (EMERGENCY)
Facility: HOSPITAL | Age: 81
Discharge: HOME | End: 2021-10-31
Attending: EMERGENCY MEDICINE | Admitting: EMERGENCY MEDICINE
Payer: COMMERCIAL

## 2021-10-31 VITALS
OXYGEN SATURATION: 100 % | SYSTOLIC BLOOD PRESSURE: 146 MMHG | TEMPERATURE: 98.1 F | DIASTOLIC BLOOD PRESSURE: 68 MMHG | HEART RATE: 52 BPM

## 2021-10-31 DIAGNOSIS — Z87.448 HISTORY OF HEMATURIA: ICD-10-CM

## 2021-10-31 LAB
ALBUMIN UR-MCNC: NEGATIVE MG/DL
ANION GAP SERPL CALCULATED.3IONS-SCNC: 7 MMOL/L (ref 5–18)
APPEARANCE UR: CLEAR
BILIRUB UR QL STRIP: NEGATIVE
BUN SERPL-MCNC: 29 MG/DL (ref 8–28)
CALCIUM SERPL-MCNC: 9.2 MG/DL (ref 8.5–10.5)
CHLORIDE BLD-SCNC: 111 MMOL/L (ref 98–107)
CO2 SERPL-SCNC: 23 MMOL/L (ref 22–31)
COLOR UR AUTO: ABNORMAL
CREAT SERPL-MCNC: 1.17 MG/DL (ref 0.7–1.3)
ERYTHROCYTE [DISTWIDTH] IN BLOOD BY AUTOMATED COUNT: 14.3 % (ref 10–15)
GFR SERPL CREATININE-BSD FRML MDRD: 59 ML/MIN/1.73M2
GLUCOSE BLD-MCNC: 74 MG/DL (ref 70–125)
GLUCOSE UR STRIP-MCNC: NEGATIVE MG/DL
HCT VFR BLD AUTO: 34.8 % (ref 40–53)
HGB BLD-MCNC: 10.8 G/DL (ref 13.3–17.7)
HGB UR QL STRIP: ABNORMAL
INR PPP: 1 (ref 0.85–1.15)
KETONES UR STRIP-MCNC: NEGATIVE MG/DL
LEUKOCYTE ESTERASE UR QL STRIP: ABNORMAL
MCH RBC QN AUTO: 27.4 PG (ref 26.5–33)
MCHC RBC AUTO-ENTMCNC: 31 G/DL (ref 31.5–36.5)
MCV RBC AUTO: 88 FL (ref 78–100)
MUCOUS THREADS #/AREA URNS LPF: PRESENT /LPF
NITRATE UR QL: POSITIVE
PH UR STRIP: 6.5 [PH] (ref 5–7)
PLATELET # BLD AUTO: 198 10E3/UL (ref 150–450)
POTASSIUM BLD-SCNC: 4.8 MMOL/L (ref 3.5–5)
RBC # BLD AUTO: 3.94 10E6/UL (ref 4.4–5.9)
RBC URINE: 157 /HPF
SODIUM SERPL-SCNC: 141 MMOL/L (ref 136–145)
SP GR UR STRIP: 1.01 (ref 1–1.03)
UROBILINOGEN UR STRIP-MCNC: <2 MG/DL
WBC # BLD AUTO: 8.4 10E3/UL (ref 4–11)
WBC URINE: 33 /HPF

## 2021-10-31 PROCEDURE — 80048 BASIC METABOLIC PNL TOTAL CA: CPT | Performed by: EMERGENCY MEDICINE

## 2021-10-31 PROCEDURE — 36415 COLL VENOUS BLD VENIPUNCTURE: CPT | Performed by: EMERGENCY MEDICINE

## 2021-10-31 PROCEDURE — 87086 URINE CULTURE/COLONY COUNT: CPT | Performed by: EMERGENCY MEDICINE

## 2021-10-31 PROCEDURE — 99283 EMERGENCY DEPT VISIT LOW MDM: CPT

## 2021-10-31 PROCEDURE — 250N000009 HC RX 250: Performed by: EMERGENCY MEDICINE

## 2021-10-31 PROCEDURE — 85610 PROTHROMBIN TIME: CPT | Performed by: EMERGENCY MEDICINE

## 2021-10-31 PROCEDURE — 81003 URINALYSIS AUTO W/O SCOPE: CPT | Performed by: EMERGENCY MEDICINE

## 2021-10-31 PROCEDURE — 85027 COMPLETE CBC AUTOMATED: CPT | Performed by: EMERGENCY MEDICINE

## 2021-10-31 PROCEDURE — 250N000013 HC RX MED GY IP 250 OP 250 PS 637: Performed by: EMERGENCY MEDICINE

## 2021-10-31 RX ORDER — LIDOCAINE HYDROCHLORIDE 20 MG/ML
10 JELLY TOPICAL ONCE
Status: COMPLETED | OUTPATIENT
Start: 2021-10-31 | End: 2021-10-31

## 2021-10-31 RX ORDER — CARBIDOPA AND LEVODOPA 25; 100 MG/1; MG/1
1 TABLET ORAL ONCE
Status: COMPLETED | OUTPATIENT
Start: 2021-10-31 | End: 2021-10-31

## 2021-10-31 RX ORDER — GRANULES FOR ORAL 3 G/1
3 POWDER ORAL ONCE
Status: COMPLETED | OUTPATIENT
Start: 2021-10-31 | End: 2021-10-31

## 2021-10-31 RX ADMIN — FOSFOMYCIN TROMETHAMINE 3 G: 3 POWDER ORAL at 19:28

## 2021-10-31 RX ADMIN — LIDOCAINE HYDROCHLORIDE 10 ML: 20 JELLY TOPICAL at 16:58

## 2021-10-31 RX ADMIN — CARBIDOPA AND LEVODOPA 1 TABLET: 25; 100 TABLET ORAL at 16:05

## 2021-10-31 NOTE — DISCHARGE INSTRUCTIONS
Oh with Dr. Tony Cisneros within next to 2 days for results of urine culture reassessment.  If recurrent hematuria that persists or you develop any new symptoms, fever abdominal flank or back pain return to the emergency department.

## 2021-10-31 NOTE — ED NOTES
Over 1600 mL output immediately upon placement . Few small blood clots, and initially very small amount cherry colored output which cleared quickly.

## 2021-10-31 NOTE — ED PROVIDER NOTES
eMERGENCY dEPARTMENT PROGRESS NOTE        ED COURSE AND MEDICAL DECISION MAKING  Patient was signed out to me by Dr. Ovalles at change of shift (5:00 PM). Plan to follow up on UA.  5:22 PM Checked on patient. Hunter in place draining clear yellow urine. Nurse said the patient passed one small clot.  6:40 PM  Patient remains without recurrent hematuria and Hunter catheter will be discontinued.  Urinalysis nitrate and leukoesterase positive and will give one-time dose of fosfomycin pending repeat urine culture *1 prior urine culture and susceptibilities    Component 4 mo ago   Culture ENTEROCOCCUS FAECIUMAbnormal     Comment: >100,000 col/ml Enterococcus faecium   Resulting [facility] LABORATORY     Susceptibility     Enterococcus faecium     HARJEET     Ampicillin Susceptible     Levofloxacin Susceptible     Penicillin Susceptible     Tetracycline Susceptible             Susceptibility Comments    Enterococcus faecium        Patient will be discharged.  To follow-up with primary care physician in 2 days for results of urine culture.  If recurrent hematuria that persists or develops any new symptoms occluding fever abdominal pain will return the emergency department.  Brief HPI:  Patient regularly catheterizes x4/day, and has not recent problems with this. On blood thinners. Catheter change this morning was normal. Prior to ED arrival, spouse recathed the patient and blood began to come out so he presented to the ED. Restarted on plavix 2 days ago. Recent course of antibiotics did not resolve recent UTI.    LAB  Pertinent labs results reviewed   Results for orders placed or performed during the hospital encounter of 10/31/21   Result Value Ref Range    INR 1.00 0.85 - 1.15   Basic metabolic panel   Result Value Ref Range    Sodium 141 136 - 145 mmol/L    Potassium 4.8 3.5 - 5.0 mmol/L    Chloride 111 (H) 98 - 107 mmol/L    Carbon Dioxide (CO2) 23 22 - 31 mmol/L    Anion Gap 7 5 - 18 mmol/L    Urea  Nitrogen 29 (H) 8 - 28 mg/dL    Creatinine 1.17 0.70 - 1.30 mg/dL    Calcium 9.2 8.5 - 10.5 mg/dL    Glucose 74 70 - 125 mg/dL    GFR Estimate 59 (L) >60 mL/min/1.73m2   CBC (+ platelets, no diff)   Result Value Ref Range    WBC Count 8.4 4.0 - 11.0 10e3/uL    RBC Count 3.94 (L) 4.40 - 5.90 10e6/uL    Hemoglobin 10.8 (L) 13.3 - 17.7 g/dL    Hematocrit 34.8 (L) 40.0 - 53.0 %    MCV 88 78 - 100 fL    MCH 27.4 26.5 - 33.0 pg    MCHC 31.0 (L) 31.5 - 36.5 g/dL    RDW 14.3 10.0 - 15.0 %    Platelet Count 198 150 - 450 10e3/uL         RADIOLOGY    Pertinent imaging reviewed   Please see official radiology report.  No orders to display       FINAL IMPRESSION    History of hematuria       Yash Briones DO  10/31/21 2890

## 2021-10-31 NOTE — ED PROVIDER NOTES
EMERGENCY DEPARTMENT ENCOUNTER      NAME: Maikol Glaser  AGE: 80 year old male  YOB: 1940  MRN: [id number]  EVALUATION DATE & TIME: 10/31/2021  3:05 PM    PCP: Christopher Banda    ED PROVIDER: Yash Ovalles M.D.      Chief Complaint   Patient presents with     Hematuria         FINAL IMPRESSION:  Gross hematuria      ED COURSE & MEDICAL DECISION MAKING:    Pertinent Labs & Imaging studies reviewed. (See chart for details)  80 year old male presents to the Emergency Department for evaluation of hematuria.  Patient with a long history of self-catheterization secondary neurogenic bladder.  Patient also with recent postoperative stroke with prolonged hospital care and TCU placement.  Much improved.  No reports of difficult catheterizations.  Patient did recently restart Plavix.  On exam he is a elderly male in minimal distress heart and lungs unremarkable.  Abdomen slightly firm but nontender.  Dark red blood at the meatus but no active bleeding.  Patient likely with hematuria related to microtrauma with catheterization and recent initiation of Plavix.  Possibility of infection also considered.  Labs are sent for evaluation.  Hunter catheter was placed to ensure patient is drained completely and no active bleeding is ongoing.  Patient is agreeable with plan. Patient appears non toxic with stable vitals signs. Overall exam is benign.        3:16 PM I met with the patient for the initial interview and physical examination. Discussed plan for treatment and workup in the ED.    4:57 PM.  Patient metabolic panel unremarkable.  No evidence of dehydration or acidosis.  Minimal anemia with hemoglobin 10.8.  Hemoglobin consistent with recent values.  Awaiting urine analysis.    5:20 PM.  Patient discussed with Dr. Briones.  Laboratory evaluation at this point is unremarkable.  Awaiting urine analysis to determine disposition.  Expectation is for dismissal.    At the conclusion of the encounter I  discussed the results of all of the tests and the disposition. The questions were answered and return precautions provided. The patient or family acknowledged understanding and was agreeable with the care plan.       PPE: Provider wore gloves, N95 mask, eye protection, surgical cap.     MEDICATIONS GIVEN IN THE EMERGENCY:  Medications   carbidopa-levodopa (SINEMET)  MG per tablet 1 tablet (has no administration in time range)   lidocaine (XYLOCAINE) 2 % external gel 10 mL (has no administration in time range)       NEW PRESCRIPTIONS STARTED AT TODAY'S ER VISIT  New Prescriptions    No medications on file          =================================================================    HPI    Patient information was obtained from: patient     Use of Intrepreter: N/A      Maikol Glaser is a 80 year old male with a pertient medical history of post CEA stroke and chronic catheterization secondary to neurogenic bladder.  Who presents to the ED for evaluation of hematuria.    Patient regularly catheterizes x4/day, and has not recent problems with this. On blood thinners. Catheter change this morning was normal. Prior to ED arrival, spouse recathed the patient and blood began to come out so he presented to the ED. Restarted on plavix 2 days ago. Recent course of antibiotics did not resolve recent UTI. Notes baseline lower extremity swelling; typically wears compression socks to relieve swelling. Denies vomiting or diarrhea.      REVIEW OF SYSTEMS   Constitutional:  Denies fever, chills  Respiratory:  Denies productive cough or increased work of breathing  Cardiovascular:  Denies chest pain, palpitations  GI:  Denies abdominal pain, nausea, vomiting, or change in bowel habits  : Positive for hematuria.   Musculoskeletal:  Denies any new muscle/joint swelling  Skin:  Denies rash   Neurologic:  Denies focal weakness  All systems negative except as marked.     PAST MEDICAL HISTORY:  Past Medical History:   Diagnosis Date      Cerebral infarction (H)      Diabetes (H)      HLD (hyperlipidemia)      HTN (hypertension)      Hyperlipidemia      Hypertension      Intracranial hemorrhage (H) 5/26/2021     Stroke (H)      TIA (transient ischemic attack)        PAST SURGICAL HISTORY:  Past Surgical History:   Procedure Laterality Date     ENDARTERECTOMY CAROTID Left 8/11/2021    Procedure: ENDARTERECTOMY, CAROTID, with electroencephalogram and intraoperative ultrasound;  Surgeon: Jessica Lemon MD;  Location: [facility] Main OR         CURRENT MEDICATIONS:      Current Facility-Administered Medications:      carbidopa-levodopa (SINEMET)  MG per tablet 1 tablet, 1 tablet, Oral, Once, Yash Ovalles MD     lidocaine (XYLOCAINE) 2 % external gel 10 mL, 10 mL, Urethral, Once, Yash Ovalles MD    Current Outpatient Medications:      acetaminophen (TYLENOL) 500 MG tablet, Take 1,000 mg by mouth every 6 hours as needed for mild pain (Patient not taking: Reported on 9/2/2021), Disp: , Rfl:      amLODIPine (NORVASC) 5 MG tablet, Take 1 tablet (5 mg) by mouth daily, Disp: 30 tablet, Rfl: 0     aspirin (ASA) 81 MG EC tablet, Take 1 tablet (81 mg) by mouth daily, Disp: 30 tablet, Rfl: 0     carbidopa-levodopa (SINEMET)  MG tablet, Take 1 tablet by mouth 4 times daily Takes at 0800, 1200, 1600, 2000, Disp: , Rfl:      glipiZIDE (GLUCOTROL) 5 MG tablet, 1/4 tab, Disp: , Rfl:      insulin aspart (NOVOLOG PEN) 100 UNIT/ML pen, Inject 0-7 Units Subcutaneous 3 times daily (before meals) Check blood sugar 4 times daily. Inject SQ three times daily according to the following sliding scale: Glucose 141-180: 1 unit  181-220: 2 units  221-260: 3 units  261-300: 4 units  301-340: 5 units  341-400: 6 units  >400: 7 units (Patient not taking: Reported on 9/2/2021), Disp: 3 mL, Rfl: 0     lisinopril (ZESTRIL) 5 MG tablet, Take 5 mg by mouth daily, Disp: , Rfl:      Melatonin 10 MG TABS tablet, as directed, Disp: , Rfl:      metoprolol succinate ER  (TOPROL-XL) 50 MG 24 hr tablet, Take 50 mg by mouth daily, Disp: , Rfl:      multivitamin (CENTRUM SILVER) tablet, Take 1 tablet by mouth daily, Disp: , Rfl:      polyethylene glycol (MIRALAX) 17 g packet, Take 17 g by mouth daily, Disp: 30 packet, Rfl: 0     pramipexole (MIRAPEX) 0.25 MG tablet, 1 tablet, Disp: , Rfl:      senna-docusate (SENOKOT-S/PERICOLACE) 8.6-50 MG tablet, Take 1 tablet by mouth 2 times daily, Disp: 60 tablet, Rfl: 0     simvastatin (ZOCOR) 40 MG tablet, 1 tablet (40 mg) by Oral or Feeding Tube route At Bedtime, Disp: 30 tablet, Rfl: 0     traZODone (DESYREL) 50 MG tablet, 1 tablet at bedtime as needed, Disp: , Rfl:     ALLERGIES:  Allergies   Allergen Reactions     Penicillins Rash     Childhood rxn.           FAMILY HISTORY:  History reviewed. No pertinent family history.    SOCIAL HISTORY:   Social History     Socioeconomic History     Marital status:      Spouse name: None     Number of children: None     Years of education: None     Highest education level: None   Occupational History     None   Tobacco Use     Smoking status: Never Smoker     Smokeless tobacco: Never Used   Substance and Sexual Activity     Alcohol use: Not Currently     Drug use: Not Currently     Sexual activity: None   Other Topics Concern     None   Social History Narrative     None     Social Determinants of Health     Financial Resource Strain:      Difficulty of Paying Living Expenses:    Food Insecurity:      Worried About Running Out of Food in the Last Year:      Ran Out of Food in the Last Year:    Transportation Needs:      Lack of Transportation (Medical):      Lack of Transportation (Non-Medical):    Physical Activity:      Days of Exercise per Week:      Minutes of Exercise per Session:    Stress:      Feeling of Stress :    Social Connections:      Frequency of Communication with Friends and Family:      Frequency of Social Gatherings with Friends and Family:      Attends Jewish Services:       Active Member of Clubs or Organizations:      Attends Club or Organization Meetings:      Marital Status:    Intimate Partner Violence:      Fear of Current or Ex-Partner:      Emotionally Abused:      Physically Abused:      Sexually Abused:        VITALS:  Patient Vitals for the past 24 hrs:   BP Temp Temp src Pulse SpO2   10/31/21 1530 (!) 163/77 -- -- -- --   10/31/21 1529 -- -- -- 54 100 %   10/31/21 1515 (!) 152/66 -- -- 55 100 %   10/31/21 1511 -- 98.1  F (36.7  C) Oral -- --   10/31/21 1510 (!) 174/78 -- -- 60 99 %        PHYSICAL EXAM    Constitutional: Elderly male in mild distress. Awake, alert.  HENT:  Normocephalic, Atraumatic. Bilateral external ears normal. Oropharynx moist. Nose normal. Neck- Normal range of motion with no guarding, No midline cervical tenderness, Supple, No stridor.   Eyes:  PERRL, EOMI with no signs of entrapment, Conjunctiva normal, No discharge.   Respiratory:  Normal breath sounds, No respiratory distress, No wheezing.    Cardiovascular:  Normal heart rate, Normal rhythm, No appreciable rubs or gallops.   GI: Firm. Minimal suprapubic tenderness. Dark red blood over gland. No distension, No palpable masses  Musculoskeletal: Pitted lower extremity edema. Intact distal pulses. Good range of motion in all major joints. No tenderness to palpation or major deformities noted.  Integument:  Warm, Dry, No erythema, No rash.   Neurologic:  Alert & oriented, Normal motor function, Normal sensory function, No focal deficits noted.   Psychiatric:  Affect normal, Judgment normal, Mood normal.     LAB:  All pertinent labs reviewed and interpreted.  Results for orders placed or performed during the hospital encounter of 10/31/21   INR     Status: Normal   Result Value Ref Range    INR 1.00 0.85 - 1.15   Basic metabolic panel     Status: Abnormal   Result Value Ref Range    Sodium 141 136 - 145 mmol/L    Potassium 4.8 3.5 - 5.0 mmol/L    Chloride 111 (H) 98 - 107 mmol/L    Carbon Dioxide (CO2)  23 22 - 31 mmol/L    Anion Gap 7 5 - 18 mmol/L    Urea Nitrogen 29 (H) 8 - 28 mg/dL    Creatinine 1.17 0.70 - 1.30 mg/dL    Calcium 9.2 8.5 - 10.5 mg/dL    Glucose 74 70 - 125 mg/dL    GFR Estimate 59 (L) >60 mL/min/1.73m2   CBC (+ platelets, no diff)     Status: Abnormal   Result Value Ref Range    WBC Count 8.4 4.0 - 11.0 10e3/uL    RBC Count 3.94 (L) 4.40 - 5.90 10e6/uL    Hemoglobin 10.8 (L) 13.3 - 17.7 g/dL    Hematocrit 34.8 (L) 40.0 - 53.0 %    MCV 88 78 - 100 fL    MCH 27.4 26.5 - 33.0 pg    MCHC 31.0 (L) 31.5 - 36.5 g/dL    RDW 14.3 10.0 - 15.0 %    Platelet Count 198 150 - 450 10e3/uL     RADIOLOGY:  Reviewed all pertinent imaging. Please see official radiology report.  No orders to display       I, Harrison Vargas, am serving as a scribe to document services personally performed by Yash Ovalles MD, based on my observation and the provider's statements to me. I, Yash Ovalles MD attest that Harrison Vargas is acting in a scribe capacity, has observed my performance of the services and has documented them in accordance with my direction.    Yash Ovalles M.D.  Emergency Medicine  [facility] EMERGENCY DEPARTMENT     Yash Ovalles MD  10/31/21 [id number]

## 2021-10-31 NOTE — ED TRIAGE NOTES
"Presents via [facility] from home for c/o blood in the urine.  Wife went to help pt straight cath today, had bright red blood return.  FIRE saw clot coming out.  Trauma pad placed.  Has a 20g to right cephalic vein, NS infusing.  BS 74.  On thinner for stroke.      Alert, has no distress.  RA.  Skin is warm and pink.  Dribbling blood while trying to get into bed.  Staff helped clean up.    Has obvious RUE weakness.  Uses personal cane at baseline, steady on feet.  Obvious BLE swelling, denies diuretics.    Pt doesn't know why he self caths.  Stated he takes Plavix, \"it changes monthly.\"    "

## 2021-11-01 LAB — BACTERIA UR CULT: NORMAL

## 2021-12-09 ENCOUNTER — ANCILLARY PROCEDURE (OUTPATIENT)
Dept: VASCULAR ULTRASOUND | Facility: CLINIC | Age: 81
End: 2021-12-09
Attending: SURGERY
Payer: COMMERCIAL

## 2021-12-09 DIAGNOSIS — I65.22 SYMPTOMATIC STENOSIS OF LEFT CAROTID ARTERY: ICD-10-CM

## 2021-12-09 PROCEDURE — 93880 EXTRACRANIAL BILAT STUDY: CPT

## 2021-12-09 PROCEDURE — 93880 EXTRACRANIAL BILAT STUDY: CPT | Mod: 26 | Performed by: INTERNAL MEDICINE

## 2021-12-17 ENCOUNTER — OFFICE VISIT (OUTPATIENT)
Dept: VASCULAR SURGERY | Facility: CLINIC | Age: 81
End: 2021-12-17
Attending: SURGERY
Payer: COMMERCIAL

## 2021-12-17 VITALS
HEART RATE: 63 BPM | SYSTOLIC BLOOD PRESSURE: 118 MMHG | WEIGHT: 210 LBS | HEIGHT: 72 IN | DIASTOLIC BLOOD PRESSURE: 68 MMHG | OXYGEN SATURATION: 99 % | BODY MASS INDEX: 28.44 KG/M2

## 2021-12-17 DIAGNOSIS — I65.22 SYMPTOMATIC STENOSIS OF LEFT CAROTID ARTERY: Primary | ICD-10-CM

## 2021-12-17 PROCEDURE — 99213 OFFICE O/P EST LOW 20 MIN: CPT | Performed by: SURGERY

## 2021-12-17 PROCEDURE — G0463 HOSPITAL OUTPT CLINIC VISIT: HCPCS

## 2021-12-17 RX ORDER — SIMVASTATIN 20 MG
20 TABLET ORAL AT BEDTIME
COMMUNITY
Start: 2021-09-27

## 2021-12-17 RX ORDER — MAGNESIUM OXIDE 400 MG/1
400 TABLET ORAL DAILY
COMMUNITY

## 2021-12-17 RX ORDER — CLOPIDOGREL BISULFATE 75 MG/1
75 TABLET ORAL DAILY
COMMUNITY
Start: 2021-12-03

## 2021-12-17 RX ORDER — LANOLIN ALCOHOL/MO/W.PET/CERES
400 CREAM (GRAM) TOPICAL DAILY
COMMUNITY
Start: 2021-10-19

## 2021-12-17 ASSESSMENT — MIFFLIN-ST. JEOR: SCORE: 1705.55

## 2021-12-17 ASSESSMENT — PAIN SCALES - GENERAL: PAINLEVEL: NO PAIN (0)

## 2021-12-17 NOTE — PROGRESS NOTES
VASCULAR SURGERY OUTPATIENT CONSULT OR VISIT   VASCULAR SURGEON: Jessica Lemon MD    LOCATION:  [facility]    Maikol Glaser   Medical Record #:  [id number]  YOB: 1940  Age:  80 year old     Date of Service: 12/17/2021    PRIMARY CARE PROVIDER: Christopher Banda      Reason for consultation: Surveillance of carotid disease    IMPRESSION: Doing very well status post left carotid endarterectomy for multiple strokes.  On current duplex no residual stenosis on either side.  Compliant with Plavix and statin therapy.  Continue to get care also for his Parkinson's.  Persistent residual weakness of the right arm and walks with a walker.    RECOMMENDATION: Overall doing extremely well considering his fairly significant strokes.  Continue with current therapy and I will see him back in 6 months time with repeat carotid duplex.    HPI:  Maikol Glaser is a 80 year old male who was seen today for surveillance of his carotid stenosis.  Is a gentleman who we took for endarterectomy for multiple strokes.  He suffered from additional postoperative strokes seen on MRI but returned to his baseline function within a few days.  Eventually to stroke rehab and has been doing fairly well overall.  Also suffering from some degree of Parkinson's disease.  Able to use his right arm but it is certainly still weak compared to prior to his original stroke.  Also ambulating with a walker.  Compliant with statin and Plavix therapy.    PHH:    Past Medical History:   Diagnosis Date     Cerebral infarction (H)      Diabetes (H)      HLD (hyperlipidemia)      HTN (hypertension)      Hyperlipidemia      Hypertension      Intracranial hemorrhage (H) 5/26/2021     Stroke (H)      TIA (transient ischemic attack)         Past Surgical History:   Procedure Laterality Date     ENDARTERECTOMY CAROTID Left 8/11/2021    Procedure: ENDARTERECTOMY, CAROTID, with electroencephalogram and intraoperative ultrasound;   Surgeon: Jessica Lemon MD;  Location: [facility] OR       ALLERGIES:  Penicillins    MEDS:    Current Outpatient Medications:      acetaminophen (TYLENOL) 500 MG tablet, Take 1,000 mg by mouth every 6 hours as needed for mild pain , Disp: , Rfl:      carbidopa-levodopa (SINEMET)  MG tablet, Take 1 tablet by mouth 4 times daily Takes at 0800, 1200, 1600, 2000, Disp: , Rfl:      clopidogrel (PLAVIX) 75 MG tablet, , Disp: , Rfl:      folic acid (FOLVITE) 400 MCG tablet, , Disp: , Rfl:      glipiZIDE (GLUCOTROL) 5 MG tablet, 1/4 tab, Disp: , Rfl:      lisinopril (ZESTRIL) 5 MG tablet, Take 5 mg by mouth daily, Disp: , Rfl:      magnesium oxide (MAG-OX) 400 MG tablet, 1 tab(s), Disp: , Rfl:      metoprolol succinate ER (TOPROL-XL) 50 MG 24 hr tablet, Take 50 mg by mouth daily, Disp: , Rfl:      multivitamin (CENTRUM SILVER) tablet, Take 1 tablet by mouth daily, Disp: , Rfl:      pramipexole (MIRAPEX) 0.25 MG tablet, 1 tablet, Disp: , Rfl:      simvastatin (ZOCOR) 20 MG tablet, 1 tab(s), Disp: , Rfl:      traZODone (DESYREL) 50 MG tablet, 1 tablet at bedtime as needed, Disp: , Rfl:      amLODIPine (NORVASC) 5 MG tablet, Take 1 tablet (5 mg) by mouth daily, Disp: 30 tablet, Rfl: 0     metFORMIN (GLUCOPHAGE) 500 MG tablet, TAKE 2 TABLETS BY MOUTH TWICE A DAY (Patient not taking: Reported on 12/17/2021), Disp: , Rfl:     SOCIAL HABITS:    History   Smoking Status     Never Smoker   Smokeless Tobacco     Never Used     Social History    Substance and Sexual Activity      Alcohol use: Not Currently      History   Drug Use Unknown       FAMILY HISTORY:  No family history on file.    REVIEW OF SYSTEMS:    A 12 point ROS was reviewed and except for what is listed in the HPI above, all others are negative    PE:  /68   Pulse 63   Ht 6' (1.829 m)   Wt 210 lb (95.3 kg)   SpO2 99%   BMI 28.48 kg/m    Wt Readings from Last 1 Encounters:   12/17/21 210 lb (95.3 kg)     Body mass index is 28.48  kg/m .    EXAM:  GENERAL: This is a well-developed 80 year old male who appears his stated age  EYES: Grossly normal.  MOUTH: Buccal mucosa normal   MUSCULOSKELETAL: Grossly normal and both lower extremities are intact.  HEME/LYMPH: No lymphedema  NEUROLOGIC: Focally intact, Alert and oriented x 3.  Persistent weakness of the right arm but able to squeeze.  Also able to walk although his right leg is weak and status post stroke.  Slowed speech but able to communicate without difficulty.  PSYCH: appropriate affect  INTEGUMENT: No open lesions or ulcers        DIAGNOSTIC STUDIES:     Images:  US Carotid Bilateral    Result Date: 12/9/2021  BILATERAL CAROTID ULTRASOUND (Date: 12/09/21) Indication: Surveillance of left CEA Previous: 7/18/2021; 09/02/21 Symptoms: Hypertension and Stroke/TIA Left Endarterectomy: 8/14/2021 TECHNIQUE: Duplex exam performed utilizing 2D gray-scale imaging, Doppler interrogation with color-flow and spectral waveform analysis. Right Velocity  Chart (cm/sec) Location Right CCA-PS 79 CCA- ED 14 ICA-PS 78 ICA-ED 9 ECA-PS 82 ECA-ED 7 Vertebral- Antegrade 86 Subclavian A- Biphasic 126 Ratio ICA/CCA-PS 0.98 Ratio ICA/CCA-ED 0.64 Left Velocity  Chart (cm/sec) Location Left CCA-PS 97 CCA- ED 15 ICA-PS 91 ICA-ED 9 ECA-PS 92 ECA-ED 0 Vertebral- Antegrade 83 Subclavian A- Biphasic 211 Ratio ICA/CCA-PS 0.94 Ratio ICA/CCA-ED 0.60 Comment: Typical spontaneous phasic flow seen in both IJV's FINDINGS: RIGHT: There is uniformly echogenic  atheromatous plaque.  LEFT: There is uniformly echogenic atheromatous plaque.  RIGHT: Vertebral artery and subclavian artery waveforms are biphasic and antegrade. LEFT: Vertebral artery and subclavian artery waveforms are biphasic and antegrade. Impression:  1. Less than 50% diameter stenosis of the right ICA relative to the distal ICA diameter. 2. Less than 50% diameter stenosis of the left ICA relative to the distal ICA diameter. Evaluation based on velocities and NASCET  criteria Category PSV (cm/s)  Plaque Imaging EDV (cm/s)  ICA/CCA Ratio Normal,  <125  None <40 <2 Mild <50% <125 < than 50% DR stenosis <40 <2 Moderate Disease 50-69% 125-230 > than 50% DR stenosis  2.0-4.0 Severe->70% but less than near occlusion >230 > than 50% DR stenosis >100 >4.0 Near Occlusion May be low or undetectable Visible, extensive Variable Variable Occlusion No Flow Visible with no detectable lumen N/A N/A Plaquetype Description Type 1 Uniformly echolucent Type 2 Predominantly echolucent >50% Type 3 Predominantly echogenic >50% Type 4 Uniformly echogenic Type 5 Unclassified due to poor visualization Ulcer Visible Ulcerative Plaque       I personally reviewed the images and my interpretation is that his carotid duplex shows no evidence of residual stenosis on the operated side..    LABS:      Sodium   Date Value Ref Range Status   10/31/2021 141 136 - 145 mmol/L Final   09/27/2021 140 136 - 145 mmol/L Final   08/30/2021 140 136 - 145 mmol/L Final   05/26/2021 143 133 - 144 mmol/L Final     Urea Nitrogen   Date Value Ref Range Status   10/31/2021 29 (H) 8 - 28 mg/dL Final   09/27/2021 30 (H) 8 - 28 mg/dL Final   08/30/2021 21 8 - 28 mg/dL Final   05/26/2021 22 7 - 30 mg/dL Final     Hemoglobin   Date Value Ref Range Status   10/31/2021 10.8 (L) 13.3 - 17.7 g/dL Final   08/15/2021 11.5 (L) 13.3 - 17.7 g/dL Final   08/12/2021 10.1 (L) 13.3 - 17.7 g/dL Final   05/26/2021 13.0 (L) 13.3 - 17.7 g/dL Final     Platelet Count   Date Value Ref Range Status   10/31/2021 198 150 - 450 10e3/uL Final   08/15/2021 230 150 - 450 10e3/uL Final   08/12/2021 209 150 - 450 10e3/uL Final   05/26/2021 197 150 - 450 10e9/L Final     INR   Date Value Ref Range Status   10/31/2021 1.00 0.85 - 1.15 Final   07/18/2021 1.01 0.85 - 1.15 Final     Comment:     Effective 7/11/2021, the reference range for this assay has changed.   05/26/2021 0.99 0.86 - 1.14 Final   05/25/2021 1.02 0.90 - 1.10 Final         Jessica Lemon MD,  MD  VASCULAR SURGERY

## 2021-12-17 NOTE — PATIENT INSTRUCTIONS
Carotid Artery Problems: Stroke    The carotid arteries are large blood vessels that carry blood to the brain. When these arteries are healthy, the brain gets all the oxygen and nutrients it needs to function well. But if the carotid arteries are damaged, this can greatly increase your risk of a stroke. A stroke is a sudden loss of brain function caused by a lack of blood flow and oxygen.    Blood clot blocking carotid artery and emboli breaking off clot. Inset shows damage to brain.      Small pieces of a blood clot called emboli break off and can enter the bloodstream and travel to the brain. Brain tissue is damaged when emboli block arteries in the brain.    How artery damage can lead to a stroke  A healthy carotid artery is smooth on the inside, like a tube. But health problems such as high blood pressure, diabetes, and smoking can damage the inside of the artery wall and make it rough. This lets fatty deposits called plaque build up on the artery wall. Blood clots called emboli may also form on the plaque. If pieces of plaque or emboli break off, they can flow in the blood and get stuck in a small blood vessel in the brain. This blocks the blood flow to a part of the brain, and causes a stroke.      Symptoms of a stroke  Below are common symptoms of a stroke.  Call 911 right away if you have any of these symptoms. Fast medical treatment for a stroke is vital. The longer you wait to get help, the more damage a stroke can do.    Sudden numbness or weakness of the face, arm, or leg, especially on one side of the body  Sudden confusion or trouble speaking or understanding other people  Sudden trouble seeing in 1 or both eyes  Sudden trouble walking, dizziness, or loss of balance or coordination  Sudden, severe headache with no known cause  Sudden new seizures    Transient ischemic attack (TIA)  A transient ischemic attack (TIA) is a mini-stroke. It s a warning sign that a larger stroke may happen in the near  future. A TIA is when an artery to the brain is blocked for a brief time. This will cause symptoms just like those that happen with a stroke. But with a TIA, they last a short period of time, from a few seconds to a few hours. Never ignore any TIA or stroke symptoms.  Call 911 right away .      F.A.S.T.  F.A.S.T. is an easy way to remember the signs of a stroke or TIA. When you see these signs, call 911 fast.    F.A.S.T. stands for:    F is for face drooping. One side of the face is drooping or numb. When the person smiles, the smile is uneven.  A is for arm weakness. One arm is weak or numb. When the person lifts both arms at the same time, one arm may drift downward.  S is for speech difficulty. You may notice slurred speech or trouble speaking. The person can't repeat a simple sentence correctly when asked.  T is for time to call 911. If someone shows any of these symptoms, even if the symptoms go away, call 911 right away. Make note of the time the symptoms first appeared.          [id number] [employer]. [address] [place] [address], [place] [address]. All rights reserved. This information is not intended as a substitute for professional medical care. Always follow your healthcare professional's instructions.

## 2021-12-17 NOTE — PROGRESS NOTES
[facility]        Patient is here for a 3 month f/u. L CEA 8/2021.  statin. AAA screen?      /68   Pulse 63   Ht 6' (1.829 m)   Wt 210 lb (95.3 kg)   SpO2 99%   BMI 28.48 kg/m      The provider has been notified that the patient has no concerns.     Questions patient would like addressed today are: N/A.    Refills are needed: No    Has homecare services and agency name:  Matilde Dozier MA

## 2022-01-21 ENCOUNTER — LAB REQUISITION (OUTPATIENT)
Dept: LAB | Facility: CLINIC | Age: 82
End: 2022-01-21

## 2022-01-21 DIAGNOSIS — E78.5 HYPERLIPIDEMIA, UNSPECIFIED: ICD-10-CM

## 2022-01-21 DIAGNOSIS — I10 ESSENTIAL (PRIMARY) HYPERTENSION: ICD-10-CM

## 2022-01-21 DIAGNOSIS — E11.9 TYPE 2 DIABETES MELLITUS WITHOUT COMPLICATIONS (H): ICD-10-CM

## 2022-01-21 LAB
ANION GAP SERPL CALCULATED.3IONS-SCNC: 14 MMOL/L (ref 5–18)
BUN SERPL-MCNC: 35 MG/DL (ref 8–28)
CALCIUM SERPL-MCNC: 9.2 MG/DL (ref 8.5–10.5)
CHLORIDE BLD-SCNC: 107 MMOL/L (ref 98–107)
CHOLEST SERPL-MCNC: 133 MG/DL
CO2 SERPL-SCNC: 18 MMOL/L (ref 22–31)
CREAT SERPL-MCNC: 1.44 MG/DL (ref 0.7–1.3)
GFR SERPL CREATININE-BSD FRML MDRD: 49 ML/MIN/1.73M2
GLUCOSE BLD-MCNC: 65 MG/DL (ref 70–125)
HDLC SERPL-MCNC: 42 MG/DL
LDLC SERPL CALC-MCNC: 70 MG/DL
MAGNESIUM SERPL-MCNC: 2 MG/DL (ref 1.8–2.6)
POTASSIUM BLD-SCNC: 4.2 MMOL/L (ref 3.5–5)
SODIUM SERPL-SCNC: 139 MMOL/L (ref 136–145)
TRIGL SERPL-MCNC: 106 MG/DL

## 2022-01-21 PROCEDURE — 80048 BASIC METABOLIC PNL TOTAL CA: CPT | Performed by: FAMILY MEDICINE

## 2022-01-21 PROCEDURE — 83735 ASSAY OF MAGNESIUM: CPT | Performed by: FAMILY MEDICINE

## 2022-01-21 PROCEDURE — 80061 LIPID PANEL: CPT | Performed by: FAMILY MEDICINE

## 2022-08-19 ENCOUNTER — LAB REQUISITION (OUTPATIENT)
Dept: LAB | Facility: CLINIC | Age: 82
End: 2022-08-19

## 2022-08-19 DIAGNOSIS — E11.21 TYPE 2 DIABETES MELLITUS WITH DIABETIC NEPHROPATHY (H): ICD-10-CM

## 2022-08-19 LAB
ANION GAP SERPL CALCULATED.3IONS-SCNC: 12 MMOL/L (ref 7–15)
BUN SERPL-MCNC: 24.2 MG/DL (ref 8–23)
CALCIUM SERPL-MCNC: 9.2 MG/DL (ref 8.8–10.2)
CHLORIDE SERPL-SCNC: 107 MMOL/L (ref 98–107)
CREAT SERPL-MCNC: 1.19 MG/DL (ref 0.67–1.17)
DEPRECATED HCO3 PLAS-SCNC: 21 MMOL/L (ref 22–29)
GFR SERPL CREATININE-BSD FRML MDRD: 62 ML/MIN/1.73M2
GLUCOSE SERPL-MCNC: 99 MG/DL (ref 70–99)
POTASSIUM SERPL-SCNC: 4.7 MMOL/L (ref 3.4–5.3)
SODIUM SERPL-SCNC: 140 MMOL/L (ref 136–145)

## 2022-08-19 PROCEDURE — 80048 BASIC METABOLIC PNL TOTAL CA: CPT | Performed by: FAMILY MEDICINE

## 2022-08-25 ENCOUNTER — LAB REQUISITION (OUTPATIENT)
Dept: LAB | Facility: CLINIC | Age: 82
End: 2022-08-25

## 2022-08-25 ENCOUNTER — OFFICE VISIT (OUTPATIENT)
Dept: VASCULAR SURGERY | Facility: CLINIC | Age: 82
End: 2022-08-25
Attending: SURGERY
Payer: COMMERCIAL

## 2022-08-25 ENCOUNTER — ANCILLARY PROCEDURE (OUTPATIENT)
Dept: VASCULAR ULTRASOUND | Facility: CLINIC | Age: 82
End: 2022-08-25
Attending: SURGERY
Payer: COMMERCIAL

## 2022-08-25 VITALS — SYSTOLIC BLOOD PRESSURE: 108 MMHG | OXYGEN SATURATION: 97 % | HEART RATE: 55 BPM | DIASTOLIC BLOOD PRESSURE: 70 MMHG

## 2022-08-25 DIAGNOSIS — E11.21 TYPE 2 DIABETES MELLITUS WITH DIABETIC NEPHROPATHY (H): ICD-10-CM

## 2022-08-25 DIAGNOSIS — I65.22 SYMPTOMATIC STENOSIS OF LEFT CAROTID ARTERY: Primary | ICD-10-CM

## 2022-08-25 DIAGNOSIS — I65.22 SYMPTOMATIC STENOSIS OF LEFT CAROTID ARTERY: ICD-10-CM

## 2022-08-25 LAB
CREAT UR-MCNC: 53 MG/DL
MICROALBUMIN UR-MCNC: 25.8 MG/L
MICROALBUMIN/CREAT UR: 48.68 MG/G CR (ref 0–17)

## 2022-08-25 PROCEDURE — 99214 OFFICE O/P EST MOD 30 MIN: CPT | Performed by: PHYSICIAN ASSISTANT

## 2022-08-25 PROCEDURE — 93880 EXTRACRANIAL BILAT STUDY: CPT

## 2022-08-25 PROCEDURE — 82043 UR ALBUMIN QUANTITATIVE: CPT | Performed by: FAMILY MEDICINE

## 2022-08-25 PROCEDURE — 93880 EXTRACRANIAL BILAT STUDY: CPT | Mod: 26 | Performed by: SURGERY

## 2022-08-25 PROCEDURE — G0463 HOSPITAL OUTPT CLINIC VISIT: HCPCS

## 2022-08-25 ASSESSMENT — PAIN SCALES - GENERAL: PAINLEVEL: NO PAIN (0)

## 2022-08-25 NOTE — NURSING NOTE
[facility]        Patient is here for a 6 month follow up  to discuss Carotid stenosis. Patient has no symptoms.    Pt is currently taking Aspirin, Statin and Plavix.    /70   Pulse 55   SpO2 97%     The provider has been notified that the patient has no concerns.     Questions patient would like addressed today are: N/A.    Refills are needed: No    Has homecare services and agency name:  Matilde Caicedo LPN

## 2022-08-25 NOTE — PROGRESS NOTES
VASCULAR SURGERY PROGRESS NOTE    LOCATION:  [facility]     Maikol Glaser  Medical Record #: [id number]  YOB: 1940  Age: 81 year old     Date of Service: 8/25/2022    PRIMARY CARE PROVIDER: Christopher Bnada    Reason for visit: Surveillance of carotid artery stenosis     IMPRESSION:  80 YO male with carotid artery stenosis status post left carotid endarterectomy in August of 2021 for symptomatic left carotid disease. Patient had multiple small strokes on MRI in the postop with associated with decreased right arm and leg function. Continues to have some right arm weakness. Carotid duplex from today is stable with less than 50% stenosis bilaterally. Compliant with medications     RECOMMENDATION/RISKS: Continue best medical management with aspirin, plavix, and statin therapy. Follow up in 6 months with repeat carotid duplex.     HPI:  Maikol Glaser is a 81 year old male with past medical history significant for hypertension, hyperlipidemia, type 2 diabetes mellitus, Parkinson's, and carotid artery stenosis with a history of left CVA. He is status post left carotid endarterectomy in 2021 with multiple small strokes postop. He was last evaluated in the vascular clinic 6 months ago and was noted to be doing well following stroke rehab with persistent residual weakness of the right arm.    Today, Mr. Glaser is accompanied by his son. He is able to use his right arm but it is certainly still weak compared to prior to his stroke. He is also using a wheelchair to get around now due to his Parkinson's. Patient denies any new visual changes, facial asymmetry, or other signs/symptoms of TIA/stroke. He is compliant with his medications. It appears that at one point his plavix was stopped but then resumed again. No other concerns.     REVIEW OF SYSTEMS:    A 12 point ROS was reviewed and is negative except for what is listed above in HPI.    PHH:    Past Medical History:   Diagnosis Date      Cerebral infarction (H)      Diabetes (H)      HLD (hyperlipidemia)      HTN (hypertension)      Hyperlipidemia      Hypertension      Intracranial hemorrhage (H) 5/26/2021     Stroke (H)      TIA (transient ischemic attack)      Past Surgical History:   Procedure Laterality Date     ENDARTERECTOMY CAROTID Left 8/11/2021    Procedure: ENDARTERECTOMY, CAROTID, with electroencephalogram and intraoperative ultrasound;  Surgeon: Jessica Lemon MD;  Location: [facility] Main OR     ALLERGIES:  Penicillins    MEDS:    Current Outpatient Medications:      acetaminophen (TYLENOL) 500 MG tablet, Take 1,000 mg by mouth every 6 hours as needed for mild pain , Disp: , Rfl:      aspirin (ASA) 81 MG EC tablet, 81 mg, Disp: , Rfl:      carbidopa-levodopa (SINEMET)  MG tablet, Take 1 tablet by mouth 4 times daily Takes at 0800, 1200, 1600, 2000, Disp: , Rfl:      clopidogrel (PLAVIX) 75 MG tablet, , Disp: , Rfl:      folic acid (FOLVITE) 400 MCG tablet, , Disp: , Rfl:      glipiZIDE (GLUCOTROL) 5 MG tablet, 1/4 tab, Disp: , Rfl:      lisinopril (ZESTRIL) 5 MG tablet, Take 5 mg by mouth daily, Disp: , Rfl:      magnesium oxide (MAG-OX) 400 MG tablet, 1 tab(s), Disp: , Rfl:      metFORMIN (GLUCOPHAGE) 500 MG tablet, TAKE 2 TABLETS BY MOUTH TWICE A DAY, Disp: , Rfl:      metoprolol succinate ER (TOPROL-XL) 50 MG 24 hr tablet, Take 50 mg by mouth daily, Disp: , Rfl:      multivitamin (CENTRUM SILVER) tablet, Take 1 tablet by mouth daily, Disp: , Rfl:      pramipexole (MIRAPEX) 0.25 MG tablet, 1 tablet, Disp: , Rfl:      simvastatin (ZOCOR) 20 MG tablet, 1 tab(s), Disp: , Rfl:      traZODone (DESYREL) 50 MG tablet, 1 tablet at bedtime as needed, Disp: , Rfl:      amLODIPine (NORVASC) 5 MG tablet, Take 1 tablet (5 mg) by mouth daily, Disp: 30 tablet, Rfl: 0    SOCIAL HABITS:    History   Smoking Status     Never Smoker   Smokeless Tobacco     Never Used     Social History    Substance and Sexual Activity      Alcohol use: Not  Currently      History   Drug Use Unknown     FAMILY HISTORY:    Family History   Problem Relation Age of Onset     Bone Cancer Mother      PE:  /70   Pulse 55   SpO2 97%   Wt Readings from Last 1 Encounters:   12/17/21 95.3 kg (210 lb)     There is no height or weight on file to calculate BMI.    EXAM:  GENERAL: well-developed 81 year old male who appears his stated age  CARDIAC: normal   CHEST/LUNG: normal respiratory effort   MUSCULOSKELETAL: grossly normal and both lower extremities are intact, no lower extremity edema  NEUROLOGIC: focally intact, alert and oriented x 3  PSYCH: appropriate affect    DIAGNOSTIC STUDIES:     Images:    US Carotid Bilateral  I personally reviewed the images and my interpretation is less than 50% stenosis of bilateral ICAs based on NASCET criteria, stable from previous imaging.     LABS:      Sodium   Date Value Ref Range Status   08/19/2022 140 136 - 145 mmol/L Final   01/21/2022 139 136 - 145 mmol/L Final   10/31/2021 141 136 - 145 mmol/L Final   05/26/2021 143 133 - 144 mmol/L Final     Urea Nitrogen   Date Value Ref Range Status   08/19/2022 24.2 (H) 8.0 - 23.0 mg/dL Final   01/21/2022 35 (H) 8 - 28 mg/dL Final   10/31/2021 29 (H) 8 - 28 mg/dL Final   09/27/2021 30 (H) 8 - 28 mg/dL Final   05/26/2021 22 7 - 30 mg/dL Final     Hemoglobin   Date Value Ref Range Status   10/31/2021 10.8 (L) 13.3 - 17.7 g/dL Final   08/15/2021 11.5 (L) 13.3 - 17.7 g/dL Final   08/12/2021 10.1 (L) 13.3 - 17.7 g/dL Final   05/26/2021 13.0 (L) 13.3 - 17.7 g/dL Final     Platelet Count   Date Value Ref Range Status   10/31/2021 198 150 - 450 10e3/uL Final   08/15/2021 230 150 - 450 10e3/uL Final   08/12/2021 209 150 - 450 10e3/uL Final   05/26/2021 197 150 - 450 10e9/L Final     INR   Date Value Ref Range Status   10/31/2021 1.00 0.85 - 1.15 Final   07/18/2021 1.01 0.85 - 1.15 Final     Comment:     Effective 7/11/2021, the reference range for this assay has changed.   05/26/2021 0.99 0.86 -  1.14 Final   05/25/2021 1.02 0.90 - 1.10 Final     30 minutes spent on the day of encounter doing chart review, history and exam, documentation, and further activities as noted.     CHRISTY FuentesC  VASCULAR SURGERY

## 2023-02-11 NOTE — PATIENT INSTRUCTIONS
Kelsi Lassiter,    Thank you for entrusting your care with us today. After your visit today with RADHA Agarwal this is the plan that was discussed at your appointment.    Follow up in 1 year for repeat ultrasound and clinic visit. We will call you in 3-4 months to get this scheduled.    I am including additional information on these things and our contact information if you have any questions or concerns.   Please do not hesitate to reach out to us if you felt we did not answer your questions or you are unsure of the treatment plan after your visit today. Our number is [phone].Thank you for trusting us with your care.     Carotid Duplex    Steps for the test are:  You will be asked to lie on a stretcher. It will be okay for you to wear your street clothes. In some situations, you may be asked to change into a gown.    Ultrasound gel, usually warmed for your comfort, will be placed on either side of your neck.    Through the gel, the technician will apply to your neck a small hand-held device that emits sound waves.   When the test is completed, the technician will remove excess gel from your neck. The gel is water-soluble and will not stain your skin or clothes.    How to Prepare:  Eat and take medications as usual.   Wear minimal or no jewelry to ease application and removal of gel.    Risks  There are typically no side effects or complications associated with a carotid duplex ultrasound examination.    What Can I Expect After the Test?  The technician will send the ultrasound images to your vascular surgeon for evaluation. Typically, a report is available in 2-3 days. If anything critical is found, it is standard practice to notify the vascular surgeon immediately.  Reference: https://vascular.org/patient-resources/vascular-tests         Again thank you for your time.

## 2023-02-11 NOTE — PROGRESS NOTES
[facility]        Patient is here for a 6 month follow up for Carotid surveillance. Patient has no symptoms. Left endarterectomy August 2021 (Dr. Lemon). Hx of small strokes affecting right leg and arm.     Pt is currently taking Statin and Plavix. Stopped ASA due to nose bleeds. No nose bleeds since stopping.    There were no vitals taken for this visit.    The provider has been notified that the patient has no concerns.     Questions patient would like addressed today are: N/A.    Refills are needed: No    Has homecare services and agency name:  No. Lives with wife and son.       Jess Araiza RN

## 2023-02-13 ENCOUNTER — OFFICE VISIT (OUTPATIENT)
Dept: VASCULAR SURGERY | Facility: CLINIC | Age: 83
End: 2023-02-13
Attending: PHYSICIAN ASSISTANT
Payer: COMMERCIAL

## 2023-02-13 ENCOUNTER — ANCILLARY PROCEDURE (OUTPATIENT)
Dept: VASCULAR ULTRASOUND | Facility: CLINIC | Age: 83
End: 2023-02-13
Attending: PHYSICIAN ASSISTANT
Payer: COMMERCIAL

## 2023-02-13 VITALS
RESPIRATION RATE: 18 BRPM | TEMPERATURE: 97.8 F | DIASTOLIC BLOOD PRESSURE: 78 MMHG | HEART RATE: 66 BPM | SYSTOLIC BLOOD PRESSURE: 128 MMHG

## 2023-02-13 DIAGNOSIS — I65.22 SYMPTOMATIC STENOSIS OF LEFT CAROTID ARTERY: Primary | ICD-10-CM

## 2023-02-13 DIAGNOSIS — I65.22 SYMPTOMATIC STENOSIS OF LEFT CAROTID ARTERY: ICD-10-CM

## 2023-02-13 PROCEDURE — G0463 HOSPITAL OUTPT CLINIC VISIT: HCPCS | Mod: 25 | Performed by: PHYSICIAN ASSISTANT

## 2023-02-13 PROCEDURE — 93880 EXTRACRANIAL BILAT STUDY: CPT | Mod: 26 | Performed by: SURGERY

## 2023-02-13 PROCEDURE — 93880 EXTRACRANIAL BILAT STUDY: CPT

## 2023-02-13 PROCEDURE — 99214 OFFICE O/P EST MOD 30 MIN: CPT | Performed by: PHYSICIAN ASSISTANT

## 2023-02-13 ASSESSMENT — PAIN SCALES - GENERAL: PAINLEVEL: NO PAIN (0)

## 2023-02-13 NOTE — PROGRESS NOTES
VASCULAR SURGERY PROGRESS NOTE    LOCATION:  [facility]     Maikol Glaser  Medical Record #: [id number]  YOB: 1940  Age: 82 year old     Date of Service: 2/13/2023    PRIMARY CARE PROVIDER: Christopher Banda    Reason for visit: Surveillance carotid disease     IMPRESSION: 81 YO male with carotid artery stenosis status post left carotid endarterectomy in August of 2021 for symptomatic disease. Patient had multiple small strokes on MRI in the postop with associated with decreased right arm and leg function. Continues to have some right arm weakness, otherwise denies any neurologic symptoms. Carotid duplex from today shows less than 50% stenosis on the right and 50-69% stenosis on the left.      RECOMMENDATION/RISKS: Continue best medical management with aspirin, plavix, and statin therapy. Follow up in 1 year with repeat carotid duplex.     HPI:  Maikol Glaser is a 82 year old male with past medical history significant for hypertension, hyperlipidemia, type 2 diabetes mellitus, Parkinson's, and carotid artery stenosis with a history of left CVA. He is status post left carotid endarterectomy in 2021 with multiple small strokes postop. He was last evaluated in the vascular clinic 6 months ago and was noted to be doing well following stroke rehab with persistent residual weakness of the right arm.     Today, Mr. Glaser is accompanied by his wife. He is doing well with no new concerns today. He continues to have some right arm weakness but feels like it is slightly improving with time. He mostly uses a wheelchair to get around. Patient denies any new visual changes, facial asymmetry, or other signs/symptoms of TIA/stroke. He is compliant with his medications.    Ultrasound results were discussed and all questions answered.     REVIEW OF SYSTEMS:    A 12 point ROS was reviewed and is negative except for what is listed above in HPI.    PHH:    Past Medical History:   Diagnosis  Date     Cerebral infarction (H)      Diabetes (H)      HLD (hyperlipidemia)      HTN (hypertension)      Hyperlipidemia      Hypertension      Intracranial hemorrhage (H) 5/26/2021     Stroke (H)      TIA (transient ischemic attack)       Past Surgical History:   Procedure Laterality Date     ENDARTERECTOMY CAROTID Left 8/11/2021    Procedure: ENDARTERECTOMY, CAROTID, with electroencephalogram and intraoperative ultrasound;  Surgeon: Jessica Lemon MD;  Location: [facility] Main OR     ALLERGIES:  Penicillins    MEDS:    Current Outpatient Medications:      acetaminophen (TYLENOL) 500 MG tablet, Take 1,000 mg by mouth every 6 hours as needed for mild pain , Disp: , Rfl:      amLODIPine (NORVASC) 5 MG tablet, Take 1 tablet (5 mg) by mouth daily, Disp: 30 tablet, Rfl: 0     aspirin (ASA) 81 MG EC tablet, 81 mg (Patient not taking: Reported on 2/13/2023), Disp: , Rfl:      carbidopa-levodopa (SINEMET)  MG tablet, Take 1 tablet by mouth 4 times daily Takes at 0800, 1200, 1600, 2000, Disp: , Rfl:      clopidogrel (PLAVIX) 75 MG tablet, , Disp: , Rfl:      folic acid (FOLVITE) 400 MCG tablet, , Disp: , Rfl:      glipiZIDE (GLUCOTROL) 5 MG tablet, 1/4 tab, Disp: , Rfl:      lisinopril (ZESTRIL) 5 MG tablet, Take 5 mg by mouth daily, Disp: , Rfl:      magnesium oxide (MAG-OX) 400 MG tablet, 1 tab(s), Disp: , Rfl:      metFORMIN (GLUCOPHAGE) 500 MG tablet, TAKE 2 TABLETS BY MOUTH TWICE A DAY, Disp: , Rfl:      metoprolol succinate ER (TOPROL-XL) 50 MG 24 hr tablet, Take 50 mg by mouth daily, Disp: , Rfl:      multivitamin (CENTRUM SILVER) tablet, Take 1 tablet by mouth daily, Disp: , Rfl:      pramipexole (MIRAPEX) 0.25 MG tablet, 1 tablet, Disp: , Rfl:      simvastatin (ZOCOR) 20 MG tablet, 1 tab(s), Disp: , Rfl:      traZODone (DESYREL) 50 MG tablet, 1 tablet at bedtime as needed, Disp: , Rfl:     SOCIAL HABITS:    History   Smoking Status     Never   Smokeless Tobacco     Never     Social History    Substance and  Sexual Activity      Alcohol use: Not Currently      History   Drug Use Unknown     FAMILY HISTORY:    Family History   Problem Relation Age of Onset     Bone Cancer Mother      PE:  /78   Pulse 66   Temp 97.8  F (36.6  C) (Temporal)   Resp 18   Wt Readings from Last 1 Encounters:   12/17/21 95.3 kg (210 lb)     There is no height or weight on file to calculate BMI.    EXAM:  GENERAL: well-developed 82 year old male who appears his stated age  CARDIAC: normal   CHEST/LUNG: normal respiratory effort   MUSCULOSKELETAL: grossly normal and both lower extremities are intact, no lower extremity edema  NEUROLOGIC: focally intact, alert and oriented x 3  PSYCH: appropriate affect    DIAGNOSTIC STUDIES:     Images:    US Carotid Bilateral   I personally reviewed the images and my interpretation is less than 50% stenosis right ICA and 50-69% stenosis left ICA based on velocities and NASCET criteria.     LABS:      Sodium   Date Value Ref Range Status   08/19/2022 140 136 - 145 mmol/L Final   01/21/2022 139 136 - 145 mmol/L Final   10/31/2021 141 136 - 145 mmol/L Final   05/26/2021 143 133 - 144 mmol/L Final     Urea Nitrogen   Date Value Ref Range Status   08/19/2022 24.2 (H) 8.0 - 23.0 mg/dL Final   01/21/2022 35 (H) 8 - 28 mg/dL Final   10/31/2021 29 (H) 8 - 28 mg/dL Final   09/27/2021 30 (H) 8 - 28 mg/dL Final   05/26/2021 22 7 - 30 mg/dL Final     Hemoglobin   Date Value Ref Range Status   10/31/2021 10.8 (L) 13.3 - 17.7 g/dL Final   08/15/2021 11.5 (L) 13.3 - 17.7 g/dL Final   08/12/2021 10.1 (L) 13.3 - 17.7 g/dL Final   05/26/2021 13.0 (L) 13.3 - 17.7 g/dL Final     Platelet Count   Date Value Ref Range Status   10/31/2021 198 150 - 450 10e3/uL Final   08/15/2021 230 150 - 450 10e3/uL Final   08/12/2021 209 150 - 450 10e3/uL Final   05/26/2021 197 150 - 450 10e9/L Final     INR   Date Value Ref Range Status   10/31/2021 1.00 0.85 - 1.15 Final   07/18/2021 1.01 0.85 - 1.15 Final     Comment:     Effective  7/11/2021, the reference range for this assay has changed.   05/26/2021 0.99 0.86 - 1.14 Final   05/25/2021 1.02 0.90 - 1.10 Final     30 minutes spent on the day of encounter doing chart review, history and exam, documentation, and further activities as noted.     RADHA Fuentes-C  VASCULAR SURGERY

## 2023-03-17 ENCOUNTER — LAB REQUISITION (OUTPATIENT)
Dept: LAB | Facility: CLINIC | Age: 83
End: 2023-03-17

## 2023-03-17 DIAGNOSIS — E78.5 HYPERLIPIDEMIA, UNSPECIFIED: ICD-10-CM

## 2023-03-17 DIAGNOSIS — I10 ESSENTIAL (PRIMARY) HYPERTENSION: ICD-10-CM

## 2023-03-17 LAB
ANION GAP SERPL CALCULATED.3IONS-SCNC: 11 MMOL/L (ref 7–15)
BUN SERPL-MCNC: 33.9 MG/DL (ref 8–23)
CALCIUM SERPL-MCNC: 9.1 MG/DL (ref 8.8–10.2)
CHLORIDE SERPL-SCNC: 108 MMOL/L (ref 98–107)
CHOLEST SERPL-MCNC: 121 MG/DL
CREAT SERPL-MCNC: 1.5 MG/DL (ref 0.67–1.17)
DEPRECATED HCO3 PLAS-SCNC: 21 MMOL/L (ref 22–29)
GFR SERPL CREATININE-BSD FRML MDRD: 46 ML/MIN/1.73M2
GLUCOSE SERPL-MCNC: 134 MG/DL (ref 70–99)
HDLC SERPL-MCNC: 39 MG/DL
LDLC SERPL CALC-MCNC: 56 MG/DL
NONHDLC SERPL-MCNC: 82 MG/DL
POTASSIUM SERPL-SCNC: 5.1 MMOL/L (ref 3.4–5.3)
SODIUM SERPL-SCNC: 140 MMOL/L (ref 136–145)
TRIGL SERPL-MCNC: 130 MG/DL

## 2023-03-17 PROCEDURE — 80048 BASIC METABOLIC PNL TOTAL CA: CPT | Performed by: FAMILY MEDICINE

## 2023-03-17 PROCEDURE — 80061 LIPID PANEL: CPT | Performed by: FAMILY MEDICINE

## 2023-09-29 ENCOUNTER — LAB REQUISITION (OUTPATIENT)
Dept: LAB | Facility: CLINIC | Age: 83
End: 2023-09-29

## 2023-09-29 DIAGNOSIS — I10 ESSENTIAL (PRIMARY) HYPERTENSION: ICD-10-CM

## 2023-09-29 DIAGNOSIS — E11.21 TYPE 2 DIABETES MELLITUS WITH DIABETIC NEPHROPATHY (H): ICD-10-CM

## 2023-09-29 DIAGNOSIS — R80.9 PROTEINURIA, UNSPECIFIED: ICD-10-CM

## 2023-09-29 LAB
ANION GAP SERPL CALCULATED.3IONS-SCNC: 13 MMOL/L (ref 7–15)
BUN SERPL-MCNC: 28.6 MG/DL (ref 8–23)
CALCIUM SERPL-MCNC: 9 MG/DL (ref 8.8–10.2)
CHLORIDE SERPL-SCNC: 106 MMOL/L (ref 98–107)
CREAT SERPL-MCNC: 1.37 MG/DL (ref 0.67–1.17)
CREAT UR-MCNC: 114 MG/DL
DEPRECATED HCO3 PLAS-SCNC: 22 MMOL/L (ref 22–29)
EGFRCR SERPLBLD CKD-EPI 2021: 52 ML/MIN/1.73M2
GLUCOSE SERPL-MCNC: 180 MG/DL (ref 70–99)
MICROALBUMIN UR-MCNC: 35 MG/L
MICROALBUMIN/CREAT UR: 30.7 MG/G CR (ref 0–17)
POTASSIUM SERPL-SCNC: 4.7 MMOL/L (ref 3.4–5.3)
SODIUM SERPL-SCNC: 141 MMOL/L (ref 135–145)

## 2023-09-29 PROCEDURE — 80048 BASIC METABOLIC PNL TOTAL CA: CPT | Performed by: FAMILY MEDICINE

## 2023-09-29 PROCEDURE — 82570 ASSAY OF URINE CREATININE: CPT | Performed by: FAMILY MEDICINE

## 2023-11-15 ENCOUNTER — APPOINTMENT (OUTPATIENT)
Dept: RADIOLOGY | Facility: HOSPITAL | Age: 83
End: 2023-11-15
Attending: STUDENT IN AN ORGANIZED HEALTH CARE EDUCATION/TRAINING PROGRAM
Payer: COMMERCIAL

## 2023-11-15 ENCOUNTER — HOSPITAL ENCOUNTER (EMERGENCY)
Facility: HOSPITAL | Age: 83
Discharge: HOME | End: 2023-11-15
Attending: STUDENT IN AN ORGANIZED HEALTH CARE EDUCATION/TRAINING PROGRAM | Admitting: STUDENT IN AN ORGANIZED HEALTH CARE EDUCATION/TRAINING PROGRAM
Payer: COMMERCIAL

## 2023-11-15 VITALS
RESPIRATION RATE: 16 BRPM | HEART RATE: 57 BPM | DIASTOLIC BLOOD PRESSURE: 64 MMHG | TEMPERATURE: 98.9 F | SYSTOLIC BLOOD PRESSURE: 146 MMHG | OXYGEN SATURATION: 97 %

## 2023-11-15 DIAGNOSIS — U07.1 COVID-19: ICD-10-CM

## 2023-11-15 LAB
ALBUMIN SERPL BCG-MCNC: 3.7 G/DL (ref 3.5–5.2)
ALBUMIN UR-MCNC: 10 MG/DL
ALP SERPL-CCNC: 132 U/L (ref 40–150)
ALT SERPL W P-5'-P-CCNC: 11 U/L (ref 0–70)
ANION GAP SERPL CALCULATED.3IONS-SCNC: 11 MMOL/L (ref 7–15)
APPEARANCE UR: CLEAR
AST SERPL W P-5'-P-CCNC: 26 U/L (ref 0–45)
BACTERIA #/AREA URNS HPF: ABNORMAL /HPF
BASOPHILS # BLD AUTO: 0 10E3/UL (ref 0–0.2)
BASOPHILS NFR BLD AUTO: 0 %
BILIRUB SERPL-MCNC: 0.5 MG/DL
BILIRUB UR QL STRIP: NEGATIVE
BUN SERPL-MCNC: 25.4 MG/DL (ref 8–23)
CALCIUM SERPL-MCNC: 8.4 MG/DL (ref 8.8–10.2)
CHLORIDE SERPL-SCNC: 105 MMOL/L (ref 98–107)
COLOR UR AUTO: ABNORMAL
CREAT SERPL-MCNC: 1.34 MG/DL (ref 0.67–1.17)
DEPRECATED HCO3 PLAS-SCNC: 23 MMOL/L (ref 22–29)
EGFRCR SERPLBLD CKD-EPI 2021: 53 ML/MIN/1.73M2
EOSINOPHIL # BLD AUTO: 0 10E3/UL (ref 0–0.7)
EOSINOPHIL NFR BLD AUTO: 0 %
ERYTHROCYTE [DISTWIDTH] IN BLOOD BY AUTOMATED COUNT: 14.1 % (ref 10–15)
FLUAV RNA SPEC QL NAA+PROBE: NEGATIVE
FLUBV RNA RESP QL NAA+PROBE: NEGATIVE
GLUCOSE SERPL-MCNC: 110 MG/DL (ref 70–99)
GLUCOSE UR STRIP-MCNC: NEGATIVE MG/DL
HCT VFR BLD AUTO: 38 % (ref 40–53)
HGB BLD-MCNC: 11.9 G/DL (ref 13.3–17.7)
HGB UR QL STRIP: NEGATIVE
IMM GRANULOCYTES # BLD: 0.1 10E3/UL
IMM GRANULOCYTES NFR BLD: 1 %
KETONES UR STRIP-MCNC: ABNORMAL MG/DL
LEUKOCYTE ESTERASE UR QL STRIP: ABNORMAL
LYMPHOCYTES # BLD AUTO: 0.6 10E3/UL (ref 0.8–5.3)
LYMPHOCYTES NFR BLD AUTO: 8 %
MAGNESIUM SERPL-MCNC: 2 MG/DL (ref 1.7–2.3)
MCH RBC QN AUTO: 29 PG (ref 26.5–33)
MCHC RBC AUTO-ENTMCNC: 31.3 G/DL (ref 31.5–36.5)
MCV RBC AUTO: 93 FL (ref 78–100)
MONOCYTES # BLD AUTO: 0.9 10E3/UL (ref 0–1.3)
MONOCYTES NFR BLD AUTO: 12 %
MUCOUS THREADS #/AREA URNS LPF: PRESENT /LPF
NEUTROPHILS # BLD AUTO: 6.4 10E3/UL (ref 1.6–8.3)
NEUTROPHILS NFR BLD AUTO: 79 %
NITRATE UR QL: POSITIVE
NRBC # BLD AUTO: 0 10E3/UL
NRBC BLD AUTO-RTO: 0 /100
PH UR STRIP: 5.5 [PH] (ref 5–7)
PLATELET # BLD AUTO: 141 10E3/UL (ref 150–450)
POTASSIUM SERPL-SCNC: 4.5 MMOL/L (ref 3.4–5.3)
PROT SERPL-MCNC: 6.7 G/DL (ref 6.4–8.3)
RBC # BLD AUTO: 4.1 10E6/UL (ref 4.4–5.9)
RBC URINE: 0 /HPF
RSV RNA SPEC NAA+PROBE: NEGATIVE
SARS-COV-2 RNA RESP QL NAA+PROBE: POSITIVE
SODIUM SERPL-SCNC: 139 MMOL/L (ref 135–145)
SP GR UR STRIP: 1.02 (ref 1–1.03)
SQUAMOUS EPITHELIAL: 1 /HPF
TROPONIN T SERPL HS-MCNC: 24 NG/L
UROBILINOGEN UR STRIP-MCNC: <2 MG/DL
WBC # BLD AUTO: 8 10E3/UL (ref 4–11)
WBC URINE: 12 /HPF

## 2023-11-15 PROCEDURE — 71046 X-RAY EXAM CHEST 2 VIEWS: CPT

## 2023-11-15 PROCEDURE — 99285 EMERGENCY DEPT VISIT HI MDM: CPT | Mod: 25

## 2023-11-15 PROCEDURE — 87086 URINE CULTURE/COLONY COUNT: CPT | Performed by: STUDENT IN AN ORGANIZED HEALTH CARE EDUCATION/TRAINING PROGRAM

## 2023-11-15 PROCEDURE — 85025 COMPLETE CBC W/AUTO DIFF WBC: CPT | Performed by: STUDENT IN AN ORGANIZED HEALTH CARE EDUCATION/TRAINING PROGRAM

## 2023-11-15 PROCEDURE — 36415 COLL VENOUS BLD VENIPUNCTURE: CPT | Performed by: STUDENT IN AN ORGANIZED HEALTH CARE EDUCATION/TRAINING PROGRAM

## 2023-11-15 PROCEDURE — 83735 ASSAY OF MAGNESIUM: CPT | Performed by: STUDENT IN AN ORGANIZED HEALTH CARE EDUCATION/TRAINING PROGRAM

## 2023-11-15 PROCEDURE — 93005 ELECTROCARDIOGRAM TRACING: CPT | Performed by: STUDENT IN AN ORGANIZED HEALTH CARE EDUCATION/TRAINING PROGRAM

## 2023-11-15 PROCEDURE — 84484 ASSAY OF TROPONIN QUANT: CPT | Performed by: STUDENT IN AN ORGANIZED HEALTH CARE EDUCATION/TRAINING PROGRAM

## 2023-11-15 PROCEDURE — 80053 COMPREHEN METABOLIC PANEL: CPT | Performed by: STUDENT IN AN ORGANIZED HEALTH CARE EDUCATION/TRAINING PROGRAM

## 2023-11-15 PROCEDURE — 87637 SARSCOV2&INF A&B&RSV AMP PRB: CPT | Performed by: STUDENT IN AN ORGANIZED HEALTH CARE EDUCATION/TRAINING PROGRAM

## 2023-11-15 PROCEDURE — 81001 URINALYSIS AUTO W/SCOPE: CPT | Performed by: STUDENT IN AN ORGANIZED HEALTH CARE EDUCATION/TRAINING PROGRAM

## 2023-11-15 RX ORDER — CEPHALEXIN 500 MG/1
500 CAPSULE ORAL 2 TIMES DAILY
Qty: 20 CAPSULE | Refills: 0 | Status: SHIPPED | OUTPATIENT
Start: 2023-11-15 | End: 2023-11-25

## 2023-11-15 ASSESSMENT — ACTIVITIES OF DAILY LIVING (ADL)
ADLS_ACUITY_SCORE: 39
ADLS_ACUITY_SCORE: 39

## 2023-11-15 NOTE — ED NOTES
Pt alert and oriented x4. Pt was able to get out of bed, dressed, and into car with assistance. Son of pt is the one who helps him at home and was able to help at discharge. Pt discharged to home with son. Instructed to follow up with PCP.

## 2023-11-15 NOTE — ED NOTES
Spoke with Fracisco, pt's son. Son states that pt usually does not get around the best due to parkinsons but the son and wife are able to help him. This morning the pt fell out of bed and wife was unable to get pt up. Son states that the wife just tested positive for covid and since the pt has been progressively weaker they think he might have it as well. Son also states that there is no plan to send pt to a nursing home and they want him to come back home when he is feeling better.

## 2023-11-15 NOTE — ED PROVIDER NOTES
EMERGENCY DEPARTMENT ENCOUNTER      NAME: Maikol Glaser  AGE: 82 year old male  YOB: 1940  MRN: [id number]  EVALUATION DATE & TIME: 11/15/2023  9:38 AM    PCP: Christopher Banda    ED PROVIDER: Buzz Beebe M.D.      Chief Complaint   Patient presents with    Generalized Weakness         FINAL IMPRESSION:  1. COVID-19          ED COURSE & MEDICAL DECISION MAKING:    Pertinent Labs & Imaging studies reviewed. (See chart for details)  82 year old male presents to the Emergency Department for evaluation of generalized weakness.  Sounds as if the patient's wife has COVID and initially that history we got from EMS was that the patient was here for placement when in fact it sounds as if the family was concerned mostly that the patient could have COVID and sent him here for evaluation.  Here in the ER patient is found to be COVID-positive.  Chest x-ray is clear and there is no pneumonia.  Patient's urinalysis does show that she is ingestion of a UTI.  Remainder of his blood work was normal.  Considered further studies and treatment and the possibility of sepsis however based on the patient's clinical appearance and overall symptomatology once things were discussed with his son I feel this clearly is COVID and a UTI contributing to the patient's symptoms and was comfortable with continued outpatient treatment including Keflex for his UTI.  Considered Paxlovid however based on time course and renal disease did not believe this was necessary.  Discussed with the son and he is comfortable taking the patient home and we will discharge home for continued care.    At the conclusion   of the encounter I discussed the results of all of the tests and the disposition. The questions were answered. The patient or family acknowledged understanding and was agreeable with the care plan.          9:38 AM I met with the patient to obtain patient history and performed a physical exam. Discussed plan for ED work up  including potential diagnostic studies and interventions.  11:54 AM Nursing staff updated me. Per patient's son, the only reason patient was sent to the ED via EMS is because patient's wife has covid and they wanted him to be evaluated for possible covid. Apparently, there was no talks of patient being relocated into a nursing home at all.  11:55 AM Spoke with patient's son to obtain additional history.  1:15 PM Reevaluated and updated the patient with findings. We discussed the plan for discharge and the patient is agreeable. Reviewed supportive cares, symptomatic treatment, outpatient follow up, and reasons to return to the Emergency Department. Patient to be discharged by ED RN.       Medical Decision Making    History:  Supplemental history from: Family Member/Significant Other and EMS  External Record(s) reviewed: Documented in chart, if applicable.    Work Up:  Chart documentation includes differential considered and any EKGs or imaging independently interpreted by provider, where specified.  In additional to work up documented, I considered the following work up: Documented in chart, if applicable.    External consultation:  Discussion of management with another provider: Documented in chart, if applicable    Complicating factors:  Care impacted by chronic illness: Cerebrovascular Disease, Diabetes, Hyperlipidemia, Hypertension, and Other: Parkinson's disease  Care affected by social determinants of health: Access to Medical Care    Disposition considerations: Discharge. I prescribed additional prescription strength medication(s) as charted. See documentation for any additional details.        This patient involved a high degree of complexity in medical decision making, as significant risks were present and assessed. Recent encounters & results in medical record reviewed by me.     All workup (i.e. any EKG/labs/imaging as per charting below) reviewed and independently interpreted by me. See respective  "sections for details.       minutes of critical care time     MEDICATIONS GIVEN IN THE EMERGENCY:  Medications - No data to display    NEW PRESCRIPTIONS STARTED AT TODAY'S ER VISIT  Discharge Medication List as of 11/15/2023  1:16 PM        START taking these medications    Details   cephALEXin (KEFLEX) 500 MG capsule Take 1 capsule (500 mg) by mouth 2 times daily for 10 days, Disp-20 capsule, R-0, Local Print                =================================================================    HPI    Patient information was obtained from: patient and EMS    Use of :         Maikol Glaser is a 82 year old male with a pertinent history of cerebral infarction with right sided weakness (2020), parkinson's disease, HTN, HLD, DM2, who presents for evaluation of generalized weakness.    Per EMS, patient is scheduled to going to a nursing home today. Patient slid out of bed this morning and was unable to get up due to generalized weakness and EMS was called.    Patient reports he has been dealing with generalized weakness for the past few years. Today, the generalized weakness acutely worsened compared to normal baseline. Patient notes that he was unable to get up from bed today after returning back from the bathroom at midnight. He is aware that he is supposed to be moved to the nursing home today because he \"is an EVELIO\" and his family is \"sick and tired of him.\" He also says he has a PCP, Dr. Banda, appointment at 1:30 PM.    He otherwise denies associating abdominal pain, chest pain, fever, chills, or pain elsewhere. Patient denies any recent injuries. He denies history of UTI's. He states that he self caths at baseline. He endorses lower extremity edema and has been dealing this for 1 year. There were no other concerns/complaints at this time.      REVIEW OF SYSTEMS   Review of Systems     PAST MEDICAL HISTORY:  Past Medical History:   Diagnosis Date    Cerebral infarction (H)     Diabetes (H)     HLD " (hyperlipidemia)     HTN (hypertension)     Hyperlipidemia     Hypertension     Intracranial hemorrhage (H) 5/26/2021    Stroke (H)     TIA (transient ischemic attack)        PAST SURGICAL HISTORY:  Past Surgical History:   Procedure Laterality Date    ENDARTERECTOMY CAROTID Left 8/11/2021    Procedure: ENDARTERECTOMY, CAROTID, with electroencephalogram and intraoperative ultrasound;  Surgeon: Jessica Lemon MD;  Location: [facility] Main OR           CURRENT MEDICATIONS:    acetaminophen (TYLENOL) 500 MG tablet  amLODIPine (NORVASC) 5 MG tablet  aspirin (ASA) 81 MG EC tablet  carbidopa-levodopa (SINEMET)  MG tablet  clopidogrel (PLAVIX) 75 MG tablet  folic acid (FOLVITE) 400 MCG tablet  glipiZIDE (GLUCOTROL) 5 MG tablet  lisinopril (ZESTRIL) 5 MG tablet  magnesium oxide (MAG-OX) 400 MG tablet  metFORMIN (GLUCOPHAGE) 500 MG tablet  metoprolol succinate ER (TOPROL-XL) 50 MG 24 hr tablet  multivitamin (CENTRUM SILVER) tablet  pramipexole (MIRAPEX) 0.25 MG tablet  simvastatin (ZOCOR) 20 MG tablet  traZODone (DESYREL) 50 MG tablet        ALLERGIES:  Allergies   Allergen Reactions    Penicillins Rash     Childhood rxn.           FAMILY HISTORY:  Family History   Problem Relation Age of Onset    Bone Cancer Mother        SOCIAL HISTORY:   Social History     Socioeconomic History    Marital status:      Spouse name: None    Number of children: None    Years of education: None    Highest education level: None   Tobacco Use    Smoking status: Never    Smokeless tobacco: Never   Substance and Sexual Activity    Alcohol use: Not Currently    Drug use: Not Currently       VITALS:  BP (!) 146/64   Pulse 57   Temp 98.9  F (37.2  C) (Oral)   Resp 16   SpO2 97%       PHYSICAL EXAM    Constitutional: Well developed, Well nourished, NAD, GCS 15. Generalized weakness. Residual weakness to right upper extremity and lower extremity,  HENT: Normocephalic, Atraumatic, Bilateral external ears normal, Oropharynx normal, mucous  membranes moist, Nose normal. Neck-  Normal range of motion, No tenderness, Supple, No stridor.  Eyes: PERRL, EOMI, Conjunctiva normal, No discharge.   Respiratory: Normal breath sounds, No respiratory distress, No wheezing, Speaks full sentences easily. No cough.   Cardiovascular: Normal heart rate, Regular rhythm, No murmurs, No rubs, No gallops. Chest wall nontender.   GI:Soft, No tenderness, No masses, No flank tenderness. No rebound or guarding.   : Chaperone    Musculoskeletal: 2+ DP pulses. Bilateral lower extremity edema. No cyanosis, No clubbing. Good range of motion in all major joints. No tenderness to palpation or major deformities noted.   Integument: Warm, Dry, No erythema, No rash. No petechiae.   Neurologic: Alert & oriented x 3,  CN 3-12 intact Normal motor function, Normal sensory function, No focal deficits noted. Normal gait. Normal finger to nose bilaterally  Psychiatric: Affect normal, Judgment normal, Mood normal. Cooperative.          LAB:  All pertinent labs reviewed and interpreted.  Labs Ordered and Resulted from Time of ED Arrival to Time of ED Departure   COMPREHENSIVE METABOLIC PANEL - Abnormal       Result Value    Sodium 139      Potassium 4.5      Carbon Dioxide (CO2) 23      Anion Gap 11      Urea Nitrogen 25.4 (*)     Creatinine 1.34 (*)     GFR Estimate 53 (*)     Calcium 8.4 (*)     Chloride 105      Glucose 110 (*)     Alkaline Phosphatase 132      AST 26      ALT 11      Protein Total 6.7      Albumin 3.7      Bilirubin Total 0.5     ROUTINE UA WITH MICROSCOPIC REFLEX TO CULTURE - Abnormal    Color Urine Light Yellow      Appearance Urine Clear      Glucose Urine Negative      Bilirubin Urine Negative      Ketones Urine Trace (*)     Specific Gravity Urine 1.017      Blood Urine Negative      pH Urine 5.5      Protein Albumin Urine 10 (*)     Urobilinogen Urine <2.0      Nitrite Urine Positive (*)     Leukocyte Esterase Urine 25 Paulette/uL (*)     Bacteria Urine Few (*)     Mucus  Urine Present (*)     RBC Urine 0      WBC Urine 12 (*)     Squamous Epithelials Urine 1     TROPONIN T, HIGH SENSITIVITY - Abnormal    Troponin T, High Sensitivity 24 (*)    CBC WITH PLATELETS AND DIFFERENTIAL - Abnormal    WBC Count 8.0      RBC Count 4.10 (*)     Hemoglobin 11.9 (*)     Hematocrit 38.0 (*)     MCV 93      MCH 29.0      MCHC 31.3 (*)     RDW 14.1      Platelet Count 141 (*)     % Neutrophils 79      % Lymphocytes 8      % Monocytes 12      % Eosinophils 0      % Basophils 0      % Immature Granulocytes 1      NRBCs per 100 WBC 0      Absolute Neutrophils 6.4      Absolute Lymphocytes 0.6 (*)     Absolute Monocytes 0.9      Absolute Eosinophils 0.0      Absolute Basophils 0.0      Absolute Immature Granulocytes 0.1      Absolute NRBCs 0.0     INFLUENZA A/B, RSV, & SARS-COV2 PCR - Abnormal    Influenza A PCR Negative      Influenza B PCR Negative      RSV PCR Negative      SARS CoV2 PCR Positive (*)    MAGNESIUM - Normal    Magnesium 2.0         RADIOLOGY:  Reviewed all pertinent imaging. Please see official radiology report.  Chest XR,  PA & LAT   Final Result   IMPRESSION: The lungs are clear. Cardiomediastinal silhouette at the upper limits of normal. Aortic calcification. No pleural effusion. No significant interval change.          EKG:    Performed at: 11/15/2023 09:52:27    Impression: Sinus rhythm, RBBB, Abnormal ECG    Rate: 65  Rhythm: Sinus rhythm  Axis: 90  WI Interval: 200  QRS Interval: 156  QTc Interval: 457  ST Changes: None  Comparison: No significant changes found when compared to ECG of 7/18/2021 09:33     I have independently reviewed and interpreted the EKG(s) documented above.    PROCEDURES:   None      I, Karolyn Valdivia , am serving as a scribe to document services personally performed by Dr. Buzz Beebe based on my observation and the provider's statements to me. I, Buzz Beebe MD attest that Karolyn Valdivia is acting in a scribe capacity, has observed my performance of the services  and has documented them in accordance with my direction.    Buzz Beebe M.D.  Emergency Medicine  [facility] EMERGENCY DEPARTMENT  [facility] [facility] [address] [phone]  Dept: [phone]       Buzz Beebe MD  11/27/23 [id number]

## 2023-11-15 NOTE — ED NOTES
Bed: JNED-05  Expected date:   Expected time:   Means of arrival: Ambulance  Comments:   SPF Increased weakness

## 2023-11-16 LAB
ATRIAL RATE - MUSE: 65 BPM
DIASTOLIC BLOOD PRESSURE - MUSE: 57 MMHG
INTERPRETATION ECG - MUSE: NORMAL
P AXIS - MUSE: 23 DEGREES
PR INTERVAL - MUSE: 200 MS
QRS DURATION - MUSE: 156 MS
QT - MUSE: 440 MS
QTC - MUSE: 457 MS
R AXIS - MUSE: 90 DEGREES
SYSTOLIC BLOOD PRESSURE - MUSE: 141 MMHG
T AXIS - MUSE: -5 DEGREES
VENTRICULAR RATE- MUSE: 65 BPM

## 2023-11-18 LAB
BACTERIA UR CULT: ABNORMAL
BACTERIA UR CULT: ABNORMAL

## 2023-12-14 ENCOUNTER — TELEPHONE (OUTPATIENT)
Dept: VASCULAR SURGERY | Facility: CLINIC | Age: 83
End: 2023-12-14

## 2023-12-14 NOTE — TELEPHONE ENCOUNTER
LMTCB schedule follow up with Dr. Garcia with carotid        Received: Today  Nancy Anne RN  [facility]  Jose.          Previous Messages       ----- Message -----  From: Jess Araiza RN  Sent: 2/13/2023  11:19 AM CST  To: *    Follow up in 1 year for carotid ultrasound and clinic visit. Okay for PA.

## 2024-02-26 NOTE — PROGRESS NOTES
[facility]        Patient is here for a 1 year follow up  to discuss Carotid stenosis, s/p left carotid endarterectomy 8/2021. Patient has no symptoms.    Pt is currently taking Statin and Plavix.    /76   Pulse 54   Temp 97.7  F (36.5  C)   Resp 20   SpO2 97%     The provider has been notified that the patient has no concerns.     Questions patient would like addressed today are: N/A.    Refills are needed: No    [facility] and [facility]  [facility]

## 2024-02-26 NOTE — PATIENT INSTRUCTIONS
Kelsi Lassiter,    Thank you for entrusting your care with us today. After your visit today with MD Vidhi Garcia this is the plan that was discussed at your appointment.    Follow up in 1 year with Dr. Garcia and an ultrasound of your carotid arteries prior. A  will reach out to you closer to that time to schedule.       I am including additional information on these things and our contact information if you have any questions or concerns.   Please do not hesitate to reach out to us if you felt we did not answer your questions or you are unsure of the treatment plan after your visit today. Our number is [phone].Thank you for trusting us with your care.         Again thank you for your time.     Carotid Stenosis: Care Instructions  Coronary artery disease (CAD)       Carotid stenosis is narrowing of one or both of the carotid arteries. These arteries take blood from the heart to the brain. There is one on each side of the neck.        Carotid artery stenosis is caused by a process called hardening of the arteries, or atherosclerosis. A substance called plaque builds up inside the carotid arteries. Things that can lead to plaque include diabetes, high blood pressure, high cholesterol, and smoking. This plaque may limit blood flow to your brain. If plaque breaks open, it may form a blood clot. Or pieces of the plaque may break off. A piece of plaque or a blood clot could move to the brain, causing a stroke or transient ischemic attack (TIA).    The goal of treatment is to lower your risk of having a stroke or TIA. You can lower your risk by having a heart-healthy lifestyle and taking medicine. Sometimes a surgery or procedure is also done.    Follow-up care is a key part of your treatment and safety. Be sure to make and go to all appointments, and call your doctor if you are having problems. It's also a good idea to know your test results and keep a list of the medicines you take.    How can you care for  yourself at home?  Take your medicines exactly as prescribed. Call your doctor if you think you are having a problem with your medicine. You may take medicine to lower your blood pressure, to lower your cholesterol, or to prevent blood clots.  If you take a blood thinner, such as aspirin, be sure to get instructions about how to take your medicine safely. Blood thinners can cause serious bleeding problems.  Do not smoke. People who smoke have a higher chance of stroke than those who quit. If you need help quitting, talk to your doctor about stop-smoking programs and medicines. These can increase your chances of quitting for good.  Eat heart-healthy foods. These foods include vegetables, fruits, nuts, beans, lean meat, fish, and whole grains. You limit things that are not so good for your heart, like sodium, alcohol, and sugar.  Stay at a healthy weight. Lose weight if you need to.  Be active. Ask your doctor what type and level of exercise is safe for you.  Limit alcohol to 2 drinks a day for men and 1 drink a day for women. Too much alcohol can cause health problems.  Manage other health problems such as diabetes, high blood pressure, and high cholesterol. If you think you may have a problem with alcohol or drug use, talk to your doctor.  Avoid infections such as COVID-19, colds, and the flu. Get the flu vaccine every year. Get a pneumococcal vaccine. If you have had one before, ask your doctor whether you need another dose. Stay up to date on your COVID-19 vaccines.    When should you call for help?     Call 911 anytime you think you may need emergency care. For example, call if:    You passed out (lost consciousness).  You have symptoms of a stroke. These may include:  Sudden numbness, tingling, weakness, or loss of movement in your face, arm, or leg, especially on only one side of your body.  Sudden vision changes.  Sudden trouble speaking.  Sudden confusion or trouble understanding simple statements.  Sudden  problems with walking or balance.  A sudden, severe headache that is different from past headaches.    Call your doctor now or seek immediate medical care if:    You are dizzy or lightheaded, or you feel like you may faint.  Watch closely for changes in your health, and be sure to contact your doctor if you have any problems.    Current as of: September 7, 2022  Author: [facility] Staff  Medical Review:DOMENICO Acosta MD - Internal Medicine & Gamal Perez MD - Family Medicine & Lulu Barnett MD - Family Medicine & Rober Harrell MD, PhD - Cardiology      Carotid Duplex    Steps for the test are:  You will be asked to lie on a stretcher. It will be okay for you to wear your street clothes. In some situations, you may be asked to change into a gown.    Ultrasound gel, usually warmed for your comfort, will be placed on either side of your neck.    Through the gel, the technician will apply to your neck a small hand-held device that emits sound waves.   When the test is completed, the technician will remove excess gel from your neck. The gel is water-soluble and will not stain your skin or clothes.    How to Prepare:  Eat and take medications as usual.   Wear minimal or no jewelry to ease application and removal of gel.    Risks  There are typically no side effects or complications associated with a carotid duplex ultrasound examination.    What Can I Expect After the Test?  The technician will send the ultrasound images to your vascular surgeon for evaluation. Typically, a report is available in 2-3 days. If anything critical is found, it is standard practice to notify the vascular surgeon immediately.  Reference: https://vascular.org/patient-resources/vascular-tests

## 2024-03-04 ENCOUNTER — ANCILLARY PROCEDURE (OUTPATIENT)
Dept: VASCULAR ULTRASOUND | Facility: CLINIC | Age: 84
End: 2024-03-04
Attending: PHYSICIAN ASSISTANT
Payer: COMMERCIAL

## 2024-03-04 ENCOUNTER — OFFICE VISIT (OUTPATIENT)
Dept: VASCULAR SURGERY | Facility: CLINIC | Age: 84
End: 2024-03-04
Attending: PHYSICIAN ASSISTANT
Payer: COMMERCIAL

## 2024-03-04 VITALS
TEMPERATURE: 97.7 F | RESPIRATION RATE: 20 BRPM | DIASTOLIC BLOOD PRESSURE: 76 MMHG | HEART RATE: 54 BPM | SYSTOLIC BLOOD PRESSURE: 137 MMHG | OXYGEN SATURATION: 97 %

## 2024-03-04 DIAGNOSIS — Z98.890 HISTORY OF LEFT-SIDED CAROTID ENDARTERECTOMY: Primary | ICD-10-CM

## 2024-03-04 DIAGNOSIS — I65.22 SYMPTOMATIC STENOSIS OF LEFT CAROTID ARTERY: ICD-10-CM

## 2024-03-04 PROCEDURE — 93880 EXTRACRANIAL BILAT STUDY: CPT

## 2024-03-04 PROCEDURE — 99213 OFFICE O/P EST LOW 20 MIN: CPT | Performed by: HOSPITALIST

## 2024-03-04 PROCEDURE — 93880 EXTRACRANIAL BILAT STUDY: CPT | Mod: 26 | Performed by: SURGERY

## 2024-03-04 PROCEDURE — G0463 HOSPITAL OUTPT CLINIC VISIT: HCPCS | Mod: 25 | Performed by: HOSPITALIST

## 2024-03-04 ASSESSMENT — PAIN SCALES - GENERAL: PAINLEVEL: NO PAIN (0)

## 2024-03-07 NOTE — PROGRESS NOTES
VASCULAR MEDICINE CONSULT NOTE          LOCATION:  [facility]       Date of Service: 3/4/2024      Primary Care Provider: Christopher Banda  Referring provider;  Jihan Agarwal      Reason for the visit/chief complaint:   Carotid artery stenosis      HPI:  Maikol Glaser is a pleasant 83 year old male who presents to our Vascular Medicine clinic accompanied by his son for carotid artery stenosis surveillance.  Mr. Glaser was previously following up with vascular surgery.  He was last seen by Jihan Agarwal PA-C last year 2/13/2023.    He has past medical history of carotid artery stenosis status post left carotid endarterectomy in August 12, 2021 for symptomatic disease/left CVA (multiple small strokes on MRI in the postop with residual some right arm weakness), parkinson's disease, type 2 diabetes mellitus and dyslipidemia.  He is currently under surveillance every year.    Denies any strokelike symptoms, TIA or amaurosis fugax within the past year.  Although he has some residual weakness of the right arm, he believes Parkinson's play is a major part of it as well.  He uses a walker and wheelchair for ambulation. No new health change over the past year.          REVIEW OF SYSTEMS:    A 12 point ROS was reviewed and is negative except what is mentioned in HPI.       Past medical history, surgical history, medications, family history, social history and allergies were reviewed. Pertinent points mentioned under HPI.      OBJECTIVE:    Vital signs:  /76   Pulse 54   Temp 97.7  F (36.5  C)   Resp 20   SpO2 97%   Wt Readings from Last 1 Encounters:   12/17/21 210 lb (95.3 kg)     There is no height or weight on file to calculate BMI.    Physical exam:  General appearance: Pleasant male in no apparent distress.  Sitting in a wheelchair.  HEENT: NC/AT.    Neck: Carotids +2/2 bilaterally without bruits.  No jugular venous distension.   Heart: RRR. Normal S1, S2.   Chest: Clear  to auscultation bilaterally.  Abdomen: Not examined in the sitting position.  Extremities: No swelling.  Skin: Normal color and temperature.  Neurological: Alert, awake and oriented       DIAGNOSTIC STUDIES:   Labs and diagnostics reviewed including outside records. Pertinent points are mentioned under HPI and assessment and plan sections.        ASSESSMENT AND PLAN:    Stable carotid artery disease  Status post left carotid endarterectomy August 2021 for symptomatic disease/left CVA  Postoperative multiple small strokes  Parkinson's disease    Mr. Glaser remains with no stroke like symptoms or amaurosis fugax.  He completed carotid ultrasound prior to this visit this afternoon.  This shows less than 50% stenosis on bilateral ICA based on velocity criteria and ICA/CCA ratio.  Interestingly, his ultrasound from last year showed 50 to 69% stenosis of the left ICA per  cm/s but ICA/CCA ratio of only 1.27.  I believe last year velocity was likely an overestimation.  Overall, we discussed the stability of his carotid disease.  He was encouraged to continue with his current medical management that includes DAPT with aspirin 81 mg and clopidogrel 75 mg.  He is not on high intensity statin and rather simvastatin 20 mg however LDL remains at target 56.  We would typically recommend high intensity statin regardless of LDL level however, given his age and frailty and the fact that he has been very tolerant to simvastatin, we will make no changes.  Blood pressure under good control.      Recommendations:  Stable disease.  Continue current medical management with DAPT and simvastatin 20 mg.  LDL at goal.  Follow-up in 1 year with ultrasound carotid prior.    It was a pleasure meeting with Mr. Glaser and his son in our clinic today.      Vidhi Garcia MD  Vascular Medicine  March 4, 2024

## 2024-03-29 ENCOUNTER — LAB REQUISITION (OUTPATIENT)
Dept: LAB | Facility: CLINIC | Age: 84
End: 2024-03-29

## 2024-03-29 DIAGNOSIS — E78.5 HYPERLIPIDEMIA, UNSPECIFIED: ICD-10-CM

## 2024-03-29 DIAGNOSIS — E11.21 TYPE 2 DIABETES MELLITUS WITH DIABETIC NEPHROPATHY (H): ICD-10-CM

## 2024-03-29 PROCEDURE — 80048 BASIC METABOLIC PNL TOTAL CA: CPT | Performed by: FAMILY MEDICINE

## 2024-03-29 PROCEDURE — 80061 LIPID PANEL: CPT | Performed by: FAMILY MEDICINE

## 2024-03-30 LAB
ANION GAP SERPL CALCULATED.3IONS-SCNC: 14 MMOL/L (ref 7–15)
BUN SERPL-MCNC: 26.9 MG/DL (ref 8–23)
CALCIUM SERPL-MCNC: 9.3 MG/DL (ref 8.8–10.2)
CHLORIDE SERPL-SCNC: 112 MMOL/L (ref 98–107)
CHOLEST SERPL-MCNC: 113 MG/DL
CREAT SERPL-MCNC: 1.3 MG/DL (ref 0.67–1.17)
DEPRECATED HCO3 PLAS-SCNC: 22 MMOL/L (ref 22–29)
EGFRCR SERPLBLD CKD-EPI 2021: 55 ML/MIN/1.73M2
FASTING STATUS PATIENT QL REPORTED: NORMAL
GLUCOSE SERPL-MCNC: 76 MG/DL (ref 70–99)
HDLC SERPL-MCNC: 44 MG/DL
LDLC SERPL CALC-MCNC: 53 MG/DL
NONHDLC SERPL-MCNC: 69 MG/DL
POTASSIUM SERPL-SCNC: 4.8 MMOL/L (ref 3.4–5.3)
SODIUM SERPL-SCNC: 148 MMOL/L (ref 135–145)
TRIGL SERPL-MCNC: 82 MG/DL

## 2024-04-08 ENCOUNTER — APPOINTMENT (OUTPATIENT)
Dept: RADIOLOGY | Facility: HOSPITAL | Age: 84
End: 2024-04-08
Attending: EMERGENCY MEDICINE
Payer: COMMERCIAL

## 2024-04-08 ENCOUNTER — HOSPITAL ENCOUNTER (OUTPATIENT)
Facility: HOSPITAL | Age: 84
Setting detail: OBSERVATION
Discharge: NURSING, REHAB OR LONG-TERM CARE | End: 2024-04-11
Attending: EMERGENCY MEDICINE | Admitting: FAMILY MEDICINE
Payer: COMMERCIAL

## 2024-04-08 DIAGNOSIS — R53.1 GENERALIZED WEAKNESS: ICD-10-CM

## 2024-04-08 DIAGNOSIS — J06.9 UPPER RESPIRATORY TRACT INFECTION, UNSPECIFIED TYPE: ICD-10-CM

## 2024-04-08 DIAGNOSIS — N30.00 ACUTE CYSTITIS WITHOUT HEMATURIA: Primary | ICD-10-CM

## 2024-04-08 PROBLEM — I10 ESSENTIAL HYPERTENSION: Status: ACTIVE | Noted: 2021-07-18

## 2024-04-08 PROBLEM — I69.30 HISTORY OF CVA WITH RESIDUAL DEFICIT: Status: ACTIVE | Noted: 2021-10-07

## 2024-04-08 PROBLEM — E11.9 DIABETES MELLITUS TYPE 2, NONINSULIN DEPENDENT (H): Status: ACTIVE | Noted: 2021-07-18

## 2024-04-08 PROBLEM — G20.A1 PARKINSON'S DISEASE (H): Status: ACTIVE | Noted: 2021-04-10

## 2024-04-08 LAB
ALBUMIN SERPL BCG-MCNC: 3.7 G/DL (ref 3.5–5.2)
ALBUMIN UR-MCNC: 30 MG/DL
ALP SERPL-CCNC: 135 U/L (ref 40–150)
ALT SERPL W P-5'-P-CCNC: 7 U/L (ref 0–70)
AMORPH CRY #/AREA URNS HPF: ABNORMAL /HPF
ANION GAP SERPL CALCULATED.3IONS-SCNC: 10 MMOL/L (ref 7–15)
APPEARANCE UR: ABNORMAL
AST SERPL W P-5'-P-CCNC: 25 U/L (ref 0–45)
BACTERIA #/AREA URNS HPF: ABNORMAL /HPF
BASOPHILS # BLD AUTO: 0 10E3/UL (ref 0–0.2)
BASOPHILS NFR BLD AUTO: 0 %
BILIRUB SERPL-MCNC: 0.5 MG/DL
BILIRUB UR QL STRIP: NEGATIVE
BUN SERPL-MCNC: 30.8 MG/DL (ref 8–23)
CALCIUM SERPL-MCNC: 8.8 MG/DL (ref 8.8–10.2)
CHLORIDE SERPL-SCNC: 103 MMOL/L (ref 98–107)
CK SERPL-CCNC: 80 U/L (ref 39–308)
COLOR UR AUTO: YELLOW
CREAT SERPL-MCNC: 1.5 MG/DL (ref 0.67–1.17)
DEPRECATED HCO3 PLAS-SCNC: 24 MMOL/L (ref 22–29)
EGFRCR SERPLBLD CKD-EPI 2021: 46 ML/MIN/1.73M2
EOSINOPHIL # BLD AUTO: 0.1 10E3/UL (ref 0–0.7)
EOSINOPHIL NFR BLD AUTO: 1 %
ERYTHROCYTE [DISTWIDTH] IN BLOOD BY AUTOMATED COUNT: 14 % (ref 10–15)
FLUAV RNA SPEC QL NAA+PROBE: NEGATIVE
FLUBV RNA RESP QL NAA+PROBE: NEGATIVE
GLUCOSE BLDC GLUCOMTR-MCNC: 116 MG/DL (ref 70–99)
GLUCOSE SERPL-MCNC: 112 MG/DL (ref 70–99)
GLUCOSE UR STRIP-MCNC: NEGATIVE MG/DL
HBA1C MFR BLD: 6.7 %
HCT VFR BLD AUTO: 44.8 % (ref 40–53)
HGB BLD-MCNC: 14.5 G/DL (ref 13.3–17.7)
HGB UR QL STRIP: NEGATIVE
IMM GRANULOCYTES # BLD: 0.1 10E3/UL
IMM GRANULOCYTES NFR BLD: 1 %
KETONES UR STRIP-MCNC: NEGATIVE MG/DL
LEUKOCYTE ESTERASE UR QL STRIP: ABNORMAL
LYMPHOCYTES # BLD AUTO: 0.9 10E3/UL (ref 0.8–5.3)
LYMPHOCYTES NFR BLD AUTO: 10 %
MCH RBC QN AUTO: 29.1 PG (ref 26.5–33)
MCHC RBC AUTO-ENTMCNC: 32.4 G/DL (ref 31.5–36.5)
MCV RBC AUTO: 90 FL (ref 78–100)
MONOCYTES # BLD AUTO: 0.8 10E3/UL (ref 0–1.3)
MONOCYTES NFR BLD AUTO: 9 %
MUCOUS THREADS #/AREA URNS LPF: PRESENT /LPF
NEUTROPHILS # BLD AUTO: 7.5 10E3/UL (ref 1.6–8.3)
NEUTROPHILS NFR BLD AUTO: 79 %
NITRATE UR QL: NEGATIVE
NRBC # BLD AUTO: 0 10E3/UL
NRBC BLD AUTO-RTO: 0 /100
NT-PROBNP SERPL-MCNC: 472 PG/ML (ref 0–1800)
PH UR STRIP: 8 [PH] (ref 5–7)
PLATELET # BLD AUTO: 173 10E3/UL (ref 150–450)
POTASSIUM SERPL-SCNC: 4.8 MMOL/L (ref 3.4–5.3)
PROCALCITONIN SERPL IA-MCNC: 0.15 NG/ML
PROT SERPL-MCNC: 6.7 G/DL (ref 6.4–8.3)
RBC # BLD AUTO: 4.98 10E6/UL (ref 4.4–5.9)
RBC URINE: 0 /HPF
RSV RNA SPEC NAA+PROBE: NEGATIVE
SARS-COV-2 RNA RESP QL NAA+PROBE: NEGATIVE
SODIUM SERPL-SCNC: 137 MMOL/L (ref 135–145)
SP GR UR STRIP: 1.02 (ref 1–1.03)
TROPONIN T SERPL HS-MCNC: 18 NG/L
UROBILINOGEN UR STRIP-MCNC: <2 MG/DL
WBC # BLD AUTO: 9.4 10E3/UL (ref 4–11)
WBC URINE: 0 /HPF

## 2024-04-08 PROCEDURE — 82962 GLUCOSE BLOOD TEST: CPT

## 2024-04-08 PROCEDURE — 83880 ASSAY OF NATRIURETIC PEPTIDE: CPT | Performed by: EMERGENCY MEDICINE

## 2024-04-08 PROCEDURE — 96365 THER/PROPH/DIAG IV INF INIT: CPT

## 2024-04-08 PROCEDURE — 82550 ASSAY OF CK (CPK): CPT | Performed by: HOSPITALIST

## 2024-04-08 PROCEDURE — 83036 HEMOGLOBIN GLYCOSYLATED A1C: CPT | Performed by: HOSPITALIST

## 2024-04-08 PROCEDURE — 87637 SARSCOV2&INF A&B&RSV AMP PRB: CPT | Performed by: EMERGENCY MEDICINE

## 2024-04-08 PROCEDURE — 84145 PROCALCITONIN (PCT): CPT | Performed by: EMERGENCY MEDICINE

## 2024-04-08 PROCEDURE — G0378 HOSPITAL OBSERVATION PER HR: HCPCS

## 2024-04-08 PROCEDURE — 36415 COLL VENOUS BLD VENIPUNCTURE: CPT | Performed by: HOSPITALIST

## 2024-04-08 PROCEDURE — 250N000011 HC RX IP 250 OP 636: Performed by: HOSPITALIST

## 2024-04-08 PROCEDURE — 36415 COLL VENOUS BLD VENIPUNCTURE: CPT | Performed by: EMERGENCY MEDICINE

## 2024-04-08 PROCEDURE — 93005 ELECTROCARDIOGRAM TRACING: CPT | Performed by: EMERGENCY MEDICINE

## 2024-04-08 PROCEDURE — 87086 URINE CULTURE/COLONY COUNT: CPT | Performed by: EMERGENCY MEDICINE

## 2024-04-08 PROCEDURE — 85025 COMPLETE CBC W/AUTO DIFF WBC: CPT | Performed by: EMERGENCY MEDICINE

## 2024-04-08 PROCEDURE — 71046 X-RAY EXAM CHEST 2 VIEWS: CPT

## 2024-04-08 PROCEDURE — 96361 HYDRATE IV INFUSION ADD-ON: CPT

## 2024-04-08 PROCEDURE — 84484 ASSAY OF TROPONIN QUANT: CPT | Performed by: EMERGENCY MEDICINE

## 2024-04-08 PROCEDURE — 99285 EMERGENCY DEPT VISIT HI MDM: CPT | Mod: 25

## 2024-04-08 PROCEDURE — 87186 SC STD MICRODIL/AGAR DIL: CPT | Performed by: EMERGENCY MEDICINE

## 2024-04-08 PROCEDURE — 80053 COMPREHEN METABOLIC PANEL: CPT | Performed by: EMERGENCY MEDICINE

## 2024-04-08 PROCEDURE — 99223 1ST HOSP IP/OBS HIGH 75: CPT | Performed by: HOSPITALIST

## 2024-04-08 PROCEDURE — 81001 URINALYSIS AUTO W/SCOPE: CPT | Performed by: EMERGENCY MEDICINE

## 2024-04-08 PROCEDURE — 258N000003 HC RX IP 258 OP 636: Performed by: EMERGENCY MEDICINE

## 2024-04-08 RX ORDER — METOPROLOL SUCCINATE 50 MG/1
50 TABLET, EXTENDED RELEASE ORAL DAILY
Status: DISCONTINUED | OUTPATIENT
Start: 2024-04-09 | End: 2024-04-11 | Stop reason: HOSPADM

## 2024-04-08 RX ORDER — CARBIDOPA AND LEVODOPA 25; 100 MG/1; MG/1
1 TABLET ORAL 4 TIMES DAILY
Status: DISCONTINUED | OUTPATIENT
Start: 2024-04-09 | End: 2024-04-11 | Stop reason: HOSPADM

## 2024-04-08 RX ORDER — GLIPIZIDE 5 MG/1
5 TABLET ORAL
Status: DISCONTINUED | OUTPATIENT
Start: 2024-04-09 | End: 2024-04-11 | Stop reason: HOSPADM

## 2024-04-08 RX ORDER — HYDRALAZINE HYDROCHLORIDE 10 MG/1
10 TABLET, FILM COATED ORAL EVERY 4 HOURS PRN
Status: DISCONTINUED | OUTPATIENT
Start: 2024-04-08 | End: 2024-04-11 | Stop reason: HOSPADM

## 2024-04-08 RX ORDER — ACETAMINOPHEN 650 MG/1
650 SUPPOSITORY RECTAL EVERY 4 HOURS PRN
Status: DISCONTINUED | OUTPATIENT
Start: 2024-04-08 | End: 2024-04-11 | Stop reason: HOSPADM

## 2024-04-08 RX ORDER — SIMVASTATIN 10 MG
20 TABLET ORAL AT BEDTIME
Status: DISCONTINUED | OUTPATIENT
Start: 2024-04-08 | End: 2024-04-11 | Stop reason: HOSPADM

## 2024-04-08 RX ORDER — AMOXICILLIN 250 MG
1 CAPSULE ORAL 2 TIMES DAILY PRN
Status: DISCONTINUED | OUTPATIENT
Start: 2024-04-08 | End: 2024-04-11 | Stop reason: HOSPADM

## 2024-04-08 RX ORDER — CEFTRIAXONE 1 G/1
1 INJECTION, POWDER, FOR SOLUTION INTRAMUSCULAR; INTRAVENOUS EVERY 24 HOURS
Status: DISCONTINUED | OUTPATIENT
Start: 2024-04-08 | End: 2024-04-10

## 2024-04-08 RX ORDER — AMOXICILLIN 250 MG
2 CAPSULE ORAL 2 TIMES DAILY PRN
Status: DISCONTINUED | OUTPATIENT
Start: 2024-04-08 | End: 2024-04-11 | Stop reason: HOSPADM

## 2024-04-08 RX ORDER — FUROSEMIDE 40 MG
40 TABLET ORAL DAILY
COMMUNITY

## 2024-04-08 RX ORDER — FUROSEMIDE 20 MG
40 TABLET ORAL DAILY
Status: DISCONTINUED | OUTPATIENT
Start: 2024-04-09 | End: 2024-04-11 | Stop reason: HOSPADM

## 2024-04-08 RX ORDER — HYDRALAZINE HYDROCHLORIDE 20 MG/ML
10 INJECTION INTRAMUSCULAR; INTRAVENOUS EVERY 4 HOURS PRN
Status: DISCONTINUED | OUTPATIENT
Start: 2024-04-08 | End: 2024-04-11 | Stop reason: HOSPADM

## 2024-04-08 RX ORDER — ONDANSETRON 2 MG/ML
4 INJECTION INTRAMUSCULAR; INTRAVENOUS EVERY 6 HOURS PRN
Status: DISCONTINUED | OUTPATIENT
Start: 2024-04-08 | End: 2024-04-11 | Stop reason: HOSPADM

## 2024-04-08 RX ORDER — AMLODIPINE BESYLATE 5 MG/1
5 TABLET ORAL DAILY
Status: DISCONTINUED | OUTPATIENT
Start: 2024-04-09 | End: 2024-04-11 | Stop reason: HOSPADM

## 2024-04-08 RX ORDER — LISINOPRIL 5 MG/1
5 TABLET ORAL DAILY
Status: DISCONTINUED | OUTPATIENT
Start: 2024-04-09 | End: 2024-04-11 | Stop reason: HOSPADM

## 2024-04-08 RX ORDER — CLOPIDOGREL BISULFATE 75 MG/1
75 TABLET ORAL DAILY
Status: DISCONTINUED | OUTPATIENT
Start: 2024-04-09 | End: 2024-04-11 | Stop reason: HOSPADM

## 2024-04-08 RX ORDER — DEXTROSE MONOHYDRATE 25 G/50ML
25-50 INJECTION, SOLUTION INTRAVENOUS
Status: DISCONTINUED | OUTPATIENT
Start: 2024-04-08 | End: 2024-04-11 | Stop reason: HOSPADM

## 2024-04-08 RX ORDER — MAGNESIUM OXIDE 400 MG/1
400 TABLET ORAL DAILY
Status: DISCONTINUED | OUTPATIENT
Start: 2024-04-09 | End: 2024-04-11 | Stop reason: HOSPADM

## 2024-04-08 RX ORDER — ONDANSETRON 4 MG/1
4 TABLET, ORALLY DISINTEGRATING ORAL EVERY 6 HOURS PRN
Status: DISCONTINUED | OUTPATIENT
Start: 2024-04-08 | End: 2024-04-11 | Stop reason: HOSPADM

## 2024-04-08 RX ORDER — ASPIRIN 81 MG/1
81 TABLET ORAL DAILY
Status: DISCONTINUED | OUTPATIENT
Start: 2024-04-09 | End: 2024-04-11 | Stop reason: HOSPADM

## 2024-04-08 RX ORDER — NICOTINE POLACRILEX 4 MG
15-30 LOZENGE BUCCAL
Status: DISCONTINUED | OUTPATIENT
Start: 2024-04-08 | End: 2024-04-11 | Stop reason: HOSPADM

## 2024-04-08 RX ORDER — TRAZODONE HYDROCHLORIDE 50 MG/1
50 TABLET, FILM COATED ORAL AT BEDTIME
Status: DISCONTINUED | OUTPATIENT
Start: 2024-04-08 | End: 2024-04-11 | Stop reason: HOSPADM

## 2024-04-08 RX ORDER — ACETAMINOPHEN 325 MG/1
650 TABLET ORAL EVERY 4 HOURS PRN
Status: DISCONTINUED | OUTPATIENT
Start: 2024-04-08 | End: 2024-04-11 | Stop reason: HOSPADM

## 2024-04-08 RX ORDER — PRAMIPEXOLE DIHYDROCHLORIDE 0.25 MG/1
0.25 TABLET ORAL AT BEDTIME
Status: DISCONTINUED | OUTPATIENT
Start: 2024-04-08 | End: 2024-04-11 | Stop reason: HOSPADM

## 2024-04-08 RX ADMIN — SODIUM CHLORIDE 500 ML: 9 INJECTION, SOLUTION INTRAVENOUS at 21:30

## 2024-04-08 RX ADMIN — CEFTRIAXONE SODIUM 1 G: 1 INJECTION, POWDER, FOR SOLUTION INTRAMUSCULAR; INTRAVENOUS at 22:35

## 2024-04-08 ASSESSMENT — ACTIVITIES OF DAILY LIVING (ADL)
ADLS_ACUITY_SCORE: 38
ADLS_ACUITY_SCORE: 36

## 2024-04-08 NOTE — ED TRIAGE NOTES
The patient arrives via EMS from home where he lives independently with family. The patient has had a cough, fever and weakness x 2 days. Hypertensive in route,

## 2024-04-08 NOTE — ED PROVIDER NOTES
"EMERGENCY DEPARTMENT ENCOUNTER      NAME: Maikol Glaser  AGE: 83 year old male  YOB: 1940  MRN: [id number]  EVALUATION DATE & TIME: No admission date for patient encounter.    PCP: Christopher Banda    ED PROVIDER: Yash Ovalles M.D.      Chief Complaint   Patient presents with    Cough    Fever    Generalized Weakness         FINAL IMPRESSION:  Fever  Weakness    ED COURSE & MEDICAL DECISION MAKING:    Pertinent Labs & Imaging studies reviewed. (See chart for details)  83 year old male presents to the Emergency Department for evaluation of fevers, cough, shortness of breath.  Symptoms states symptoms been worsening for the last few weeks.  With this is got increasingly weak.  Did have a fall over the weekend.  Denies any injuries..  Patient reports living at home with this son and wife.  Denies any obvious changes in medications recently other than starting some water pills a few weeks ago for swelling in his legs..  Does report long history of Parkinson's disease with things \"gradually worsening\".  Patient is a very frail appearing elderly male in moderate distress.  Patient with bronchitic cough.  Lungs diminished but clear.  Card exam unremarkable.  Mild lower extremity edema primarily to the feet.  Will obtain baseline blood work to assess for contributory electrolyte imbalance or anemia.  Will also obtain troponin and BNP for contributions to shortness of breath.  Chest x-ray obtained assess for infiltrative disease.  Will also obtain swab for COVID/RSV/influenza.  Patient likely will require hospitalization.  Patient appears non toxic with stable vitals signs.       6:20 PM I met with the patient for the initial interview and physical examination. Discussed plan for treatment and workup in the ED.    7:14 PM.  COVID/RSV/influenza swab negative.  Remainder of laboratory evaluation pending.  7:58 PM.  CBC unremarkable.  Given patient's general weakness and presentation plan will be for " hospitalization.  May require placement as family does not appear to be capable to care for him presently.  Call placed to the admitting physician  8:15 PM.  Patient discussed with my associate at end of shift.    At the conclusion of the encounter I discussed the results of all of the tests and the disposition. The questions were answered and return precautions provided. The patient or family acknowledged understanding and was agreeable with the care plan.       PPE: Provider wore paper mask.     MEDICATIONS GIVEN IN THE EMERGENCY:  Medications - No data to display    NEW PRESCRIPTIONS STARTED AT TODAY'S ER VISIT  New Prescriptions    No medications on file          =================================================================    HPI  Maikol Glaser is a 83 year old male with a pertient medical history of Parkinson's, hypertension, diabetes, dyslipidemia, left MCA stroke with right-sided weakness who presents to the ED for evaluation of worsening cough and general dyspnea.  Patient reports fever and cough for 2 days.  States he has been having difficulty ambulating due to general malaise.  Typically being cared for by his wife and son.  Review of records indicate patient seen on 3/4/2024 and vascular clinic.  Patient under surveillance for carotid artery stenosis.  Patient underwent left carotid endarterectomy in August 2021.    REVIEW OF SYSTEMS   Constitutional:  Denies fever, chills  Respiratory: Reports productive cough with increased work of breathing  Cardiovascular:  Denies chest pain, palpitations  GI:  Denies abdominal pain, nausea, vomiting, or change in bowel or bladder habits   Musculoskeletal:  Denies any new muscle/joint swelling  Skin:  Denies rash   Neurologic:  Denies new focal weakness  All systems negative except as marked.     PAST MEDICAL HISTORY:  Past Medical History:   Diagnosis Date    Cerebral infarction (H)     Diabetes (H)     HLD (hyperlipidemia)     HTN (hypertension)      Hyperlipidemia     Hypertension     Intracranial hemorrhage (H) 5/26/2021    Stroke (H)     TIA (transient ischemic attack)        PAST SURGICAL HISTORY:  Past Surgical History:   Procedure Laterality Date    ENDARTERECTOMY CAROTID Left 8/11/2021    Procedure: ENDARTERECTOMY, CAROTID, with electroencephalogram and intraoperative ultrasound;  Surgeon: Jessica Lemon MD;  Location: [facility] Main OR         CURRENT MEDICATIONS:    No current facility-administered medications for this encounter.    Current Outpatient Medications:     acetaminophen (TYLENOL) 500 MG tablet, Take 1,000 mg by mouth every 6 hours as needed for mild pain , Disp: , Rfl:     amLODIPine (NORVASC) 5 MG tablet, Take 1 tablet (5 mg) by mouth daily, Disp: 30 tablet, Rfl: 0    aspirin (ASA) 81 MG EC tablet, 81 mg, Disp: , Rfl:     carbidopa-levodopa (SINEMET)  MG tablet, Take 1 tablet by mouth 4 times daily Takes at 0800, 1200, 1600, 2000, Disp: , Rfl:     clopidogrel (PLAVIX) 75 MG tablet, , Disp: , Rfl:     folic acid (FOLVITE) 400 MCG tablet, , Disp: , Rfl:     glipiZIDE (GLUCOTROL) 5 MG tablet, 1/4 tab, Disp: , Rfl:     lisinopril (ZESTRIL) 5 MG tablet, Take 5 mg by mouth daily, Disp: , Rfl:     magnesium oxide (MAG-OX) 400 MG tablet, 1 tab(s), Disp: , Rfl:     metFORMIN (GLUCOPHAGE) 500 MG tablet, TAKE 2 TABLETS BY MOUTH TWICE A DAY, Disp: , Rfl:     metoprolol succinate ER (TOPROL-XL) 50 MG 24 hr tablet, Take 50 mg by mouth daily, Disp: , Rfl:     multivitamin (CENTRUM SILVER) tablet, Take 1 tablet by mouth daily, Disp: , Rfl:     pramipexole (MIRAPEX) 0.25 MG tablet, 1 tablet, Disp: , Rfl:     simvastatin (ZOCOR) 20 MG tablet, 1 tab(s), Disp: , Rfl:     traZODone (DESYREL) 50 MG tablet, 1 tablet at bedtime as needed, Disp: , Rfl:     ALLERGIES:  Allergies   Allergen Reactions    Penicillins Rash     Childhood rxn.           FAMILY HISTORY:  Family History   Problem Relation Age of Onset    Bone Cancer Mother        SOCIAL HISTORY:  "  Social History     Socioeconomic History    Marital status:    Tobacco Use    Smoking status: Never    Smokeless tobacco: Never   Substance and Sexual Activity    Alcohol use: Not Currently    Drug use: Not Currently       VITALS:  Patient Vitals for the past 24 hrs:   BP Temp Temp src Pulse Resp SpO2 Height Weight   04/08/24 1627 -- -- -- -- -- -- 1.854 m (6' 1\") 108 kg (238 lb 1.6 oz)   04/08/24 1622 128/64 98.8  F (37.1  C) Oral 63 12 96 % -- --        PHYSICAL EXAM    Constitutional:  Awake, alert, in mild to moderate apparent distress  HENT:  Normocephalic, Atraumatic. Bilateral external ears normal. Oropharynx moist. Nose normal. Neck- Normal range of motion with no guarding,  Supple, No stridor.   Eyes:  PERRL, EOMI with no signs of entrapment, Conjunctiva normal, No discharge.   Respiratory:  Normal breath sounds, mild respiratory distress, No wheezing.  Bronchitic cough noted  Cardiovascular:  Normal heart rate, Normal rhythm, No appreciable rubs or gallops.   GI:  Soft, No tenderness, No distension, No palpable masses  Musculoskeletal: No edema. Good range of motion in all major joints. No tenderness to palpation or major deformities noted.  Integument:  Warm, Dry, No erythema, No rash.   Neurologic:  Alert & oriented, mild right hemiplegia  Psychiatric:  Affect normal, Judgment normal, Mood normal.     LAB:  All pertinent labs reviewed and interpreted.  Results for orders placed or performed during the hospital encounter of 04/08/24   Symptomatic Influenza A/B, RSV, & SARS-CoV2 PCR (COVID-19) Nasopharyngeal    Specimen: Nasopharyngeal; Swab   Result Value Ref Range    Influenza A PCR Negative Negative    Influenza B PCR Negative Negative    RSV PCR Negative Negative    SARS CoV2 PCR Negative Negative       RADIOLOGY:  Reviewed all pertinent imaging. Please see official radiology report.  Chest XR,  PA & LAT    (Results Pending)       EKG:    Normal sinus rhythm.  Rate of 64.  Right bundle branch " morphology.  No acute ST segment abnormalities.  Unchanged compared to November 15, 2023  I have independently reviewed and interpreted the EKG(s) documented above.           I, Brooke Cox, am serving as a scribe to document services personally performed by Yash Ovalles MD, based on my observation and the provider's statements to me. I, Yash Ovalles MD attest that Brooke Cox is acting in a scribe capacity, has observed my performance of the services and has documented them in accordance with my direction.    Yash Ovalles M.D.  Emergency Medicine  [facility] EMERGENCY DEPARTMENT     Yash Ovalles MD  04/09/24 8743

## 2024-04-09 ENCOUNTER — APPOINTMENT (OUTPATIENT)
Dept: OCCUPATIONAL THERAPY | Facility: HOSPITAL | Age: 84
End: 2024-04-09
Attending: HOSPITALIST
Payer: COMMERCIAL

## 2024-04-09 ENCOUNTER — APPOINTMENT (OUTPATIENT)
Dept: PHYSICAL THERAPY | Facility: HOSPITAL | Age: 84
End: 2024-04-09
Attending: HOSPITALIST
Payer: COMMERCIAL

## 2024-04-09 PROBLEM — N30.00 ACUTE CYSTITIS WITHOUT HEMATURIA: Status: ACTIVE | Noted: 2021-05-26

## 2024-04-09 LAB
ANION GAP SERPL CALCULATED.3IONS-SCNC: 13 MMOL/L (ref 7–15)
BUN SERPL-MCNC: 32.7 MG/DL (ref 8–23)
CALCIUM SERPL-MCNC: 8.5 MG/DL (ref 8.8–10.2)
CHLORIDE SERPL-SCNC: 101 MMOL/L (ref 98–107)
CREAT SERPL-MCNC: 1.41 MG/DL (ref 0.67–1.17)
DEPRECATED HCO3 PLAS-SCNC: 22 MMOL/L (ref 22–29)
EGFRCR SERPLBLD CKD-EPI 2021: 50 ML/MIN/1.73M2
GLUCOSE BLDC GLUCOMTR-MCNC: 101 MG/DL (ref 70–99)
GLUCOSE BLDC GLUCOMTR-MCNC: 137 MG/DL (ref 70–99)
GLUCOSE BLDC GLUCOMTR-MCNC: 146 MG/DL (ref 70–99)
GLUCOSE BLDC GLUCOMTR-MCNC: 95 MG/DL (ref 70–99)
GLUCOSE SERPL-MCNC: 146 MG/DL (ref 70–99)
POTASSIUM SERPL-SCNC: 4.6 MMOL/L (ref 3.4–5.3)
SODIUM SERPL-SCNC: 136 MMOL/L (ref 135–145)

## 2024-04-09 PROCEDURE — 97165 OT EVAL LOW COMPLEX 30 MIN: CPT | Mod: GO

## 2024-04-09 PROCEDURE — 97162 PT EVAL MOD COMPLEX 30 MIN: CPT | Mod: GP

## 2024-04-09 PROCEDURE — G0378 HOSPITAL OBSERVATION PER HR: HCPCS

## 2024-04-09 PROCEDURE — 250N000011 HC RX IP 250 OP 636: Performed by: HOSPITALIST

## 2024-04-09 PROCEDURE — 82962 GLUCOSE BLOOD TEST: CPT

## 2024-04-09 PROCEDURE — 97110 THERAPEUTIC EXERCISES: CPT | Mod: GP

## 2024-04-09 PROCEDURE — 96366 THER/PROPH/DIAG IV INF ADDON: CPT

## 2024-04-09 PROCEDURE — 250N000013 HC RX MED GY IP 250 OP 250 PS 637: Performed by: HOSPITALIST

## 2024-04-09 PROCEDURE — 272N000202 HC AEROBIKA WITH MANOMETER

## 2024-04-09 PROCEDURE — 36415 COLL VENOUS BLD VENIPUNCTURE: CPT

## 2024-04-09 PROCEDURE — 80048 BASIC METABOLIC PNL TOTAL CA: CPT

## 2024-04-09 PROCEDURE — 999N000157 HC STATISTIC RCP TIME EA 10 MIN

## 2024-04-09 PROCEDURE — 99233 SBSQ HOSP IP/OBS HIGH 50: CPT | Mod: FS

## 2024-04-09 PROCEDURE — 99207 PR APP CREDIT; MD BILLING SHARED VISIT: CPT | Mod: FS | Performed by: EMERGENCY MEDICINE

## 2024-04-09 PROCEDURE — 250N000012 HC RX MED GY IP 250 OP 636 PS 637: Performed by: HOSPITALIST

## 2024-04-09 PROCEDURE — 96376 TX/PRO/DX INJ SAME DRUG ADON: CPT

## 2024-04-09 PROCEDURE — 94799 UNLISTED PULMONARY SVC/PX: CPT

## 2024-04-09 RX ORDER — ALBUTEROL SULFATE 5 MG/ML
2.5 SOLUTION RESPIRATORY (INHALATION) EVERY 6 HOURS PRN
Status: DISCONTINUED | OUTPATIENT
Start: 2024-04-09 | End: 2024-04-10

## 2024-04-09 RX ORDER — GUAIFENESIN 600 MG/1
600 TABLET, EXTENDED RELEASE ORAL 2 TIMES DAILY
Status: DISCONTINUED | OUTPATIENT
Start: 2024-04-09 | End: 2024-04-11 | Stop reason: HOSPADM

## 2024-04-09 RX ORDER — BENZONATATE 100 MG/1
100 CAPSULE ORAL 3 TIMES DAILY PRN
Status: DISCONTINUED | OUTPATIENT
Start: 2024-04-09 | End: 2024-04-11 | Stop reason: HOSPADM

## 2024-04-09 RX ORDER — GUAIFENESIN 200 MG/10ML
200 LIQUID ORAL EVERY 4 HOURS PRN
Status: DISCONTINUED | OUTPATIENT
Start: 2024-04-09 | End: 2024-04-11 | Stop reason: HOSPADM

## 2024-04-09 RX ADMIN — MAGNESIUM OXIDE TAB 400 MG (241.3 MG ELEMENTAL MG) 400 MG: 400 (241.3 MG) TAB at 09:16

## 2024-04-09 RX ADMIN — BENZONATATE 100 MG: 100 CAPSULE ORAL at 01:40

## 2024-04-09 RX ADMIN — PRAMIPEXOLE DIHYDROCHLORIDE 0.25 MG: 0.25 TABLET ORAL at 22:22

## 2024-04-09 RX ADMIN — CEFTRIAXONE SODIUM 1 G: 1 INJECTION, POWDER, FOR SOLUTION INTRAMUSCULAR; INTRAVENOUS at 22:15

## 2024-04-09 RX ADMIN — METOPROLOL SUCCINATE 50 MG: 50 TABLET, EXTENDED RELEASE ORAL at 09:16

## 2024-04-09 RX ADMIN — CARBIDOPA AND LEVODOPA 1 TABLET: 25; 100 TABLET ORAL at 19:55

## 2024-04-09 RX ADMIN — TRAZODONE HYDROCHLORIDE 50 MG: 50 TABLET ORAL at 22:22

## 2024-04-09 RX ADMIN — GUAIFENESIN 600 MG: 600 TABLET ORAL at 09:33

## 2024-04-09 RX ADMIN — CARBIDOPA AND LEVODOPA 1 TABLET: 25; 100 TABLET ORAL at 16:51

## 2024-04-09 RX ADMIN — PRAMIPEXOLE DIHYDROCHLORIDE 0.25 MG: 0.25 TABLET ORAL at 00:05

## 2024-04-09 RX ADMIN — INSULIN ASPART 1 UNITS: 100 INJECTION, SOLUTION INTRAVENOUS; SUBCUTANEOUS at 13:06

## 2024-04-09 RX ADMIN — SIMVASTATIN 20 MG: 10 TABLET, FILM COATED ORAL at 22:23

## 2024-04-09 RX ADMIN — TRAZODONE HYDROCHLORIDE 50 MG: 50 TABLET ORAL at 00:05

## 2024-04-09 RX ADMIN — FUROSEMIDE 40 MG: 20 TABLET ORAL at 09:16

## 2024-04-09 RX ADMIN — GUAIFENESIN 200 MG: 200 SOLUTION ORAL at 01:40

## 2024-04-09 RX ADMIN — ACETAMINOPHEN 650 MG: 325 TABLET ORAL at 01:40

## 2024-04-09 RX ADMIN — CARBIDOPA AND LEVODOPA 1 TABLET: 25; 100 TABLET ORAL at 12:08

## 2024-04-09 RX ADMIN — CARBIDOPA AND LEVODOPA 1 TABLET: 25; 100 TABLET ORAL at 09:16

## 2024-04-09 RX ADMIN — LISINOPRIL 5 MG: 5 TABLET ORAL at 09:16

## 2024-04-09 RX ADMIN — ASPIRIN 81 MG: 81 TABLET, COATED ORAL at 09:16

## 2024-04-09 RX ADMIN — GUAIFENESIN 600 MG: 600 TABLET ORAL at 19:55

## 2024-04-09 RX ADMIN — CLOPIDOGREL BISULFATE 75 MG: 75 TABLET ORAL at 09:16

## 2024-04-09 RX ADMIN — AMLODIPINE BESYLATE 5 MG: 5 TABLET ORAL at 09:16

## 2024-04-09 RX ADMIN — ACETAMINOPHEN 650 MG: 325 TABLET ORAL at 10:35

## 2024-04-09 ASSESSMENT — ACTIVITIES OF DAILY LIVING (ADL)
DEPENDENT_IADLS:: CLEANING;COOKING;LAUNDRY;SHOPPING;MEAL PREPARATION;TRANSPORTATION
ADLS_ACUITY_SCORE: 44
ADLS_ACUITY_SCORE: 44
ADLS_ACUITY_SCORE: 57
ADLS_ACUITY_SCORE: 50
ADLS_ACUITY_SCORE: 44
ADLS_ACUITY_SCORE: 45
ADLS_ACUITY_SCORE: 57
ADLS_ACUITY_SCORE: 52
ADLS_ACUITY_SCORE: 45
ADLS_ACUITY_SCORE: 44
ADLS_ACUITY_SCORE: 58
ADLS_ACUITY_SCORE: 52
ADLS_ACUITY_SCORE: 44
ADLS_ACUITY_SCORE: 58
ADLS_ACUITY_SCORE: 44
ADLS_ACUITY_SCORE: 50
ADLS_ACUITY_SCORE: 44
ADLS_ACUITY_SCORE: 44

## 2024-04-09 NOTE — ED NOTES
Patient sitting upright in bed, ate breakfast tray. Patient denies any urge to void, bladder scan done by EDT and straight cath inserted. Draining senait urine.  RT here explaining flutter care. Congested cough, non productive. Lungs anteriorly rhonchi present. PT in room with patient

## 2024-04-09 NOTE — PROGRESS NOTES
04/09/24 0845   Appointment Info   Signing Clinician's Name / Credentials (PT) Callie Sharif PT   Quick Adds   Quick Adds Certification   Living Environment   People in Home child(marlo), adult;spouse  (son works during day)   Current Living Arrangements house   Home Accessibility stairs to enter home   Number of Stairs, Main Entrance greater than 10 stairs  (13)   Stair Railings, Main Entrance railings safe and in good condition   Self-Care   Equipment Currently Used at Home walker, rolling;cane, straight   Fall history within last six months yes   Number of times patient has fallen within last six months 2   Activity/Exercise/Self-Care Comment has assist for amb. with FWW and with ADL/IADL's; [facility] and nursing for L calf wound   General Information   Onset of Illness/Injury or Date of Surgery 04/08/24   Referring Physician Dr. Coello   Patient/Family Therapy Goals Statement (PT) not fall   Pertinent History of Current Problem (include personal factors and/or comorbidities that impact the POC) fall, cystitis with hematuria, URI; PMH of PArkinson's, CVA with R hemiplegia   Existing Precautions/Restrictions fall   Cognition   Affect/Mental Status (Cognition) confused   Orientation Status (Cognition) oriented to;person;disoriented to;place;time   Follows Commands (Cognition) WFL   Range of Motion (ROM)   Range of Motion ROM deficits secondary to weakness   ROM Comment decreased R shld flex, R elbow ext, R wrist and finger ext   Strength (Manual Muscle Testing)   Strength (Manual Muscle Testing) Deficits observed during functional mobility   Strength Comments decreased R shld elevation, elbow ext, wrist/finger ext strength; generalized weakness BLE, unable to SLR   Bed Mobility   Bed Mobility supine-sit;sit-supine   Supine-Sit [place] (Bed Mobility) maximum assist (25% patient effort);verbal cues   Sit-Supine [place] (Bed Mobility) maximum assist (25% patient effort);verbal  cues   Transfers   Transfers sit-stand transfer   Sit-Stand Transfer   Sit-Stand [place] (Transfers) maximum assist (25% patient effort);verbal cues;nonverbal cues (demo/gesture)   Assistive Device (Sit-Stand Transfers) walker, front-wheeled   Comment, (Sit-Stand Transfer) max/mod assist x 3 trials from high ED cart; post. lean   Gait/Stairs (Locomotion)   Comment, (Gait/Stairs) unable to ambulate at this time   Balance   Balance Comments post. lean in standing with FWW requiring max assist; sitting cga/sba   Clinical Impression   Criteria for Skilled Therapeutic Intervention Yes, treatment indicated   PT Diagnosis (PT) impaired functional mobility   Influenced by the following impairments decreased strength, rom, balance, cognition   Functional limitations due to impairments bed mob., transfers, gait, stairs   Clinical Presentation (PT Evaluation Complexity) evolving   Clinical Presentation Rationale pt presents as medically diagnosed   Clinical Decision Making (Complexity) moderate complexity   Planned Therapy Interventions (PT) balance training;bed mobility training;gait training;home exercise program;neuromuscular re-education;patient/family education;stair training;strengthening;stretching;transfer training;wheelchair management/propulsion training   Risk & Benefits of therapy have been explained evaluation/treatment results reviewed;patient   PT Total Evaluation Time   PT Eval, Moderate Complexity Minutes (35417) 15   Therapy Certification   Start of care date 04/09/24   Certification date from 04/09/24   Certification date to 05/09/24   Medical Diagnosis URI   Physical Therapy Goals   PT Frequency 5x/week   PT Predicted Duration/Target Date for Goal Attainment 04/16/24   PT Goals Bed Mobility;Transfers;Gait;PT Goal 1   PT: Bed Mobility Supervision/stand-by assist;Supine to/from sit   PT: Transfers Minimal assist;Sit to/from stand;Assistive device   PT: Gait Minimal assist;25 feet;Rolling walker   PT:  Goal 1 pt will stand with FWW with cga x1min to prepare for transfers and gait   Interventions   Interventions Quick Adds Therapeutic Procedure   Therapeutic Procedure/Exercise   Ther. Procedure: strength, endurance, ROM, flexibillity Minutes (23246) 10   Symptoms Noted During/After Treatment fatigue   Treatment Detail/Skilled Intervention supine BLE AA ex x5-10 reps with tactile and verbal cuing and demonstration for technique   PT Discharge Planning   PT Plan LE ex, bed mob., sit<>Stand and gait with FWW chair follow   PT Discharge Recommendation (DC Rec) Transitional Care Facility   PT Rationale for DC Rec pt needs max assist for standing and unable to ambulate at this time; continue PT at TCU for strengthening and mobility training   PT Brief overview of current status max assist standing   PT Equipment Needed at Discharge wheelchair;walker, rolling;gait belt;hospital bed   Total Session Time   Timed Code Treatment Minutes 10   Total Session Time (sum of timed and untimed services) 25   M [facility]  OUTPATIENT PHYSICAL THERAPY EVALUATION  PLAN OF TREATMENT FOR OUTPATIENT REHABILITATION  (COMPLETE FOR INITIAL CLAIMS ONLY)  Patient's Last Name, First Name, M.I.  YOB: 1940  Maikol Glaser                        Provider's Name  [facility] Medical Record No.  [id number]                             Onset Date:  04/08/24   Start of Care Date:  04/09/24   Type:     _X_PT   ___OT   ___SLP Medical Diagnosis:  URI              PT Diagnosis:  impaired functional mobility Visits from SOC:  1     See note for plan of treatment, functional goals and certification details    I CERTIFY THE NEED FOR THESE SERVICES FURNISHED UNDER        THIS PLAN OF TREATMENT AND WHILE UNDER MY CARE     (Physician co-signature of this document indicates review and certification of the therapy plan).

## 2024-04-09 NOTE — PHARMACY-ADMISSION MEDICATION HISTORY
Pharmacist Admission Medication History    Admission medication history is complete. The information provided in this note is only as accurate as the sources available at the time of the update.    Information Source(s): Family member, Clinic records, and CareEverywhere/SureScripts via in-person    Pertinent Information: Patient's wife sets up medications, but doesn't really know the names of them. Initially she told me he takes 7 in the morning, 2 at noon, 2 at 4pm, and 4 at bedtime. Later the morning number of tablets increased. I was able to confirm the 4 medications that patient takes at bedtime -- wife had a bag of his 8pm meds. I also know that patient takes Sinemet at noon and 4pm, but cannot determine what other medications are given at those times. Cannot confirm OTC medications such as aspirin and acetaminophen.    Changes made to PTA medication list:  Added: furosemide  Deleted: metformin  Changed: trazodone -- PRN to scheduled; added doses/frequencies to most other medications    Allergies reviewed with patient and updates made in EHR: yes    Medication History Completed By: Melissa Varela [facility] 4/8/2024 9:23 PM    PTA Med List   Medication Sig Note Last Dose    acetaminophen (TYLENOL) 325 MG tablet Take 650 mg by mouth every 6 hours as needed for mild pain  Unknown    amLODIPine (NORVASC) 5 MG tablet Take 1 tablet (5 mg) by mouth daily  4/8/2024    aspirin (ASA) 81 MG EC tablet Take 81 mg by mouth daily  4/8/2024    carbidopa-levodopa (SINEMET)  MG tablet Take 1 tablet by mouth 4 times daily Takes at 0800, 1200, 1600, 2000  4/8/2024 at 2000    clopidogrel (PLAVIX) 75 MG tablet Take 75 mg by mouth daily  4/8/2024    folic acid (FOLVITE) 400 MCG tablet Take 400 mcg by mouth daily  4/8/2024    furosemide (LASIX) 40 MG tablet Take 40 mg by mouth daily 4/8/2024: Prescribed last week. Patient's wife states it has helped the swelling in patient's ankles fairly significantly. 4/8/2024    glipiZIDE  (GLUCOTROL) 5 MG tablet Take 5 mg by mouth daily (with breakfast)  4/8/2024    lisinopril (ZESTRIL) 5 MG tablet Take 5 mg by mouth daily  4/8/2024    magnesium oxide (MAG-OX) 400 MG tablet Take 400 mg by mouth daily  4/8/2024    metoprolol succinate ER (TOPROL-XL) 50 MG 24 hr tablet Take 50 mg by mouth daily  4/8/2024    multivitamin (CENTRUM SILVER) tablet Take 1 tablet by mouth daily  4/8/2024    pramipexole (MIRAPEX) 0.25 MG tablet Take 0.25 mg by mouth at bedtime  4/7/2024    simvastatin (ZOCOR) 20 MG tablet Take 20 mg by mouth at bedtime  4/7/2024    traZODone (DESYREL) 50 MG tablet Take 50 mg by mouth at bedtime  4/7/2024

## 2024-04-09 NOTE — PROGRESS NOTES
Provided flutter to PT and education on how to use.  PT used flutter adequately.  Instructed PT to use every 2 hours and then cough afterwards.  PT verbalized understanding.    Stef Reyez, RT.  4/9/2024

## 2024-04-09 NOTE — PROGRESS NOTES
[facility]    Medicine Progress Note - Hospitalist Service    Date of Admission:  4/8/2024    Assessment & Plan   Maikol Glaser is a 83 year old male admitted on 4/8/2024. PMHx of HTN/HLD, TIA, CVA (2021), Parkinson's, DMT2, CKD3. He was brought to the ED by ambulance for evaluation of cough, fever, weakness for 2 days.    #Acute cystitis without hematuria  #Urinary retention  -Supposedly patient's wife or son straight cath him daily at home.   --Had no urinary output overnight or early morning, bladder scan completed by ED staff and straight catheter placed. Aranza urine 750mL output at 0900.   -UA showing possible infection, UC pending   -Continue IV ceftriaxone - likely will not discharge with antibiotics unless culture positive.     #URI  -Chest x-ray unremarkable  -Probable viral illness  -Respiratory panel (COVID, RSV, flu) negative   -Flutter valve, guaifenesin, Tessalon, albuterol neb as needed    #Generalized weakness  -Fall precautions  -PT/OT recommendations: TCU - CM to follow     #Parkinson's disease  -Continue PTA carbidopa-levodopa    #Type 2 diabetes mellitus without long-term insulin use  -Hemoglobin A1c 6.7  -Medium insulin resistance scale and accu-checks   -HOLD PTA glipizide     #CVA with residual right hemiplegia  -Continue PTA aspirin, clopidogrel, simvastatin    #Drug-induced platelet defect  -On PTA aspirin and clopidogrel  -Monitor for bleeding    #Chronic edema  #Stasis dermatitis  -There is a Mepilex on the left calf, however, no wound located there, patient reports there was a wound there prior  -Continue PTA furosemide    #Essential hypertension  -Continue PTA amlodipine, lisinopril, metoprolol    #Chronic kidney disease stage IIIa  -Avoid nephrotoxins and monitor renal function    #Obesity  -BMI 31.41       Observation Goals: -diagnostic tests and consults completed and resulted, -vital signs normal or at patient baseline, Nurse to notify provider when  "observation goals have been met and patient is ready for discharge.  Diet: Combination Diet Moderate Consistent Carb (60 g CHO per Meal) Diet; Low Saturated Fat Na <2400mg Diet    DVT Prophylaxis: Pneumatic Compression Devices and Ambulate every shift  Hunter Catheter: Not present  Lines: None     Cardiac Monitoring: None  Code Status: No CPR- Do NOT Intubate      Clinically Significant Risk Factors Present on Admission                # Drug Induced Platelet Defect: home medication list includes an antiplatelet medication   # Hypertension: Noted on problem list     # DMII: A1C = 6.7 % (Ref range: <5.7 %) within past 6 months    # Obesity: Estimated body mass index is 31.41 kg/m  as calculated from the following:    Height as of this encounter: 1.854 m (6' 1\").    Weight as of this encounter: 108 kg (238 lb 1.6 oz).              Disposition Plan      Expected Discharge Date: 04/09/2024                  The patient's care was discussed with the Attending Physician, Dr. Coello who independently met with and assessed the patient and is agreement with the assessment and plan.    Yarelis Duncan PA-C  Hospitalist Service  [facility]  Securely message with [employer] (more info)  Text page via [facility] Paging/Directory   ______________________________________________________________________    Interval History   Spoke with patient at bedside today. He reports he feels \"rough.\" Not able to breath very well and deep breathing and talking for extended periods of time causes him to cough. He reports no fevers/chills, lightheadedness, headaches. Denies chest pain or palpitations. Cough that is coarse but not productive. No abdominal pain, N/V/D. No changes in bladder/bowel habits. Wife and son have to straight cath him daily at home. He cannot urinate on his own for 2-3 years now.     Physical Exam   Vital Signs: Temp: 98.8  F (37.1  C) Temp src: Oral BP: (!) 151/87 Pulse: 59   Resp: 22 SpO2: 94 % O2 Device: " None (Room air)    Weight: 238 lbs 1.55 oz    Physical Exam  GENERAL: Well-developed, elderly male in no apparent distress.   EYES: Lids and lashes normal. Pupils equal. No conjunctivitis, injection or icterus.   HENT: Normocephalic, atraumatic. Mucous membranes dry.  RESPIRATORY: Coarse crackles noted bilaterally, worse in right apex. No accessory muscle use. No wheezing.    CARDIOVASCULAR: Regular rate and rhythm, no murmurs gallops or rubs. S1/S2 present.   ABDOMEN: Bowel sounds present, soft and non-tender to palpation. Non-distended. No obvious masses or herniations.   EXTREMITIES: No lower extremity edema.   NEURO: Alert and oriented to self and place. Unknown date.   SKIN: No rashes or lesions to exposed skin.   PSYCH: Appropriate mood and affect.      Medical Decision Making       60 MINUTES SPENT BY ME on the date of service doing chart review, history, exam, documentation & further activities per the note.      Data   ------------------------- PAST 24 HR DATA REVIEWED -----------------------------------------------    I have personally reviewed the following data over the past 24 hrs:    9.4  \   14.5   / 173     137 103 30.8 (H) /  112 (H)   4.8 24 1.50 (H) \     ALT: 7 AST: 25 AP: 135 TBILI: 0.5   ALB: 3.7 TOT PROTEIN: 6.7 LIPASE: N/A     Trop: 18 BNP: 472     TSH: N/A T4: N/A A1C: 6.7 (H)     Procal: 0.15 CRP: N/A Lactic Acid: N/A         Imaging results reviewed over the past 24 hrs:   Recent Results (from the past 24 hour(s))   Chest XR,  PA & LAT    Narrative    EXAM: XR CHEST 2 VIEWS  LOCATION: [facility]  DATE: 4/8/2024    INDICATION: cough, fever  COMPARISON: Two-view chest radiograph 11/15/2023      Impression    IMPRESSION: Upper limits normal cardiomediastinal silhouette, stable. No pulmonary vascular congestion. Bibasilar atelectasis. No other focal pulmonary consolidation or pleural effusion. No pneumothorax. Degenerative changes of the thoracic spine and   shoulders.  No acute osseous abnormality.

## 2024-04-09 NOTE — ED NOTES
EMERGENCY DEPARTMENT SIGN OUT NOTE        ED COURSE AND MEDICAL DECISION MAKING  Patient was signed out to me by Dr Yash Ovalles at 8:02 PM    In brief, Maikol Glaser is a 83 year old male who initially presented to the ED for evaluation of worsening cough and general dyspnea. Patient reports fever and cough for 2 days. States he has been having difficulty ambulating due to general malaise.  Typically being cared for by his wife and son at home and they are concerned about caring for him in his current state.    Review of records indicate patient seen on 3/4/2024 and vascular clinic. Patient under surveillance for carotid artery stenosis. Patient underwent left carotid endarterectomy in August 2021.     At time of sign out, patient was pending admission.    8:25 PM I spoke with Dr. Gondal, hospitalist who accepts the patient for admission.  Hospitalist team will follow-up on remainder of the pending labs and chest x-ray.  Patient and his family in agreement with plan for admission.    FINAL IMPRESSION    1. Generalized weakness    2. Upper respiratory tract infection, unspecified type        ED MEDS  Medications   sodium chloride 0.9% BOLUS 500 mL (has no administration in time range)       LAB  Labs Ordered and Resulted from Time of ED Arrival to Time of ED Departure   INFLUENZA A/B, RSV, & SARS-COV2 PCR - Normal       Result Value    Influenza A PCR Negative      Influenza B PCR Negative      RSV PCR Negative      SARS CoV2 PCR Negative     CBC WITH PLATELETS AND DIFFERENTIAL    WBC Count 9.4      RBC Count 4.98      Hemoglobin 14.5      Hematocrit 44.8      MCV 90      MCH 29.1      MCHC 32.4      RDW 14.0      Platelet Count 173      % Neutrophils 79      % Lymphocytes 10      % Monocytes 9      % Eosinophils 1      % Basophils 0      % Immature Granulocytes 1      NRBCs per 100 WBC 0      Absolute Neutrophils 7.5      Absolute Lymphocytes 0.9      Absolute Monocytes 0.8      Absolute Eosinophils 0.1       Absolute Basophils 0.0      Absolute Immature Granulocytes 0.1      Absolute NRBCs 0.0     COMPREHENSIVE METABOLIC PANEL   ROUTINE UA WITH MICROSCOPIC REFLEX TO CULTURE   NT PROBNP INPATIENT   TROPONIN T, HIGH SENSITIVITY   PROCALCITONIN         RADIOLOGY    Chest XR,  PA & LAT    (Results Pending)       DISCHARGE MEDS  New Prescriptions    No medications on file       Jessica Marvin MD  [facility] EMERGENCY DEPARTMENT  [facility] [facility] [address]  [phone]       Jessica Marvin MD  04/09/24 0130

## 2024-04-09 NOTE — H&P
[facility]    History and Physical - Hospitalist Service       Date of Admission:  4/8/2024    Assessment & Plan      Maikol Glaser is a 83 year old male admitted on 4/8/2024. He was brought to the ED by ambulance for evaluation of cough, fever, weakness for 2 days    #Acute cystitis without hematuria  #Urinary retention  -Patient self cath daily  -Follow-up urine culture  -Continue IV ceftriaxone    #URI  -Chest x-ray unremarkable  -Probable viral illness  -Check respiratory panel PCR  -Flutter valve, guaifenesin, Tessalon Perles, albuterol neb as needed    #Generalized weakness  -Fall precautions  -PT/OT evaluation and treatment    #Parkinson's disease  -Continue PTA carbidopa-levodopa    #Type 2 diabetes mellitus without long-term insulin use  -Hemoglobin A1c 6.7  -Medium insulin resistance scale  -Hold PTA glipizide to avoid hypoglycemia while on insulin    #CVA with residual right hemiplegia  -Continue PTA aspirin, clopidogrel, simvastatin    #Drug-induced platelet defect  -On PTA aspirin and clopidogrel  -Monitor for bleeding    #Chronic edema  #Stasis dermatitis  -There is a Mepilex on the left calf, however, I did not find a wound on that area and patient stated was placed for weeping  -Continue PTA furosemide    #Essential hypertension  -Continue PTA amlodipine, lisinopril, metoprolol    #Chronic kidney disease stage IIIa  -Avoid nephrotoxins and monitor renal function    #Obesity  -BMI 31.41     Observation Goals: -diagnostic tests and consults completed and resulted, -vital signs normal or at patient baseline, Nurse to notify provider when observation goals have been met and patient is ready for discharge.  Diet: Combination Diet Moderate Consistent Carb (60 g CHO per Meal) Diet; Low Saturated Fat Na <2400mg Diet    DVT Prophylaxis: Ambulate every shift  Hunter Catheter: Not present  Lines: None     Cardiac Monitoring: None  Code Status: No CPR- Do NOT Intubate           Disposition Plan      Expected Discharge Date: 04/09/2024                  Michael Carrillo MD  Hospitalist Service  [facility]  Securely message with edjing (more info)  Text page via [facility] Paging/Directory     ______________________________________________________________________    Chief Complaint   Generalized weakness, cough, fevers    History is obtained from the patient and electronic health record    History of Present Illness   Maikol Glaser is a 83 year old male who was brought to the ED by ambulance for evaluation of generalized weakness, cough and fevers for 2 days.  Past medical history of Parkinson's, type 2 diabetes, essential hypertension, chronic edema, venous stasis, hyperlipidemia, CVA with residual right-sided weakness, urinary retention, obesity, CKD.  Patient lives at home with his wife and son; denies tobacco, alcohol or drugs.  He self catheterizes several times a day and denies symptoms of dysuria or hematuria.  However, he has had a wet cough but unable to expectorate phlegm.  He denied chest pain, shortness of breath, sore throat, palpitations, nausea, vomiting or diarrhea.  He became generally weak but did not fall.  He had some weeping from the left leg and [facility]      Past Medical History    Past Medical History:   Diagnosis Date    Cerebral infarction (H)     Diabetes (H)     HLD (hyperlipidemia)     HTN (hypertension)     Hyperlipidemia     Hypertension     Intracranial hemorrhage (H) 5/26/2021    Stroke (H)     TIA (transient ischemic attack)        Past Surgical History   Past Surgical History:   Procedure Laterality Date    ENDARTERECTOMY CAROTID Left 8/11/2021    Procedure: ENDARTERECTOMY, CAROTID, with electroencephalogram and intraoperative ultrasound;  Surgeon: Jessica Lemon MD;  Location: [facility] OR       Prior to Admission Medications   Prior to Admission Medications   Prescriptions Last Dose Informant Patient  Reported? Taking?   acetaminophen (TYLENOL) 325 MG tablet Unknown  Yes Yes   Sig: Take 650 mg by mouth every 6 hours as needed for mild pain   amLODIPine (NORVASC) 5 MG tablet 4/8/2024  No Yes   Sig: Take 1 tablet (5 mg) by mouth daily   aspirin (ASA) 81 MG EC tablet 4/8/2024  Yes Yes   Sig: Take 81 mg by mouth daily   carbidopa-levodopa (SINEMET)  MG tablet 4/8/2024 at 2000  Yes Yes   Sig: Take 1 tablet by mouth 4 times daily Takes at 0800, 1200, 1600, 2000   clopidogrel (PLAVIX) 75 MG tablet 4/8/2024  Yes Yes   Sig: Take 75 mg by mouth daily   folic acid (FOLVITE) 400 MCG tablet 4/8/2024  Yes Yes   Sig: Take 400 mcg by mouth daily   furosemide (LASIX) 40 MG tablet 4/8/2024  Yes Yes   Sig: Take 40 mg by mouth daily   glipiZIDE (GLUCOTROL) 5 MG tablet 4/8/2024  Yes Yes   Sig: Take 5 mg by mouth daily (with breakfast)   lisinopril (ZESTRIL) 5 MG tablet 4/8/2024  Yes Yes   Sig: Take 5 mg by mouth daily   magnesium oxide (MAG-OX) 400 MG tablet 4/8/2024  Yes Yes   Sig: Take 400 mg by mouth daily   metoprolol succinate ER (TOPROL-XL) 50 MG 24 hr tablet 4/8/2024  Yes Yes   Sig: Take 50 mg by mouth daily   multivitamin (CENTRUM SILVER) tablet 4/8/2024  Yes Yes   Sig: Take 1 tablet by mouth daily   pramipexole (MIRAPEX) 0.25 MG tablet 4/7/2024  Yes Yes   Sig: Take 0.25 mg by mouth at bedtime   simvastatin (ZOCOR) 20 MG tablet 4/7/2024  Yes Yes   Sig: Take 20 mg by mouth at bedtime   traZODone (DESYREL) 50 MG tablet 4/7/2024  Yes Yes   Sig: Take 50 mg by mouth at bedtime      Facility-Administered Medications: None           Physical Exam   Vital Signs: Temp: 98.8  F (37.1  C) Temp src: Oral BP: (!) 155/69 Pulse: 61   Resp: 12 SpO2: 96 % O2 Device: None (Room air)    Weight: 238 lbs 1.55 oz    Constitutional: no apparent distress  Respiratory: no increased work of breathing and rhonchi diffuse  Cardiovascular: regular rate and rhythm  GI: normal bowel sounds  Skin: Stasis dermatitis  Musculoskeletal: Chronic  edema  Neurologic: Mental Status Exam:  Level of Alertness:   awake  Orientation:   person, place, time    Medical Decision Making       MANAGEMENT DISCUSSED with the following over the past 24 hours: 60       Data     I have personally reviewed the following data over the past 24 hrs:    9.4  \   14.5   / 173     137 103 30.8 (H) /  112 (H)   4.8 24 1.50 (H) \     ALT: 7 AST: 25 AP: 135 TBILI: 0.5   ALB: 3.7 TOT PROTEIN: 6.7 LIPASE: N/A     Trop: 18 BNP: 472     TSH: N/A T4: N/A A1C: 6.7 (H)     Procal: 0.15 CRP: N/A Lactic Acid: N/A         Imaging results reviewed over the past 24 hrs:   Recent Results (from the past 24 hour(s))   Chest XR,  PA & LAT    Narrative    EXAM: XR CHEST 2 VIEWS  LOCATION: [facility]  DATE: 4/8/2024    INDICATION: cough, fever  COMPARISON: Two-view chest radiograph 11/15/2023      Impression    IMPRESSION: Upper limits normal cardiomediastinal silhouette, stable. No pulmonary vascular congestion. Bibasilar atelectasis. No other focal pulmonary consolidation or pleural effusion. No pneumothorax. Degenerative changes of the thoracic spine and   shoulders. No acute osseous abnormality.

## 2024-04-09 NOTE — ED NOTES
"[facility] ED Handoff Report    ED Chief Complaint: Weakness, SOB    ED Diagnosis:  (R53.1) Generalized weakness  Comment:   Plan:     (J06.9) Upper respiratory tract infection, unspecified type  Comment:   Plan:        PMH:    Past Medical History:   Diagnosis Date    Cerebral infarction (H)     Diabetes (H)     HLD (hyperlipidemia)     HTN (hypertension)     Hyperlipidemia     Hypertension     Intracranial hemorrhage (H) 5/26/2021    Stroke (H)     TIA (transient ischemic attack)         Code Status:  No CPR- Do NOT Intubate     Falls Risk: Yes Band: Applied    Current Living Situation/Residence: lives with wife and son     Elimination Status: Continent: Yes and self cath at home with help of wife, son    Activity Level: SBA w/ walker    Patients Preferred Language:  English     Needed: No    Vital Signs:  BP (!) 155/71   Pulse 65   Temp 99.3  F (37.4  C)   Resp 22   Ht 1.854 m (6' 1\")   Wt 108 kg (238 lb 1.6 oz)   SpO2 96%   BMI 31.41 kg/m       Cardiac Rhythm:     Pain Score: 3/10    Is the Patient Confused:  No    Last Food or Drink: 04/09/24 at     Focused Assessment:  Elderly patient DNR-DNI lives at home with wife and son, came to ER for increased weakness and SOB. Covid negative, congested cough. Fall 3 days ago with pain at shoulder, usually walks with help of walker, too weak to get around. Lungs have scattered rhonchi, non productive  cough. Does need sputum sample if obtained    Hx of Parkinson, diabetic. Blood sugar at breakfast 95, no insulin. Blood sugar at lunch 146 will need Insulin coverage. Instructed on Flutter value, needs to be reminded to use it. Self caths at home. This am denied needing to be cath (last cath was 2200 yesterday) bladder scan showed 700 ml. EDT self cathed at 10:00 for 750 ml.    Tests Performed: Done: Labs and Imaging    Treatments Provided:  OT-PT    Family Dynamics/Concerns: No    Family Updated On Visitor Policy: Yes    Plan of Care Communicated " to Family: Yes    Who Was Updated about Plan of Care: wife and son in room    Belongings Checklist Done and Signed by Patient: Yes    Belongings Sent with Patient: yes    Medications sent with patient:     Covid: symptomatic, negative    Additional Information: Patient was not covered with insulin for lunch, tray not here.    RN: Keeley Hummel RN   4/9/2024 11:55 AM

## 2024-04-09 NOTE — PLAN OF CARE
Problem: Adult Inpatient Plan of Care  Goal: Optimal Comfort and Wellbeing  4/9/2024 1447 by Yaya Javier, RN  Outcome: Progressing  4/9/2024 1447 by Yaya Javier, RN  Outcome: Progressing    Goal Outcome Evaluation:      Plan of Care Reviewed With: patient     A&Ox4. Lethargic. Wheezes upon exertion. Self-caths. C/o pain on feet but declined pain meds. L PIV SL. BG of 146 upon arrival on the unit. Assist of 2 per PT notes. Continue POC.

## 2024-04-09 NOTE — CONSULTS
Care Management Initial Consult    General Information  Assessment completed with: Patient, Children,    Type of CM/SW Visit: Initial Assessment    Primary Care Provider verified and updated as needed: Yes   Readmission within the last 30 days: no previous admission in last 30 days      Reason for Consult: discharge planning  Advance Care Planning: Advance Care Planning Reviewed: present on chart          Communication Assessment  Patient's communication style: spoken language (English or Bilingual)             Cognitive  Cognitive/Neuro/Behavioral: WDL                      Living Environment:   People in home: spouse     Current living Arrangements: house      Able to return to prior arrangements: yes       Family/Social Support:  Care provided by: self  Provides care for: no one  Marital Status:   Wife          Description of Support System: Supportive    Support Assessment: Adequate family and caregiver support    Current Resources:   Patient receiving home care services: No     Community Resources:    Equipment currently used at home: walker, rolling, cane, straight  Supplies currently used at home:      Employment/Financial:  Employment Status: retired        Financial Concerns: none   Referral to Financial Worker: No       Does the patient's insurance plan have a 3 day qualifying hospital stay waiver?  No    Lifestyle & Psychosocial Needs:  Social Determinants of Health     Food Insecurity: Not on file   Depression: Not on file   Housing Stability: Not on file   Tobacco Use: Low Risk  (3/4/2024)    Patient History     Smoking Tobacco Use: Never     Smokeless Tobacco Use: Never     Passive Exposure: Not on file   Financial Resource Strain: Not on file   Alcohol Use: Not At Risk (5/25/2021)    AUDIT-C     Frequency of Alcohol Consumption: Never     Average Number of Drinks: Not on file     Frequency of Binge Drinking: Not on file   Transportation Needs: Not on file   Physical Activity: Not on file    Interpersonal Safety: Not on file   Stress: Not on file   Social Connections: Not on file       Functional Status:  Prior to admission patient needed assistance:   Dependent ADLs:: Ambulation-cane, Ambulation-walker, Bathing  Dependent IADLs:: Cleaning, Cooking, Laundry, Shopping, Meal Preparation, Transportation       Mental Health Status:  Mental Health Status: No Current Concerns       Chemical Dependency Status:  Chemical Dependency Status: No Current Concerns             Values/Beliefs:  Spiritual, Cultural Beliefs, Denominational Practices, Values that affect care: yes               Additional Information:  SW met with pt to introduce role of CM, complete  initial assessment, and to discuss needs at time of d/c. Pt comes from home; lives with spouse, and noted to be independent at baseline. Therapy recommending TCU at this time; pt unsure at this time. List of choices left in room. CM to continue to follow through hospitalization and for discharge planning..  1:59 PM    Brenna Kjellberg, BSW LSW  4/9/2024

## 2024-04-09 NOTE — PLAN OF CARE
PRIMARY DIAGNOSIS: Upper respiratory tract infection  OUTPATIENT/OBSERVATION GOALS TO BE MET BEFORE DISCHARGE:  ADLs back to baseline: No    Activity and level of assistance: Up with maximum assistance. Consider SW and/or PT evaluation.     Pain status: Mild pain in bilateral LE. Decline pain med.     Return to near baseline physical activity: No     Discharge Planner Nurse   Safe discharge environment identified: No  Barriers to discharge: Yes       Entered by: Yaya Javier RN 04/09/2024 2:45 PM     Please review provider order for any additional goals.   Nurse to notify provider when observation goals have been met and patient is ready for discharge.

## 2024-04-09 NOTE — PROGRESS NOTES
04/09/24 0955   Appointment Info   Signing Clinician's Name / Credentials (OT) Jocelin Gonzalez OTR/L   Quick Adds   Quick Adds Certification   Living Environment   People in Home child(marlo), adult;spouse  (adult son)   Current Living Arrangements house   Home Accessibility stairs to enter home   Number of Stairs, Main Entrance greater than 10 stairs   Stair Railings, Main Entrance railings safe and in good condition   Transportation Anticipated agency;family or friend will provide   Living Environment Comments pt has a stair lift.  Son provides transportation to appointments with stair lift and wheelchair.   Self-Care   Usual Activity Tolerance moderate   Current Activity Tolerance poor   Equipment Currently Used at Home walker, rolling;cane, straight   Fall history within last six months yes   Activity/Exercise/Self-Care Comment pt is independent with walking in home with walker to bathroom and livingroom.  Pt straight caths self on the toilet.  Pt has a shower chair with grab bars and [facility].  Dresses self mostly independent with occasional assist and always gets assist with footware.  L hand dominant with significant impairments on R from prior CVA.  Wife reports for the past few days pt has been having a lot more difficulty with moblity falling forward when standing/walking   Instrumental Activities of Daily Living (IADL)   IADL Comments wife and son provide all IADLs.  Wife makes meals and brings them to him in the livingroom where he sits in his recliner.  Wife brings his meds to him   General Information   Onset of Illness/Injury or Date of Surgery 04/08/24   Referring Physician Michael Carrillo MD   Patient/Family Therapy Goal Statement (OT) pts goal is to get strong enough to return home, walking safety in the home.   Additional Occupational Profile Info/Pertinent History of Current Problem per chart:  83 year old male admitted on 4/8/2024. He was brought to  the ED by ambulance for evaluation of cough, fever, weakness for 2 days, fall, cystitis with hematuria, URI; PMH of Parkinson's, CVA with R hemiplegia   Existing Precautions/Restrictions fall   Limitations/Impairments safety/cognitive  (R side hemiplegia)   General Observations and Info supine in bed at arrival.  significant difficulties getting to sitting position.   Cognitive Status Examination   Orientation Status   (April 4 2014)   Follows Commands follows one-step commands;75-90% accuracy;increased processing time needed;delayed response/completion   Cognitive Status Comments cognitive impairments at baseline   Range of Motion Comprehensive   Comment, General Range of Motion R side impaired   Strength Comprehensive (MMT)   Comment, General Manual Muscle Testing (MMT) Assessment generalized weakness impairing functional moblity   Coordination   Upper Extremity Coordination Right UE impaired   Gross Motor Coordination R side impaired   Fine Motor Coordination R side impaired.  left side dominanat   Functional Limitations Impaired ability to perform bilateral tasks;Fine motor ADL performance impaired   Coordination Comments pt appears to be at current baseline   Bed Mobility   Bed Mobility supine-sit   Supine-Sit [place] (Bed Mobility) maximum assist (25% patient effort)   Assistive Device (Bed Mobility) bed rails   Comment (Bed Mobility) attempted supine to sit.  Pt expressed having a lot of LE pain and weakness and declined to complete transfer.  Assisted back to bed for bed positioning and comfort   Transfers   Transfer Comments declined at this time   Activities of Daily Living   BADL Assessment/Intervention lower body dressing   Lower Body Dressing Assessment/Training   Comment, (Lower Body Dressing) attemtped LE dressing.  Willing but impaired at baseline.   [place] Level (Lower Body Dressing) dependent (less than 25% patient effort)   Clinical Impression   Criteria for Skilled Therapeutic  Interventions Met (OT) Yes, treatment indicated   OT Diagnosis reduced functional mobility and ADL functioning   Influenced by the following impairments weakness with upper respiratory infection   OT Problem List-Impairments impacting ADL problems related to;activity tolerance impaired;balance;cognition;mobility;inability to direct their own care;coordination;pain;strength   Assessment of Occupational Performance 5 or more Performance Deficits   Planned Therapy Interventions (OT) ADL retraining;bed mobility training;strengthening;transfer training;home program guidelines;progressive activity/exercise   Clinical Decision Making Complexity (OT) detailed assessment/moderate complexity   Risk & Benefits of therapy have been explained evaluation/treatment results reviewed;care plan/treatment goals reviewed;risks/benefits reviewed;current/potential barriers reviewed;participants voiced agreement with care plan;participants included;patient;spouse/significant other;son   Clinical Impression Comments pt would benefit from further therapy.  Not at current baseline functioning.  Family cannot provide cares for him at home at this level   OT Total Evaluation Time   OT Eval, Moderate Complexity Minutes (28128) 13   Therapy Certification   Medical Diagnosis upper respiratory infection, weakness, and falls   Start of Care Date 04/09/24   Certification date from 04/09/24   Certification date to 05/09/24   OT Goals   Therapy Frequency (OT) 4 times/week   OT Predicted Duration/Target Date for Goal Attainment 04/15/24   OT Goals Lower Body Dressing;Transfers;Upper Body Dressing   OT: Upper Body Dressing Supervision/stand-by assist;Minimal assist   OT: Lower Body Dressing Supervision/stand-by assist;Minimal assist   OT: Transfer Supervision/stand-by assist;with assistive device   Interventions   Interventions Quick Adds Self-Care/Home Management   OT Discharge Planning   OT Plan dressing, grooming, STS, bed mob, toilet transfer (pt  straight caths from toilet)   OT Discharge Recommendation (DC Rec) Transitional Care Facility   OT Rationale for DC Rec pt is not at baseline and family is not able to care for him at this current state.   OT Brief overview of current status max A supine to sit and LE dressing   Total Session Time   Total Session Time (sum of timed and untimed services) 13    [facility]  OUTPATIENT OCCUPATIONAL THERAPY  EVALUATION  PLAN OF TREATMENT FOR OUTPATIENT REHABILITATION  (COMPLETE FOR INITIAL CLAIMS ONLY)  Patient's Last Name, First Name, M.I.  YOB: 1940  Maikol Glaser                          Provider's Name  [facility] Medical Record No.  [id number]                             Onset Date:  04/08/24   Start of Care Date:  04/09/24   Type:     ___PT   _X_OT   ___SLP Medical Diagnosis:  upper respiratory infection, weakness, and falls                    OT Diagnosis:  reduced functional mobility and ADL functioning Visits from SOC:  1     See note for plan of treatment, functional goals and certification details    I CERTIFY THE NEED FOR THESE SERVICES FURNISHED UNDER        THIS PLAN OF TREATMENT AND WHILE UNDER MY CARE     (Physician co-signature of this document indicates review and certification of the therapy plan).

## 2024-04-10 ENCOUNTER — APPOINTMENT (OUTPATIENT)
Dept: OCCUPATIONAL THERAPY | Facility: HOSPITAL | Age: 84
End: 2024-04-10
Payer: COMMERCIAL

## 2024-04-10 ENCOUNTER — APPOINTMENT (OUTPATIENT)
Dept: PHYSICAL THERAPY | Facility: HOSPITAL | Age: 84
End: 2024-04-10
Payer: COMMERCIAL

## 2024-04-10 LAB
GLUCOSE BLDC GLUCOMTR-MCNC: 104 MG/DL (ref 70–99)
GLUCOSE BLDC GLUCOMTR-MCNC: 113 MG/DL (ref 70–99)
GLUCOSE BLDC GLUCOMTR-MCNC: 117 MG/DL (ref 70–99)
GLUCOSE BLDC GLUCOMTR-MCNC: 146 MG/DL (ref 70–99)
GLUCOSE BLDC GLUCOMTR-MCNC: 160 MG/DL (ref 70–99)

## 2024-04-10 PROCEDURE — 250N000013 HC RX MED GY IP 250 OP 250 PS 637: Performed by: HOSPITALIST

## 2024-04-10 PROCEDURE — 94640 AIRWAY INHALATION TREATMENT: CPT

## 2024-04-10 PROCEDURE — 99233 SBSQ HOSP IP/OBS HIGH 50: CPT | Mod: FS | Performed by: EMERGENCY MEDICINE

## 2024-04-10 PROCEDURE — 250N000009 HC RX 250

## 2024-04-10 PROCEDURE — 97110 THERAPEUTIC EXERCISES: CPT | Mod: GO

## 2024-04-10 PROCEDURE — 97110 THERAPEUTIC EXERCISES: CPT | Mod: GP

## 2024-04-10 PROCEDURE — 82962 GLUCOSE BLOOD TEST: CPT

## 2024-04-10 PROCEDURE — G0378 HOSPITAL OBSERVATION PER HR: HCPCS

## 2024-04-10 PROCEDURE — 97116 GAIT TRAINING THERAPY: CPT | Mod: GP

## 2024-04-10 PROCEDURE — 250N000013 HC RX MED GY IP 250 OP 250 PS 637

## 2024-04-10 PROCEDURE — 99207 PR APP CREDIT; MD BILLING SHARED VISIT: CPT | Mod: FS

## 2024-04-10 PROCEDURE — 97535 SELF CARE MNGMENT TRAINING: CPT | Mod: GO

## 2024-04-10 RX ORDER — ALBUTEROL SULFATE 5 MG/ML
2.5 SOLUTION RESPIRATORY (INHALATION) EVERY 6 HOURS PRN
Status: DISCONTINUED | OUTPATIENT
Start: 2024-04-10 | End: 2024-04-11 | Stop reason: HOSPADM

## 2024-04-10 RX ORDER — LEVOFLOXACIN 750 MG/1
750 TABLET, FILM COATED ORAL EVERY OTHER DAY
Status: DISCONTINUED | OUTPATIENT
Start: 2024-04-10 | End: 2024-04-11 | Stop reason: HOSPADM

## 2024-04-10 RX ADMIN — CLOPIDOGREL BISULFATE 75 MG: 75 TABLET ORAL at 08:40

## 2024-04-10 RX ADMIN — CARBIDOPA AND LEVODOPA 1 TABLET: 25; 100 TABLET ORAL at 14:42

## 2024-04-10 RX ADMIN — ASPIRIN 81 MG: 81 TABLET, COATED ORAL at 08:40

## 2024-04-10 RX ADMIN — PRAMIPEXOLE DIHYDROCHLORIDE 0.25 MG: 0.25 TABLET ORAL at 22:29

## 2024-04-10 RX ADMIN — GUAIFENESIN 600 MG: 600 TABLET ORAL at 19:53

## 2024-04-10 RX ADMIN — SIMVASTATIN 20 MG: 10 TABLET, FILM COATED ORAL at 22:28

## 2024-04-10 RX ADMIN — CARBIDOPA AND LEVODOPA 1 TABLET: 25; 100 TABLET ORAL at 22:28

## 2024-04-10 RX ADMIN — CARBIDOPA AND LEVODOPA 1 TABLET: 25; 100 TABLET ORAL at 08:40

## 2024-04-10 RX ADMIN — ACETAMINOPHEN 650 MG: 325 TABLET ORAL at 08:50

## 2024-04-10 RX ADMIN — AMLODIPINE BESYLATE 5 MG: 5 TABLET ORAL at 08:40

## 2024-04-10 RX ADMIN — METOPROLOL SUCCINATE 50 MG: 50 TABLET, EXTENDED RELEASE ORAL at 08:41

## 2024-04-10 RX ADMIN — MAGNESIUM OXIDE TAB 400 MG (241.3 MG ELEMENTAL MG) 400 MG: 400 (241.3 MG) TAB at 08:40

## 2024-04-10 RX ADMIN — FUROSEMIDE 40 MG: 20 TABLET ORAL at 08:40

## 2024-04-10 RX ADMIN — ALBUTEROL SULFATE 2.5 MG: 2.5 SOLUTION RESPIRATORY (INHALATION) at 17:32

## 2024-04-10 RX ADMIN — LEVOFLOXACIN 750 MG: 750 TABLET, FILM COATED ORAL at 14:43

## 2024-04-10 RX ADMIN — LISINOPRIL 5 MG: 5 TABLET ORAL at 08:40

## 2024-04-10 RX ADMIN — TRAZODONE HYDROCHLORIDE 50 MG: 50 TABLET ORAL at 22:28

## 2024-04-10 RX ADMIN — GUAIFENESIN 600 MG: 600 TABLET ORAL at 08:40

## 2024-04-10 RX ADMIN — CARBIDOPA AND LEVODOPA 1 TABLET: 25; 100 TABLET ORAL at 18:55

## 2024-04-10 ASSESSMENT — ACTIVITIES OF DAILY LIVING (ADL)
ADLS_ACUITY_SCORE: 50

## 2024-04-10 NOTE — PROGRESS NOTES
[facility]    Medicine Progress Note - Hospitalist Service    Date of Admission:  4/8/2024    Assessment & Plan   Maikol Glaser is a 83 year old male admitted on 4/8/2024. PMHx of HTN/HLD, TIA, CVA (2021), Parkinson's, DMT2, CKD3. He was brought to the ED by ambulance for evaluation of cough, weakness. Found to have UTI, likely secondary to chronic self-catheterization. Patient started on empiric therapy with IV ceftriaxone, UC with growth of enterococcus and enterobacter, transitioned to PO levofloxacin. PT/OT consulted given patient weakness, recommend TCU placement to which patient is agreeable.      #Acute cystitis without hematuria  #Urinary retention, chronic  Patient's wife or son straight cath him daily at home.   -- Bladder scan completed by ED staff and straight catheter placed. Aranza urine 750mL output at 0900.   -- Straight cathing PRN while hospitalized   -- UA + for leuk esterace, few bact. Empiric therapy with IV ceftriaxone   -- UC positive for enterococcus, enterobacter.   -- Transitioned to levofloxacin 705 mg every other day x 3 doses   -- Would recommend patient follow-up outpatient with urology      #Chronic Cough  #URI  Chronic cough and SOB x several weeks per patient report   -- Chest x-ray unremarkable  -- Respiratory panel (COVID, RSV, flu) negative   -- Flutter valve, guaifenesin, Tessalon, albuterol neb as needed     #Generalized weakness  -Fall precautions  -PT/OT recommendations: TCU - CM to follow      #Parkinson's disease  -Continue PTA carbidopa-levodopa     #Type 2 diabetes mellitus without long-term insulin use  -Hemoglobin A1c 6.7  -Medium insulin resistance scale and accu-checks   -HOLD PTA glipizide      #CVA with residual right hemiplegia  -Continue PTA aspirin, clopidogrel, simvastatin     #Drug-induced platelet defect  -On PTA aspirin and clopidogrel  -Monitor for bleeding     #Chronic edema  #Stasis dermatitis, bilateral, chronic   -There is a  "Mepilex on the left calf, however, no wound located there, patient reports there was a wound there prior  -Continue PTA furosemide     #Essential hypertension  -Continue PTA amlodipine, lisinopril, metoprolol     #Chronic kidney disease stage IIIa  -- Avoid nephrotoxins and monitor renal function  -- Cr appears around baseline of 1.4     #Obesity  -BMI 31.41       Observation Goals: -diagnostic tests and consults completed and resulted, -vital signs normal or at patient baseline, Nurse to notify provider when observation goals have been met and patient is ready for discharge.  Diet: Combination Diet Moderate Consistent Carb (60 g CHO per Meal) Diet; Low Saturated Fat Na <2400mg Diet    DVT Prophylaxis: Pneumatic Compression Devices  Hunter Catheter: Not present  Lines: None     Cardiac Monitoring: None  Code Status: No CPR- Do NOT Intubate      Clinically Significant Risk Factors Present on Admission                # Drug Induced Platelet Defect: home medication list includes an antiplatelet medication   # Hypertension: Noted on problem list     # DMII: A1C = 6.7 % (Ref range: <5.7 %) within past 6 months    # Obesity: Estimated body mass index is 31.41 kg/m  as calculated from the following:    Height as of this encounter: 1.854 m (6' 1\").    Weight as of this encounter: 108 kg (238 lb 1.6 oz).         # Financial/Environmental Concerns: none         Disposition Plan     Medically Ready for Discharge: Anticipated Tomorrow       The patient's care was discussed with the Attending Physician, Dr. Derick Coello who independently met with and assessed the patient and is in agreement with the assessment and plan     Daya Malagon PA-C  Hospitalist [facility]  Securely message with [employer] (more info)  Text page via [employer] Paging/Directory   ______________________________________________________________________    Interval History   Continuing with some SOB, cough and generalized weakness " today. States cough is worse with deep inspiration. Reports chronic left shoulder pain, improved with warm pack and tylenol. Occasionally straight cathing at home. No fevers, chills, sweats, abd pain or flank pain per patient report.     Physical Exam   Vital Signs: Temp: 98.1  F (36.7  C) Temp src: Oral BP: 127/79 Pulse: 57   Resp: 21 SpO2: 93 % O2 Device: None (Room air)    Weight: 238 lbs 1.55 oz    Constitutional: awake, alert, no apparent distress, and appears stated age  Eyes: Lids and lashes normal, extra ocular muscles intact, sclera clear, conjunctiva normal  Respiratory: No increased work of breathing, no accessory muscle use. Coarse lung sounds bilaterally, improved with coughing. Hoarse cough heard on exam.   Cardiovascular: Regular rate and rhythm, normal S1 and S2, no S3 or S4, and no murmur noted  GI: Soft, non-distended, non-tender, normal bowel sounds,  Musculoskeletal: Bilateral LE chronic venous statis skin changes. Mild lower extremity pitting edema present. No erythema, increased warmth, open wounds, tenderness.  Neurologic: Awake, alert.   Neuropsychiatric: Appropriate mood, affect and eye contact. Cooperative.    Medical Decision Making             Data         Imaging results reviewed over the past 24 hrs:   No results found for this or any previous visit (from the past 24 hour(s)).

## 2024-04-10 NOTE — PLAN OF CARE
PRIMARY DIAGNOSIS: GENERALIZED WEAKNESS    OUTPATIENT/OBSERVATION GOALS TO BE MET BEFORE DISCHARGE  1. Orthostatic performed: N/A    2. Tolerating PO medications: Yes    3. Return to near baseline physical activity: No    4. Cleared for discharge by consultants (if involved): No    Discharge Planner Nurse   Safe discharge environment identified: Yes  Barriers to discharge: Yes       Entered by: Shirley Frost RN 04/09/2024 7:55 PM     Please review provider order for any additional goals.   Nurse to notify provider when observation goals have been met and patient is ready for discharge.Goal Outcome Evaluation:       Pt has frequent productive cough and expiratory wheezing. Pt straight cathed and voided 700mL. Pt denied pain and AxO4.

## 2024-04-10 NOTE — UTILIZATION REVIEW
Concurrent stay review; Secondary Review Determination - [facility]        Under the authority of the Utilization Management Committee, the utilization review process indicated a secondary review on the above patient.  The review outcome is based on review of the medical records, discussions with staff, and applying clinical experience noted on the date of the review.        (x) Outpatient USP status is appropriate       RATIONALE FOR DETERMINATION:   83 year old male with a past medical history of HTN/HLD, TIA, CVA (2021), Parkinson's, DMT2, CKD3. Who presented to the ED for evaluation of cough, weakness. Admitted with UTI, likely secondary to chronic self-catheterization. Patient started on empiric therapy with IV ceftriaxone, UC with growth of enterococcus and enterobacter, transitioned to PO levofloxacin. PT/OT consulted given patient weakness, recommend TCU placement which is pending.     Patient delayed discharge is related to disposition, there is no medical necessity for inpatient admission at the time of this review. If there is a change in patient status, please resend for review.    The information on this document is developed by the utilization review team in order for the business office to ensure compliance.  This only denotes the appropriateness of proper admission status and does not reflect the quality of care rendered.       The definitions of Inpatient Status and Observation Status used in making the determination above are those provided in the CMS Coverage Manual, Chapter 1 and Chapter 6, section 70.4.       Sincerely,    Home Crenshaw MD

## 2024-04-10 NOTE — PLAN OF CARE
"Goal Outcome Evaluation:  PRIMARY DIAGNOSIS: \"GENERIC\" NURSING  OUTPATIENT/OBSERVATION GOALS TO BE MET BEFORE DISCHARGE:  ADLs back to baseline: No    Activity and level of assistance: Up with maximum assistance. Consider SW and/or PT evaluation.     Pain status: Improved with use of alternative comfort measures i.e.: heat and essential oils    Return to near baseline physical activity: No     Discharge Planner Nurse   Safe discharge environment identified: Yes  Barriers to discharge: No       Entered by: Sheryl Ham RN 04/10/2024 5:57 PM     Please review provider order for any additional goals.   Nurse to notify provider when observation goals have been met and patient is ready for discharge.           Up to chair again this evening with A1 and walker.   Straight cath completed at 1530 with 650cc emptied. Tolerated well. Pt is unable to assist with straight cathing due to left shoulder pain and right arm weakness from historical stroke.   Continues to have dry aggressive cough. Neb request from RT.   Anticipated TCU at 1100 tomorrow.       "

## 2024-04-10 NOTE — PROGRESS NOTES
Pt is alert and oriented x 4, vitals stable, is in RA, denies pain, done regular straight cath, no BM this shift,

## 2024-04-10 NOTE — PLAN OF CARE
PRIMARY DIAGNOSIS: GENERALIZED WEAKNESS    OUTPATIENT/OBSERVATION GOALS TO BE MET BEFORE DISCHARGE  1. Orthostatic performed: No    2. Tolerating PO medications: Yes    3. Return to near baseline physical activity: No    4. Cleared for discharge by consultants (if involved): No    Discharge Planner Nurse   Barriers to discharge: Yes       Entered by: Demi Garibay RN 04/10/2024 6:00 AM     Please review provider order for any additional goals.   Nurse to notify provider when observation goals have been met and patient is ready for discharge.Goal Outcome Evaluation:

## 2024-04-10 NOTE — PLAN OF CARE
PRIMARY DIAGNOSIS: GENERALIZED WEAKNESS    OUTPATIENT/OBSERVATION GOALS TO BE MET BEFORE DISCHARGE  1. Orthostatic performed: N/A    2. Tolerating PO medications: Yes    3. Return to near baseline physical activity: No    4. Cleared for discharge by consultants (if involved): No    Discharge Planner Nurse   Safe discharge environment identified: Yes  Barriers to discharge: Yes       Entered by: Shirley Frost RN 04/09/2024 11:29 PM     Please review provider order for any additional goals.   Nurse to notify provider when observation goals have been met and patient is ready for discharge.Goal Outcome Evaluation:       Pt has not been out of bed, no BM on shift, straight cath at 1900 with 700 voided.

## 2024-04-10 NOTE — PLAN OF CARE
"Goal Outcome Evaluation:       PRIMARY DIAGNOSIS: \"GENERIC\" NURSING  OUTPATIENT/OBSERVATION GOALS TO BE MET BEFORE DISCHARGE:  ADLs back to baseline: No    Activity and level of assistance: Up with maximum assistance. Consider SW and/or PT evaluation.     Pain status: Improved-controlled with oral pain medications.    Return to near baseline physical activity: No     Discharge Planner Nurse   Safe discharge environment identified: Yes  Barriers to discharge: No       Entered by: Sheryl Ham RN 04/10/2024 1:44 PM     Please review provider order for any additional goals.   Nurse to notify provider when observation goals have been met and patient is ready for discharge.        A&O x4 upon assessment. Reports severe pain to left shoulder this AM. Warm pack and tylenol given; pt finds this effective. Pt has a strong dry cough with coarse lung sounds. MD aware. Pt repositioned Q2H while in bed. Up to chair with PT with A1 and walker. Continues to feel weak.     Bladder scan completed at 1230 with 450cc found. Pt refuses being straight cathed until later this afternoon. Pt usually feels the urge when he needs to be straight cathed. Will re-assess this afternoon     Pt anticipated to discharge at 11am to TCU tomorrow.            "

## 2024-04-10 NOTE — PLAN OF CARE
Goal Outcome Evaluation:         Problem: Adult Inpatient Plan of Care  Goal: Absence of Hospital-Acquired Illness or Injury  Intervention: Identify and Manage Fall Risk  Recent Flowsheet Documentation  Taken 4/9/2024 1642 by Shirley Frost, RN  Safety Promotion/Fall Prevention:   clutter free environment maintained   safety round/check completed     Problem: Adult Inpatient Plan of Care  Goal: Optimal Comfort and Wellbeing  Outcome: Progressing         Pt has frequent productive cough and expiratory wheezing. Pt straight cathed and voided 700mL. Pt denied pain and AxO4.

## 2024-04-11 ENCOUNTER — DOCUMENTATION ONLY (OUTPATIENT)
Dept: GERIATRICS | Facility: CLINIC | Age: 84
End: 2024-04-11
Payer: COMMERCIAL

## 2024-04-11 VITALS
DIASTOLIC BLOOD PRESSURE: 83 MMHG | SYSTOLIC BLOOD PRESSURE: 145 MMHG | TEMPERATURE: 98.4 F | HEART RATE: 68 BPM | OXYGEN SATURATION: 97 % | RESPIRATION RATE: 18 BRPM

## 2024-04-11 VITALS
WEIGHT: 238.1 LBS | BODY MASS INDEX: 31.56 KG/M2 | TEMPERATURE: 98.5 F | DIASTOLIC BLOOD PRESSURE: 63 MMHG | HEIGHT: 73 IN | OXYGEN SATURATION: 93 % | RESPIRATION RATE: 18 BRPM | HEART RATE: 67 BPM | SYSTOLIC BLOOD PRESSURE: 122 MMHG

## 2024-04-11 LAB
ANION GAP SERPL CALCULATED.3IONS-SCNC: 12 MMOL/L (ref 7–15)
BACTERIA UR CULT: ABNORMAL
BACTERIA UR CULT: ABNORMAL
BUN SERPL-MCNC: 37.6 MG/DL (ref 8–23)
CALCIUM SERPL-MCNC: 8.8 MG/DL (ref 8.8–10.2)
CHLORIDE SERPL-SCNC: 102 MMOL/L (ref 98–107)
CREAT SERPL-MCNC: 1.39 MG/DL (ref 0.67–1.17)
DEPRECATED HCO3 PLAS-SCNC: 23 MMOL/L (ref 22–29)
EGFRCR SERPLBLD CKD-EPI 2021: 51 ML/MIN/1.73M2
ERYTHROCYTE [DISTWIDTH] IN BLOOD BY AUTOMATED COUNT: 13.9 % (ref 10–15)
GLUCOSE BLDC GLUCOMTR-MCNC: 100 MG/DL (ref 70–99)
GLUCOSE SERPL-MCNC: 97 MG/DL (ref 70–99)
HCT VFR BLD AUTO: 42.2 % (ref 40–53)
HGB BLD-MCNC: 13.6 G/DL (ref 13.3–17.7)
MCH RBC QN AUTO: 28.8 PG (ref 26.5–33)
MCHC RBC AUTO-ENTMCNC: 32.2 G/DL (ref 31.5–36.5)
MCV RBC AUTO: 89 FL (ref 78–100)
PLATELET # BLD AUTO: 161 10E3/UL (ref 150–450)
POTASSIUM SERPL-SCNC: 4.9 MMOL/L (ref 3.4–5.3)
RBC # BLD AUTO: 4.73 10E6/UL (ref 4.4–5.9)
SODIUM SERPL-SCNC: 137 MMOL/L (ref 135–145)
WBC # BLD AUTO: 7.2 10E3/UL (ref 4–11)

## 2024-04-11 PROCEDURE — 82962 GLUCOSE BLOOD TEST: CPT

## 2024-04-11 PROCEDURE — G0378 HOSPITAL OBSERVATION PER HR: HCPCS

## 2024-04-11 PROCEDURE — 82565 ASSAY OF CREATININE: CPT

## 2024-04-11 PROCEDURE — 82374 ASSAY BLOOD CARBON DIOXIDE: CPT

## 2024-04-11 PROCEDURE — 85041 AUTOMATED RBC COUNT: CPT

## 2024-04-11 PROCEDURE — 99207 PR APP CREDIT; MD BILLING SHARED VISIT: CPT | Mod: FS | Performed by: FAMILY MEDICINE

## 2024-04-11 PROCEDURE — 36415 COLL VENOUS BLD VENIPUNCTURE: CPT

## 2024-04-11 PROCEDURE — 99239 HOSP IP/OBS DSCHRG MGMT >30: CPT | Mod: FS

## 2024-04-11 PROCEDURE — 250N000013 HC RX MED GY IP 250 OP 250 PS 637: Performed by: HOSPITALIST

## 2024-04-11 RX ORDER — LEVOFLOXACIN 750 MG/1
750 TABLET, FILM COATED ORAL EVERY OTHER DAY
DISCHARGE
Start: 2024-04-12 | End: 2024-04-13

## 2024-04-11 RX ORDER — BENZONATATE 100 MG/1
100 CAPSULE ORAL 3 TIMES DAILY PRN
DISCHARGE
Start: 2024-04-11 | End: 2024-04-14

## 2024-04-11 RX ORDER — GUAIFENESIN 600 MG/1
600 TABLET, EXTENDED RELEASE ORAL 2 TIMES DAILY
DISCHARGE
Start: 2024-04-11 | End: 2024-04-13

## 2024-04-11 RX ORDER — ALBUTEROL SULFATE 5 MG/ML
2.5 SOLUTION RESPIRATORY (INHALATION) EVERY 6 HOURS PRN
DISCHARGE
Start: 2024-04-11

## 2024-04-11 RX ADMIN — GUAIFENESIN 600 MG: 600 TABLET ORAL at 08:03

## 2024-04-11 RX ADMIN — MAGNESIUM OXIDE TAB 400 MG (241.3 MG ELEMENTAL MG) 400 MG: 400 (241.3 MG) TAB at 08:03

## 2024-04-11 RX ADMIN — LISINOPRIL 5 MG: 5 TABLET ORAL at 08:06

## 2024-04-11 RX ADMIN — AMLODIPINE BESYLATE 5 MG: 5 TABLET ORAL at 08:04

## 2024-04-11 RX ADMIN — CARBIDOPA AND LEVODOPA 1 TABLET: 25; 100 TABLET ORAL at 08:03

## 2024-04-11 RX ADMIN — ASPIRIN 81 MG: 81 TABLET, COATED ORAL at 08:03

## 2024-04-11 RX ADMIN — FUROSEMIDE 40 MG: 20 TABLET ORAL at 08:04

## 2024-04-11 RX ADMIN — METOPROLOL SUCCINATE 50 MG: 50 TABLET, EXTENDED RELEASE ORAL at 08:04

## 2024-04-11 RX ADMIN — CLOPIDOGREL BISULFATE 75 MG: 75 TABLET ORAL at 08:03

## 2024-04-11 ASSESSMENT — ACTIVITIES OF DAILY LIVING (ADL)
ADLS_ACUITY_SCORE: 50

## 2024-04-11 NOTE — DISCHARGE SUMMARY
"[facility]  Hospitalist Discharge Summary      Date of Admission:  4/8/2024  Date of Discharge:  4/11/2024  Discharging Provider: Daya Malagon PA-C  Discharge Service: Hospitalist Service    Discharge Diagnoses   Generalized Weakness   Urinary Tract Infection   Chronic, Intermittent Urinary Retention with Intermittent Self-Catheterization   URI with cough   Essential Hypertension   DMII     Clinically Significant Risk Factors     # DMII: A1C = 6.7 % (Ref range: <5.7 %) within past 6 months    # Obesity: Estimated body mass index is 31.41 kg/m  as calculated from the following:    Height as of this encounter: 1.854 m (6' 1\").    Weight as of this encounter: 108 kg (238 lb 1.6 oz).       Follow-ups Needed After Discharge   Follow-up Appointments     Follow Up and recommended labs and tests      Follow up with Nursing home physician.  No follow up labs or test are   needed. Re-evaluate cough and URI symptoms if no improvement with   symptomatic cares and/or worsening symptoms.    Recommend follow-up with urology.            Unresulted Labs Ordered in the Past 30 Days of this Admission       Date and Time Order Name Status Description    4/8/2024 10:02 PM Urine Culture Preliminary           Discharge Disposition   Discharged to TCU   Condition at discharge: Stable    Hospital Course   Maikol Glaser is a 83 year old male admitted on 4/8/2024. PMHx of HTN/HLD, TIA, CVA (2021), Parkinson's, DMT2, CKD3. He was brought to the ED by ambulance for evaluation of cough, weakness. Found to have UTI, likely secondary to chronic self-catheterization. Patient started on empiric therapy with IV ceftriaxone, UC with growth of enterococcus and enterobacter, transitioned to PO levofloxacin x 3 doses after discussion with pharmacy. PT/OT consulted given patient weakness, recommend TCU placement to which patient is agreeable. Patient did have a cough through hospital course, suspect URI. Patient non-hypoxic, CXR " without acute findings. Will continue with supportive cares at discharge and to follow-up with TCU physician/PCP if failure of support care therapies.      #Acute cystitis without hematuria  #Urinary retention, chronic  Patient's wife or son straight cath him daily at home.   -- Straight cathing PRN while hospitalized   -- UA + for leuk esterace, few bact. Empiric therapy was started with IV ceftriaxone   -- UC positive for enterococcus, enterobacter.   -- Transitioned to levofloxacin 750 mg every other day x 3 doses (to complete 2 more doses at TCU)   -- Would recommend patient follow-up outpatient with urology      #Cough  #URI  Cough non-productive with SOB x several weeks per patient report   -- Chest x-ray unremarkable  -- Respiratory panel (COVID, RSV, flu) negative  -- Normal white count   -- BNP within normal limits, patient not hypoxic   -- Some relief intermittently with albuterol margarette  -- Continue guaifenesin, Tessalon, albuterol neb as needed at discharge  -- Levofloxacin as above for UTI   -- Follow-up with PCP/TCU physician if not improving with symptomatic cares     #Generalized weakness  -Fall precautions  -PT/OT recommendations: TCU      #Parkinson's disease  -Continue PTA carbidopa-levodopa     #Type 2 diabetes mellitus without long-term insulin use  -Hemoglobin A1c 6.7  -Medium insulin resistance scale and accu-checks while hospitalized   -Continue glipizide at discharge, close glucose monitoring while completing Levofloxacin course     #CVA with residual right hemiplegia  -Continue PTA aspirin, clopidogrel, simvastatin     #Drug-induced platelet defect  -On PTA aspirin and clopidogrel  -Monitor for bleeding     #Chronic edema  #Stasis dermatitis, bilateral, chronic   -There is a Mepilex on the left calf, however, no wound located there, patient reports there was a wound there prior  -Continue PTA furosemide     #Essential hypertension  -Continue PTA amlodipine, lisinopril, metoprolol     #Chronic  kidney disease stage IIIa  -- Avoid nephrotoxins and monitor renal function  -- Cr appears around baseline of 1.4     #Obesity  -BMI 31.41    Consultations This Hospital Stay   PHARMACY IP CONSULT  CARE MANAGEMENT / SOCIAL WORK IP CONSULT  PHYSICAL THERAPY ADULT IP CONSULT  OCCUPATIONAL THERAPY ADULT IP CONSULT  PHYSICAL THERAPY ADULT IP CONSULT  OCCUPATIONAL THERAPY ADULT IP CONSULT    Code Status   No CPR- Do NOT Intubate    Time Spent on this Encounter   I, Daya Malagon PA-C, personally saw the patient today and spent greater than 30 minutes discharging this patient.     This case was discussed with the Attending Physician, Dr. Malachi Royal, who independently met with and assessed the patient and who is in agreement with the disposition and discharge.    Daya Malagon PA-C  [facility]  [address] [place] [id number]  Phone: [phone]  Fax: [phone]  ______________________________________________________________________    Physical Exam   Vital Signs: Temp: 97.9  F (36.6  C) Temp src: Oral BP: 139/66 Pulse: 61   Resp: 18 SpO2: 95 % O2 Device: None (Room air)    Weight: 238 lbs 1.55 oz    Constitutional: awake, alert, no apparent distress, and appears stated age  Eyes: Lids and lashes normal, extra ocular muscles intact, sclera clear, conjunctiva normal  Respiratory: No increased work of breathing, no accessory muscle use. Slightly improved coarse lung sounds bilaterally, improved with coughing.   Cardiovascular: Regular rate and rhythm, normal S1 and S2, no S3 or S4, and no murmur noted  GI: Soft, non-distended, non-tender, normal bowel sounds,  Musculoskeletal: Bilateral LE chronic venous statis skin changes. Mild lower extremity and pedal pitting edema present. No erythema, increased warmth, open wounds, tenderness.  Neurologic: Awake, alert.   Neuropsychiatric: Appropriate mood, affect and eye contact. Cooperative.       Primary Care  Physician   Christopher Banda    Discharge Orders      General info for SNF    Length of Stay Estimate: Short Term Care: Estimated # of Days <30  Condition at Discharge: Stable  Level of care:skilled   Rehabilitation Potential: Good  Admission H&P remains valid and up-to-date: Yes  Recent Chemotherapy: N/A  Use Nursing Home Standing Orders: Yes     Follow Up and recommended labs and tests    Follow up with Nursing home physician.  No follow up labs or test are needed. Re-evaluate cough and URI symptoms if no improvement with symptomatic cares and/or worsening symptoms.    Recommend follow-up with urology.     Reason for your hospital stay    Generalized Weakness   Urinary Tract Infection   Chronic, intermittent urinary retention with intermittent self-catheterization   URI with cough  DMII  Essential Hypertension     Glucose monitor nursing POCT    Before meals and at bedtime     Intake and output    Every shift     Daily weights    Call Provider for weight gain of more than 2 pounds per day or 5 pounds per week.     Bladder scan    Patient intermittently self-caths and will need close monitoring of PVR and catheterization PRN     Additional Discharge Instructions     Activity - Up with nursing assistance     No CPR- Do NOT Intubate     Physical Therapy Adult Consult    Evaluate and treat as clinically indicated.    Reason:  generalized weakness     Occupational Therapy Adult Consult    Evaluate and treat as clinically indicated.    Reason:  generalized weakness     Fall precautions     Diet    Follow this diet upon discharge: Combination Diet Moderate Consistent Carb (60 g CHO per Meal) Diet; Low Saturated Fat Na <2400mg Diet       Significant Results and Procedures   Most Recent 3 CBC's:  Recent Labs   Lab Test 04/11/24  0750 04/08/24  1935 11/15/23  0949   WBC 7.2 9.4 8.0   HGB 13.6 14.5 11.9*   MCV 89 90 93    173 141*     Most Recent 3 BMP's:  Recent Labs   Lab Test 04/11/24  0750 04/11/24  0735  04/10/24  2233 04/09/24  1150 04/09/24  1040 04/09/24  0754 04/08/24  2231     --   --   --  136  --  137   POTASSIUM 4.9  --   --   --  4.6  --  4.8   CHLORIDE 102  --   --   --  101  --  103   CO2 23  --   --   --  22  --  24   BUN 37.6*  --   --   --  32.7*  --  30.8*   CR 1.39*  --   --   --  1.41*  --  1.50*   ANIONGAP 12  --   --   --  13  --  10   DORA 8.8  --   --   --  8.5*  --  8.8   GLC 97 100* 113*   < > 146*   < > 112*    < > = values in this interval not displayed.     Most Recent Urinalysis:  Recent Labs   Lab Test 04/08/24  2152 10/31/21  1706 06/10/21  1432   COLOR Yellow   < > Yellow   APPEARANCE Turbid*   < > Clear   URINEGLC Negative   < > Negative   URINEBILI Negative   < > Negative   URINEKETONE Negative   < > Trace   SG 1.018   < > 1.021   UBLD Negative   < > Negative   URINEPH 8.0*   < > 5.0   PROTEIN 30*   < > Negative   UROBILINOGEN  --   --  <2.0 mg/dL   NITRITE Negative   < > Negative   LEUKEST 250 Paulette/uL*   < > Negative   RBCU 0   < >  --    WBCU 0   < >  --     < > = values in this interval not displayed.   ,   Results for orders placed or performed during the hospital encounter of 04/08/24   Chest XR,  PA & LAT    Narrative    EXAM: XR CHEST 2 VIEWS  LOCATION: [facility]  DATE: 4/8/2024    INDICATION: cough, fever  COMPARISON: Two-view chest radiograph 11/15/2023      Impression    IMPRESSION: Upper limits normal cardiomediastinal silhouette, stable. No pulmonary vascular congestion. Bibasilar atelectasis. No other focal pulmonary consolidation or pleural effusion. No pneumothorax. Degenerative changes of the thoracic spine and   shoulders. No acute osseous abnormality.       Discharge Medications   Current Discharge Medication List        START taking these medications    Details   albuterol (PROVENTIL) (5 MG/ML) 0.5% neb solution Take 0.5 mLs (2.5 mg) by nebulization every 6 hours as needed for wheezing, shortness of breath or cough    Associated  Diagnoses: Upper respiratory tract infection, unspecified type      benzonatate (TESSALON) 100 MG capsule Take 1 capsule (100 mg) by mouth 3 times daily as needed for cough    Associated Diagnoses: Upper respiratory tract infection, unspecified type      guaiFENesin (MUCINEX) 600 MG 12 hr tablet Take 1 tablet (600 mg) by mouth 2 times daily for 5 days    Associated Diagnoses: Upper respiratory tract infection, unspecified type      levofloxacin (LEVAQUIN) 750 MG tablet Take 1 tablet (750 mg) by mouth every other day for 2 doses    Associated Diagnoses: Acute cystitis without hematuria           CONTINUE these medications which have NOT CHANGED    Details   acetaminophen (TYLENOL) 325 MG tablet Take 650 mg by mouth every 6 hours as needed for mild pain      amLODIPine (NORVASC) 5 MG tablet Take 1 tablet (5 mg) by mouth daily  Qty: 30 tablet, Refills: 0    Associated Diagnoses: Intraparenchymal hemorrhage of brain (H); 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); Essential hypertension; Diabetes mellitus type 2, noninsulin dependent (H)      aspirin (ASA) 81 MG EC tablet Take 81 mg by mouth daily      carbidopa-levodopa (SINEMET)  MG tablet Take 1 tablet by mouth 4 times daily Takes at 0800, 1200, 1600, 2000      clopidogrel (PLAVIX) 75 MG tablet Take 75 mg by mouth daily      folic acid (FOLVITE) 400 MCG tablet Take 400 mcg by mouth daily      furosemide (LASIX) 40 MG tablet Take 40 mg by mouth daily      glipiZIDE (GLUCOTROL) 5 MG tablet Take 5 mg by mouth daily (with breakfast)      lisinopril (ZESTRIL) 5 MG tablet Take 5 mg by mouth daily      magnesium oxide (MAG-OX) 400 MG tablet Take 400 mg by mouth daily      metoprolol succinate ER (TOPROL-XL) 50 MG 24 hr tablet Take 50 mg by mouth daily      multivitamin (CENTRUM SILVER) tablet Take 1 tablet by mouth daily      pramipexole (MIRAPEX) 0.25 MG tablet Take 0.25 mg by mouth at bedtime      simvastatin (ZOCOR) 20 MG tablet Take  20 mg by mouth at bedtime      traZODone (DESYREL) 50 MG tablet Take 50 mg by mouth at bedtime           Allergies   Allergies   Allergen Reactions    Penicillins Rash     Childhood rxn.

## 2024-04-11 NOTE — CONSULTS
PRIMARY DIAGNOSIS: Upper Respiratory Tract Infection  OUTPATIENT/OBSERVATION GOALS TO BE MET BEFORE DISCHARGE:  ADLs back to baseline: No    Activity and level of assistance: Assist 2x w/GB & walker    Pain status: Pain free.    Return to near baseline physical activity: No     Discharge Planner Nurse   Safe discharge environment identified: Yes. TCU  Barriers to discharge: No       Entered by: Yaya Javier RN 04/11/2024 8:13 AM     Please review provider order for any additional goals.   Nurse to notify provider when observation goals have been met and patient is ready for discharge.

## 2024-04-11 NOTE — PLAN OF CARE
Patient discharged to transitional care unit at 1140 via Health plan transportation.  Accompanied by self and staff.  Discharge instructions were reviewed with patient, opportunity offered to ask questions.    Prescriptions sent with patient to discharge facility .  Access discontinued: Yes  Care plan and education discontinued: Yes  Belongings were sent home with patient/family:  Cell phone/electronics: 1, Clothing: Shirt(s): 1, Pants: 1, Underclothes: 1, and Outerwear: 1, and Shoes: 1  Personal inhaler.

## 2024-04-11 NOTE — PROGRESS NOTES
[facility] GERIATRICS    PRIMARY CARE PROVIDER AND CLINIC:  Christopher Banda MD, [address] / [place] [address]  Chief Complaint   Patient presents with    Hospital F/U      [place] Medical Record Number:  [id number]  Place of Service where encounter took place:  [facility] ([facility]) [[id number]]    Maikol Glaser  is a 83 year old  (1940), admitted to the above facility from  [facility]. Hospital stay 4/8/24 through 4/11/24..     HPI:    This is a 84 yo male with PMHx for hypertension, TIA, CVA in 2021, Parkinson's disease, type 2 diabetes, CKD stage IIIa who presented for evaluation of cough and weakness.  Found to have UTI secondary to self-catheterization, positive Enterococcus and enterobacter, initially given Rocephin and then transition to Levaquin until 4/13.  Recommend follow-up with urology outpatient.  No other changes noted, [facility]      CODE STATUS/ADVANCE DIRECTIVES DISCUSSION:  No CPR- Do NOT Intubate  DNR / DNI  ALLERGIES:   Allergies   Allergen Reactions    Penicillins Rash     Childhood rxn.        Influenza Vac Split Quad      Other Reaction(s): weakness      PAST MEDICAL HISTORY:   Past Medical History:   Diagnosis Date    Cerebral infarction (H)     Diabetes (H)     HLD (hyperlipidemia)     HTN (hypertension)     Hyperlipidemia     Hypertension     Intracranial hemorrhage (H) 5/26/2021    Stroke (H)     TIA (transient ischemic attack)       PAST SURGICAL HISTORY:   has a past surgical history that includes Endarterectomy carotid (Left, 8/11/2021).  FAMILY HISTORY: family history includes Bone Cancer in his mother.  SOCIAL HISTORY:   reports that he has never smoked. He has never used smokeless tobacco. He reports that he does not currently use alcohol. He reports that he does not currently use drugs.  Patient's living condition: lives with spouse    Post Discharge Medication Reconciliation Status:    MED REC REQUIRED  Post Medication Reconciliation Status:  Discharge medications reconciled and changed, see notes/orders       Current Outpatient Medications   Medication Sig Dispense Refill    acetaminophen (TYLENOL) 325 MG tablet Take 650 mg by mouth every 6 hours as needed for mild pain      albuterol (PROVENTIL) (5 MG/ML) 0.5% neb solution Take 0.5 mLs (2.5 mg) by nebulization every 6 hours as needed for wheezing, shortness of breath or cough      amLODIPine (NORVASC) 5 MG tablet Take 1 tablet (5 mg) by mouth daily 30 tablet 0    aspirin (ASA) 81 MG EC tablet Take 81 mg by mouth daily      benzonatate (TESSALON) 100 MG capsule Take 1 capsule (100 mg) by mouth 3 times daily as needed for cough      carbidopa-levodopa (SINEMET)  MG tablet Take 1 tablet by mouth 4 times daily Takes at 0800, 1200, 1600, 2000      clopidogrel (PLAVIX) 75 MG tablet Take 75 mg by mouth daily      folic acid (FOLVITE) 400 MCG tablet Take 400 mcg by mouth daily      furosemide (LASIX) 40 MG tablet Take 40 mg by mouth daily      glipiZIDE (GLUCOTROL) 5 MG tablet Take 5 mg by mouth daily (with breakfast)      lisinopril (ZESTRIL) 5 MG tablet Take 5 mg by mouth daily      magnesium oxide (MAG-OX) 400 MG tablet Take 400 mg by mouth daily      metoprolol succinate ER (TOPROL-XL) 50 MG 24 hr tablet Take 50 mg by mouth daily      multivitamin (CENTRUM SILVER) tablet Take 1 tablet by mouth daily      pramipexole (MIRAPEX) 0.25 MG tablet Take 0.25 mg by mouth at bedtime      simvastatin (ZOCOR) 20 MG tablet Take 20 mg by mouth at bedtime      traZODone (DESYREL) 50 MG tablet Take 50 mg by mouth at bedtime       No current facility-administered medications for this visit.       ROS:  10 point ROS of systems including Constitutional, Eyes, Respiratory, Cardiovascular, Gastroenterology, Genitourinary, Integumentary, Musculoskeletal, Psychiatric were all negative except for pertinent positives noted in my HPI.    Vitals:  BP (!) 145/83    Pulse 68   Temp 98.4  F (36.9  C)   Resp 18   SpO2 97%   Exam:  GENERAL APPEARANCE:  Alert, in no distress, morbidly obese, cooperative  ENT:  Mouth and posterior oropharynx normal, moist mucous membranes  NECK:  No adenopathy,masses or thyromegaly  RESP:  lungs clear to auscultation , no respiratory distress  CV:  no edema, irregular rhythm per PAF  ABDOMEN:  normal bowel sounds, soft, nontender, no hepatosplenomegaly or other masses  :    self caths  M/S:   Able to move all extremities  SKIN:  Inspection of skin and subcutaneous tissue baseline  NEURO:   No focal deficits  PSYCH:  oriented to 2/3    Lab/Diagnostic data:  Most Recent 3 CBC's:  Recent Labs   Lab Test 04/11/24  0750 04/08/24  1935 11/15/23  0949   WBC 7.2 9.4 8.0   HGB 13.6 14.5 11.9*   MCV 89 90 93    173 141*     Most Recent 3 BMP's:  Recent Labs   Lab Test 04/11/24  0750 04/11/24  0735 04/10/24  2233 04/09/24  1150 04/09/24  1040 04/09/24  0754 04/08/24  2231     --   --   --  136  --  137   POTASSIUM 4.9  --   --   --  4.6  --  4.8   CHLORIDE 102  --   --   --  101  --  103   CO2 23  --   --   --  22  --  24   BUN 37.6*  --   --   --  32.7*  --  30.8*   CR 1.39*  --   --   --  1.41*  --  1.50*   ANIONGAP 12  --   --   --  13  --  10   DORA 8.8  --   --   --  8.5*  --  8.8   GLC 97 100* 113*   < > 146*   < > 112*    < > = values in this interval not displayed.     Most Recent Hemoglobin A1c:  Recent Labs   Lab Test 04/08/24 1935   A1C 6.7*       ASSESSMENT/PLAN:    (N30.00) Acute cystitis without hematuria  (R33.9) Urinary retention  Usually self caths, monitor for retention, received rocephin and discharged on levaquin until 4/14    Cough  Given tessalon pearls and mucinex, resolved. RA    (G20.B2) Parkinson's disease with dyskinesia and fluctuating manifestations (H)    (R53.1) Generalized weakness  Continue Sinemet 25/100 mcg 4 times daily, has tremors    (E11.9) Diabetes mellitus type 2, noninsulin dependent (H)  Last A1c 6.7  on 4/8, continue glipizide 5 mg daily, monitor blood sugar twice daily    (I10) Essential hypertension  Continue amlodipine 5 mg daily, Lasix 40 mg daily, metoprolol succinate 50 mg daily and lisinopril 5 mg daily.  Monitor blood pressure twice daily    Pain  Continue Tylenol 650 mg every 6 hours as needed, denies pain    History of TIA  Continue low-dose aspirin, Plavix and statin    (N18.32) Stage 3b chronic kidney disease (H)  Last CR 1.39 with GFR 51 on 4/11    Hypomagnesia  Continue magnesium oxide 400 mg daily, last level 2.0 on 11/15/2023    Anemia surveillance  Last Hgb 13.6 on 4/11    Insomnia  Continue trazodone 50 mg at bedtime    Restless leg syndrome  Continue Mirapex 0.25 mg at bedtime    Orders:  Monitor blood pressure twice daily, monitor blood sugar, discontinue Tessalon Pearls    Electronically signed by:  Angel Broussard NP

## 2024-04-11 NOTE — PLAN OF CARE
"PRIMARY DIAGNOSIS: \"GENERIC\" NURSING  OUTPATIENT/OBSERVATION GOALS TO BE MET BEFORE DISCHARGE:  ADLs back to baseline: No    Activity and level of assistance: Up with assistance.x2    Pain status: Pain free.    Return to near baseline physical activity: No     Discharge Planner Nurse   Safe discharge environment identified: Yes  Barriers to discharge: No       Entered by: Demi Garibay RN 04/11/2024 2:21 AM     Please review provider order for any additional goals.   Nurse to notify provider when observation goals have been met and patient is ready for discharge.Goal Outcome Evaluation:                        "

## 2024-04-11 NOTE — PROGRESS NOTES
Care Management Discharge Note    Discharge Date: 04/11/2024       Discharge Disposition: Transitional Care    Discharge Services: None    Discharge DME: None    Discharge Transportation: agency, family or friend will provide    Private pay costs discussed: transportation costs and Not applicable    Does the patient's insurance plan have a 3 day qualifying hospital stay waiver?  No    PAS Confirmation Code: [id number]  Patient/family educated on Medicare website which has current facility and service quality ratings:  yes    Education Provided on the Discharge Plan: Yes  Persons Notified of Discharge Plans: yes pt informed  Patient/Family in Agreement with the Plan: yes    Handoff Referral Completed: Yes    Additional Information:  Plans for discharge today at 11am via stretcher ride ( ETA 10:40-11:20a).   Natali at [facility] [phone], need orders 2 hours before pt leaving.  CM updated family, son Fracisco and wife plans to visit at [facility].    Patient made aware of private pay costs related to discharge plan. Cost for private room rate for TCU/LTC ,Transport cost, and/or non-covered or partially covered TCU/LTC stay .       Ignacio Barr RN

## 2024-04-11 NOTE — PLAN OF CARE
Occupational Therapy Discharge Summary    Reason for therapy discharge:    Discharged to transitional care facility.    Progress towards therapy goal(s). See goals on Care Plan in [facility] electronic health record for goal details.  Goals partially met.  Barriers to achieving goals:   discharge from facility.    Therapy recommendation(s):    Continued therapy is recommended.  Rationale/Recommendations:  to maximize ADL independence.

## 2024-04-11 NOTE — PLAN OF CARE
Physical Therapy Discharge Summary    Reason for therapy discharge:    Discharged to transitional care facility.    Progress towards therapy goal(s). See goals on Care Plan in [facility] electronic health record for goal details.  Goals not met.  Barriers to achieving goals:   limited tolerance for therapy and discharge from facility.    Therapy recommendation(s):    Continued therapy is recommended.  Rationale/Recommendations:  PT goals not met.

## 2024-04-11 NOTE — PROGRESS NOTES
Pt is alert and oriented x 4 , vitals stable, is in RA, denies pain, plan discharge to TCU today, did bladder scan and the last bladder scan was 610ml  so I did regular straight cath.

## 2024-04-11 NOTE — PLAN OF CARE
"PRIMARY DIAGNOSIS: \"GENERIC\" NURSING  OUTPATIENT/OBSERVATION GOALS TO BE MET BEFORE DISCHARGE:  ADLs back to baseline: No    Activity and level of assistance: Up with  assistance.x2    Pain status: Pain free.    Return to near baseline physical activity: No     Discharge Planner Nurse   Safe discharge environment identified: Yes  Barriers to discharge: No       Entered by: Demi Garibay RN 04/11/2024 2:40 AM     Please review provider order for any additional goals.   Nurse to notify provider when observation goals have been met and patient is ready for discharge.Goal Outcome Evaluation:                        "

## 2024-04-11 NOTE — PLAN OF CARE
Problem: Adult Inpatient Plan of Care  Goal: Optimal Comfort and Wellbeing  Outcome: Progressing     Problem: Adult Inpatient Plan of Care  Goal: Absence of Hospital-Acquired Illness or Injury  Intervention: Prevent Infection  Recent Flowsheet Documentation  Taken 4/11/2024 0810 by Yaya Javier, RN  Infection Prevention:   cohorting utilized   hand hygiene promoted    Goal Outcome Evaluation:    Plan of Care Reviewed With: patient    A&Ox4. RA. Denies pain. . PIV removed. Discharge to TCU today. Done.

## 2024-04-12 ENCOUNTER — TRANSITIONAL CARE UNIT VISIT (OUTPATIENT)
Dept: GERIATRICS | Facility: CLINIC | Age: 84
End: 2024-04-12
Payer: COMMERCIAL

## 2024-04-12 DIAGNOSIS — E11.9 DIABETES MELLITUS TYPE 2, NONINSULIN DEPENDENT (H): ICD-10-CM

## 2024-04-12 DIAGNOSIS — R33.9 URINARY RETENTION: ICD-10-CM

## 2024-04-12 DIAGNOSIS — I10 ESSENTIAL HYPERTENSION: ICD-10-CM

## 2024-04-12 DIAGNOSIS — N18.32 STAGE 3B CHRONIC KIDNEY DISEASE (H): ICD-10-CM

## 2024-04-12 DIAGNOSIS — N30.00 ACUTE CYSTITIS WITHOUT HEMATURIA: ICD-10-CM

## 2024-04-12 DIAGNOSIS — G20.B2 PARKINSON'S DISEASE WITH DYSKINESIA AND FLUCTUATING MANIFESTATIONS (H): Primary | ICD-10-CM

## 2024-04-12 DIAGNOSIS — R53.1 GENERALIZED WEAKNESS: ICD-10-CM

## 2024-04-12 PROCEDURE — 99309 SBSQ NF CARE MODERATE MDM 30: CPT | Performed by: NURSE PRACTITIONER

## 2024-04-12 NOTE — LETTER
4/12/2024        RE: Maikol Glaser  [address] [place] [address]        [facility] GERIATRICS    PRIMARY CARE PROVIDER AND CLINIC:  Christopher Banda MD, [address] / [place] [address]  Chief Complaint   Patient presents with     Hospital F/U      [facility] Medical Record Number:  [id number]  Place of Service where encounter took place:  [facility] ([facility]) [[id number]]    Maikol Glaser  is a 83 year old  (1940), admitted to the above facility from  [facility]. Hospital stay 4/8/24 through 4/11/24..     HPI:    This is a 82 yo male with PMHx for hypertension, TIA, CVA in 2021, Parkinson's disease, type 2 diabetes, CKD stage IIIa who presented for evaluation of cough and weakness.  Found to have UTI secondary to self-catheterization, positive Enterococcus and enterobacter, initially given Rocephin and then transition to Levaquin until 4/13.  Recommend follow-up with urology outpatient.  No other changes noted, [facility]      CODE STATUS/ADVANCE DIRECTIVES DISCUSSION:  No CPR- Do NOT Intubate  DNR / DNI  ALLERGIES:   Allergies   Allergen Reactions     Penicillins Rash     Childhood rxn.         Influenza Vac Split Quad      Other Reaction(s): weakness      PAST MEDICAL HISTORY:   Past Medical History:   Diagnosis Date     Cerebral infarction (H)      Diabetes (H)      HLD (hyperlipidemia)      HTN (hypertension)      Hyperlipidemia      Hypertension      Intracranial hemorrhage (H) 5/26/2021     Stroke (H)      TIA (transient ischemic attack)       PAST SURGICAL HISTORY:   has a past surgical history that includes Endarterectomy carotid (Left, 8/11/2021).  FAMILY HISTORY: family history includes Bone Cancer in his mother.  SOCIAL HISTORY:   reports that he has never smoked. He has never used smokeless tobacco. He reports that he does not currently use alcohol. He reports that he does not currently use  drugs.  Patient's living condition: lives with spouse    Post Discharge Medication Reconciliation Status:   MED REC REQUIRED  Post Medication Reconciliation Status:  Discharge medications reconciled and changed, see notes/orders       Current Outpatient Medications   Medication Sig Dispense Refill     acetaminophen (TYLENOL) 325 MG tablet Take 650 mg by mouth every 6 hours as needed for mild pain       albuterol (PROVENTIL) (5 MG/ML) 0.5% neb solution Take 0.5 mLs (2.5 mg) by nebulization every 6 hours as needed for wheezing, shortness of breath or cough       amLODIPine (NORVASC) 5 MG tablet Take 1 tablet (5 mg) by mouth daily 30 tablet 0     aspirin (ASA) 81 MG EC tablet Take 81 mg by mouth daily       benzonatate (TESSALON) 100 MG capsule Take 1 capsule (100 mg) by mouth 3 times daily as needed for cough       carbidopa-levodopa (SINEMET)  MG tablet Take 1 tablet by mouth 4 times daily Takes at 0800, 1200, 1600, 2000       clopidogrel (PLAVIX) 75 MG tablet Take 75 mg by mouth daily       folic acid (FOLVITE) 400 MCG tablet Take 400 mcg by mouth daily       furosemide (LASIX) 40 MG tablet Take 40 mg by mouth daily       glipiZIDE (GLUCOTROL) 5 MG tablet Take 5 mg by mouth daily (with breakfast)       lisinopril (ZESTRIL) 5 MG tablet Take 5 mg by mouth daily       magnesium oxide (MAG-OX) 400 MG tablet Take 400 mg by mouth daily       metoprolol succinate ER (TOPROL-XL) 50 MG 24 hr tablet Take 50 mg by mouth daily       multivitamin (CENTRUM SILVER) tablet Take 1 tablet by mouth daily       pramipexole (MIRAPEX) 0.25 MG tablet Take 0.25 mg by mouth at bedtime       simvastatin (ZOCOR) 20 MG tablet Take 20 mg by mouth at bedtime       traZODone (DESYREL) 50 MG tablet Take 50 mg by mouth at bedtime       No current facility-administered medications for this visit.       ROS:  10 point ROS of systems including Constitutional, Eyes, Respiratory, Cardiovascular, Gastroenterology, Genitourinary, Integumentary,  Musculoskeletal, Psychiatric were all negative except for pertinent positives noted in my HPI.    Vitals:  BP (!) 145/83   Pulse 68   Temp 98.4  F (36.9  C)   Resp 18   SpO2 97%   Exam:  GENERAL APPEARANCE:  Alert, in no distress, morbidly obese, cooperative  ENT:  Mouth and posterior oropharynx normal, moist mucous membranes  NECK:  No adenopathy,masses or thyromegaly  RESP:  lungs clear to auscultation , no respiratory distress  CV:  no edema, irregular rhythm per PAF  ABDOMEN:  normal bowel sounds, soft, nontender, no hepatosplenomegaly or other masses  :    self caths  M/S:   Able to move all extremities  SKIN:  Inspection of skin and subcutaneous tissue baseline  NEURO:   No focal deficits  PSYCH:  oriented to 2/3    Lab/Diagnostic data:  Most Recent 3 CBC's:  Recent Labs   Lab Test 04/11/24  0750 04/08/24  1935 11/15/23  0949   WBC 7.2 9.4 8.0   HGB 13.6 14.5 11.9*   MCV 89 90 93    173 141*     Most Recent 3 BMP's:  Recent Labs   Lab Test 04/11/24  0750 04/11/24  0735 04/10/24  2233 04/09/24  1150 04/09/24  1040 04/09/24  0754 04/08/24  2231     --   --   --  136  --  137   POTASSIUM 4.9  --   --   --  4.6  --  4.8   CHLORIDE 102  --   --   --  101  --  103   CO2 23  --   --   --  22  --  24   BUN 37.6*  --   --   --  32.7*  --  30.8*   CR 1.39*  --   --   --  1.41*  --  1.50*   ANIONGAP 12  --   --   --  13  --  10   DORA 8.8  --   --   --  8.5*  --  8.8   GLC 97 100* 113*   < > 146*   < > 112*    < > = values in this interval not displayed.     Most Recent Hemoglobin A1c:  Recent Labs   Lab Test 04/08/24 1935   A1C 6.7*       ASSESSMENT/PLAN:    (N30.00) Acute cystitis without hematuria  (R33.9) Urinary retention  Usually self caths, monitor for retention, received rocephin and discharged on levaquin until 4/14    Cough  Given tessalon pearls and mucinex, resolved. RA    (G20.B2) Parkinson's disease with dyskinesia and fluctuating manifestations (H)    (R53.1) Generalized  weakness  Continue Sinemet 25/100 mcg 4 times daily, has tremors    (E11.9) Diabetes mellitus type 2, noninsulin dependent (H)  Last A1c 6.7 on 4/8, continue glipizide 5 mg daily, monitor blood sugar twice daily    (I10) Essential hypertension  Continue amlodipine 5 mg daily, Lasix 40 mg daily, metoprolol succinate 50 mg daily and lisinopril 5 mg daily.  Monitor blood pressure twice daily    Pain  Continue Tylenol 650 mg every 6 hours as needed, denies pain    History of TIA  Continue low-dose aspirin, Plavix and statin    (N18.32) Stage 3b chronic kidney disease (H)  Last CR 1.39 with GFR 51 on 4/11    Hypomagnesia  Continue magnesium oxide 400 mg daily, last level 2.0 on 11/15/2023    Anemia surveillance  Last Hgb 13.6 on 4/11    Insomnia  Continue trazodone 50 mg at bedtime    Restless leg syndrome  Continue Mirapex 0.25 mg at bedtime    Orders:  Monitor blood pressure twice daily, monitor blood sugar, discontinue Tessalon Pearls    Electronically signed by:  Angel Broussard NP                   Sincerely,        Angel Broussard NP

## 2024-04-14 VITALS
BODY MASS INDEX: 29.21 KG/M2 | HEIGHT: 73 IN | HEART RATE: 69 BPM | RESPIRATION RATE: 20 BRPM | OXYGEN SATURATION: 96 % | WEIGHT: 220.4 LBS | SYSTOLIC BLOOD PRESSURE: 130 MMHG | DIASTOLIC BLOOD PRESSURE: 86 MMHG | TEMPERATURE: 97.5 F

## 2024-04-14 PROBLEM — E11.9 DIABETES MELLITUS, TYPE 2 (H): Status: ACTIVE | Noted: 2024-04-14

## 2024-04-14 PROBLEM — S90.421A BLISTER (NONTHERMAL), RIGHT GREAT TOE, INITIAL ENCOUNTER: Status: ACTIVE | Noted: 2024-04-14

## 2024-04-14 PROBLEM — G47.00 INSOMNIA, UNSPECIFIED: Status: ACTIVE | Noted: 2024-04-14

## 2024-04-14 PROBLEM — R53.1 WEAKNESS: Status: ACTIVE | Noted: 2021-09-28

## 2024-04-14 PROBLEM — G20.C PARKINSONISM (H): Status: ACTIVE | Noted: 2021-09-28

## 2024-04-14 PROBLEM — I87.2 VENOUS STASIS DERMATITIS OF BOTH LOWER EXTREMITIES: Status: ACTIVE | Noted: 2024-04-14

## 2024-04-14 PROBLEM — Z99.3 WHEELCHAIR DEPENDENCE: Status: ACTIVE | Noted: 2024-04-14

## 2024-04-14 PROBLEM — I69.959 HEMIPLEGIA OF DOMINANT SIDE AS LATE EFFECT OF CEREBROVASCULAR DISEASE (H): Status: ACTIVE | Noted: 2021-09-28

## 2024-04-14 PROBLEM — N18.32 STAGE 3B CHRONIC KIDNEY DISEASE (H): Status: ACTIVE | Noted: 2024-04-14

## 2024-04-14 PROBLEM — N31.9 NEUROGENIC BLADDER: Status: ACTIVE | Noted: 2024-04-14

## 2024-04-14 PROBLEM — G20.A1 PARKINSON DISEASE, SYMPTOMATIC (H): Status: ACTIVE | Noted: 2024-04-14

## 2024-04-14 PROBLEM — I10 HYPERTENSION: Status: ACTIVE | Noted: 2024-04-14

## 2024-04-14 PROBLEM — I63.9 CEREBRAL INFARCTION (H): Status: ACTIVE | Noted: 2024-04-14

## 2024-04-14 PROBLEM — G25.81 RESTLESS LEGS: Status: ACTIVE | Noted: 2024-04-14

## 2024-04-14 PROBLEM — G47.00 INSOMNIA: Status: ACTIVE | Noted: 2024-04-14

## 2024-04-14 NOTE — PROGRESS NOTES
"[facility] GERIATRICS    Chief Complaint   Patient presents with    RECHECK     HPI:  Maikol Glaser is a 83 year old  (1940), who is being seen today for an episodic care visit at: [facility] ([facility]) [[id number]].     This is a 82 yo male with PMHx for hypertension, TIA, CVA in 2021, Parkinson's disease, type 2 diabetes, CKD stage IIIa who presented for evaluation of cough and weakness.  Found to have UTI secondary to self-catheterization, positive Enterococcus and enterobacter, initially given Rocephin and then transition to Levaquin until 4/13.  Recommend follow-up with urology outpatient.  No other changes noted, [facility]      Today's concern is:   CKD, Htn    Allergies, and PMH/PSH reviewed in [facility] today.  REVIEW OF SYSTEMS:  4 point ROS including Respiratory, CV, GI and , other than that noted in the HPI,  is negative    Objective:   /86   Pulse 69   Temp 97.5  F (36.4  C)   Resp 20   Ht 1.854 m (6' 1\")   Wt 100 kg (220 lb 6.4 oz)   SpO2 96%   BMI 29.08 kg/m    GENERAL APPEARANCE:  Alert, in no distress, morbidly obese, cooperative. Denies pain  RESP:  lungs clear to auscultation , no respiratory distress, RA  CV:  no edema, irregular rhythm per PAF  :    self caths  PSYCH:  oriented to 2/3    Most Recent 3 CBC's:  Recent Labs   Lab Test 04/11/24  0750 04/08/24  1935 11/15/23  0949   WBC 7.2 9.4 8.0   HGB 13.6 14.5 11.9*   MCV 89 90 93    173 141*     Most Recent 3 BMP's:  Recent Labs   Lab Test 04/11/24  0750 04/11/24  0735 04/10/24  2233 04/09/24  1150 04/09/24  1040 04/09/24  0754 04/08/24 2231     --   --   --  136  --  137   POTASSIUM 4.9  --   --   --  4.6  --  4.8   CHLORIDE 102  --   --   --  101  --  103   CO2 23  --   --   --  22  --  24   BUN 37.6*  --   --   --  32.7*  --  30.8*   CR 1.39*  --   --   --  1.41*  --  1.50*   ANIONGAP 12  --   --   --  13  --  10   DORA 8.8  --   --   --  8.5*  --  8.8   GLC 97 100* 113* "   < > 146*   < > 112*    < > = values in this interval not displayed.     Most Recent Hemoglobin A1c:  Recent Labs   Lab Test 04/08/24  1935   A1C 6.7*       Assessment/Plan:    (N30.00) Acute cystitis without hematuria  (R33.9) Urinary retention  Neurogenic bladder  Usually self caths, monitor for retention with PVRs q shift, received rocephin and discharged on levaquin until 4/14. May need avilez?  Start flomax?     Cough  Given tessalon pearls and mucinex, resolved. RA     (G20.B2) Parkinson's disease with dyskinesia and fluctuating manifestations (H)    (R53.1) Generalized weakness  Continue Sinemet 25/100 mcg 4 times daily, has tremors     (E11.9) Diabetes mellitus type 2, noninsulin dependent (H)  Last A1c 6.7 on 4/8, BS 90-140s, today discontinue glipizide     (I10) Essential hypertension  Continue amlodipine 5 mg daily, Lasix 40 mg daily, metoprolol succinate 50 mg daily and lisinopril 5 mg daily.  -140s, HR <70s     Pain  Continue Tylenol 650 mg every 6 hours as needed, denies pain     History of TIA  Continue low-dose aspirin, Plavix and statin     (N18.32) Stage 3b chronic kidney disease (H)  Last CR 1.39 with GFR 51 on 4/11     Hypomagnesia  Continue magnesium oxide 400 mg daily, last level 2.0 on 11/15/2023     Anemia surveillance  Last Hgb 13.6 on 4/11     Insomnia  Continue trazodone 50 mg at bedtime, adequate     Restless leg syndrome  Continue Mirapex 0.25 mg at bedtime    Orders:  Discontinue glipizide    Electronically signed by: Angel Broussard NP

## 2024-04-15 ENCOUNTER — TRANSITIONAL CARE UNIT VISIT (OUTPATIENT)
Dept: GERIATRICS | Facility: CLINIC | Age: 84
End: 2024-04-15
Payer: COMMERCIAL

## 2024-04-15 DIAGNOSIS — N31.9 NEUROGENIC BLADDER: ICD-10-CM

## 2024-04-15 DIAGNOSIS — J06.9 UPPER RESPIRATORY TRACT INFECTION, UNSPECIFIED TYPE: ICD-10-CM

## 2024-04-15 DIAGNOSIS — E11.9 DIABETES MELLITUS TYPE 2, NONINSULIN DEPENDENT (H): ICD-10-CM

## 2024-04-15 DIAGNOSIS — G20.B2 PARKINSON'S DISEASE WITH DYSKINESIA AND FLUCTUATING MANIFESTATIONS (H): Primary | ICD-10-CM

## 2024-04-15 LAB
ATRIAL RATE - MUSE: 64 BPM
DIASTOLIC BLOOD PRESSURE - MUSE: NORMAL MMHG
INTERPRETATION ECG - MUSE: NORMAL
P AXIS - MUSE: 19 DEGREES
PR INTERVAL - MUSE: 208 MS
QRS DURATION - MUSE: 154 MS
QT - MUSE: 436 MS
QTC - MUSE: 449 MS
R AXIS - MUSE: 97 DEGREES
SYSTOLIC BLOOD PRESSURE - MUSE: NORMAL MMHG
T AXIS - MUSE: -19 DEGREES
VENTRICULAR RATE- MUSE: 64 BPM

## 2024-04-15 PROCEDURE — 99309 SBSQ NF CARE MODERATE MDM 30: CPT | Performed by: NURSE PRACTITIONER

## 2024-04-15 NOTE — LETTER
"    4/15/2024        RE: Maikol Glaser  [address] [place] [address]        [facility] GERIATRICS    Chief Complaint   Patient presents with     RECHECK     HPI:  Maikol Glaser is a 83 year old  (1940), who is being seen today for an episodic care visit at: [facility] ([facility]) [[id number]].     This is a 82 yo male with PMHx for hypertension, TIA, CVA in 2021, Parkinson's disease, type 2 diabetes, CKD stage IIIa who presented for evaluation of cough and weakness.  Found to have UTI secondary to self-catheterization, positive Enterococcus and enterobacter, initially given Rocephin and then transition to Levaquin until 4/13.  Recommend follow-up with urology outpatient.  No other changes noted[facility]      Today's concern is:   CKD, Htn    Allergies, and PMH/PSH reviewed in [facility] today.  REVIEW OF SYSTEMS:  4 point ROS including Respiratory, CV, GI and , other than that noted in the HPI,  is negative    Objective:   /86   Pulse 69   Temp 97.5  F (36.4  C)   Resp 20   Ht 1.854 m (6' 1\")   Wt 100 kg (220 lb 6.4 oz)   SpO2 96%   BMI 29.08 kg/m    GENERAL APPEARANCE:  Alert, in no distress, morbidly obese, cooperative. Denies pain  RESP:  lungs clear to auscultation , no respiratory distress, RA  CV:  no edema, irregular rhythm per PAF  :    self caths  PSYCH:  oriented to 2/3    Most Recent 3 CBC's:  Recent Labs   Lab Test 04/11/24  0750 04/08/24  1935 11/15/23  0949   WBC 7.2 9.4 8.0   HGB 13.6 14.5 11.9*   MCV 89 90 93    173 141*     Most Recent 3 BMP's:  Recent Labs   Lab Test 04/11/24  0750 04/11/24  0735 04/10/24  2233 04/09/24  1150 04/09/24  1040 04/09/24  0754 04/08/24 2231     --   --   --  136  --  137   POTASSIUM 4.9  --   --   --  4.6  --  4.8   CHLORIDE 102  --   --   --  101  --  103   CO2 23  --   --   --  22  --  24   BUN 37.6*  --   --   --  32.7*  --  30.8*   CR 1.39*  --   --   --  1.41*  --  1.50*   ANIONGAP 12 "  --   --   --  13  --  10   DORA 8.8  --   --   --  8.5*  --  8.8   GLC 97 100* 113*   < > 146*   < > 112*    < > = values in this interval not displayed.     Most Recent Hemoglobin A1c:  Recent Labs   Lab Test 04/08/24 1935   A1C 6.7*       Assessment/Plan:    (N30.00) Acute cystitis without hematuria  (R33.9) Urinary retention  Neurogenic bladder  Usually self caths, monitor for retention with PVRs q shift, received rocephin and discharged on levaquin until 4/14. May need avilez?  Start flomax?     Cough  Given tessalon pearls and mucinex, resolved. RA     (G20.B2) Parkinson's disease with dyskinesia and fluctuating manifestations (H)    (R53.1) Generalized weakness  Continue Sinemet 25/100 mcg 4 times daily, has tremors     (E11.9) Diabetes mellitus type 2, noninsulin dependent (H)  Last A1c 6.7 on 4/8, BS 90-140s, today discontinue glipizide     (I10) Essential hypertension  Continue amlodipine 5 mg daily, Lasix 40 mg daily, metoprolol succinate 50 mg daily and lisinopril 5 mg daily.  -140s, HR <70s     Pain  Continue Tylenol 650 mg every 6 hours as needed, denies pain     History of TIA  Continue low-dose aspirin, Plavix and statin     (N18.32) Stage 3b chronic kidney disease (H)  Last CR 1.39 with GFR 51 on 4/11     Hypomagnesia  Continue magnesium oxide 400 mg daily, last level 2.0 on 11/15/2023     Anemia surveillance  Last Hgb 13.6 on 4/11     Insomnia  Continue trazodone 50 mg at bedtime, adequate     Restless leg syndrome  Continue Mirapex 0.25 mg at bedtime    Orders:  Discontinue glipizide    Electronically signed by: Angel Broussard NP         Sincerely,        Angel Broussard NP

## 2024-04-17 ENCOUNTER — DISCHARGE SUMMARY NURSING HOME (OUTPATIENT)
Dept: GERIATRICS | Facility: CLINIC | Age: 84
End: 2024-04-17
Payer: COMMERCIAL

## 2024-04-17 VITALS
DIASTOLIC BLOOD PRESSURE: 86 MMHG | TEMPERATURE: 97.5 F | HEART RATE: 69 BPM | SYSTOLIC BLOOD PRESSURE: 130 MMHG | BODY MASS INDEX: 29.16 KG/M2 | HEIGHT: 73 IN | RESPIRATION RATE: 20 BRPM | WEIGHT: 220 LBS | OXYGEN SATURATION: 96 %

## 2024-04-17 DIAGNOSIS — E11.9 DIABETES MELLITUS TYPE 2, NONINSULIN DEPENDENT (H): ICD-10-CM

## 2024-04-17 DIAGNOSIS — R33.9 URINARY RETENTION: ICD-10-CM

## 2024-04-17 DIAGNOSIS — G20.B2 PARKINSON'S DISEASE WITH DYSKINESIA AND FLUCTUATING MANIFESTATIONS (H): Primary | ICD-10-CM

## 2024-04-17 DIAGNOSIS — G47.00 INSOMNIA, UNSPECIFIED TYPE: ICD-10-CM

## 2024-04-17 DIAGNOSIS — I10 ESSENTIAL HYPERTENSION: ICD-10-CM

## 2024-04-17 PROCEDURE — 99315 NF DSCHRG MGMT 30 MIN/LESS: CPT | Performed by: NURSE PRACTITIONER

## 2024-04-17 NOTE — PROGRESS NOTES
[facility] GERIATRICS DISCHARGE SUMMARY  PATIENT'S NAME: Maikol Glaser  YOB: 1940  MEDICAL RECORD NUMBER:  [id number]  Place of Service where encounter took place:  General acute hospital ([facility]) [[id number]]    PRIMARY CARE PROVIDER AND CLINIC RESPONSIBLE AFTER TRANSFER:   Christopher Banda MD, [address] / [place] [address]    Non-[facility] Provider     Transferring providers: Angel Broussard NP, MD Lisa  Recent Hospitalization/ED:  [facility] stay 4/8/24 to 4/11/24.  Date of SNF Admission: April 11, 2024  Date of SNF (anticipated) Discharge: April 18, 2024  Discharged to: previous independent home  Cognitive Scores:  SLUMS 20/30  Physical Function:  baseline  DME: No new DME needed    CODE STATUS/ADVANCE DIRECTIVES DISCUSSION:  No CPR- Do NOT Intubate   ALLERGIES: Penicillins and Influenza vac split quad    HPI  This is a 82 yo male with PMHx for hypertension, TIA, CVA in 2021, Parkinson's disease, type 2 diabetes, CKD stage IIIa who presented for evaluation of cough and weakness. Found to have UTI secondary to self-catheterization, positive Enterococcus and enterobacter, initially given Rocephin and then transition to Levaquin until 4/13. Recommend follow-up with urology outpatient. No other changes noted[facility]     Summary of nursing facility stay:     (N30.00) Acute cystitis without hematuria  (R33.9) Urinary retention  Neurogenic bladder  Usually self caths, monitor for retention with PVRs q shift, received rocephin and discharged on levaquin until 4/14. May need avilez?  Start flomax?     Cough  Given tessalon pearls and mucinex, resolved. RA     (G20.B2) Parkinson's disease with dyskinesia and fluctuating manifestations (H)    (R53.1) Generalized weakness  Continue Sinemet 25/100 mcg 4 times daily, has tremors     (E11.9) Diabetes mellitus type 2, noninsulin dependent (H)  Last A1c 6.7 on 4/8, BS  110-140s, discontinued glipizide     (I10) Essential hypertension  Continue amlodipine 5 mg daily, Lasix 40 mg daily, metoprolol succinate 50 mg daily and lisinopril 5 mg daily.  -140s, HR <70s     Pain  Continue Tylenol 650 mg every 6 hours as needed     History of TIA  Continue low-dose aspirin, Plavix and statin     (N18.32) Stage 3b chronic kidney disease (H)  Last CR 1.39 with GFR 51 on 4/11     Hypomagnesia  Continue magnesium oxide 400 mg daily, last level 2.0 on 11/15/2023     Anemia surveillance  Last Hgb 13.6 on 4/11     Insomnia  Continue trazodone 50 mg at bedtime     Restless leg syndrome  Continue Mirapex 0.25 mg at bedtime    Discharge Medications:    Current Outpatient Medications   Medication Sig Dispense Refill    acetaminophen (TYLENOL) 325 MG tablet Take 650 mg by mouth every 6 hours as needed for mild pain      albuterol (PROVENTIL) (5 MG/ML) 0.5% neb solution Take 0.5 mLs (2.5 mg) by nebulization every 6 hours as needed for wheezing, shortness of breath or cough      amLODIPine (NORVASC) 5 MG tablet Take 1 tablet (5 mg) by mouth daily 30 tablet 0    aspirin (ASA) 81 MG EC tablet Take 81 mg by mouth daily      carbidopa-levodopa (SINEMET)  MG tablet Take 1 tablet by mouth 4 times daily Takes at 0800, 1200, 1600, 2000      clopidogrel (PLAVIX) 75 MG tablet Take 75 mg by mouth daily      folic acid (FOLVITE) 400 MCG tablet Take 400 mcg by mouth daily      furosemide (LASIX) 40 MG tablet Take 40 mg by mouth daily      lisinopril (ZESTRIL) 5 MG tablet Take 5 mg by mouth daily      magnesium oxide (MAG-OX) 400 MG tablet Take 400 mg by mouth daily      metoprolol succinate ER (TOPROL-XL) 50 MG 24 hr tablet Take 50 mg by mouth daily      multivitamin (CENTRUM SILVER) tablet Take 1 tablet by mouth daily      pramipexole (MIRAPEX) 0.25 MG tablet Take 0.25 mg by mouth at bedtime      simvastatin (ZOCOR) 20 MG tablet Take 20 mg by mouth at bedtime      traZODone (DESYREL) 50 MG tablet Take 50  "mg by mouth at bedtime          Controlled medications:   not applicable/none     Past Medical History:   Past Medical History:   Diagnosis Date    Cerebral infarction (H)     Diabetes (H)     HLD (hyperlipidemia)     HTN (hypertension)     Hyperlipidemia     Hypertension     Intracranial hemorrhage (H) 5/26/2021    Stroke (H)     TIA (transient ischemic attack)      Physical Exam:   Vitals: /86   Pulse 69   Temp 97.5  F (36.4  C)   Resp 20   Ht 1.854 m (6' 1\")   Wt 99.8 kg (220 lb)   SpO2 96%   BMI 29.03 kg/m    BMI: Body mass index is 29.03 kg/m .  GENERAL APPEARANCE:  Alert, in no distress, morbidly obese, cooperative. Denies pain  RESP:  lungs clear to auscultation , no respiratory distress, RA  CV:  no edema, irregular rhythm per PAF  :    self caths  PSYCH:  oriented to 2/3    SNF labs: Most Recent 3 CBC's:  Recent Labs   Lab Test 04/11/24  0750 04/08/24  1935 11/15/23  0949   WBC 7.2 9.4 8.0   HGB 13.6 14.5 11.9*   MCV 89 90 93    173 141*     Most Recent 3 BMP's:  Recent Labs   Lab Test 04/11/24  0750 04/11/24  0735 04/10/24  2233 04/09/24  1150 04/09/24  1040 04/09/24  0754 04/08/24 2231     --   --   --  136  --  137   POTASSIUM 4.9  --   --   --  4.6  --  4.8   CHLORIDE 102  --   --   --  101  --  103   CO2 23  --   --   --  22  --  24   BUN 37.6*  --   --   --  32.7*  --  30.8*   CR 1.39*  --   --   --  1.41*  --  1.50*   ANIONGAP 12  --   --   --  13  --  10   DORA 8.8  --   --   --  8.5*  --  8.8   GLC 97 100* 113*   < > 146*   < > 112*    < > = values in this interval not displayed.     Most Recent Hemoglobin A1c:  Recent Labs   Lab Test 04/08/24 1935   A1C 6.7*       DISCHARGE PLAN:  Follow up labs: No labs orders/due  Medical Follow Up:      Follow up with primary care provider in 1-2 weeks  Current [place] scheduled appointments:  Appointments in Next Year      Apr 17, 2024  1:00 PM  Discharge Summary with Angle Broussard NP  [facility] ([facility] ) [phone]           Discharge Services: Home Care:  Occupational Therapy and Physical Therapy  Discharge Instructions Verbalized to Patient at Discharge:   None    TOTAL DISCHARGE TIME:   Less than or equal to 30 minutes  Electronically signed by:  Angel Broussard NP     Documentation of Face to Face and Certification for Home Health Services    I certify that patient: Maikol Glaser is under my care and that I, or a nurse practitioner or physician's assistant working with me, had a face-to-face encounter that meets the physician face-to-face encounter requirements with this patient on: 4/17/2024.    This encounter with the patient was in whole, or in part, for the following medical condition, which is the primary reason for home health care: therapy.    I certify that, based on my findings, the following services are medically necessary home health services: Occupational Therapy and Physical Therapy.    My clinical findings support the need for the above services because: Occupational Therapy Services are needed to assess and treat cognitive ability and address ADL safety due to impairment in cognition. and Physical Therapy Services are needed to assess and treat the following functional impairments: falls risk.    Further, I certify that my clinical findings support that this patient is homebound (i.e. absences from home require considerable and taxing effort and are for medical reasons or Sh[employer] services or infrequently or of short duration when for other reasons) because:  therapy .    Based on the above findings. I certify that this patient is confined to the home and needs intermittent skilled nursing care, physical therapy and/or speech therapy.  The patient is under my care, and I have initiated the establishment of the plan of care.  This patient will be followed by a physician who will periodically review the plan of care.  Physician/Provider to provide follow up  care: Christopher Banda    Attending hospital physician (the Medicare certified [facility] provider): Angel Broussard NP  Physician Signature: See electronic signature associated with these discharge orders.  Date: 4/17/2024

## 2024-04-17 NOTE — LETTER
4/17/2024        RE: Maikol Glaser  [address] [place] [address]        [facility] GERIATRICS DISCHARGE SUMMARY  PATIENT'S NAME: Maikol Glaser  YOB: 1940  MEDICAL RECORD NUMBER:  [id number]  Place of Service where encounter took place:  [facility] ([facility]) [[id number]]    PRIMARY CARE PROVIDER AND CLINIC RESPONSIBLE AFTER TRANSFER:   Crhistopher Banda MD, [address] [facility] / [place] [address]    Non-[facility] Provider     Transferring providers: Angel Broussard NP, MD Lisa  Recent Hospitalization/ED:  [facility] stay 4/8/24 to 4/11/24.  Date of SNF Admission: April 11, 2024  Date of [facility] (anticipated) Discharge: April 18, 2024  Discharged to: previous independent home  Cognitive Scores:  SLUMS 20/30  Physical Function:  baseline  DME: No new DME needed    CODE STATUS/ADVANCE DIRECTIVES DISCUSSION:  No CPR- Do NOT Intubate   ALLERGIES: Penicillins and Influenza vac split quad    HPI  This is a 82 yo male with PMHx for hypertension, TIA, CVA in 2021, Parkinson's disease, type 2 diabetes, CKD stage IIIa who presented for evaluation of cough and weakness. Found to have UTI secondary to self-catheterization, positive Enterococcus and enterobacter, initially given Rocephin and then transition to Levaquin until 4/13. Recommend follow-up with urology outpatient. No other changes noted, [facility].     Summary of nursing facility stay:     (N30.00) Acute cystitis without hematuria  (R33.9) Urinary retention  Neurogenic bladder  Usually self caths, monitor for retention with PVRs q shift, received rocephin and discharged on levaquin until 4/14. May need avilez?  Start flomax?     Cough  Given tessalon pearls and mucinex, resolved. RA     (G20.B2) Parkinson's disease with dyskinesia and fluctuating manifestations (H)    (R53.1) Generalized weakness  Continue Sinemet 25/100 mcg 4 times daily, has tremors      (E11.9) Diabetes mellitus type 2, noninsulin dependent (H)  Last A1c 6.7 on 4/8, -140s, discontinued glipizide     (I10) Essential hypertension  Continue amlodipine 5 mg daily, Lasix 40 mg daily, metoprolol succinate 50 mg daily and lisinopril 5 mg daily.  -140s, HR <70s     Pain  Continue Tylenol 650 mg every 6 hours as needed     History of TIA  Continue low-dose aspirin, Plavix and statin     (N18.32) Stage 3b chronic kidney disease (H)  Last CR 1.39 with GFR 51 on 4/11     Hypomagnesia  Continue magnesium oxide 400 mg daily, last level 2.0 on 11/15/2023     Anemia surveillance  Last Hgb 13.6 on 4/11     Insomnia  Continue trazodone 50 mg at bedtime     Restless leg syndrome  Continue Mirapex 0.25 mg at bedtime    Discharge Medications:    Current Outpatient Medications   Medication Sig Dispense Refill     acetaminophen (TYLENOL) 325 MG tablet Take 650 mg by mouth every 6 hours as needed for mild pain       albuterol (PROVENTIL) (5 MG/ML) 0.5% neb solution Take 0.5 mLs (2.5 mg) by nebulization every 6 hours as needed for wheezing, shortness of breath or cough       amLODIPine (NORVASC) 5 MG tablet Take 1 tablet (5 mg) by mouth daily 30 tablet 0     aspirin (ASA) 81 MG EC tablet Take 81 mg by mouth daily       carbidopa-levodopa (SINEMET)  MG tablet Take 1 tablet by mouth 4 times daily Takes at 0800, 1200, 1600, 2000       clopidogrel (PLAVIX) 75 MG tablet Take 75 mg by mouth daily       folic acid (FOLVITE) 400 MCG tablet Take 400 mcg by mouth daily       furosemide (LASIX) 40 MG tablet Take 40 mg by mouth daily       lisinopril (ZESTRIL) 5 MG tablet Take 5 mg by mouth daily       magnesium oxide (MAG-OX) 400 MG tablet Take 400 mg by mouth daily       metoprolol succinate ER (TOPROL-XL) 50 MG 24 hr tablet Take 50 mg by mouth daily       multivitamin (CENTRUM SILVER) tablet Take 1 tablet by mouth daily       pramipexole (MIRAPEX) 0.25 MG tablet Take 0.25 mg by mouth at bedtime        "simvastatin (ZOCOR) 20 MG tablet Take 20 mg by mouth at bedtime       traZODone (DESYREL) 50 MG tablet Take 50 mg by mouth at bedtime          Controlled medications:   not applicable/none     Past Medical History:   Past Medical History:   Diagnosis Date     Cerebral infarction (H)      Diabetes (H)      HLD (hyperlipidemia)      HTN (hypertension)      Hyperlipidemia      Hypertension      Intracranial hemorrhage (H) 5/26/2021     Stroke (H)      TIA (transient ischemic attack)      Physical Exam:   Vitals: /86   Pulse 69   Temp 97.5  F (36.4  C)   Resp 20   Ht 1.854 m (6' 1\")   Wt 99.8 kg (220 lb)   SpO2 96%   BMI 29.03 kg/m    BMI: Body mass index is 29.03 kg/m .  GENERAL APPEARANCE:  Alert, in no distress, morbidly obese, cooperative. Denies pain  RESP:  lungs clear to auscultation , no respiratory distress, RA  CV:  no edema, irregular rhythm per PAF  :    self caths  PSYCH:  oriented to 2/3    SNF labs: Most Recent 3 CBC's:  Recent Labs   Lab Test 04/11/24  0750 04/08/24  1935 11/15/23  0949   WBC 7.2 9.4 8.0   HGB 13.6 14.5 11.9*   MCV 89 90 93    173 141*     Most Recent 3 BMP's:  Recent Labs   Lab Test 04/11/24  0750 04/11/24  0735 04/10/24  2233 04/09/24  1150 04/09/24  1040 04/09/24  0754 04/08/24  2231     --   --   --  136  --  137   POTASSIUM 4.9  --   --   --  4.6  --  4.8   CHLORIDE 102  --   --   --  101  --  103   CO2 23  --   --   --  22  --  24   BUN 37.6*  --   --   --  32.7*  --  30.8*   CR 1.39*  --   --   --  1.41*  --  1.50*   ANIONGAP 12  --   --   --  13  --  10   DORA 8.8  --   --   --  8.5*  --  8.8   GLC 97 100* 113*   < > 146*   < > 112*    < > = values in this interval not displayed.     Most Recent Hemoglobin A1c:  Recent Labs   Lab Test 04/08/24  1935   A1C 6.7*       DISCHARGE PLAN:  Follow up labs: No labs orders/due  Medical Follow Up:      Follow up with primary care provider in 1-2 weeks  [facility] scheduled appointments:  Appointments in Next " Year      Apr 17, 2024  1:00 PM  Discharge Summary with Angel Broussard NP  [facility] ([facility] ) [phone]           Discharge Services: Home Care:  Occupational Therapy and Physical Therapy  Discharge Instructions Verbalized to Patient at Discharge:   None    TOTAL DISCHARGE TIME:   Less than or equal to 30 minutes  Electronically signed by:  Angel Broussard NP     Documentation of Face to Face and Certification for Home Health Services    I certify that patient: Maikol Glaser is under my care and that I, or a nurse practitioner or physician's assistant working with me, had a face-to-face encounter that meets the physician face-to-face encounter requirements with this patient on: 4/17/2024.    This encounter with the patient was in whole, or in part, for the following medical condition, which is the primary reason for home health care: therapy.    I certify that, based on my findings, the following services are medically necessary home health services: Occupational Therapy and Physical Therapy.    My clinical findings support the need for the above services because: Occupational Therapy Services are needed to assess and treat cognitive ability and address ADL safety due to impairment in cognition. and Physical Therapy Services are needed to assess and treat the following functional impairments: falls risk.    Further, I certify that my clinical findings support that this patient is homebound (i.e. absences from home require considerable and taxing effort and are for medical reasons or [facility] services or infrequently or of short duration when for other reasons) because:  therapy .    Based on the above findings. I certify that this patient is confined to the home and needs intermittent skilled nursing care, physical therapy and/or speech therapy.  The patient is under my care, and I have initiated the establishment of the plan of care.  This patient will  be followed by a physician who will periodically review the plan of care.  Physician/Provider to provide follow up care: Christopher Banda    Attending [facility] physician (the Medicare certified [facility] provider): Angel Broussard NP  Physician Signature: See electronic signature associated with these discharge orders.  Date: 4/17/2024              Sincerely,        Angel Broussard NP

## 2024-08-08 ENCOUNTER — LAB REQUISITION (OUTPATIENT)
Dept: LAB | Facility: CLINIC | Age: 84
End: 2024-08-08

## 2024-08-08 DIAGNOSIS — E11.21 TYPE 2 DIABETES MELLITUS WITH DIABETIC NEPHROPATHY (H): ICD-10-CM

## 2024-08-08 LAB
ANION GAP SERPL CALCULATED.3IONS-SCNC: 12 MMOL/L (ref 7–15)
BUN SERPL-MCNC: 41.2 MG/DL (ref 8–23)
CALCIUM SERPL-MCNC: 9.5 MG/DL (ref 8.8–10.4)
CHLORIDE SERPL-SCNC: 103 MMOL/L (ref 98–107)
CREAT SERPL-MCNC: 1.49 MG/DL (ref 0.67–1.17)
EGFRCR SERPLBLD CKD-EPI 2021: 47 ML/MIN/1.73M2
GLUCOSE SERPL-MCNC: 221 MG/DL (ref 70–99)
HCO3 SERPL-SCNC: 25 MMOL/L (ref 22–29)
POTASSIUM SERPL-SCNC: 4.9 MMOL/L (ref 3.4–5.3)
SODIUM SERPL-SCNC: 140 MMOL/L (ref 135–145)

## 2024-08-08 PROCEDURE — 80048 BASIC METABOLIC PNL TOTAL CA: CPT | Performed by: FAMILY MEDICINE

## 2025-03-04 ENCOUNTER — OFFICE VISIT (OUTPATIENT)
Dept: VASCULAR SURGERY | Facility: CLINIC | Age: 85
End: 2025-03-04
Attending: HOSPITALIST
Payer: COMMERCIAL

## 2025-03-04 ENCOUNTER — ANCILLARY PROCEDURE (OUTPATIENT)
Dept: VASCULAR ULTRASOUND | Facility: CLINIC | Age: 85
End: 2025-03-04
Attending: HOSPITALIST
Payer: COMMERCIAL

## 2025-03-04 VITALS
SYSTOLIC BLOOD PRESSURE: 126 MMHG | HEART RATE: 57 BPM | DIASTOLIC BLOOD PRESSURE: 73 MMHG | RESPIRATION RATE: 16 BRPM | OXYGEN SATURATION: 97 % | TEMPERATURE: 97.5 F

## 2025-03-04 DIAGNOSIS — Z09 ENCOUNTER FOR FOLLOW-UP EXAMINATION AFTER COMPLETED TREATMENT FOR CONDITIONS OTHER THAN MALIGNANT NEOPLASM: ICD-10-CM

## 2025-03-04 DIAGNOSIS — Z98.890 HISTORY OF LEFT-SIDED CAROTID ENDARTERECTOMY: Primary | ICD-10-CM

## 2025-03-04 DIAGNOSIS — Z98.890 HISTORY OF LEFT-SIDED CAROTID ENDARTERECTOMY: ICD-10-CM

## 2025-03-04 PROCEDURE — 3078F DIAST BP <80 MM HG: CPT | Performed by: HOSPITALIST

## 2025-03-04 PROCEDURE — 99214 OFFICE O/P EST MOD 30 MIN: CPT | Performed by: HOSPITALIST

## 2025-03-04 PROCEDURE — G0463 HOSPITAL OUTPT CLINIC VISIT: HCPCS | Performed by: HOSPITALIST

## 2025-03-04 PROCEDURE — 1126F AMNT PAIN NOTED NONE PRSNT: CPT | Performed by: HOSPITALIST

## 2025-03-04 PROCEDURE — 93880 EXTRACRANIAL BILAT STUDY: CPT

## 2025-03-04 PROCEDURE — 3074F SYST BP LT 130 MM HG: CPT | Performed by: HOSPITALIST

## 2025-03-04 ASSESSMENT — PAIN SCALES - GENERAL: PAINLEVEL_OUTOF10: NO PAIN (0)

## 2025-03-04 NOTE — PROGRESS NOTES
[facility]        Patient is here for a 1 year follow up  to discuss Carotid stenosis. Patient has no symptoms.    Pt is currently taking Aspirin and Statin.    /73 (BP Location: Right arm)   Pulse 57   Temp 97.5  F (36.4  C)   Resp 16   SpO2 97%     The provider has been notified that the patient has no concerns.     Questions patient would like addressed today are: N/A.    Refills are needed: No    Has homecare services and agency name:  [facility]

## 2025-03-04 NOTE — PATIENT INSTRUCTIONS
"Kelsi Lassiter,    Thank you for entrusting your care with us today. After your visit today with Dr. Vidhi Garcia this is the plan that was discussed at your appointment.    Follow up in 1 year with Dr. Garcia and an ultrasound of your carotid arteries prior.  A  will reach out to you closer to that time to schedule.       I am including additional information on these things and our contact information if you have any questions or concerns.   Please do not hesitate to reach out to us if you felt we did not answer your questions or you are unsure of the treatment plan after your visit today. Our number is [phone].Thank you for trusting us with your care.         Again thank you for your time.       Carotid Artery Disease      What is carotid artery disease?  A carotid artery on each side of the neck supplies blood to the brain. Carotid artery disease occurs when a substance called plaque builds up in either or both arteries. The buildup may narrow the artery and limit blood flow to the brain. If this plaque breaks open, it may form a blood clot. Or pieces of the plaque may break off. A piece of plaque or a blood clot could move to the brain and cause a stroke or transient ischemic attack (TIA).    The narrowing in an artery is called stenosis. The more narrow an artery becomes, the greater the risk of stroke or TIA.        What causes it?  Carotid artery disease is caused by a process called hardening of the arteries, or atherosclerosis. Plaque builds up inside the carotid arteries. Things that can lead to this buildup include:    Smoking.  High blood pressure.  High cholesterol.  Diabetes.  A family history of hardening of the arteries.    What are the symptoms?  Many people have no symptoms. In some people, a doctor can hear a sound in their neck called a bruit (pronounced \"broo-EE\"). This is a whooshing sound that happens when a carotid artery is narrowed.    For some people, a transient ischemic attack " (TIA) or stroke is the first sign of the disease.    Know the warning signs and symptoms of stroke: BE FAST     B = Balance loss   E = Eyesight changes   F = Facial droop or numbness   A = Arm or leg weakness   S = Speech difficulty, slurred speech   T = Time to call 911 for help    How is carotid artery disease treated?  The goal of treatment is to lower your risk of a stroke. Treatment depends on whether you have symptoms and how narrow your arteries are. You probably will take medicine. You also will be encouraged to have a heart-healthy lifestyle. Some people also have a procedure to lower their risk.    Medicines  You may take aspirin or another medicine to prevent blood clots. You will likely also take medicine to lower cholesterol. You may also take medicine to help manage blood pressure.    Heart-healthy lifestyle  A heart-healthy lifestyle can help lower your risk of stroke.    Don't smoke. Avoid secondhand smoke too.  Eat heart-healthy foods.  Be active. Ask your doctor what type of exercise is safe for you.  Stay at a healthy weight. Lose weight if you need to.  Manage other health problems, such as diabetes. If you think you may have a problem with alcohol or drug use, talk to your doctor.  Get vaccinated against COVID-19, the flu, and pneumonia.    Surgery or stenting  Surgery in the arteries is called carotid endarterectomy. The doctor makes a cut in the neck and takes the plaque out of the artery.    Some people have a stent placed inside a carotid artery. A stent may be inserted during a catheter procedure. In this procedure, a doctor puts a thin tube, called a catheter, into a blood vessel in your groin or arm. The doctor threads the tube up to the carotid artery in the neck. The catheter is used to place the stent. In another type of procedure, a stent is placed in the artery through a cut in the neck.    Surgery and stenting may help lower your risk of a stroke. But they also have a risk of  "serious problems. You and your doctor can decide together if you want to have surgery or stenting.    Current as of: June 24, 2023  Author: [employer] StaffYou are leaving this website for information purposes only  Clinical Review BoardYou are leaving this website for information purposes only  All [employer] education is reviewed by a team that includes physicians, nurses, advanced practitioners, registered dieticians, and other healthcare professionals.    Your risk factors for stroke or TIA (transient ischemic attack):     Your Risk Factors Your Results Goals Additional education   Please scan QR code   [] High blood pressure /73 (BP Location: Right arm)   Pulse 57   Temp 97.5  F (36.4  C)   Resp 16   SpO2 97%    Less than 120/80    [] Cholesterol            Total  Cholesterol   Date Value Ref Range Status   03/29/2024 113 <200 mg/dL Final    Less than 150     Triglycerides   Triglycerides   Date Value Ref Range Status   03/29/2024 82 <150 mg/dL Final    Less than 150     LDL LDL Cholesterol Calculated   Date Value Ref Range Status   03/29/2024 53 <=100 mg/dL Final      Less than 70     HDL Direct Measure HDL   Date Value Ref Range Status   03/29/2024 44 >=40 mg/dL Final    Greater than 40 (men)  Greater than 50 (women)    [] Diabetes                A1C Hemoglobin A1C   Date Value Ref Range Status   04/08/2024 6.7 (H) <5.7 % Final     Comment:     Normal <5.7%   Prediabetes 5.7-6.4%    Diabetes 6.5% or higher     Note: Adopted from ADA consensus guidelines.    Less than 5.7    [] Smoking/tobacco use   Tobacco Use      Smoking status: Never      Smokeless tobacco: Never   Quit smoking and tobacco    [] Overweight Estimated body mass index is 29.03 kg/m  as calculated from the following:    Height as of 4/17/24: 6' 1\" (1.854 m).    Weight as of 4/17/24: 220 lb (99.8 kg).  Less than 25                  "

## 2025-03-04 NOTE — PROGRESS NOTES
Carotid Disease Education Note    The following information has been reviewed with the patient and family:    Warning signs of stroke  Calling 911 if having warning signs of stroke  Patient's risk factors for stroke:    high blood pressure     high cholesterol     diabetes     overweight     physical inactivity  Medications: Aspirin and Statin  Carotid surgery instructions: N/A      Educational Barriers: No barriers  Learner's response to risk factors / lifestyle modification education: taking steps

## 2025-03-04 NOTE — Clinical Note
Needs 1 year follow up around 3/4/26 with Dr. Garcia and carotid US prior.  1 year follow up carotid stenosis, hx left endart

## 2025-03-04 NOTE — PROGRESS NOTES
VASCULAR MEDICINE PROGRESS NOTE          LOCATION:  [facility]       Date of Service: 3/4/2025      Primary Care Provider: Christopher Banda      Reason for the visit/chief complaint:   Annual surveillance for carotid artery disease status post left carotid CEA      Subjective:  Maikol Glaser is a pleasant 84 year old male who presents to our Vascular Medicine clinic to follow up. He is accompanied by his son.    I first met Mr. Glaser last year 3/4/2024.  He was previously following up with vascular surgery.      He has past medical history of carotid artery stenosis status post left carotid endarterectomy in August 12, 2021 for symptomatic disease/left CVA (multiple small strokes on MRI in the postop with residual some right arm weakness), parkinson's disease, type 2 diabetes mellitus and dyslipidemia.     Denies any strokelike symptoms, TIA or amaurosis fugax within the past year.   He uses a walker and wheelchair for ambulation. No major new health change over the past year.             Past medical history, surgical history, medications, family history, social history and allergies were reviewed. Pertinent points mentioned under HPI.        OBJECTIVE:    Vital signs:  /73 (BP Location: Right arm)   Pulse 57   Temp 97.5  F (36.4  C)   Resp 16   SpO2 97%   Wt Readings from Last 1 Encounters:   04/17/24 220 lb (99.8 kg)     There is no height or weight on file to calculate BMI.      Physical exam:  General appearance: Pleasant male in no apparent distress.    Sitting in a wheelchair.  HEENT: NC/AT.    Neck: Carotids +2/2 bilaterally without bruits.  No jugular venous distension.   Heart: RRR. Normal S1, S2.   Chest: Clear to auscultation bilaterally.  Abdomen: Difficult to examine in a sitting position.  Extremities: Bilateral pitting edema (chronic per patient)  Skin: Normal color and temperature.  Neurological: Alert, awake and oriented            DIAGNOSTIC STUDIES:    Labs and diagnostics reviewed including outside records. Pertinent points are mentioned under HPI and assessment and plan sections.        ASSESSMENT AND PLAN:    Stable carotid artery disease  Status post left carotid endarterectomy August 2021 for symptomatic disease/left CVA  Postoperative multiple small strokes  Parkinson's disease    Ultrasound today is stable with less than 50% stenosis on bilateral ICA.  He is no longer on aspirin only on beta-blocker/Plavix.  Continues on simvastatin 20 mg.  LDL previously at goal, due for annual lipid panel soon this year.      Recommendations:  Stable disease.  Continue current medical management with clopidogrel and simvastatin 20 mg.  LDL at goal last year.  Follow-up in 1 year with ultrasound carotid prior.     It was a pleasure meeting with Mr. Glaser and his son again in our clinic today.       Vidhi Garcia MD, [facility]  Vascular Medicine  March 4, 2025

## 2025-03-27 ENCOUNTER — LAB REQUISITION (OUTPATIENT)
Dept: LAB | Facility: CLINIC | Age: 85
End: 2025-03-27

## 2025-03-27 DIAGNOSIS — E78.5 HYPERLIPIDEMIA, UNSPECIFIED: ICD-10-CM

## 2025-03-27 DIAGNOSIS — E11.22 TYPE 2 DIABETES MELLITUS WITH DIABETIC CHRONIC KIDNEY DISEASE (H): ICD-10-CM

## 2025-03-27 LAB
ANION GAP SERPL CALCULATED.3IONS-SCNC: 15 MMOL/L (ref 7–15)
BUN SERPL-MCNC: 47.1 MG/DL (ref 8–23)
CALCIUM SERPL-MCNC: 9.6 MG/DL (ref 8.8–10.4)
CHLORIDE SERPL-SCNC: 101 MMOL/L (ref 98–107)
CHOLEST SERPL-MCNC: 132 MG/DL
CREAT SERPL-MCNC: 1.6 MG/DL (ref 0.67–1.17)
EGFRCR SERPLBLD CKD-EPI 2021: 42 ML/MIN/1.73M2
FASTING STATUS PATIENT QL REPORTED: ABNORMAL
FASTING STATUS PATIENT QL REPORTED: ABNORMAL
GLUCOSE SERPL-MCNC: 140 MG/DL (ref 70–99)
HCO3 SERPL-SCNC: 21 MMOL/L (ref 22–29)
HDLC SERPL-MCNC: 38 MG/DL
LDLC SERPL CALC-MCNC: 56 MG/DL
NONHDLC SERPL-MCNC: 94 MG/DL
POTASSIUM SERPL-SCNC: 4.8 MMOL/L (ref 3.4–5.3)
SODIUM SERPL-SCNC: 137 MMOL/L (ref 135–145)
TRIGL SERPL-MCNC: 191 MG/DL

## 2025-03-27 PROCEDURE — 82465 ASSAY BLD/SERUM CHOLESTEROL: CPT | Performed by: FAMILY MEDICINE

## 2025-03-27 PROCEDURE — 80048 BASIC METABOLIC PNL TOTAL CA: CPT | Performed by: FAMILY MEDICINE

## 2025-03-27 PROCEDURE — 80061 LIPID PANEL: CPT | Performed by: FAMILY MEDICINE

## 2025-03-27 PROCEDURE — 82565 ASSAY OF CREATININE: CPT | Performed by: FAMILY MEDICINE

## 2025-08-07 ENCOUNTER — LAB REQUISITION (OUTPATIENT)
Dept: LAB | Facility: CLINIC | Age: 85
End: 2025-08-07

## 2025-08-07 DIAGNOSIS — D64.9 ANEMIA, UNSPECIFIED: ICD-10-CM

## 2025-08-07 DIAGNOSIS — R41.9 UNSPECIFIED SYMPTOMS AND SIGNS INVOLVING COGNITIVE FUNCTIONS AND AWARENESS: ICD-10-CM

## 2025-08-11 LAB
ANION GAP SERPL CALCULATED.3IONS-SCNC: 12 MMOL/L (ref 7–15)
BUN SERPL-MCNC: 24.1 MG/DL (ref 8–23)
CALCIUM SERPL-MCNC: 9.1 MG/DL (ref 8.8–10.4)
CHLORIDE SERPL-SCNC: 109 MMOL/L (ref 98–107)
CREAT SERPL-MCNC: 1.06 MG/DL (ref 0.67–1.17)
EGFRCR SERPLBLD CKD-EPI 2021: 70 ML/MIN/1.73M2
ERYTHROCYTE [DISTWIDTH] IN BLOOD BY AUTOMATED COUNT: 14 % (ref 10–15)
FERRITIN SERPL-MCNC: 135 NG/ML (ref 31–409)
FOLATE SERPL-MCNC: 19 NG/ML (ref 4.6–34.8)
GLUCOSE SERPL-MCNC: 223 MG/DL (ref 70–99)
HCO3 SERPL-SCNC: 20 MMOL/L (ref 22–29)
HCT VFR BLD AUTO: 42.2 % (ref 40–53)
HGB BLD-MCNC: 13.1 G/DL (ref 13.3–17.7)
IRON BINDING CAPACITY (ROCHE): 271 UG/DL (ref 240–430)
IRON SATN MFR SERPL: 28 % (ref 15–46)
IRON SERPL-MCNC: 76 UG/DL (ref 61–157)
MCH RBC QN AUTO: 28.7 PG (ref 26.5–33)
MCHC RBC AUTO-ENTMCNC: 31 G/DL (ref 31.5–36.5)
MCV RBC AUTO: 93 FL (ref 78–100)
PLATELET # BLD AUTO: 231 10E3/UL (ref 150–450)
POTASSIUM SERPL-SCNC: 4.4 MMOL/L (ref 3.4–5.3)
RBC # BLD AUTO: 4.56 10E6/UL (ref 4.4–5.9)
SODIUM SERPL-SCNC: 141 MMOL/L (ref 135–145)
TSH SERPL DL<=0.005 MIU/L-ACNC: 26.3 UIU/ML (ref 0.3–4.2)
VIT B12 SERPL-MCNC: 714 PG/ML (ref 232–1245)
WBC # BLD AUTO: 9.8 10E3/UL (ref 4–11)

## 2025-08-11 PROCEDURE — 36415 COLL VENOUS BLD VENIPUNCTURE: CPT | Performed by: INTERNAL MEDICINE

## 2025-08-11 PROCEDURE — 82728 ASSAY OF FERRITIN: CPT | Performed by: INTERNAL MEDICINE

## 2025-08-11 PROCEDURE — P9604 ONE-WAY ALLOW PRORATED TRIP: HCPCS | Performed by: INTERNAL MEDICINE

## 2025-08-11 PROCEDURE — 85041 AUTOMATED RBC COUNT: CPT | Performed by: INTERNAL MEDICINE

## 2025-08-11 PROCEDURE — 83540 ASSAY OF IRON: CPT | Performed by: INTERNAL MEDICINE

## 2025-08-11 PROCEDURE — 82746 ASSAY OF FOLIC ACID SERUM: CPT | Performed by: INTERNAL MEDICINE

## 2025-08-11 PROCEDURE — 84443 ASSAY THYROID STIM HORMONE: CPT | Performed by: INTERNAL MEDICINE

## 2025-08-11 PROCEDURE — 82607 VITAMIN B-12: CPT | Performed by: INTERNAL MEDICINE

## 2025-08-11 PROCEDURE — 80048 BASIC METABOLIC PNL TOTAL CA: CPT | Performed by: INTERNAL MEDICINE

## (undated) DEVICE — GLOVE BIOGEL PI ULTRATOUCH G SZ 6.5 42165

## (undated) DEVICE — SU PROLENE 7-0 BV-1DA 4X18" M8701

## (undated) DEVICE — SHUNT CAROTID ARGYLE

## (undated) DEVICE — Device

## (undated) DEVICE — CUSTOM PACK CAROTID ENDARTERECTOMY PR

## (undated) DEVICE — SU PROLENE 6-0 BV-1DA 18" 8709H

## (undated) DEVICE — PLATE GROUNDING ADULT W/CORD 9165L

## (undated) DEVICE — BLADE KNIFE SURG 11 371111

## (undated) DEVICE — DRSG TEGADERM 4X4 3/4" 1626W

## (undated) DEVICE — SU MONOCRYL+ 4-0 18IN PS2 UND MCP496G

## (undated) DEVICE — LOOP RETRACT-O-TAPE 16GA X 18 QUEST 1012

## (undated) DEVICE — SOL WATER IRRIG 1000ML BOTTLE 2F7114

## (undated) DEVICE — SU ETHILON 3-0 PS-2 18" 1669H

## (undated) DEVICE — ESU PENCIL SMOKE EVAC W/ROCKER SWITCH 0703-047-000

## (undated) DEVICE — SOL NACL 0.9% IRRIG 1000ML BOTTLE 2F7124

## (undated) DEVICE — WIPE MICRO-TIP GRAPHIC CONTROL 3300

## (undated) DEVICE — DRSG GAUZE 2X2" 8042

## (undated) DEVICE — SUCTION MANIFOLD NEPTUNE 2 SYS 1 PORT 702-025-000

## (undated) DEVICE — SUTURE VICRYL+ 2-0 27IN SH UND VCP417H

## (undated) DEVICE — IOM CASE FLAT FEE

## (undated) DEVICE — DRAPE IOBAN INCISE 23X17" 6650EZ

## (undated) DEVICE — SU PROLENE 6-0 BV-1 DA 24" 8805H

## (undated) DEVICE — SU PROLENE 7-0 BV-1DA 18" 8701H

## (undated) DEVICE — DRAIN FLAT 10MM FULL PERF SIL 0070440

## (undated) DEVICE — NEEDLE HYPO 22X1-1/2 SAFETY 305900

## (undated) DEVICE — GLOVE BIOGEL INDICATOR 7.5 LF 41675

## (undated) DEVICE — CLIP APPLIER 09 3/8" SM LIGACLIP MCS20

## (undated) DEVICE — MAT INST MAGNETIC 16X20

## (undated) DEVICE — IOM STANDARD SUPPLY FEE

## (undated) DEVICE — PREP CHLORAPREP W/ORANGE TINT 10.5ML 930715

## (undated) DEVICE — SU SILK 3-0 TIE 18" SA64H

## (undated) DEVICE — DECANTER BAG 2002S

## (undated) DEVICE — ADH SKIN CLOSURE PREMIERPRO EXOFIN 1.0ML 3470

## (undated) DEVICE — SUTURE SILK 0 TIES 30IN SA86G

## (undated) DEVICE — PACK MAJOR BASIN 673

## (undated) DEVICE — NEEDLE HYPO 18X1-1/2 SAFETY 305918

## (undated) DEVICE — DECANTER VIAL 2006S

## (undated) DEVICE — MARKER SURG SKIN STRL 77734

## (undated) DEVICE — SYR BULB IRRIG 50ML LATEX FREE 0035280

## (undated) DEVICE — SU SILK 2-0 FS-1 18" 685G

## (undated) DEVICE — CATH TRAY FOLEY SURESTEP 16FR DRAIN BAG STATOCK A899916

## (undated) DEVICE — GLOVE BIOGEL PI ULTRATOUCH G SZ 7.0 42170

## (undated) DEVICE — SU PROLENE 5-0 RB-1DA 18"8756H

## (undated) DEVICE — DRAIN RESERVOIR 100ML JP 0070740

## (undated) DEVICE — CLIP APPLIER 11" MED LIGACLIP MCM20

## (undated) DEVICE — DRAPE SHEET REV FOLD 3/4 9349

## (undated) DEVICE — SU VICRYL+ 3-0 27IN SH UND VCP416H

## (undated) DEVICE — SYR 10ML LL W/O NDL 302995

## (undated) DEVICE — GOWN IMPERVIOUS BREATHABLE SMART LG 89015

## (undated) RX ORDER — BUPIVACAINE HYDROCHLORIDE 2.5 MG/ML
INJECTION, SOLUTION EPIDURAL; INFILTRATION; INTRACAUDAL
Status: DISPENSED
Start: 2021-08-11

## (undated) RX ORDER — LIDOCAINE HYDROCHLORIDE 10 MG/ML
INJECTION, SOLUTION EPIDURAL; INFILTRATION; INTRACAUDAL; PERINEURAL
Status: DISPENSED
Start: 2021-08-11

## (undated) RX ORDER — HEPARIN SODIUM 1000 [USP'U]/ML
INJECTION, SOLUTION INTRAVENOUS; SUBCUTANEOUS
Status: DISPENSED
Start: 2021-08-11